# Patient Record
Sex: FEMALE | Race: WHITE | NOT HISPANIC OR LATINO | Employment: FULL TIME | URBAN - METROPOLITAN AREA
[De-identification: names, ages, dates, MRNs, and addresses within clinical notes are randomized per-mention and may not be internally consistent; named-entity substitution may affect disease eponyms.]

---

## 2017-02-02 DIAGNOSIS — F32.0 MILD MAJOR DEPRESSION (H): Primary | ICD-10-CM

## 2017-02-03 RX ORDER — SERTRALINE HYDROCHLORIDE 100 MG/1
TABLET, FILM COATED ORAL
Qty: 90 TABLET | Refills: 0 | Status: SHIPPED | OUTPATIENT
Start: 2017-02-03 | End: 2017-05-02

## 2017-02-09 ENCOUNTER — OFFICE VISIT (OUTPATIENT)
Dept: FAMILY MEDICINE | Facility: OTHER | Age: 27
End: 2017-02-09
Attending: NURSE PRACTITIONER
Payer: COMMERCIAL

## 2017-02-09 VITALS
TEMPERATURE: 98.6 F | WEIGHT: 278.4 LBS | HEIGHT: 67 IN | SYSTOLIC BLOOD PRESSURE: 128 MMHG | RESPIRATION RATE: 14 BRPM | BODY MASS INDEX: 43.7 KG/M2 | HEART RATE: 84 BPM | DIASTOLIC BLOOD PRESSURE: 80 MMHG

## 2017-02-09 DIAGNOSIS — J45.20 INTERMITTENT ASTHMA, UNCOMPLICATED: ICD-10-CM

## 2017-02-09 DIAGNOSIS — H65.192 ACUTE EFFUSION OF LEFT EAR: Primary | ICD-10-CM

## 2017-02-09 PROCEDURE — 99213 OFFICE O/P EST LOW 20 MIN: CPT | Performed by: NURSE PRACTITIONER

## 2017-02-09 RX ORDER — NEOMYCIN SULFATE, POLYMYXIN B SULFATE AND HYDROCORTISONE 10; 3.5; 1 MG/ML; MG/ML; [USP'U]/ML
3 SUSPENSION/ DROPS AURICULAR (OTIC) 4 TIMES DAILY
Qty: 5 ML | Refills: 0 | Status: SHIPPED | OUTPATIENT
Start: 2017-02-09 | End: 2017-02-16

## 2017-02-09 ASSESSMENT — ANXIETY QUESTIONNAIRES
1. FEELING NERVOUS, ANXIOUS, OR ON EDGE: SEVERAL DAYS
2. NOT BEING ABLE TO STOP OR CONTROL WORRYING: NOT AT ALL
GAD7 TOTAL SCORE: 3
3. WORRYING TOO MUCH ABOUT DIFFERENT THINGS: NOT AT ALL
5. BEING SO RESTLESS THAT IT IS HARD TO SIT STILL: NOT AT ALL
6. BECOMING EASILY ANNOYED OR IRRITABLE: SEVERAL DAYS
IF YOU CHECKED OFF ANY PROBLEMS ON THIS QUESTIONNAIRE, HOW DIFFICULT HAVE THESE PROBLEMS MADE IT FOR YOU TO DO YOUR WORK, TAKE CARE OF THINGS AT HOME, OR GET ALONG WITH OTHER PEOPLE: NOT DIFFICULT AT ALL
7. FEELING AFRAID AS IF SOMETHING AWFUL MIGHT HAPPEN: NOT AT ALL

## 2017-02-09 ASSESSMENT — PAIN SCALES - GENERAL: PAINLEVEL: MILD PAIN (3)

## 2017-02-09 ASSESSMENT — PATIENT HEALTH QUESTIONNAIRE - PHQ9: 5. POOR APPETITE OR OVEREATING: SEVERAL DAYS

## 2017-02-09 NOTE — PROGRESS NOTES
SUBJECTIVE:  Shilpi Moore, 26 year old, female presents with the following Chief Complaint(s) with HPI to follow:  Chief Complaint   Patient presents with     Ear Problem     feels plugged and pain since last wednesday     *_* Health Care Directive *_*     declined           HPI:  Shilpi presents today with the above concern. Her symptoms started last week with a sore throat and sinus discomfort. She tried a netti pot for relief of sinus congestion and discomfort. The next day, last Wednesday, she woke up with pressure and discomfort in her ears. She rates the discomfort at 3-4/10. She has tried tylenol for relief but the pressure does not change. On Tuesday she did an online doctor who told her to do Flonase for ear effusion. She has not noticed an improvement since starting the Flonase.  Her sinus symptoms have since resolved.    Asthma is well controlled on current plan.  Has not had to go to the ED or UC for exacerbation.     Patient Active Problem List   Diagnosis     Intermittent asthma     Mild major depression (H)     Vitamin D deficiency     ACP (advance care planning)       Past Medical History   Diagnosis Date     Allergic rhinitis 6/15/2011     Major depressive disorder, recurrent episode, unspecified 6/15/2011     Unspecified vitamin D deficiency 4/11/2014       Past Surgical History   Procedure Laterality Date     Other surgical history       I&D rt abscess axilla     Ent surgery       tonsillectomy     Soft tissue surgery       IND cyst right axilla       Family History   Problem Relation Age of Onset     Asthma Mother      Hypertension Father      Asthma Brother      Asthma Sister      CANCER Other 28     Cousin; Melanoma, cause of death     CANCER Maternal Grandfather 62     Mesothelioma, cause of death     Thyroid Disease No family hx of        Social History   Substance Use Topics     Smoking status: Never Smoker      Smokeless tobacco: Never Used      Comment: Passive exposure     Alcohol Use:  Yes      Comment: Beer; Occasionally       Current Outpatient Prescriptions   Medication Sig Dispense Refill     Topiramate (TOPAMAX PO) Take 25 mg by mouth 2 times daily       neomycin-polymyxin-hydrocortisone (CORTISPORIN) 3.5-95852-0 otic suspension Place 3 drops Into the left ear 4 times daily for 7 days 5 mL 0     sertraline (ZOLOFT) 100 MG tablet TAKE 1 TABLET DAILY 90 tablet 0     hydrochlorothiazide (HYDRODIURIL) 25 MG tablet TAKE 1 TABLET DAILY 90 tablet 1     cetirizine (ZYRTEC) 10 MG tablet Take 10 mg by mouth daily       fluticasone (FLONASE) 50 MCG/ACT nasal spray Spray 1-2 sprays into both nostrils daily (Patient taking differently: Spray 1-2 sprays into both nostrils daily as needed ) 16 g 0     VENTOLIN  (90 BASE) MCG/ACT inhaler USE 2 INHALATIONS INTO THE LUNGS EVERY 6 HOURS. (Patient taking differently: USE 2 INHALATIONS INTO THE LUNGS EVERY 6 HOURS.prn) 2 each 2       Allergies   Allergen Reactions     Nsaids Swelling         REVIEW OF SYSTEMS  Skin: negative for, rash  Eyes: negative for, pain, pressure, drainage  Ears/Nose/Throat: positive for earache bilaterally--see above, negative for, nasal congestion, purulent rhinorrhea, sneezing, postnasal drainage, sinus trouble, sore throat  Respiratory: No shortness of breath, dyspnea on exertion, cough, or hemoptysis  Cardiovascular: negative for, chest pain and exertional chest pain or pressure  Gastrointestinal: negative for, nausea, vomiting, abdominal pain and diarrhea  Musculoskeletal: negative for body aches  Hematologic/Lymphatic/Immunologic: negative for, chills, fever and swollen nodes      OBJECTIVE:    B/P: 128/80, T: 98.6, P: 84, R: 14, W: 278 lbs 6.4 oz, BMI: Body mass index is 43.59 kg/(m^2).  Constitutional: healthy, alert and no distress  Head: Normocephalic. No masses, lesions, tenderness or abnormalities  ENT: Left external auditory canal erythematous. Bilateral TM normal without fluid, translucent TM with positive light  reflex, neck without nodes, throat normal without erythema or exudate and sinuses nontender  Neck: neck supple, no lymphadenopathy, trachea midline, thyroid symmetrical with out nodules noted.  Carotid arteries without bruit  Cardiovascular: RRR. No murmurs, clicks gallops or rub  Respiratory:  Respirations easy, even, unlabored.. Lungs clear  Musculoskeletal: extremities normal- no gross deformities noted, gait normal and normal muscle tone  Skin: no suspicious lesions or rashes  Psychiatric: mentation appears normal and affect normal  Hematologic/Lymphatic/Immunologic: normal ant/post cervical and supraclavicular nodes      ASSESSMENT / PLAN:  1. Acute effusion of left ear  Symptomatic  - neomycin-polymyxin-hydrocortisone (CORTISPORIN) 3.5-65661-9 otic suspension; Place 3 drops Into the left ear 4 times daily for 7 days  Dispense: 5 mL; Refill: 0  -If worsening call the clinic. If symptoms do not improve in 6-8 weeks consider referral to ENT.    2. Intermittent asthma, uncomplicated  Chronic  -Continue with zyrtec for prevention of asthma flares.        Follow-up as needed for acute concerns    MALIKA Mayo-Student    Patient is seen in conjunction with NP student.  History is reviewed with patient and pertinent portions of the exam are repeated.  Assessment and plan is reviewed with the patient.    Thalia Landaverde,   Certified Adult Nurse Practitioner  [phone]

## 2017-02-09 NOTE — PATIENT INSTRUCTIONS
ASSESSMENT/PLAN:  1. Acute effusion of left ear  Symptomatic  - continue zyrtec and flonase  - neomycin-polymyxin-hydrocortisone (CORTISPORIN) 3.5-37091-9 otic suspension; Place 3 drops Into the left ear 4 times daily for 7 days  Dispense: 5 mL; Refill: 0    2. Intermittent asthma, uncomplicated  Chronic  Continue current plan  Asthma action plan and control test are updated today      Increase fluids and rest.   Follow up if symptoms do not improve or worsen    Thalia Landaverde,   Certified Adult Nurse Practitioner  [phone]    My Asthma Action Plan  Name: Shilpi Moore   YOB: 1990  Date: 2/9/2017   My doctor: Tiana Tucker   My clinic: [facility]      My Control Medicine: zyrtec          Dose: 10 mg  My Rescue Medicine: Albuterol (Proair/Ventolin/Proventil) HFA        Dose: prn  My Asthma Severity: intermittent  Avoid your asthma triggers: smoke, upper respiratory infections, dust mites, pollens, animal dander, aspirin, strong odors and fumes, exercise or sports and cold air        GREEN ZONE   Good Control    I feel good    No cough or wheeze    Can work, sleep and play without asthma symptoms       Take your asthma control medicine every day.     1. If exercise triggers your asthma, take your rescue medication    15 minutes before exercise or sports, and    During exercise if you have asthma symptoms  2. Spacer to use with inhaler: If you have a spacer, make sure to use it with your inhaler             YELLOW ZONE Getting Worse  I have ANY of these:    I do not feel good    Cough or wheeze    Chest feels tight    Wake up at night   1. Keep taking your Green Zone medications  2. Start taking your rescue medicine:    every 20 minutes for up to 1 hour. Then every 4 hours for 24-48 hours.  3. If you stay in the Yellow Zone for more than 12-24 hours, contact your doctor.  4. If you do not return to the Green Zone in 12-24 hours or you get worse, start taking your oral steroid  medicine if prescribed by your provider.           RED ZONE Medical Alert - Get Help  I have ANY of these:    I feel awful    Medicine is not helping    Breathing getting harder    Trouble walking or talking    Nose opens wide to breathe       1. Take your rescue medicine NOW  2. If your provider has prescribed an oral steroid medicine, start taking it NOW  3. Call your doctor NOW  4. If you are still in the Red Zone after 20 minutes and you have not reached your doctor:    Take your rescue medicine again and    Call 911 or go to the emergency room right away    See your regular doctor within 2 weeks of an Emergency Room or Urgent Care visit for follow-up treatment.        The above medication may be given at school or day care?: Yes and N/A (Adult Patient)  Child can carry and use inhaler(s) at school with approval of school nurse?: Yes and N/A (Adult Patient)    Electronically signed by: TRANG LARSON, February 9, 2017    Annual Reminders:  Meet with Asthma Educator,  Flu Shot in the Fall, consider Pneumonia Vaccination for patients with asthma (aged 19 and older).    Pharmacy:    [facility] [id number] [place] [address]  [facility] [address]  [facility] [address] [facility] STORE [address] [place], [place] [id number] AT [facility] [address]                    Asthma Triggers  How To Control Things That Make Your Asthma Worse    Triggers are things that make your asthma worse.  Look at the list below to help you find your triggers and what you can do about them.  You can help prevent asthma flare-ups by staying away from your triggers.      Trigger                                                          What you can do   Cigarette Smoke  Tobacco smoke can make asthma worse. Do not allow smoking in your home, car or around you.  Be sure no one smokes at a child s day care or school.  If you  smoke, ask your health care provider for ways to help you quit.  Ask family members to quit too.  Ask your health care provider for a referral to Quit Plan to help you quit smoking, or call 2-265-995-PLAN.     Colds, Flu, Bronchitis  These are common triggers of asthma. Wash your hands often.  Don t touch your eyes, nose or mouth.  Get a flu shot every year.     Dust Mites  These are tiny bugs that live in cloth or carpet. They are too small to see. Wash sheets and blankets in hot water every week.   Encase pillows and mattress in dust mite proof covers.  Avoid having carpet if you can. If you have carpet, vacuum weekly.   Use a dust mask and HEPA vacuum.   Pollen and Outdoor Mold  Some people are allergic to trees, grass, or weed pollen, or molds. Try to keep your windows closed.  Limit time out doors when pollen count is high.   Ask you health care provider about taking medicine during allergy season.     Animal Dander  Some people are allergic to skin flakes, urine or saliva from pets with fur or feathers. Keep pets with fur or feathers out of your home.    If you can t keep the pet outdoors, then keep the pet out of your bedroom.  Keep the bedroom door closed.  Keep pets off cloth furniture and away from stuffed toys.     Mice, Rats, and Cockroaches  Some people are allergic to the waste from these pests.   Cover food and garbage.  Clean up spills and food crumbs.  Store grease in the refrigerator.   Keep food out of the bedroom.   Indoor Mold  This can be a trigger if your home has high moisture. Fix leaking faucets, pipes, or other sources of water.   Clean moldy surfaces.  Dehumidify basement if it is damp and smelly.   Smoke, Strong Odors, and Sprays  These can reduce air quality. Stay away from strong odors and sprays, such as perfume, powder, hair spray, paints, smoke incense, paint, cleaning products, candles and new carpet.   Exercise or Sports  Some people with asthma have this trigger. Be active!  Ask  your doctor about taking medicine before sports or exercise to prevent symptoms.    Warm up for 5-10 minutes before and after sports or exercise.     Other Triggers of Asthma  Cold air:  Cover your nose and mouth with a scarf.  Sometimes laughing or crying can be a trigger.  Some medicines and food can trigger asthma.

## 2017-02-09 NOTE — PROGRESS NOTES
CHIEF COMPLAINT:  Chief Complaint   Patient presents with     Ear Problem     feels plugged and pain since last wednesday     *_* Health Care Directive *_*     declined        SUBJECTIVE:   Shilpi Moore  is here today because of:{FVC CCI:516579}  The patient has had symptoms of {FVC URI SYMPTOMS:128793}.   Onset of symptoms was {NUMBERS 1-16:10} {TIME UNITS:11} ago. Course of illness is {STATUS:270946}.  Patient {has/denies:5300} exposure to illness at home or work/school.   Patient denies {URI Sx:762842}  Treatment measures tried include {URI TREATMENTS TRIED:5012}.  Patient {IS/ARE:5283} a smoker      {additional problems for provider to add:934608}      Past Medical History   Diagnosis Date     Allergic rhinitis 6/15/2011     Major depressive disorder, recurrent episode, unspecified 6/15/2011     Unspecified vitamin D deficiency 4/11/2014     Past Surgical History   Procedure Laterality Date     Other surgical history       I&D rt abscess axilla     Ent surgery       tonsillectomy     Soft tissue surgery       IND cyst right axilla     Current Outpatient Prescriptions   Medication Sig Dispense Refill     Topiramate (TOPAMAX PO) Take 25 mg by mouth 2 times daily       neomycin-polymyxin-hydrocortisone (CORTISPORIN) 3.5-52475-6 otic suspension Place 3 drops Into the left ear 4 times daily for 7 days 5 mL 0     sertraline (ZOLOFT) 100 MG tablet TAKE 1 TABLET DAILY 90 tablet 0     hydrochlorothiazide (HYDRODIURIL) 25 MG tablet TAKE 1 TABLET DAILY 90 tablet 1     cetirizine (ZYRTEC) 10 MG tablet Take 10 mg by mouth daily       fluticasone (FLONASE) 50 MCG/ACT nasal spray Spray 1-2 sprays into both nostrils daily (Patient taking differently: Spray 1-2 sprays into both nostrils daily as needed ) 16 g 0     VENTOLIN  (90 BASE) MCG/ACT inhaler USE 2 INHALATIONS INTO THE LUNGS EVERY 6 HOURS. (Patient taking differently: USE 2 INHALATIONS INTO THE LUNGS EVERY 6 HOURS.prn) 2 each 2      Allergies   Allergen  "Reactions     Nsaids Swelling       Family and Social History are reviewed.    REVIEW OF SYSTEMS  Skin: {Skin:100::\"negative\"}  Eyes: {Eyes:200::\"negative\"}  Ears/Nose/Throat: {ENT:300::\"negative\"}  Respiratory: {Resp:400::\"No shortness of breath, dyspnea on exertion, cough, or hemoptysis\"}  Cardiovascular: {CV:500::\"negative\"}  Gastrointestinal: {GI:600::\"negative\"}  Genitourinary: {:700::\"negative\"}  Musculoskeletal: {Musc:800::\"negative\"}  Neurologic: {Neur:900::\"negative\"}  Psychiatric: {Psych:1000::\"negative\"}  Hematologic/Lymphatic/Immunologic: {Hem:1100::\"negative\"}  Endocrine: {Endo:1200::\"negative\"}    OBJECTIVE:   Vital signs:/80 mmHg  Pulse 84  Temp(Src) 98.6  F (37  C) (Tympanic)  Resp 14  Ht 5' 7\" (1.702 m)  Wt 278 lb 6.4 oz (126.281 kg)  BMI 43.59 kg/m2   General: {GENERAL APPEARANCE:50::\"healthy\",\"alert\",\"no distress\"}  Skin is unremarkable.  HEENT: {ENT EXAM:5032::\"ENT exam normal, no neck nodes or sinus tenderness\"}.  Lungs {CHEST EXAM:5033::\"chest clear to IPPA\",\"no tachypnea, retractions or cyanosis\",\"S1, S2 normal, no murmur, no gallop, rate regular\"}  Abdomen: ***  Rapid Strep Test {IS/ARE:5283} performed    LABS AND IMAGING  Results for orders placed or performed in visit on 03/07/16   Basic metabolic panel   Result Value Ref Range    Sodium 140 133 - 144 mmol/L    Potassium 3.4 3.4 - 5.3 mmol/L    Chloride 103 94 - 109 mmol/L    Carbon Dioxide 28 20 - 32 mmol/L    Anion Gap 9 3 - 14 mmol/L    Glucose 93 70 - 99 mg/dL    Urea Nitrogen 9 7 - 30 mg/dL    Creatinine 0.76 0.52 - 1.04 mg/dL    GFR Estimate >90  Non  GFR Calc   >60 mL/min/1.7m2    GFR Estimate If Black >90   GFR Calc   >60 mL/min/1.7m2    Calcium 8.6 8.5 - 10.1 mg/dL   TSH   Result Value Ref Range    TSH 2.13 0.40 - 4.00 mU/L       ASSESSMENT/PLAN:  1. Acute effusion of left ear  Symptomatic  - continue zyrtec and flonase  - neomycin-polymyxin-hydrocortisone (CORTISPORIN) 3.5-57812-8 otic " suspension; Place 3 drops Into the left ear 4 times daily for 7 days  Dispense: 5 mL; Refill: 0    2. Intermittent asthma, uncomplicated  Chronic  Continue current plan  Asthma action plan and control test are updated today      Increase fluids and rest.   Follow up if symptoms do not improve or worsen    Thalia Landaverde,   Certified Adult Nurse Practitioner  [phone]

## 2017-02-09 NOTE — NURSING NOTE
"Chief Complaint   Patient presents with     Ear Problem     feels plugged and pain since last wednesday     *_* Health Care Directive *_*     declined        Initial /80 mmHg  Pulse 84  Temp(Src) 98.6  F (37  C) (Tympanic)  Resp 14  Ht 5' 7\" (1.702 m)  Wt 278 lb 6.4 oz (126.281 kg)  BMI 43.59 kg/m2 Estimated body mass index is 43.59 kg/(m^2) as calculated from the following:    Height as of this encounter: 5' 7\" (1.702 m).    Weight as of this encounter: 278 lb 6.4 oz (126.281 kg).  Medication Reconciliation: andra LARSON      "

## 2017-02-09 NOTE — MR AVS SNAPSHOT
After Visit Summary   2/9/2017    Shilpi Moore    MRN: [id number]           Patient Information     Date Of Birth          1990        Visit Information        Provider Department      2/9/2017 4:15 PM Thalia Landaverde NP [facility]        Today's Diagnoses     Acute effusion of left ear    -  1     Intermittent asthma, uncomplicated           Care Instructions      ASSESSMENT/PLAN:  1. Acute effusion of left ear  Symptomatic  - continue zyrtec and flonase  - neomycin-polymyxin-hydrocortisone (CORTISPORIN) 3.5-54063-0 otic suspension; Place 3 drops Into the left ear 4 times daily for 7 days  Dispense: 5 mL; Refill: 0    2. Intermittent asthma, uncomplicated  Chronic  Continue current plan  Asthma action plan and control test are updated today      Increase fluids and rest.   Follow up if symptoms do not improve or worsen    Thalia Landaverde,   Certified Adult Nurse Practitioner  [phone]    My Asthma Action Plan  Name: Shilpi Moore   YOB: 1990  Date: 2/9/2017   My doctor: Tiana Tucker   My clinic: [facility]      My Control Medicine: zyrtec          Dose: 10 mg  My Rescue Medicine: Albuterol (Proair/Ventolin/Proventil) HFA        Dose: prn  My Asthma Severity: intermittent  Avoid your asthma triggers: smoke, upper respiratory infections, dust mites, pollens, animal dander, aspirin, strong odors and fumes, exercise or sports and cold air        GREEN ZONE   Good Control    I feel good    No cough or wheeze    Can work, sleep and play without asthma symptoms       Take your asthma control medicine every day.     1. If exercise triggers your asthma, take your rescue medication    15 minutes before exercise or sports, and    During exercise if you have asthma symptoms  2. Spacer to use with inhaler: If you have a spacer, make sure to use it with your inhaler             YELLOW ZONE Getting Worse  I have ANY of these:    I do not feel  good    Cough or wheeze    Chest feels tight    Wake up at night   1. Keep taking your Green Zone medications  2. Start taking your rescue medicine:    every 20 minutes for up to 1 hour. Then every 4 hours for 24-48 hours.  3. If you stay in the Yellow Zone for more than 12-24 hours, contact your doctor.  4. If you do not return to the Green Zone in 12-24 hours or you get worse, start taking your oral steroid medicine if prescribed by your provider.           RED ZONE Medical Alert - Get Help  I have ANY of these:    I feel awful    Medicine is not helping    Breathing getting harder    Trouble walking or talking    Nose opens wide to breathe       1. Take your rescue medicine NOW  2. If your provider has prescribed an oral steroid medicine, start taking it NOW  3. Call your doctor NOW  4. If you are still in the Red Zone after 20 minutes and you have not reached your doctor:    Take your rescue medicine again and    Call 911 or go to the emergency room right away    See your regular doctor within 2 weeks of an Emergency Room or Urgent Care visit for follow-up treatment.        The above medication may be given at school or day care?: Yes and N/A (Adult Patient)  Child can carry and use inhaler(s) at school with approval of school nurse?: Yes and N/A (Adult Patient)    Electronically signed by: TRANG LARSON, February 9, 2017    Annual Reminders:  Meet with Asthma Educator,  Flu Shot in the Fall, consider Pneumonia Vaccination for patients with asthma (aged 19 and older).    Pharmacy:    [facility] [address] [place] [facility] [address]  [facility] - [address]  [facility] - [address] [facility] STORE [address] - [facility] [address] AT [facility] [address]                    Asthma Triggers  How To Control Things That Make Your Asthma Worse    Triggers are things that make your asthma worse.  Look at  the list below to help you find your triggers and what you can do about them.  You can help prevent asthma flare-ups by staying away from your triggers.      Trigger                                                          What you can do   Cigarette Smoke  Tobacco smoke can make asthma worse. Do not allow smoking in your home, car or around you.  Be sure no one smokes at a child s day care or school.  If you smoke, ask your health care provider for ways to help you quit.  Ask family members to quit too.  Ask your health care provider for a referral to Quit Plan to help you quit smoking, or call 2-433-265-PLAN.     Colds, Flu, Bronchitis  These are common triggers of asthma. Wash your hands often.  Don t touch your eyes, nose or mouth.  Get a flu shot every year.     Dust Mites  These are tiny bugs that live in cloth or carpet. They are too small to see. Wash sheets and blankets in hot water every week.   Encase pillows and mattress in dust mite proof covers.  Avoid having carpet if you can. If you have carpet, vacuum weekly.   Use a dust mask and HEPA vacuum.   Pollen and Outdoor Mold  Some people are allergic to trees, grass, or weed pollen, or molds. Try to keep your windows closed.  Limit time out doors when pollen count is high.   Ask you health care provider about taking medicine during allergy season.     Animal Dander  Some people are allergic to skin flakes, urine or saliva from pets with fur or feathers. Keep pets with fur or feathers out of your home.    If you can t keep the pet outdoors, then keep the pet out of your bedroom.  Keep the bedroom door closed.  Keep pets off cloth furniture and away from stuffed toys.     Mice, Rats, and Cockroaches  Some people are allergic to the waste from these pests.   Cover food and garbage.  Clean up spills and food crumbs.  Store grease in the refrigerator.   Keep food out of the bedroom.   Indoor Mold  This can be a trigger if your home has high moisture. Fix  leaking faucets, pipes, or other sources of water.   Clean moldy surfaces.  Dehumidify basement if it is damp and smelly.   Smoke, Strong Odors, and Sprays  These can reduce air quality. Stay away from strong odors and sprays, such as perfume, powder, hair spray, paints, smoke incense, paint, cleaning products, candles and new carpet.   Exercise or Sports  Some people with asthma have this trigger. Be active!  Ask your doctor about taking medicine before sports or exercise to prevent symptoms.    Warm up for 5-10 minutes before and after sports or exercise.     Other Triggers of Asthma  Cold air:  Cover your nose and mouth with a scarf.  Sometimes laughing or crying can be a trigger.  Some medicines and food can trigger asthma.           Follow-ups after your visit        Who to contact     If you have questions or need follow up information about today's clinic visit or your schedule please contact [facility] directly at [phone].  Normal or non-critical lab and imaging results will be communicated to you by Trovhart, letter or phone within 4 business days after the clinic has received the results. If you do not hear from us within 7 days, please contact the clinic through [employer] or phone. If you have a critical or abnormal lab result, we will notify you by phone as soon as possible.  Submit refill requests through [employer] or call your pharmacy and they will forward the refill request to us. Please allow 3 business days for your refill to be completed.          Additional Information About Your Visit        [employer] Information     [employer] gives you secure access to your electronic health record. If you see a primary care provider, you can also send messages to your care team and make appointments. If you have questions, please call your primary care clinic.  If you do not have a primary care provider, please call [phone] and they will assist you.        Care EveryWhere ID     This is your  "Care EveryWhere ID. This could be used by other organizations to access your [facility] medical records  [id number]        Your Vitals Were     Pulse Temperature Respirations Height BMI (Body Mass Index)       84 98.6  F (37  C) (Tympanic) 14 5' 7\" (1.702 m) 43.59 kg/m2        Blood Pressure from Last 3 Encounters:   02/09/17 128/80   10/03/16 124/76   06/06/16 138/74    Weight from Last 3 Encounters:   02/09/17 278 lb 6.4 oz (126.281 kg)   10/03/16 292 lb 9.6 oz (132.722 kg)   06/06/16 300 lb (136.079 kg)              Today, you had the following     No orders found for display         Today's Medication Changes          These changes are accurate as of: 2/9/17  4:37 PM.  If you have any questions, ask your nurse or doctor.               Start taking these medicines.        Dose/Directions    neomycin-polymyxin-hydrocortisone 3.5-41335-7 otic suspension   Commonly known as:  CORTISPORIN   Used for:  Acute effusion of left ear   Started by:  Thalia Landaverde, NP        Dose:  3 drop   Place 3 drops Into the left ear 4 times daily for 7 days   Quantity:  5 mL   Refills:  0         These medicines have changed or have updated prescriptions.        Dose/Directions    fluticasone 50 MCG/ACT spray   Commonly known as:  FLONASE   This may have changed:    - when to take this  - reasons to take this   Used for:  Acute maxillary sinusitis, recurrence not specified        Dose:  1-2 spray   Spray 1-2 sprays into both nostrils daily   Quantity:  16 g   Refills:  0       VENTOLIN  (90 BASE) MCG/ACT Inhaler   This may have changed:  See the new instructions.   Used for:  Mild intermittent asthma without complication   Generic drug:  albuterol        USE 2 INHALATIONS INTO THE LUNGS EVERY 6 HOURS.   Quantity:  2 each   Refills:  2         Stop taking these medicines if you haven't already. Please contact your care team if you have questions.     cyclobenzaprine 10 MG tablet   Commonly known as:  FLEXERIL   Stopped " by:  Thalia Landaverde NP           traMADol 50 MG tablet   Commonly known as:  ULTRAM   Stopped by:  Thalia Landaverde NP                Where to get your medicines      These medications were sent to [place] [id number] [place] [facility] [address] [place] [address]  [facility] [address]     Phone:  [phone]    - neomycin-polymyxin-hydrocortisone 3.5-39693-9 otic suspension             Primary Care Provider Office Phone # Fax #    Tiana GamboaCHRIS underwood [phone]       [address] [address]        Thank you!     Thank you for choosing [facility]  for your care. Our goal is always to provide you with excellent care. Hearing back from our patients is one way we can continue to improve our services. Please take a few minutes to complete the written survey that you may receive in the mail after your visit with us. Thank you!             Your Updated Medication List - Protect others around you: Learn how to safely use, store and throw away your medicines at www.disposemymeds.org.          This list is accurate as of: 2/9/17  4:37 PM.  Always use your most recent med list.                   Brand Name Dispense Instructions for use    cetirizine 10 MG tablet    zyrTEC     Take 10 mg by mouth daily       fluticasone 50 MCG/ACT spray    FLONASE    16 g    Spray 1-2 sprays into both nostrils daily       hydrochlorothiazide 25 MG tablet    HYDRODIURIL    90 tablet    TAKE 1 TABLET DAILY       neomycin-polymyxin-hydrocortisone 3.5-88719-0 otic suspension    CORTISPORIN    5 mL    Place 3 drops Into the left ear 4 times daily for 7 days       sertraline 100 MG tablet    ZOLOFT    90 tablet    TAKE 1 TABLET DAILY       TOPAMAX PO      Take 25 mg by mouth 2 times daily       VENTOLIN  (90 BASE) MCG/ACT Inhaler   Generic drug:  albuterol     2 each    USE 2 INHALATIONS INTO THE LUNGS EVERY 6 HOURS.

## 2017-02-10 ASSESSMENT — ASTHMA QUESTIONNAIRES: ACT_TOTALSCORE: 22

## 2017-02-10 ASSESSMENT — ANXIETY QUESTIONNAIRES: GAD7 TOTAL SCORE: 3

## 2017-02-10 ASSESSMENT — PATIENT HEALTH QUESTIONNAIRE - PHQ9: SUM OF ALL RESPONSES TO PHQ QUESTIONS 1-9: 3

## 2017-04-04 ENCOUNTER — OFFICE VISIT (OUTPATIENT)
Dept: FAMILY MEDICINE | Facility: OTHER | Age: 27
End: 2017-04-04
Attending: NURSE PRACTITIONER
Payer: COMMERCIAL

## 2017-04-04 VITALS
TEMPERATURE: 97 F | HEART RATE: 68 BPM | WEIGHT: 270 LBS | SYSTOLIC BLOOD PRESSURE: 110 MMHG | RESPIRATION RATE: 14 BRPM | DIASTOLIC BLOOD PRESSURE: 80 MMHG | BODY MASS INDEX: 42.38 KG/M2 | HEIGHT: 67 IN

## 2017-04-04 DIAGNOSIS — E56.9 VITAMIN DEFICIENCY: ICD-10-CM

## 2017-04-04 DIAGNOSIS — R00.0 TACHYCARDIA, UNSPECIFIED: Primary | ICD-10-CM

## 2017-04-04 DIAGNOSIS — I10 BENIGN ESSENTIAL HYPERTENSION: ICD-10-CM

## 2017-04-04 LAB
ANION GAP SERPL CALCULATED.3IONS-SCNC: 12 MMOL/L (ref 3–14)
BASOPHILS # BLD AUTO: 0.1 10E9/L (ref 0–0.2)
BASOPHILS NFR BLD AUTO: 0.8 %
BUN SERPL-MCNC: 12 MG/DL (ref 7–30)
CALCIUM SERPL-MCNC: 9.2 MG/DL (ref 8.5–10.1)
CHLORIDE SERPL-SCNC: 107 MMOL/L (ref 94–109)
CHOLEST SERPL-MCNC: 187 MG/DL
CO2 SERPL-SCNC: 21 MMOL/L (ref 20–32)
CREAT SERPL-MCNC: 0.82 MG/DL (ref 0.52–1.04)
DIFFERENTIAL METHOD BLD: NORMAL
EOSINOPHIL # BLD AUTO: 0.4 10E9/L (ref 0–0.7)
EOSINOPHIL NFR BLD AUTO: 5.8 %
ERYTHROCYTE [DISTWIDTH] IN BLOOD BY AUTOMATED COUNT: 14.9 % (ref 10–15)
GFR SERPL CREATININE-BSD FRML MDRD: 84 ML/MIN/1.7M2
GLUCOSE SERPL-MCNC: 103 MG/DL (ref 70–99)
HCT VFR BLD AUTO: 42.7 % (ref 35–47)
HDLC SERPL-MCNC: 59 MG/DL
HGB BLD-MCNC: 14 G/DL (ref 11.7–15.7)
LDLC SERPL CALC-MCNC: 101 MG/DL
LYMPHOCYTES # BLD AUTO: 2.2 10E9/L (ref 0.8–5.3)
LYMPHOCYTES NFR BLD AUTO: 29.9 %
MCH RBC QN AUTO: 26.9 PG (ref 26.5–33)
MCHC RBC AUTO-ENTMCNC: 32.8 G/DL (ref 31.5–36.5)
MCV RBC AUTO: 82 FL (ref 78–100)
MONOCYTES # BLD AUTO: 0.7 10E9/L (ref 0–1.3)
MONOCYTES NFR BLD AUTO: 9 %
NEUTROPHILS # BLD AUTO: 4 10E9/L (ref 1.6–8.3)
NEUTROPHILS NFR BLD AUTO: 54.5 %
NONHDLC SERPL-MCNC: 128 MG/DL
PLATELET # BLD AUTO: 349 10E9/L (ref 150–450)
POTASSIUM SERPL-SCNC: 3.8 MMOL/L (ref 3.4–5.3)
RBC # BLD AUTO: 5.2 10E12/L (ref 3.8–5.2)
SODIUM SERPL-SCNC: 140 MMOL/L (ref 133–144)
TRIGL SERPL-MCNC: 134 MG/DL
TSH SERPL DL<=0.005 MIU/L-ACNC: 2.19 MU/L (ref 0.4–4)
WBC # BLD AUTO: 7.3 10E9/L (ref 4–11)

## 2017-04-04 PROCEDURE — 93000 ELECTROCARDIOGRAM COMPLETE: CPT | Performed by: INTERNAL MEDICINE

## 2017-04-04 PROCEDURE — 99000 SPECIMEN HANDLING OFFICE-LAB: CPT | Performed by: NURSE PRACTITIONER

## 2017-04-04 PROCEDURE — 36415 COLL VENOUS BLD VENIPUNCTURE: CPT | Performed by: NURSE PRACTITIONER

## 2017-04-04 PROCEDURE — 80048 BASIC METABOLIC PNL TOTAL CA: CPT | Performed by: NURSE PRACTITIONER

## 2017-04-04 PROCEDURE — 82306 VITAMIN D 25 HYDROXY: CPT | Mod: 90 | Performed by: NURSE PRACTITIONER

## 2017-04-04 PROCEDURE — 99214 OFFICE O/P EST MOD 30 MIN: CPT | Mod: 25 | Performed by: NURSE PRACTITIONER

## 2017-04-04 PROCEDURE — 80061 LIPID PANEL: CPT | Performed by: NURSE PRACTITIONER

## 2017-04-04 PROCEDURE — 84443 ASSAY THYROID STIM HORMONE: CPT | Performed by: NURSE PRACTITIONER

## 2017-04-04 PROCEDURE — 82607 VITAMIN B-12: CPT | Mod: 90 | Performed by: NURSE PRACTITIONER

## 2017-04-04 PROCEDURE — 85025 COMPLETE CBC W/AUTO DIFF WBC: CPT | Performed by: NURSE PRACTITIONER

## 2017-04-04 NOTE — PATIENT INSTRUCTIONS
1. Tachycardia, unspecified  - TSH with free T4 reflex  - CBC with platelets differential  - Vitamin B12  - Vitamin D Deficiency  - EKG 12-lead complete w/read - (Clinic Performed)  - Zio Patch Holter; Future  - Zio Patch Holter    2. Vitamin deficiency  - Vitamin D level    3. Benign essential hypertension  - Meds as directed  - Lipid Profile  - Basic metabolic panel    Pap in 3 weeks      [facility]  [phone]

## 2017-04-04 NOTE — MR AVS SNAPSHOT
After Visit Summary   4/4/2017    Shilpi Moore    MRN: [id number]           Patient Information     Date Of Birth          1990        Visit Information        Provider Department      4/4/2017 8:15 AM Tiana Tucker NP [facility]        Today's Diagnoses     Tachycardia, unspecified    -  1    Vitamin deficiency        Benign essential hypertension          Care Instructions          1. Tachycardia, unspecified  - TSH with free T4 reflex  - CBC with platelets differential  - Vitamin B12  - Vitamin D Deficiency  - EKG 12-lead complete w/read - (Clinic Performed)  - Zio Patch Holter; Future  - Zio Patch Holter    2. Vitamin deficiency  - Vitamin D level    3. Benign essential hypertension  - Meds as directed  - Lipid Profile  - Basic metabolic panel    Pap in 3 weeks      Tiana LOGAN-FN  [phone]              Follow-ups after your visit        Who to contact     If you have questions or need follow up information about today's clinic visit or your schedule please contact [facility] directly at [phone].  Normal or non-critical lab and imaging results will be communicated to you by [employer], letter or phone within 4 business days after the clinic has received the results. If you do not hear from us within 7 days, please contact the clinic through [email or web address] or phone. If you have a critical or abnormal lab result, we will notify you by phone as soon as possible.  Submit refill requests through [email or web address] or call your pharmacy and they will forward the refill request to us. Please allow 3 business days for your refill to be completed.          Additional Information About Your Visit        [employer] Information     [email or web address] gives you secure access to your electronic health record. If you see a primary care provider, you can also send messages to your care team and make appointments. If you have questions, please call your primary care clinic.  If you do not have a primary  "care provider, please call [phone] and they will assist you.        Care EveryWhere ID     This is your Care EveryWhere ID. This could be used by other organizations to access your [facility] medical records  [id number]        Your Vitals Were     Pulse Temperature Respirations Height BMI (Body Mass Index)       68 97  F (36.1  C) 14 5' 7\" (1.702 m) 42.29 kg/m2        Blood Pressure from Last 3 Encounters:   04/04/17 110/80   02/09/17 128/80   10/03/16 124/76    Weight from Last 3 Encounters:   04/04/17 270 lb (122.5 kg)   02/09/17 278 lb 6.4 oz (126.3 kg)   10/03/16 292 lb 9.6 oz (132.7 kg)              We Performed the Following     Basic metabolic panel     CBC with platelets differential     EKG 12-lead complete w/read - (Clinic Performed)     Lipid Profile     TSH with free T4 reflex     Vitamin B12     Vitamin D Deficiency     Zio Patch Holter          Today's Medication Changes          These changes are accurate as of: 4/4/17  9:16 AM.  If you have any questions, ask your nurse or doctor.               These medicines have changed or have updated prescriptions.        Dose/Directions    fluticasone 50 MCG/ACT spray   Commonly known as:  FLONASE   This may have changed:    - when to take this  - reasons to take this   Used for:  Acute maxillary sinusitis, recurrence not specified        Dose:  1-2 spray   Spray 1-2 sprays into both nostrils daily   Quantity:  16 g   Refills:  0       hydrochlorothiazide 25 MG tablet   Commonly known as:  HYDRODIURIL   This may have changed:  See the new instructions.   Used for:  HTN (hypertension)        TAKE 1 TABLET DAILY   Quantity:  90 tablet   Refills:  1       VENTOLIN  (90 BASE) MCG/ACT Inhaler   This may have changed:  See the new instructions.   Used for:  Mild intermittent asthma without complication   Generic drug:  albuterol        USE 2 INHALATIONS INTO THE LUNGS EVERY 6 HOURS.   Quantity:  2 each   Refills:  2                Primary Care Provider " Office Phone # Fax Ike Tucker [phone]       [address] [address]        Thank you!     Thank you for choosing [facility]  for your care. Our goal is always to provide you with excellent care. Hearing back from our patients is one way we can continue to improve our services. Please take a few minutes to complete the written survey that you may receive in the mail after your visit with us. Thank you!             Your Updated Medication List - Protect others around you: Learn how to safely use, store and throw away your medicines at www.disposemymeds.org.          This list is accurate as of: 4/4/17  9:16 AM.  Always use your most recent med list.                   Brand Name Dispense Instructions for use    cetirizine 10 MG tablet    zyrTEC     Take 10 mg by mouth daily       fluticasone 50 MCG/ACT spray    FLONASE    16 g    Spray 1-2 sprays into both nostrils daily       hydrochlorothiazide 25 MG tablet    HYDRODIURIL    90 tablet    TAKE 1 TABLET DAILY       sertraline 100 MG tablet    ZOLOFT    90 tablet    TAKE 1 TABLET DAILY       TOPAMAX PO      Take 25 mg by mouth 2 times daily       VENTOLIN  (90 BASE) MCG/ACT Inhaler   Generic drug:  albuterol     2 each    USE 2 INHALATIONS INTO THE LUNGS EVERY 6 HOURS.

## 2017-04-04 NOTE — PROGRESS NOTES
"SUBJECTIVE:  Shilpi Moore is a 26 year old female   Chief Complaint   Patient presents with     Heart Problem     heart beat rapid, , different feeling. for few months, when went on low carb diet, cut HCTZ in half daily       Active diagnoses this visit:  Rapid heart beat at times, Feels\"different but cant explain..  She is on a low carb diet, sees Dr. Bruno, on Topamax.  Has lost 60#.  She has reduced her dose of HCTZ, as BP was normal after weight loss.    Onset- 3-4 months  Timing - intermittent - but daily  Location  - CV  Severity  - moderate  Duration - as above  Quality -  No pain, no SOB, just rapid HR  Settings  - any  Aggravating Factors -  no  Relieving factors -  no  Treatment to Date - no      Due for pap, will schedule  Other medical conditions are stable    Past Medical History:   Diagnosis Date     Allergic rhinitis 6/15/2011     Major depressive disorder, recurrent episode, unspecified 6/15/2011     Unspecified vitamin D deficiency 4/11/2014       Past Surgical History:   Procedure Laterality Date     ENT SURGERY      tonsillectomy     OTHER SURGICAL HISTORY      I&D rt abscess axilla     SOFT TISSUE SURGERY      IND cyst right axilla       Family History   Problem Relation Age of Onset     Asthma Mother      Hypertension Father      Asthma Brother      Asthma Sister      CANCER Maternal Grandfather 62     Mesothelioma, cause of death     CANCER Other 28     Cousin; Melanoma, cause of death     Thyroid Disease No family hx of        Social History   Substance Use Topics     Smoking status: Never Smoker     Smokeless tobacco: Never Used      Comment: Passive exposure     Alcohol use Yes      Comment: Beer; Occasionally       Current Outpatient Prescriptions   Medication     Topiramate (TOPAMAX PO)     sertraline (ZOLOFT) 100 MG tablet     hydrochlorothiazide (HYDRODIURIL) 25 MG tablet     cetirizine (ZYRTEC) 10 MG tablet     fluticasone (FLONASE) 50 MCG/ACT nasal spray     VENTOLIN  (90 BASE) " "MCG/ACT inhaler     No current facility-administered medications for this visit.           Allergies   Allergen Reactions     Nsaids Swelling       REVIEW OF SYSTEMS  Skin: negative  Eyes: negative  Ears/Nose/Throat: negative  Respiratory: No shortness of breath, dyspnea on exertion, cough, or hemoptysis  Cardiovascular: as above  Gastrointestinal: negative  Genitourinary: negative  Musculoskeletal: negative  Neurologic: negative  Psychiatric: negative  Hematologic/Lymphatic/Immunologic: negative      OBJECTIVE:  /80 (BP Location: Right arm, Patient Position: Chair, Cuff Size: Adult Large)  Pulse 68  Temp 97  F (36.1  C)  Resp 14  Ht 5' 7\" (1.702 m)  Wt 270 lb (122.5 kg)  BMI 42.29 kg/m2  Constitutional: healthy, alert, no distress and cooperative  Head: Normocephalic. No masses, lesions, or tenderness  Neck: Neck supple. No adenopathy. Thyroid symmetric.  ENT: ENT exam unremarkable  Cardiovascular: PMI normal. No murmurs, clicks gallops or rub  Respiratory: negative, Percussion normal. Good diaphragmatic excursion. Lungs clear  Gastrointestinal: Abdomen soft, non-tender. BS normal. No masses, organomegaly  Musculoskeletal: extremities normal- no gross deformities noted  Skin: no suspicious lesions or rashes  Neurologic: Gait normal. Sensation grossly WNL.  Psychiatric: mentation appears normal and affect normal/bright  Hematologic/Lymphatic/Immunologic: No adenopathy noted      Visit time:   20 Minutes  Time spent with patient, including examination, face to face patient education - 10mn  Visit Content: During our face to face time, patient education was provided regarding diagnosis, and treatment pan. Patient counseled regarding disease process  All diagnosis and treatment plan are reviewed with the patient, 50 % of face to face time is directed at patient education  Record review completed        1. Tachycardia, unspecified  - TSH with free T4 reflex  - CBC with platelets differential  - Vitamin " B12  - Vitamin D Deficiency  - EKG 12-lead complete w/read - (Clinic Performed)  - Zio Patch Holter; Future  - Zio Patch Holter    2. Vitamin deficiency  - Vitamin D level    3. Benign essential hypertension  - Meds as directed  - Lipid Profile  - Basic metabolic panel    Pap in 3 weeks      Tiana KEMP  [phone]

## 2017-04-04 NOTE — NURSING NOTE
"Chief Complaint   Patient presents with     Heart Problem     heart beat rapid, , different feeling. for few months, when went on low carb diet, cut HCTZ in half daily       Initial /80 (BP Location: Right arm, Patient Position: Chair, Cuff Size: Adult Large)  Pulse 68  Temp 97  F (36.1  C)  Resp 14  Ht 5' 7\" (1.702 m)  Wt 270 lb (122.5 kg)  BMI 42.29 kg/m2 Estimated body mass index is 42.29 kg/(m^2) as calculated from the following:    Height as of this encounter: 5' 7\" (1.702 m).    Weight as of this encounter: 270 lb (122.5 kg).  Medication Reconciliation: complete     Danisha Mijares      "

## 2017-04-05 ENCOUNTER — HOSPITAL ENCOUNTER (OUTPATIENT)
Dept: NUCLEAR MEDICINE | Facility: HOSPITAL | Age: 27
Discharge: HOME | End: 2017-04-05
Attending: NURSE PRACTITIONER | Admitting: NURSE PRACTITIONER
Payer: COMMERCIAL

## 2017-04-05 LAB
DEPRECATED CALCIDIOL+CALCIFEROL SERPL-MC: 23 UG/L (ref 20–75)
VIT B12 SERPL-MCNC: 800 PG/ML (ref 193–986)

## 2017-04-05 PROCEDURE — 0296T ZZHC  EXT ECG > 48HR TO 21 DAY RCRD W/CONECT INTL RCRD: CPT | Mod: TC

## 2017-04-05 PROCEDURE — 0298T ZZC EXT ECG > 48HR TO 21 DAY REVIEW AND INTERPRETATN: CPT | Performed by: INTERNAL MEDICINE

## 2017-04-25 NOTE — PROGRESS NOTES
I looked at her last visit note - she was in with tachycardia.  EKG normal, all labs normal.  Zio patch resulted essentially normal, I will review this with Dr Kendrick for his opinion.  No obvious concerns noted on report  If she is still feeling poorly, have her FU, or I can refer her to IM.    Pls print Zio report    Thank you    Tiana LOGANGracie Square Hospital  [phone]

## 2017-04-26 ENCOUNTER — TELEPHONE (OUTPATIENT)
Dept: FAMILY MEDICINE | Facility: OTHER | Age: 27
End: 2017-04-26

## 2017-04-26 DIAGNOSIS — R00.2 PALPITATIONS: Primary | ICD-10-CM

## 2017-04-27 PROBLEM — J30.1 SEASONAL ALLERGIC RHINITIS DUE TO POLLEN: Status: ACTIVE | Noted: 2017-04-27

## 2017-04-27 PROBLEM — E66.9 OBESITY: Status: ACTIVE | Noted: 2017-04-27

## 2017-04-27 PROBLEM — I10 BENIGN ESSENTIAL HYPERTENSION: Status: ACTIVE | Noted: 2017-04-27

## 2017-05-02 DIAGNOSIS — F32.0 MILD MAJOR DEPRESSION (H): ICD-10-CM

## 2017-05-03 ENCOUNTER — OFFICE VISIT (OUTPATIENT)
Dept: INTERNAL MEDICINE | Facility: OTHER | Age: 27
End: 2017-05-03
Attending: INTERNAL MEDICINE
Payer: COMMERCIAL

## 2017-05-03 VITALS
BODY MASS INDEX: 42.76 KG/M2 | SYSTOLIC BLOOD PRESSURE: 120 MMHG | WEIGHT: 273 LBS | RESPIRATION RATE: 20 BRPM | TEMPERATURE: 96.4 F | DIASTOLIC BLOOD PRESSURE: 80 MMHG | HEART RATE: 76 BPM | OXYGEN SATURATION: 98 %

## 2017-05-03 DIAGNOSIS — I49.3 PVC'S (PREMATURE VENTRICULAR CONTRACTIONS): Primary | ICD-10-CM

## 2017-05-03 DIAGNOSIS — R00.2 PALPITATIONS: Primary | ICD-10-CM

## 2017-05-03 PROCEDURE — 99243 OFF/OP CNSLTJ NEW/EST LOW 30: CPT | Performed by: INTERNAL MEDICINE

## 2017-05-03 ASSESSMENT — PAIN SCALES - GENERAL: PAINLEVEL: NO PAIN (0)

## 2017-05-03 NOTE — MR AVS SNAPSHOT
After Visit Summary   5/3/2017    Shilpi Moore    MRN: [id number]           Patient Information     Date Of Birth          1990        Visit Information        Provider Department      5/3/2017 11:30 AM Gabe Kendrick, DO [facility]        Today's Diagnoses     Palpitations    -  1       Follow-ups after your visit        Who to contact     If you have questions or need follow up information about today's clinic visit or your schedule please contact [facility] directly at [phone].  Normal or non-critical lab and imaging results will be communicated to you by MyChart, letter or phone within 4 business days after the clinic has received the results. If you do not hear from us within 7 days, please contact the clinic through [facility] or phone. If you have a critical or abnormal lab result, we will notify you by phone as soon as possible.  Submit refill requests through [employer] or call your pharmacy and they will forward the refill request to us. Please allow 3 business days for your refill to be completed.          Additional Information About Your Visit        MyChart Information     [employer] gives you secure access to your electronic health record. If you see a primary care provider, you can also send messages to your care team and make appointments. If you have questions, please call your primary care clinic.  If you do not have a primary care provider, please call [phone] and they will assist you.        Care EveryWhere ID     This is your Care EveryWhere ID. This could be used by other organizations to access your [place] medical records  [id number]        Your Vitals Were     Pulse Temperature Respirations Pulse Oximetry BMI (Body Mass Index)       76 96.4  F (35.8  C) (Tympanic) 20 98% 42.76 kg/m2        Blood Pressure from Last 3 Encounters:   05/03/17 120/80   04/04/17 110/80   02/09/17 128/80    Weight from Last 3 Encounters:   05/03/17 273 lb  (123.8 kg)   04/04/17 270 lb (122.5 kg)   02/09/17 278 lb 6.4 oz (126.3 kg)              We Performed the Following     Echocardiogram          Today's Medication Changes          These changes are accurate as of: 5/3/17 11:45 AM.  If you have any questions, ask your nurse or doctor.               These medicines have changed or have updated prescriptions.        Dose/Directions    fluticasone 50 MCG/ACT spray   Commonly known as:  FLONASE   This may have changed:    - when to take this  - reasons to take this   Used for:  Acute maxillary sinusitis, recurrence not specified        Dose:  1-2 spray   Spray 1-2 sprays into both nostrils daily   Quantity:  16 g   Refills:  0       hydrochlorothiazide 25 MG tablet   Commonly known as:  HYDRODIURIL   This may have changed:  See the new instructions.   Used for:  HTN (hypertension)        TAKE 1 TABLET DAILY   Quantity:  90 tablet   Refills:  1       VENTOLIN  (90 BASE) MCG/ACT Inhaler   This may have changed:  See the new instructions.   Used for:  Mild intermittent asthma without complication   Generic drug:  albuterol        USE 2 INHALATIONS INTO THE LUNGS EVERY 6 HOURS.   Quantity:  2 each   Refills:  2                Primary Care Provider Office Phone # Fax #    Tiana Tucker [phone]       [place] [address]        Thank you!     Thank you for choosing [facility] (formerly [facility])  for your care. Our goal is always to provide you with excellent care. Hearing back from our patients is one way we can continue to improve our services. Please take a few minutes to complete the written survey that you may receive in the mail after your visit with us. Thank you!             Your Updated Medication List - Protect others around you: Learn how to safely use, store and throw away your medicines at www.disposemymeds.org.          This list is accurate as of: 5/3/17 11:45 AM.  Always use your most recent med list.                    Brand Name Dispense Instructions for use    cetirizine 10 MG tablet    zyrTEC     Take 10 mg by mouth daily       fluticasone 50 MCG/ACT spray    FLONASE    16 g    Spray 1-2 sprays into both nostrils daily       hydrochlorothiazide 25 MG tablet    HYDRODIURIL    90 tablet    TAKE 1 TABLET DAILY       sertraline 100 MG tablet    ZOLOFT    90 tablet    TAKE 1 TABLET DAILY       TOPAMAX PO      Take 25 mg by mouth 2 times daily       VENTOLIN  (90 BASE) MCG/ACT Inhaler   Generic drug:  albuterol     2 each    USE 2 INHALATIONS INTO THE LUNGS EVERY 6 HOURS.

## 2017-05-03 NOTE — NURSING NOTE
"Chief Complaint   Patient presents with     Results     zio patch results       Initial /80 (BP Location: Left arm, Patient Position: Chair, Cuff Size: Adult Large)  Pulse 76  Temp 96.4  F (35.8  C) (Tympanic)  Resp 20  Wt 273 lb (123.8 kg)  SpO2 98%  BMI 42.76 kg/m2 Estimated body mass index is 42.76 kg/(m^2) as calculated from the following:    Height as of 4/4/17: 5' 7\" (1.702 m).    Weight as of this encounter: 273 lb (123.8 kg).  Medication Reconciliation: complete   Helena Tirado LPN      "

## 2017-05-03 NOTE — PROGRESS NOTES
Internal Medicine:    Chief Complaint   Patient presents with     Results     zio patch results     Shilpi presents today in consultation from Tiana Tucker regarding palpitations and recent Zio monitor.  Zio revealed some PVCs and bigemeny-essentially sinus rhythm throughout.  She denies any chest pain or pre syncope or syncope related to her palpitations.  Zio reviewed.  No family hx of Hypertrophic cardiomyopathy.          Patient Active Problem List   Diagnosis     Intermittent asthma     Mild major depression (H)     Vitamin D deficiency     ACP (advance care planning)     Seasonal allergic rhinitis due to pollen     Benign essential hypertension     Obesity            Past Medical History:   Diagnosis Date     Allergic rhinitis 6/15/2011     Benign essential hypertension 4/27/2017     Major depressive disorder, recurrent episode, unspecified 6/15/2011     Obesity 4/27/2017     Seasonal allergic rhinitis due to pollen 4/27/2017     Unspecified vitamin D deficiency 4/11/2014            Past Surgical History:   Procedure Laterality Date     ENT SURGERY      tonsillectomy     OTHER SURGICAL HISTORY      I&D rt abscess axilla     SOFT TISSUE SURGERY      IND cyst right axilla            Social History   Substance Use Topics     Smoking status: Never Smoker     Smokeless tobacco: Never Used      Comment: Passive exposure     Alcohol use Yes      Comment: Beer; Occasionally            Family History   Problem Relation Age of Onset     Asthma Mother      Hypertension Father      Asthma Brother      Asthma Sister      CANCER Maternal Grandfather 62     Mesothelioma, cause of death     CANCER Other 28     Cousin; Melanoma, cause of death     Thyroid Disease No family hx of                Allergies   Allergen Reactions     Nsaids Swelling            Current Outpatient Prescriptions   Medication Sig Dispense Refill     Topiramate (TOPAMAX PO) Take 25 mg by mouth 2 times daily       sertraline (ZOLOFT) 100 MG tablet TAKE 1  TABLET DAILY 90 tablet 0     hydrochlorothiazide (HYDRODIURIL) 25 MG tablet TAKE 1 TABLET DAILY (Patient taking differently: takes 1/2 tab daily) 90 tablet 1     cetirizine (ZYRTEC) 10 MG tablet Take 10 mg by mouth daily       fluticasone (FLONASE) 50 MCG/ACT nasal spray Spray 1-2 sprays into both nostrils daily (Patient taking differently: Spray 1-2 sprays into both nostrils daily as needed ) 16 g 0     VENTOLIN  (90 BASE) MCG/ACT inhaler USE 2 INHALATIONS INTO THE LUNGS EVERY 6 HOURS. (Patient taking differently: USE 2 INHALATIONS INTO THE LUNGS EVERY 6 HOURS.prn) 2 each 2       Review Of Systems:    Respiratory: No shortness of breath, dyspnea on exertion, cough, or hemoptysis  Cardiovascular: as above and palpitations  Gastrointestinal: negative  Genitourinary: negative  Musculoskeletal: negative  Neurologic: negative  Psychiatric: negative    Objective:   /80 (BP Location: Left arm, Patient Position: Chair, Cuff Size: Adult Large)  Pulse 76  Temp 96.4  F (35.8  C) (Tympanic)  Resp 20  Wt 273 lb (123.8 kg)  SpO2 98%  BMI 42.76 kg/m2  EXAM:  Constitutional: healthy, alert and no distress   Cardiovascular: RRR. No murmurs, clicks gallops or rub  Respiratory:  Lungs clear  Psychiatric: mentation appears normal and affect normal/bright  Abdomen: Abdomen soft, non-tender. BS normal. No masses, organomegaly       Orders placed or performed in visit on 02/09/17     Topiramate (TOPAMAX PO)     neomycin-polymyxin-hydrocortisone (CORTISPORIN) 3.5-02412-6 otic suspension       Assessment and Plan:    (R00.2) Palpitations  (primary encounter diagnosis)  Comment: Kandy reviewed with relatively benign findings.  Ischemic heart disease extremely unlikely at her age.  Potassium values were normal.  Mg-unknown.  Structural heart disease is still of question but extremely unlikely.  If Echo were to look ok no additional work up necessary.    Plan: Echocardiogram-[facility].  Patient also instructed as to taking  Magnesium may be of some help.            Gabe Kendrick,        CC:Tiana Tucker

## 2017-05-05 RX ORDER — SERTRALINE HYDROCHLORIDE 100 MG/1
TABLET, FILM COATED ORAL
Qty: 90 TABLET | Refills: 0 | Status: SHIPPED | OUTPATIENT
Start: 2017-05-05 | End: 2017-08-03

## 2017-05-10 DIAGNOSIS — I10 HTN (HYPERTENSION): ICD-10-CM

## 2017-05-11 RX ORDER — HYDROCHLOROTHIAZIDE 25 MG/1
TABLET ORAL
Qty: 90 TABLET | Refills: 2 | Status: SHIPPED | OUTPATIENT
Start: 2017-05-11 | End: 2018-02-05

## 2017-05-11 NOTE — TELEPHONE ENCOUNTER
Hydrochlorothiazide       Last Written Prescription Date: 2/12/2017  Last Fill Quantity: 90, # refills: 0  Last Office Visit with G, P or [facility] prescribing provider: 5/3/2017       Potassium   Date Value Ref Range Status   04/04/2017 3.8 3.4 - 5.3 mmol/L Final     Creatinine   Date Value Ref Range Status   04/04/2017 0.82 0.52 - 1.04 mg/dL Final     BP Readings from Last 3 Encounters:   05/03/17 120/80   04/04/17 110/80   02/09/17 128/80

## 2017-08-03 DIAGNOSIS — F32.0 MILD MAJOR DEPRESSION (H): ICD-10-CM

## 2017-08-03 NOTE — TELEPHONE ENCOUNTER
Zoloft     Last Written Prescription Date: 5/5/17  Last Fill Quantity: 90, # refills: 0  Last Office Visit with [facility] primary care provider:  5/3/17        Last PHQ-9 score on record=   PHQ-9 SCORE 2/9/2017   Total Score -   Total Score 3

## 2017-08-07 RX ORDER — SERTRALINE HYDROCHLORIDE 100 MG/1
TABLET, FILM COATED ORAL
Qty: 90 TABLET | Refills: 0 | Status: SHIPPED | OUTPATIENT
Start: 2017-08-07 | End: 2017-11-05

## 2017-11-05 DIAGNOSIS — F32.0 MILD MAJOR DEPRESSION (H): ICD-10-CM

## 2017-11-06 NOTE — TELEPHONE ENCOUNTER
Zoloft       Last Written Prescription Date: 8/07/2017  Last Fill Quantity: 90,  # refills: 0   Last Office Visit with [facility], [facility] or [facility] prescribing provider: 4/04/2017

## 2017-11-08 RX ORDER — SERTRALINE HYDROCHLORIDE 100 MG/1
TABLET, FILM COATED ORAL
Qty: 90 TABLET | Refills: 0 | Status: SHIPPED | OUTPATIENT
Start: 2017-11-08 | End: 2018-02-07

## 2017-11-26 ENCOUNTER — HEALTH MAINTENANCE LETTER (OUTPATIENT)
Age: 27
End: 2017-11-26

## 2018-02-05 DIAGNOSIS — I10 HTN (HYPERTENSION): ICD-10-CM

## 2018-02-05 RX ORDER — HYDROCHLOROTHIAZIDE 25 MG/1
TABLET ORAL
Qty: 90 TABLET | Refills: 0 | Status: SHIPPED | OUTPATIENT
Start: 2018-02-05 | End: 2018-02-19

## 2018-02-12 PROBLEM — J98.9 REACTIVE AIRWAY DISEASE THAT IS NOT ASTHMA: Status: ACTIVE | Noted: 2018-02-12

## 2018-02-19 ENCOUNTER — OFFICE VISIT (OUTPATIENT)
Dept: FAMILY MEDICINE | Facility: OTHER | Age: 28
End: 2018-02-19
Attending: NURSE PRACTITIONER
Payer: COMMERCIAL

## 2018-02-19 VITALS
RESPIRATION RATE: 16 BRPM | BODY MASS INDEX: 41.91 KG/M2 | TEMPERATURE: 97.8 F | HEIGHT: 67 IN | WEIGHT: 267 LBS | SYSTOLIC BLOOD PRESSURE: 122 MMHG | HEART RATE: 77 BPM | OXYGEN SATURATION: 98 % | DIASTOLIC BLOOD PRESSURE: 76 MMHG

## 2018-02-19 DIAGNOSIS — F32.0 MILD MAJOR DEPRESSION (H): Primary | ICD-10-CM

## 2018-02-19 DIAGNOSIS — Z30.9 ENCOUNTER FOR CONTRACEPTIVE MANAGEMENT, UNSPECIFIED TYPE: ICD-10-CM

## 2018-02-19 PROBLEM — E66.01 MORBID OBESITY (H): Status: ACTIVE | Noted: 2017-04-27

## 2018-02-19 PROBLEM — I10 BENIGN ESSENTIAL HYPERTENSION: Status: RESOLVED | Noted: 2017-04-27 | Resolved: 2018-02-19

## 2018-02-19 PROCEDURE — 99214 OFFICE O/P EST MOD 30 MIN: CPT | Performed by: NURSE PRACTITIONER

## 2018-02-19 RX ORDER — SERTRALINE HYDROCHLORIDE 100 MG/1
TABLET, FILM COATED ORAL
Qty: 135 TABLET | Refills: 1 | Status: SHIPPED | OUTPATIENT
Start: 2018-02-19 | End: 2018-12-07

## 2018-02-19 ASSESSMENT — ANXIETY QUESTIONNAIRES
6. BECOMING EASILY ANNOYED OR IRRITABLE: NEARLY EVERY DAY
IF YOU CHECKED OFF ANY PROBLEMS ON THIS QUESTIONNAIRE, HOW DIFFICULT HAVE THESE PROBLEMS MADE IT FOR YOU TO DO YOUR WORK, TAKE CARE OF THINGS AT HOME, OR GET ALONG WITH OTHER PEOPLE: VERY DIFFICULT
5. BEING SO RESTLESS THAT IT IS HARD TO SIT STILL: SEVERAL DAYS
GAD7 TOTAL SCORE: 18
3. WORRYING TOO MUCH ABOUT DIFFERENT THINGS: NEARLY EVERY DAY
1. FEELING NERVOUS, ANXIOUS, OR ON EDGE: NEARLY EVERY DAY
7. FEELING AFRAID AS IF SOMETHING AWFUL MIGHT HAPPEN: MORE THAN HALF THE DAYS
2. NOT BEING ABLE TO STOP OR CONTROL WORRYING: NEARLY EVERY DAY

## 2018-02-19 ASSESSMENT — PATIENT HEALTH QUESTIONNAIRE - PHQ9: 5. POOR APPETITE OR OVEREATING: NEARLY EVERY DAY

## 2018-02-19 ASSESSMENT — PAIN SCALES - GENERAL: PAINLEVEL: NO PAIN (0)

## 2018-02-19 NOTE — LETTER
My Depression Action Plan  Name: Shilpi Moore   Date of Birth 1990  Date: 2/19/2018    My doctor: Tiana Tucker   My clinic: [facility]  [address] [place] [address] [address]  [place] [address]  [phone]          GREEN    ZONE   Good Control    What it looks like:     Things are going generally well. You have normal up s and down s. You may even feel depressed from time to time, but bad moods usually last less than a day.   What you need to do:  1. Continue to care for yourself (see self care plan)  2. Check your depression survival kit and update it as needed  3. Follow your physician s recommendations including any medication.  4. Do not stop taking medication unless you consult with your physician first.           YELLOW         ZONE Getting Worse    What it looks like:     Depression is starting to interfere with your life.     It may be hard to get out of bed; you may be starting to isolate yourself from others.    Symptoms of depression are starting to last most all day and this has happened for several days.     You may have suicidal thoughts but they are not constant.   What you need to do:     1. Call your care team, your response to treatment will improve if you keep your care team informed of your progress. Yellow periods are signs an adjustment may need to be made.     2. Continue your self-care, even if you have to fake it!    3. Talk to someone in your support network    4. Open up your depression survival kit           RED    ZONE Medical Alert - Get Help    What it looks like:     Depression is seriously interfering with your life.     You may experience these or other symptoms: You can t get out of bed most days, can t work or engage in other necessary activities, you have trouble taking care of basic hygiene, or basic responsibilities, thoughts of suicide or death that will not go away, self-injurious behavior.     What you need to do:  1. Call your care team and request  a same-day appointment. If they are not available (weekends or after hours) call your local crisis line, emergency room or 911.      Electronically signed by: Tiana Tucker, February 19, 2018    Depression Self Care Plan / Survival Kit    Self-Care for Depression  Here s the deal. Your body and mind are really not as separate as most people think.  What you do and think affects how you feel and how you feel influences what you do and think. This means if you do things that people who feel good do, it will help you feel better.  Sometimes this is all it takes.  There is also a place for medication and therapy depending on how severe your depression is, so be sure to consult with your medical provider and/ or Behavioral Health Consultant if your symptoms are worsening or not improving.     In order to better manage my stress, I will:    Exercise  Get some form of exercise, every day. This will help reduce pain and release endorphins, the  feel good  chemicals in your brain. This is almost as good as taking antidepressants!  This is not the same as joining a gym and then never going! (they count on that by the way ) It can be as simple as just going for a walk or doing some gardening, anything that will get you moving.      Hygiene   Maintain good hygiene (Get out of bed in the morning, Make your bed, Brush your teeth, Take a shower, and Get dressed like you were going to work, even if you are unemployed).  If your clothes don't fit try to get ones that do.    Diet  I will strive to eat foods that are good for me, drink plenty of water, and avoid excessive sugar, caffeine, alcohol, and other mood-altering substances.  Some foods that are helpful in depression are: complex carbohydrates, B vitamins, flaxseed, fish or fish oil, fresh fruits and vegetables.    Psychotherapy  I agree to participate in Individual Therapy (if recommended).    Medication  If prescribed medications, I agree to take them.  Missing doses can result  in serious side effects.  I understand that drinking alcohol, or other illicit drug use, may cause potential side effects.  I will not stop my medication abruptly without first discussing it with my provider.    Staying Connected With Others  I will stay in touch with my friends, family members, and my primary care provider/team.    Use your imagination  Be creative.  We all have a creative side; it doesn t matter if it s oil painting, sand castles, or mud pies! This will also kick up the endorphins.    Witness Beauty  (AKA stop and smell the roses) Take a look outside, even in mid-winter. Notice colors, textures. Watch the squirrels and birds.     Service to others  Be of service to others.  There is always someone else in need.  By helping others we can  get out of ourselves  and remember the really important things.  This also provides opportunities for practicing all the other parts of the program.    Humor  Laugh and be silly!  Adjust your TV habits for less news and crime-drama and more comedy.    Control your stress  Try breathing deep, massage therapy, biofeedback, and meditation. Find time to relax each day.     My support system    Clinic Contact:  Phone number:    Contact 1:  Phone number:    Contact 2:  Phone number:    Sikhism/:  Phone number:    Therapist:  Phone number:    Local crisis center:    Phone number:    Other community support:  Phone number:

## 2018-02-19 NOTE — PATIENT INSTRUCTIONS
1. Mild major depression (H) - Anxiety  - sertraline (ZOLOFT) 100 MG tablet; 1.5 tabs po per day for 150 mg  Dispense: 135 tablet; Refill: 1  - DEPRESSION EDUCATION    2. Encounter for contraceptive management, unspecified type  - OB/GYN REFERRAL      Vitamin D3 5000 U daily  Multiple vitamin daily      My chart update in 1 month анна Tucker NP  [facility]

## 2018-02-19 NOTE — PROGRESS NOTES
SUBJECTIVE:   Shilpi Moore is a 27 year old female who presents to clinic today for the following health issues:      Depression and Anxiety Follow-Up    Status since last visit: Worsened with both anxiety and depression    Other associated symptoms:None    Complicating factors:     Significant life event: No     Current substance abuse: None    PHQ-9 3/7/2016 2/9/2017   Total Score 5 3   Q9: Suicide Ideation Not at all Not at all     KING-7 SCORE 2/9/2017   Total Score 3         PHQ-9  English  PHQ-9   Any Language  KING-7  Suicide Assessment Five-step Evaluation and Treatment (SAFE-T)      Pap due        Amount of exercise or physical activity: active    Problems taking medications regularly: No    Medication side effects: none    Diet: regular (no restrictions)      Problem list and histories reviewed & adjusted, as indicated.  Additional history: as documented    Patient Active Problem List   Diagnosis     Mild major depression (H)     Vitamin D deficiency     ACP (advance care planning)     Seasonal allergic rhinitis due to pollen     Morbid obesity (H)     Reactive airway disease that is not asthma     Contraception management     Family history of melanoma     Past Surgical History:   Procedure Laterality Date     ENT SURGERY      tonsillectomy     OTHER SURGICAL HISTORY      I&D rt abscess axilla     SOFT TISSUE SURGERY      IND cyst right axilla       Social History   Substance Use Topics     Smoking status: Never Smoker     Smokeless tobacco: Never Used      Comment: Passive exposure     Alcohol use Yes      Comment: Beer; Occasionally     Family History   Problem Relation Age of Onset     Asthma Mother      Hypertension Father      Asthma Brother      Asthma Sister      CANCER Maternal Grandfather 62     Mesothelioma, cause of death     CANCER Other 28     Cousin; Melanoma, cause of death     Thyroid Disease No family hx of          Current Outpatient Prescriptions   Medication Sig Dispense Refill      "sertraline (ZOLOFT) 100 MG tablet TAKE 1 TABLET DAILY (DUE FOR OFFICE VISIT) 90 tablet 0     cetirizine (ZYRTEC) 10 MG tablet Take 10 mg by mouth daily       VENTOLIN  (90 BASE) MCG/ACT inhaler USE 2 INHALATIONS INTO THE LUNGS EVERY 6 HOURS. (Patient taking differently: USE 2 INHALATIONS INTO THE LUNGS EVERY 6 HOURS.prn) 2 each 2     Allergies   Allergen Reactions     Nsaids Swelling     Recent Labs   Lab Test  04/04/17   0919  03/07/16   0902  08/29/14   0916   LDL  101*   --   139*   HDL  59   --   49*   TRIG  134   --   134   CR  0.82  0.76  0.83   GFRESTIMATED  84  >90  Non  GFR Calc    84   GFRESTBLACK  >90   GFR Calc    >90   GFR Calc    >90   GFR Calc     POTASSIUM  3.8  3.4  3.8   TSH  2.19  2.13   --       BP Readings from Last 3 Encounters:   02/19/18 122/76   05/03/17 120/80   04/04/17 110/80    Wt Readings from Last 3 Encounters:   02/19/18 267 lb (121.1 kg)   05/03/17 273 lb (123.8 kg)   04/04/17 270 lb (122.5 kg)                  Labs reviewed in EPIC    Reviewed and updated as needed this visit by clinical staff       Reviewed and updated as needed this visit by Provider         ROS:  Constitutional, HEENT, cardiovascular, pulmonary, gi and gu systems are negative, except as otherwise noted.    OBJECTIVE:     /76  Pulse 77  Temp 97.8  F (36.6  C) (Tympanic)  Resp 16  Ht 5' 7\" (1.702 m)  Wt 267 lb (121.1 kg)  SpO2 98%  BMI 41.82 kg/m2  Body mass index is 41.82 kg/(m^2).       GENERAL: healthy, alert and no distress  EYES: Eyes grossly normal to inspection, PERRL and conjunctivae and sclerae normal  HENT: ear canals and TM's normal, nose and mouth without ulcers or lesions  NECK: no adenopathy, no asymmetry, masses, or scars and thyroid normal to palpation  RESP: lungs clear to auscultation - no rales, rhonchi or wheezes  CV: regular rate and rhythm, normal S1 S2, no S3 or S4, no murmur, click or rub, no peripheral edema " and peripheral pulses strong  MS: no gross musculoskeletal defects noted, no edema  SKIN: no suspicious lesions or rashes  PSYCH: a bit more anxious than usual      Visit time:   Over 25  minutes are spent with the patient, over 50% of which was in education and counseling regarding current conditions and treatment/therapy.  Time spent with patient, including examination, face to face patient education, review of disease process, and plan of care  15  mn  Visit Content: During our face to face time, patient education was provided regarding diagnosis, and treatment pan.  Relevant laboratory and diagnostic testing is reviewed prior to visit, and updated at this appointment as indicated  Health Maintenance - updated as appropriate.  Record review completed        ASSESSMENT/PLAN:     Depression; recurrent episode-- Moderate   Associated with the following complications:    None   Plan:  No changes in the patient's current treatment plan          1. Mild major depression (H) - Anxiety  - sertraline (ZOLOFT) 100 MG tablet; 1.5 tabs po per day for 150 mg  Dispense: 135 tablet; Refill: 1  - DEPRESSION EDUCATION    2. Encounter for contraceptive management, unspecified type  - OB/GYN REFERRAL      Vitamin D3 5000 U daily  Multiple vitamin daily      My chart update in 1 month анна Tucker NP  [facility]

## 2018-02-19 NOTE — MR AVS SNAPSHOT
After Visit Summary   2/19/2018    Shilpi Moore    MRN: [id number]           Patient Information     Date Of Birth          1990        Visit Information        Provider Department      2/19/2018 9:45 AM Tiana Tucker NP [facility]        Today's Diagnoses     Mild major depression (H)    -  1    Encounter for contraceptive management, unspecified type          Care Instructions        1. Mild major depression (H) - Anxiety  - sertraline (ZOLOFT) 100 MG tablet; 1.5 tabs po per day for 150 mg  Dispense: 135 tablet; Refill: 1  - DEPRESSION EDUCATION    2. Encounter for contraceptive management, unspecified type  - OB/GYN REFERRAL      Vitamin D3 5000 U daily  Multiple vitamin daily      My chart update in 1 month pls    Tiana Tucker NP  [facility]            Follow-ups after your visit        Additional Services     OB/GYN REFERRAL       Your provider has referred you to:  Kanu Bueno - [facility] pap and birth control    Please be aware that coverage of these services is subject to the terms and limitations of your health insurance plan.  Call member services at your health plan with any benefit or coverage questions.      Please bring the following with you to your appointment:    (1) Any X-Rays, CTs or MRIs which have been performed.  Contact the facility where they were done to arrange for  prior to your scheduled appointment.   (2) List of current medications   (3) This referral request   (4) Any documents/labs given to you for this referral                  Who to contact     If you have questions or need follow up information about today's clinic visit or your schedule please contact [facility] directly at [phone].  Normal or non-critical lab and imaging results will be communicated to you by MyChart, letter or phone within 4 business days after the clinic has received the results. If you do not hear from us within 7 days, please contact the  "clinic through [employer] or phone. If you have a critical or abnormal lab result, we will notify you by phone as soon as possible.  Submit refill requests through [employer]Archon or call your pharmacy and they will forward the refill request to us. Please allow 3 business days for your refill to be completed.          Additional Information About Your Visit        [employer] Portal Information     [employer]Archon gives you secure access to your electronic health record. If you see a primary care provider, you can also send messages to your care team and make appointments. If you have questions, please call your primary care clinic.  If you do not have a primary care provider, please call [phone] and they will assist you.        Care EveryWhere ID     This is your Care EveryWhere ID. This could be used by other organizations to access your [place] medical records  [id number]        Your Vitals Were     Pulse Temperature Respirations Height Pulse Oximetry BMI (Body Mass Index)    77 97.8  F (36.6  C) (Tympanic) 16 5' 7\" (1.702 m) 98% 41.82 kg/m2       Blood Pressure from Last 3 Encounters:   02/19/18 122/76   05/03/17 120/80   04/04/17 110/80    Weight from Last 3 Encounters:   02/19/18 267 lb (121.1 kg)   05/03/17 273 lb (123.8 kg)   04/04/17 270 lb (122.5 kg)              We Performed the Following     DEPRESSION EDUCATION     OB/GYN REFERRAL          Today's Medication Changes          These changes are accurate as of 2/19/18 11:19 AM.  If you have any questions, ask your nurse or doctor.               These medicines have changed or have updated prescriptions.        Dose/Directions    sertraline 100 MG tablet   Commonly known as:  ZOLOFT   This may have changed:  See the new instructions.   Used for:  Mild major depression (H)   Changed by:  Tiana Tucker NP        1.5 tabs po per day for 150 mg   Quantity:  135 tablet   Refills:  1       VENTOLIN  (90 BASE) MCG/ACT Inhaler   This may have changed:  See the new instructions. "   Used for:  Mild intermittent asthma without complication   Generic drug:  albuterol        USE 2 INHALATIONS INTO THE LUNGS EVERY 6 HOURS.   Quantity:  2 each   Refills:  2         Stop taking these medicines if you haven't already. Please contact your care team if you have questions.     fluticasone 50 MCG/ACT spray   Commonly known as:  FLONASE   Stopped by:  Tiana Tucker NP           hydrochlorothiazide 25 MG tablet   Commonly known as:  HYDRODIURIL   Stopped by:  Tiana Tucker NP           TOPAMAX PO   Stopped by:  Tiana Tucker NP                Where to get your medicines      These medications were sent to [place] [address] [place] [address] [address]     Phone:  [phone]     sertraline 100 MG tablet                Primary Care Provider Office Phone # Fax #    Tiana Tucker [phone] [address]  [facility] [address]        Equal Access to Services     [facility] : Hadii aad ku hadasho Soomaali, waaxda luqadaha, qaybta kaalmada adeegyada, waxay idiin hayaan leo roe . So [facility] [phone].    ATENCIÓN: Si habla español, tiene a hines disposición servicios gratuitos de asistencia lingüística. Llame al [phone].    We comply with applicable federal civil rights laws and [place] laws. We do not discriminate on the basis of race, color, national origin, age, disability, sex, sexual orientation, or gender identity.            Thank you!     Thank you for choosing [facility]  for your care. Our goal is always to provide you with excellent care. Hearing back from our patients is one way we can continue to improve our services. Please take a few minutes to complete the written survey that you may receive in the mail after your visit with us. Thank you!             Your Updated Medication List - Protect others around you: Learn how to safely use, store and throw away your medicines at  www.disposemymeds.org.          This list is accurate as of 2/19/18 11:19 AM.  Always use your most recent med list.                   Brand Name Dispense Instructions for use Diagnosis    cetirizine 10 MG tablet    zyrTEC     Take 10 mg by mouth daily        sertraline 100 MG tablet    ZOLOFT    135 tablet    1.5 tabs po per day for 150 mg    Mild major depression (H)       VENTOLIN  (90 BASE) MCG/ACT Inhaler   Generic drug:  albuterol     2 each    USE 2 INHALATIONS INTO THE LUNGS EVERY 6 HOURS.    Mild intermittent asthma without complication

## 2018-02-19 NOTE — NURSING NOTE
"Chief Complaint   Patient presents with     Depression       Initial /76  Pulse 77  Temp 97.8  F (36.6  C) (Tympanic)  Resp 16  Ht 5' 7\" (1.702 m)  Wt 267 lb (121.1 kg)  SpO2 98%  BMI 41.82 kg/m2 Estimated body mass index is 41.82 kg/(m^2) as calculated from the following:    Height as of this encounter: 5' 7\" (1.702 m).    Weight as of this encounter: 267 lb (121.1 kg).  Medication Reconciliation: complete   Rajani Perkins      "

## 2018-02-20 ENCOUNTER — OFFICE VISIT (OUTPATIENT)
Dept: OBGYN | Facility: OTHER | Age: 28
End: 2018-02-20
Attending: NURSE PRACTITIONER
Payer: COMMERCIAL

## 2018-02-20 VITALS
OXYGEN SATURATION: 99 % | HEART RATE: 93 BPM | DIASTOLIC BLOOD PRESSURE: 80 MMHG | HEIGHT: 67 IN | WEIGHT: 267 LBS | BODY MASS INDEX: 41.91 KG/M2 | SYSTOLIC BLOOD PRESSURE: 124 MMHG

## 2018-02-20 DIAGNOSIS — Z12.4 SCREENING FOR CERVICAL CANCER: ICD-10-CM

## 2018-02-20 DIAGNOSIS — Z30.019 ENCOUNTER FOR INITIAL PRESCRIPTION OF CONTRACEPTIVES, UNSPECIFIED CONTRACEPTIVE: Primary | ICD-10-CM

## 2018-02-20 LAB — HCG UR QL: NEGATIVE

## 2018-02-20 PROCEDURE — 81025 URINE PREGNANCY TEST: CPT | Performed by: ADVANCED PRACTICE MIDWIFE

## 2018-02-20 PROCEDURE — 88142 CYTOPATH C/V THIN LAYER: CPT | Performed by: ADVANCED PRACTICE MIDWIFE

## 2018-02-20 PROCEDURE — 99202 OFFICE O/P NEW SF 15 MIN: CPT | Performed by: ADVANCED PRACTICE MIDWIFE

## 2018-02-20 RX ORDER — NORETHINDRONE ACETATE AND ETHINYL ESTRADIOL 1MG-20(21)
KIT ORAL
Qty: 84 TABLET | Refills: 4 | Status: SHIPPED | OUTPATIENT
Start: 2018-02-20 | End: 2018-03-14

## 2018-02-20 ASSESSMENT — PATIENT HEALTH QUESTIONNAIRE - PHQ9: SUM OF ALL RESPONSES TO PHQ QUESTIONS 1-9: 14

## 2018-02-20 ASSESSMENT — ANXIETY QUESTIONNAIRES
4. TROUBLE RELAXING: MORE THAN HALF THE DAYS
1. FEELING NERVOUS, ANXIOUS, OR ON EDGE: NEARLY EVERY DAY
2. NOT BEING ABLE TO STOP OR CONTROL WORRYING: NEARLY EVERY DAY
GAD7 TOTAL SCORE: 18
5. BEING SO RESTLESS THAT IT IS HARD TO SIT STILL: SEVERAL DAYS
GAD7 TOTAL SCORE: 16
3. WORRYING TOO MUCH ABOUT DIFFERENT THINGS: NEARLY EVERY DAY
7. FEELING AFRAID AS IF SOMETHING AWFUL MIGHT HAPPEN: MORE THAN HALF THE DAYS
6. BECOMING EASILY ANNOYED OR IRRITABLE: MORE THAN HALF THE DAYS
IF YOU CHECKED OFF ANY PROBLEMS ON THIS QUESTIONNAIRE, HOW DIFFICULT HAVE THESE PROBLEMS MADE IT FOR YOU TO DO YOUR WORK, TAKE CARE OF THINGS AT HOME, OR GET ALONG WITH OTHER PEOPLE: NOT DIFFICULT AT ALL

## 2018-02-20 ASSESSMENT — PAIN SCALES - GENERAL: PAINLEVEL: NO PAIN (0)

## 2018-02-20 NOTE — MR AVS SNAPSHOT
After Visit Summary   2/20/2018    Shilpi Moore    MRN: [id number]           Patient Information     Date Of Birth          1990        Visit Information        Provider Department      2/20/2018 3:30 PM Kanu Bueno APRN CNM [facility]        Today's Diagnoses     Encounter for initial prescription of contraceptives, unspecified contraceptive    -  1    Screening for cervical cancer          Care Instructions    Return to office as needed.    Thank you for allowing Kanu APODACA CNM and our OB team to participate in your care.  If you have a scheduling or an appointment question please contact [facility] Coordinator at their direct line [phone].   ALL nursing questions or concerns can be directed to your OB nurse at: [phone] - leah              Follow-ups after your visit        Who to contact     If you have questions or need follow up information about today's clinic visit or your schedule please contact [facility] directly at [phone].  Normal or non-critical lab and imaging results will be communicated to you by [facility] [employer]art, letter or phone within 4 business days after the clinic has received the results. If you do not hear from us within 7 days, please contact the clinic through Smart Eyet or phone. If you have a critical or abnormal lab result, we will notify you by phone as soon as possible.  Submit refill requests through Alo7 or call your pharmacy and they will forward the refill request to us. Please allow 3 business days for your refill to be completed.          Additional Information About Your Visit        MyChart Information     Alo[employer] gives you secure access to your electronic health record. If you see a primary care provider, you can also send messages to your care team and make appointments. If you have questions, please call your primary care clinic.  If you do not have a primary care provider, please call [phone]  "and they will assist you.        Care EveryWhere ID     This is your Care EveryWhere ID. This could be used by other organizations to access your [place] medical records  [id number]        Your Vitals Were     Pulse Height Pulse Oximetry BMI (Body Mass Index)          93 5' 7\" (1.702 m) 99% 41.82 kg/m2         Blood Pressure from Last 3 Encounters:   02/20/18 124/80   02/19/18 122/76   05/03/17 120/80    Weight from Last 3 Encounters:   02/20/18 267 lb (121.1 kg)   02/19/18 267 lb (121.1 kg)   05/03/17 273 lb (123.8 kg)              We Performed the Following     A pap thin layer screen reflex to HPV if ASCUS - recommend age 25 - 29     HCG qualitative urine          Today's Medication Changes          These changes are accurate as of 2/20/18  5:40 PM.  If you have any questions, ask your nurse or doctor.               Start taking these medicines.        Dose/Directions    norethindrone-ethinyl estradiol 1-20 MG-MCG per tablet   Commonly known as:  MICROGESTIN FE 1/20   Used for:  Encounter for initial prescription of contraceptives, unspecified contraceptive   Started by:  Kanu Bueno APRN CNM        Take active pill daily for amenorrhea.  If period desired take a 4 day break only and restart.   Quantity:  84 tablet   Refills:  4         These medicines have changed or have updated prescriptions.        Dose/Directions    VENTOLIN  (90 BASE) MCG/ACT Inhaler   This may have changed:  See the new instructions.   Used for:  Mild intermittent asthma without complication   Generic drug:  albuterol        USE 2 INHALATIONS INTO THE LUNGS EVERY 6 HOURS.   Quantity:  2 each   Refills:  2            Where to get your medicines      These medications were sent to [facility] [address] [place] [address] [address]     Phone:  [phone]     norethindrone-ethinyl estradiol 1-20 MG-MCG per tablet                Primary Care Provider Office Phone # Fax #    Tiana Tucker, " [phone]       [address]  [place] [address]        Equal Access to Services     LENNY PINEDA : Hadii aad ku hadmendel Gibson, watierrada luqadaha, qaybta kaalmada shabana, azul florin hayaan shaunivania oswald laLeahmaylin kumar. So [facility] [phone].    ATENCIÓN: Si habla español, tiene a hines disposición servicios gratuitos de asistencia lingüística. Llame al [phone].    We comply with applicable federal civil rights laws and [place] laws. We do not discriminate on the basis of race, color, national origin, age, disability, sex, sexual orientation, or gender identity.            Thank you!     Thank you for choosing [facility]  for your care. Our goal is always to provide you with excellent care. Hearing back from our patients is one way we can continue to improve our services. Please take a few minutes to complete the written survey that you may receive in the mail after your visit with us. Thank you!             Your Updated Medication List - Protect others around you: Learn how to safely use, store and throw away your medicines at www.disposemymeds.org.          This list is accurate as of 2/20/18  5:40 PM.  Always use your most recent med list.                   Brand Name Dispense Instructions for use Diagnosis    cetirizine 10 MG tablet    zyrTEC     Take 10 mg by mouth daily        norethindrone-ethinyl estradiol 1-20 MG-MCG per tablet    MICROGESTIN FE 1/20    84 tablet    Take active pill daily for amenorrhea.  If period desired take a 4 day break only and restart.    Encounter for initial prescription of contraceptives, unspecified contraceptive       sertraline 100 MG tablet    ZOLOFT    135 tablet    1.5 tabs po per day for 150 mg    Mild major depression (H)       VENTOLIN  (90 BASE) MCG/ACT Inhaler   Generic drug:  albuterol     2 each    USE 2 INHALATIONS INTO THE LUNGS EVERY 6 HOURS.    Mild intermittent asthma without complication

## 2018-02-20 NOTE — NURSING NOTE
"Chief Complaint   Patient presents with     Gyn Exam     Contraception       Initial /80 (BP Location: Left arm, Patient Position: Chair, Cuff Size: Adult Large)  Pulse 93  Ht 5' 7\" (1.702 m)  Wt 267 lb (121.1 kg)  SpO2 99%  BMI 41.82 kg/m2 Estimated body mass index is 41.82 kg/(m^2) as calculated from the following:    Height as of this encounter: 5' 7\" (1.702 m).    Weight as of this encounter: 267 lb (121.1 kg).  Medication Reconciliation: andra Reyes      "

## 2018-02-20 NOTE — PROGRESS NOTES
"Shilpi Moore is a 27 year old female  Here today for contraception counseling and cervical cancer screening.    Cycle:  28 days  Bleed:  5-6 days with 2-3 heavy Pain:  Cramping 6/10  Started Menses:  13 yo.  Contraception:  Natural family planning  Planning pregnancy:  no    Denies migraines, clotting disorders, heart disease, or hyperlipidemia.    Discussed Contraception options:  Effectiveness, risks, benefits, side effects.    O:   /80 (BP Location: Left arm, Patient Position: Chair, Cuff Size: Adult Large)  Pulse 93  Ht 5' 7\" (1.702 m)  Wt 267 lb (121.1 kg)  SpO2 99%  BMI 41.82 kg/m2   Pleasant without acute distress  Pelvic:  Vagina and vulva are normal;  no discharge is noted.    Cervix: normal without lesions.    Uterus:  Freely mobile,  normal in size and shape without tenderness.  Adnexa: without masses or tenderness.      A:    Need of cervical cancer screening  Negative hCG  Family planning    P:    Pap  Contraception counseling  Urine hCG qualitative  Microgestin PO daily      Greater than 20 minutes were spent face to face counseling this patient contraception options.    PAULIE Carson, CNM    "

## 2018-02-20 NOTE — PATIENT INSTRUCTIONS
Return to office as needed.    Thank you for allowing Kanu APODACA CNM and our OB team to participate in your care.  If you have a scheduling or an appointment question please contact [facility] Coordinator at their direct line [phone].   ALL nursing questions or concerns can be directed to your OB nurse at: [phone] - Radha

## 2018-02-21 ASSESSMENT — PATIENT HEALTH QUESTIONNAIRE - PHQ9: SUM OF ALL RESPONSES TO PHQ QUESTIONS 1-9: 9

## 2018-02-21 ASSESSMENT — ANXIETY QUESTIONNAIRES: GAD7 TOTAL SCORE: 16

## 2018-02-28 LAB
COPATH REPORT: NORMAL
PAP: NORMAL

## 2018-03-14 DIAGNOSIS — Z30.019 ENCOUNTER FOR INITIAL PRESCRIPTION OF CONTRACEPTIVES, UNSPECIFIED CONTRACEPTIVE: ICD-10-CM

## 2018-03-14 NOTE — TELEPHONE ENCOUNTER
norethindrone-ethinyl estradiol (MICROGESTIN FE 1/20) 1-20 MG-MCG per tablet   Last Written Prescription Date:  2/20/18  Last Fill Quantity: 84,   # refills: 4  Last Office Visit: 2/19/18  Future Office visit:

## 2018-03-16 RX ORDER — NORETHINDRONE ACETATE AND ETHINYL ESTRADIOL 1MG-20(21)
KIT ORAL
Qty: 84 TABLET | Refills: 0 | Status: SHIPPED | OUTPATIENT
Start: 2018-03-16 | End: 2018-05-24

## 2018-05-06 DIAGNOSIS — I10 HYPERTENSION, UNSPECIFIED TYPE: Primary | ICD-10-CM

## 2018-05-07 DIAGNOSIS — F32.0 MILD MAJOR DEPRESSION (H): ICD-10-CM

## 2018-05-07 NOTE — TELEPHONE ENCOUNTER
HCTZ      Last Written Prescription Date:  Not on medication list  Last Fill Quantity: ,   # refills:   Last Office Visit: 2/19/18  Future Office visit: none

## 2018-05-08 RX ORDER — HYDROCHLOROTHIAZIDE 25 MG/1
TABLET ORAL
Qty: 90 TABLET | Refills: 0 | Status: SHIPPED | OUTPATIENT
Start: 2018-05-08 | End: 2018-08-03

## 2018-05-08 NOTE — TELEPHONE ENCOUNTER
HCZ not on current medication list. Pended at this time.Thank you    Vital Signs 2/9/2017 4/4/2017 5/3/2017 5/3/2017 2/19/2018 2/19/2018   Systolic 128 110  120  122   Diastolic 80 80  80  76   Pulse 84 68  76  77     Vital Signs 2/20/2018 2/20/2018   Systolic  124   Diastolic  80   Pulse  93

## 2018-05-10 RX ORDER — SERTRALINE HYDROCHLORIDE 100 MG/1
100 TABLET, FILM COATED ORAL DAILY
Qty: 135 TABLET | Refills: 0 | Status: SHIPPED | OUTPATIENT
Start: 2018-05-10 | End: 2018-08-16

## 2018-05-10 NOTE — TELEPHONE ENCOUNTER
Patient of CHRIS Torres.Epic order for Zoloft is for 1.5mg tabs for 150mg dose.Pharmacy request is for 1 tablet.Pended for 1.5mg.Please review and approve if appropriate.Thank you.

## 2018-05-10 NOTE — TELEPHONE ENCOUNTER
Zoloft       Last Written Prescription Date:  2/19/2018  Last Fill Quantity: 135,   # refills: 1  Last Office Visit: 2/19/2018  Future Office visit:

## 2018-07-24 ENCOUNTER — TRANSFERRED RECORDS (OUTPATIENT)
Dept: HEALTH INFORMATION MANAGEMENT | Facility: CLINIC | Age: 28
End: 2018-07-24

## 2018-08-16 DIAGNOSIS — F32.0 MILD MAJOR DEPRESSION (H): ICD-10-CM

## 2018-08-16 RX ORDER — SERTRALINE HYDROCHLORIDE 100 MG/1
TABLET, FILM COATED ORAL
Qty: 135 TABLET | Refills: 0 | Status: SHIPPED | OUTPATIENT
Start: 2018-08-16 | End: 2018-09-21

## 2018-08-23 DIAGNOSIS — J98.9 REACTIVE AIRWAY DISEASE THAT IS NOT ASTHMA: Primary | ICD-10-CM

## 2018-08-23 RX ORDER — ALBUTEROL SULFATE 90 UG/1
2 AEROSOL, METERED RESPIRATORY (INHALATION) EVERY 6 HOURS PRN
Qty: 1 INHALER | Refills: 1 | COMMUNITY
Start: 2018-08-23 | End: 2018-11-28

## 2018-09-20 NOTE — PROGRESS NOTES
SUBJECTIVE:   Shilpi Moore is a 28 year old female who presents to clinic today for the following health issues:        Depression Followup    Status since last visit: Improved     See PHQ-9 for current symptoms.  Other associated symptoms: None    Complicating factors:   Significant life event:  No   Current substance abuse:  None  Anxiety or Panic symptoms:  No      PHQ-9 SCORE 2/9/2017 2/19/2018 2/20/2018   Total Score - - -   Total Score 3 14 9       KING-7 SCORE 2/19/2018 2/20/2018 9/21/2018   Total Score 18 16 6         PHQ-9  English  PHQ-9   Any Language  Suicide Assessment Five-step Evaluation and Treatment (SAFE-T)          Amount of exercise or physical activity: walks at work    Problems taking medications regularly: No    Medication side effects: none    Diet: regular (no restrictions)      GERD - trying OTC low dose prilosec, not effective      Hypertension Follow-up    Outpatient blood pressures are not being checked.    Low Salt Diet: not monitoring salt    Was taking hydrochlorothiazide in the past, quit 6 months ago  Today's 140 /100             Problem list and histories reviewed & adjusted, as indicated.  Additional history: as documented    Patient Active Problem List   Diagnosis     Mild major depression (H)     Vitamin D deficiency     ACP (advance care planning)     Seasonal allergic rhinitis due to pollen     Benign essential hypertension     Morbid obesity (H)     Reactive airway disease that is not asthma     Family history of melanoma     Gastroesophageal reflux disease without esophagitis     Past Surgical History:   Procedure Laterality Date     ENT SURGERY      tonsillectomy     OTHER SURGICAL HISTORY      I&D rt abscess axilla     SOFT TISSUE SURGERY      IND cyst right axilla       Social History   Substance Use Topics     Smoking status: Never Smoker     Smokeless tobacco: Never Used      Comment: Passive exposure     Alcohol use Yes      Comment: Beer; Occasionally     Family  History   Problem Relation Age of Onset     Asthma Mother      Hypertension Father      Asthma Brother      Asthma Sister      Cancer Maternal Grandfather 62     Mesothelioma, cause of death     Cancer Other 28     Cousin; Melanoma, cause of death     Thyroid Disease No family hx of          Current Outpatient Prescriptions   Medication Sig Dispense Refill     albuterol (PROAIR HFA/PROVENTIL HFA/VENTOLIN HFA) 108 (90 Base) MCG/ACT inhaler Inhale 2 puffs into the lungs every 6 hours as needed for shortness of breath / dyspnea or wheezing 1 Inhaler 1     BLISOVI FE 1/20 1-20 MG-MCG per tablet TAKE 1 ACTIVE TABLET DAILY FOR AMENORRHEA. IF PERIOD DESIRED TAKE A 4 DAY BREAK ONLY AND RESTART 84 tablet 2     cetirizine (ZYRTEC) 10 MG tablet Take 10 mg by mouth daily       hydrochlorothiazide (HYDRODIURIL) 25 MG tablet Take 1 tablet (25 mg) by mouth daily 90 tablet 1     omeprazole (PRILOSEC) 40 MG capsule Take 1 capsule (40 mg) by mouth daily Take 30-60 minutes before a meal. 90 capsule 3     sertraline (ZOLOFT) 100 MG tablet 1.5 tabs po per day for 150 mg 135 tablet 1     [DISCONTINUED] hydrochlorothiazide (HYDRODIURIL) 25 MG tablet TAKE 1 TABLET DAILY 90 tablet 0     [DISCONTINUED] sertraline (ZOLOFT) 100 MG tablet TAKE ONE AND ONE-HALF TABLETS (150 MG TOTAL) DAILY 135 tablet 0     Allergies   Allergen Reactions     Nsaids Swelling     Recent Labs   Lab Test  04/04/17   0919  03/07/16   0902  08/29/14   0916   LDL  101*   --   139*   HDL  59   --   49*   TRIG  134   --   134   CR  0.82  0.76  0.83   GFRESTIMATED  84  >90  Non  GFR Calc    84   GFRESTBLACK  >90   GFR Calc    >90   GFR Calc    >90   GFR Calc     POTASSIUM  3.8  3.4  3.8   TSH  2.19  2.13   --       BP Readings from Last 3 Encounters:   09/21/18 136/86   02/20/18 124/80   02/19/18 122/76    Wt Readings from Last 3 Encounters:   09/21/18 304 lb (137.9 kg)   02/20/18 267 lb (121.1 kg)   02/19/18  267 lb (121.1 kg)                  Labs reviewed in EPIC    Reviewed and updated as needed this visit by clinical staff  Tobacco  Allergies  Meds  Med Hx  Surg Hx  Fam Hx  Soc Hx      Reviewed and updated as needed this visit by Provider         ROS:  Constitutional, HEENT, cardiovascular, pulmonary, GI, , musculoskeletal, neuro, skin, endocrine and psych systems are negative, except as otherwise noted.    OBJECTIVE:     /86 (BP Location: Left arm, Patient Position: Sitting, Cuff Size: Adult Large)  Pulse 76  Resp 14  Wt 304 lb (137.9 kg)  BMI 47.61 kg/m2  Body mass index is 47.61 kg/(m^2).       GENERAL: healthy, alert and no distress  EYES: Eyes grossly normal to inspection, PERRL and conjunctivae and sclerae normal  NECK: no adenopathy, no asymmetry, masses, or scars and thyroid normal to palpation  RESP: lungs clear to auscultation - no rales, rhonchi or wheezes  CV: regular rate and rhythm, normal S1 S2, no S3 or S4, no murmur, click or rub, no peripheral edema and peripheral pulses strong  ABDOMEN: soft, nontender, no hepatosplenomegaly, no masses and bowel sounds normal  MS: no gross musculoskeletal defects noted, no edema  SKIN: no suspicious lesions or rashes  PSYCH: mentation appears normal, affect normal/bright      ASSESSMENT/PLAN:     Depression; recurrent episode-- Mild   Associated with the following complications:    None   Plan:  No changes in the patient's current treatment plan      1. Mild major depression (H)  - Zoloft as directed  - DEPRESSION EDUCATION    2. Hypertension, unspecified type - Goal is 120'/70  - hydrochlorothiazide (HYDRODIURIL) 25 MG tablet; Take 1 tablet (25 mg) by mouth daily  Dispense: 90 tablet; Refill: 1  - Baby aspirin one daily  - TSH with free T4 reflex  - Comprehensive metabolic panel (BMP + Alb, Alk Phos, ALT, AST, Total. Bili, TP)  - BP check 2  weeks - nurse only     3. Gastroesophageal reflux disease without esophagitis  - omeprazole (PRILOSEC) 40  MG capsule; Take 1 capsule (40 mg) by mouth daily Take 30-60 minutes before a meal.  Dispense: 90 capsule; Refill: 3    Tiana Tucker NP  [facility]

## 2018-09-21 ENCOUNTER — TELEPHONE (OUTPATIENT)
Dept: FAMILY MEDICINE | Facility: OTHER | Age: 28
End: 2018-09-21

## 2018-09-21 ENCOUNTER — OFFICE VISIT (OUTPATIENT)
Dept: FAMILY MEDICINE | Facility: OTHER | Age: 28
End: 2018-09-21
Attending: NURSE PRACTITIONER
Payer: COMMERCIAL

## 2018-09-21 VITALS
DIASTOLIC BLOOD PRESSURE: 86 MMHG | HEART RATE: 76 BPM | BODY MASS INDEX: 47.61 KG/M2 | WEIGHT: 293 LBS | RESPIRATION RATE: 14 BRPM | SYSTOLIC BLOOD PRESSURE: 136 MMHG

## 2018-09-21 DIAGNOSIS — I10 HYPERTENSION, UNSPECIFIED TYPE: ICD-10-CM

## 2018-09-21 DIAGNOSIS — F32.0 MILD MAJOR DEPRESSION (H): Primary | ICD-10-CM

## 2018-09-21 DIAGNOSIS — K21.9 GASTROESOPHAGEAL REFLUX DISEASE WITHOUT ESOPHAGITIS: ICD-10-CM

## 2018-09-21 LAB
ALBUMIN SERPL-MCNC: 3.4 G/DL (ref 3.4–5)
ALP SERPL-CCNC: 66 U/L (ref 40–150)
ALT SERPL W P-5'-P-CCNC: 33 U/L (ref 0–50)
ANION GAP SERPL CALCULATED.3IONS-SCNC: 8 MMOL/L (ref 3–14)
AST SERPL W P-5'-P-CCNC: 33 U/L (ref 0–45)
BILIRUB SERPL-MCNC: 0.1 MG/DL (ref 0.2–1.3)
BUN SERPL-MCNC: 10 MG/DL (ref 7–30)
CALCIUM SERPL-MCNC: 8.7 MG/DL (ref 8.5–10.1)
CHLORIDE SERPL-SCNC: 107 MMOL/L (ref 94–109)
CO2 SERPL-SCNC: 25 MMOL/L (ref 20–32)
CREAT SERPL-MCNC: 0.78 MG/DL (ref 0.52–1.04)
GFR SERPL CREATININE-BSD FRML MDRD: 88 ML/MIN/1.7M2
GLUCOSE SERPL-MCNC: 86 MG/DL (ref 70–99)
POTASSIUM SERPL-SCNC: 4 MMOL/L (ref 3.4–5.3)
PROT SERPL-MCNC: 7.2 G/DL (ref 6.8–8.8)
SODIUM SERPL-SCNC: 140 MMOL/L (ref 133–144)
TSH SERPL DL<=0.005 MIU/L-ACNC: 2.4 MU/L (ref 0.4–4)

## 2018-09-21 PROCEDURE — 84443 ASSAY THYROID STIM HORMONE: CPT | Performed by: NURSE PRACTITIONER

## 2018-09-21 PROCEDURE — 80053 COMPREHEN METABOLIC PANEL: CPT | Performed by: NURSE PRACTITIONER

## 2018-09-21 PROCEDURE — 36415 COLL VENOUS BLD VENIPUNCTURE: CPT | Performed by: NURSE PRACTITIONER

## 2018-09-21 PROCEDURE — 99214 OFFICE O/P EST MOD 30 MIN: CPT | Performed by: NURSE PRACTITIONER

## 2018-09-21 RX ORDER — OMEPRAZOLE 40 MG/1
40 CAPSULE, DELAYED RELEASE ORAL DAILY
Qty: 90 CAPSULE | Refills: 3 | Status: SHIPPED | OUTPATIENT
Start: 2018-09-21 | End: 2018-10-18

## 2018-09-21 RX ORDER — HYDROCHLOROTHIAZIDE 25 MG/1
25 TABLET ORAL DAILY
Qty: 90 TABLET | Refills: 1 | COMMUNITY
Start: 2018-09-21 | End: 2018-11-05

## 2018-09-21 ASSESSMENT — ANXIETY QUESTIONNAIRES
IF YOU CHECKED OFF ANY PROBLEMS ON THIS QUESTIONNAIRE, HOW DIFFICULT HAVE THESE PROBLEMS MADE IT FOR YOU TO DO YOUR WORK, TAKE CARE OF THINGS AT HOME, OR GET ALONG WITH OTHER PEOPLE: SOMEWHAT DIFFICULT
6. BECOMING EASILY ANNOYED OR IRRITABLE: SEVERAL DAYS
5. BEING SO RESTLESS THAT IT IS HARD TO SIT STILL: NOT AT ALL
1. FEELING NERVOUS, ANXIOUS, OR ON EDGE: SEVERAL DAYS
GAD7 TOTAL SCORE: 6
3. WORRYING TOO MUCH ABOUT DIFFERENT THINGS: SEVERAL DAYS
2. NOT BEING ABLE TO STOP OR CONTROL WORRYING: SEVERAL DAYS
7. FEELING AFRAID AS IF SOMETHING AWFUL MIGHT HAPPEN: SEVERAL DAYS
4. TROUBLE RELAXING: SEVERAL DAYS

## 2018-09-21 NOTE — MR AVS SNAPSHOT
After Visit Summary   9/21/2018    Shilpi Moore    MRN: [id number]           Patient Information     Date Of Birth          1990        Visit Information        Provider Department      9/21/2018 1:15 PM Tiana Tucker NP [facility]        Today's Diagnoses     Mild major depression (H)    -  1    Hypertension, unspecified type        Gastroesophageal reflux disease without esophagitis          Care Instructions        1. Mild major depression (H)  - Zoloft as directed  - DEPRESSION EDUCATION    2. Hypertension, unspecified type - Goal is 120'/70  - hydrochlorothiazide (HYDRODIURIL) 25 MG tablet; Take 1 tablet (25 mg) by mouth daily  Dispense: 90 tablet; Refill: 1  - Baby aspirin one daily  - TSH with free T4 reflex  - Comprehensive metabolic panel (BMP + Alb, Alk Phos, ALT, AST, Total. Bili, TP)  - BP check 2  weeks - nurse only     3. Gastroesophageal reflux disease without esophagitis  - omeprazole (PRILOSEC) 40 MG capsule; Take 1 capsule (40 mg) by mouth daily Take 30-60 minutes before a meal.  Dispense: 90 capsule; Refill: 3    Tiana Tucker NP  [facility]            Follow-ups after your visit        Who to contact     If you have questions or need follow up information about today's clinic visit or your schedule please contact [facility] directly at [phone].  Normal or non-critical lab and imaging results will be communicated to you by MyChart, letter or phone within 4 business days after the clinic has received the results. If you do not hear from us within 7 days, please contact the clinic through MyChart or phone. If you have a critical or abnormal lab result, we will notify you by phone as soon as possible.  Submit refill requests through [employer] or call your pharmacy and they will forward the refill request to us. Please allow 3 business days for your refill to be completed.          Additional Information About  Your Visit        [employer] Information     [employer] gives you secure access to your electronic health record. If you see a primary care provider, you can also send messages to your care team and make appointments. If you have questions, please call your primary care clinic.  If you do not have a primary care provider, please call [phone] and they will assist you.        Care EveryWhere ID     This is your Care EveryWhere ID. This could be used by other organizations to access your [place] medical records  [id number]        Your Vitals Were     Pulse Respirations BMI (Body Mass Index)             76 14 47.61 kg/m2          Blood Pressure from Last 3 Encounters:   09/21/18 136/86   02/20/18 124/80   02/19/18 122/76    Weight from Last 3 Encounters:   09/21/18 304 lb (137.9 kg)   02/20/18 267 lb (121.1 kg)   02/19/18 267 lb (121.1 kg)              We Performed the Following     Comprehensive metabolic panel (BMP + Alb, Alk Phos, ALT, AST, Total. Bili, TP)     DEPRESSION EDUCATION     TSH with free T4 reflex          Today's Medication Changes          These changes are accurate as of 9/21/18  1:55 PM.  If you have any questions, ask your nurse or doctor.               Start taking these medicines.        Dose/Directions    aspirin 81 MG EC tablet   Used for:  Hypertension, unspecified type   Started by:  Tiana Tucker NP        Dose:  81 mg   Take 1 tablet (81 mg) by mouth daily   Quantity:  90 tablet   Refills:  3       omeprazole 40 MG capsule   Commonly known as:  priLOSEC   Used for:  Gastroesophageal reflux disease without esophagitis   Started by:  Tiana Tucker NP        Dose:  40 mg   Take 1 capsule (40 mg) by mouth daily Take 30-60 minutes before a meal.   Quantity:  90 capsule   Refills:  3         These medicines have changed or have updated prescriptions.        Dose/Directions    hydrochlorothiazide 25 MG tablet   Commonly known as:  HYDRODIURIL   This may have changed:  See the new instructions.    Used for:  Hypertension, unspecified type   Changed by:  Tiana Tucker NP        Dose:  25 mg   Take 1 tablet (25 mg) by mouth daily   Quantity:  90 tablet   Refills:  1       sertraline 100 MG tablet   Commonly known as:  ZOLOFT   This may have changed:  Another medication with the same name was removed. Continue taking this medication, and follow the directions you see here.   Used for:  Mild major depression (H)   Changed by:  Tiana Tucker NP        1.5 tabs po per day for 150 mg   Quantity:  135 tablet   Refills:  1            Where to get your medicines      These medications were sent to [facility] [address] [address]  AT [facility] [address] [facility] [address]     Phone:  [phone]     omeprazole 40 MG capsule         Some of these will need a paper prescription and others can be bought over the counter.  Ask your nurse if you have questions.     You don't need a prescription for these medications     aspirin 81 MG EC tablet                Primary Care Provider Office Phone # Fax #    Tiana Tucker [phone] [address] [place]  [facility] [address]        Equal Access to Services     BROOKS PINEDA AH: Hadii aad ku hadasho Soomaali, waaxda luqadaha, qaybta kaalmada adeegyada, waxay florin hayzenaidan leo kumar. So [facility] [phone].    ATENCIÓN: Si habla español, tiene a hines disposición servicios gratuitos de asistencia lingüística. Severino al [phone].    We comply with applicable federal civil rights laws and [place] laws. We do not discriminate on the basis of race, color, national origin, age, disability, sex, sexual orientation, or gender identity.            Thank you!     Thank you for choosing [facility]  for your care. Our goal is always to provide you with excellent care. Hearing back from our patients is one way we can continue to improve our services. Please take a few  minutes to complete the written survey that you may receive in the mail after your visit with us. Thank you!             Your Updated Medication List - Protect others around you: Learn how to safely use, store and throw away your medicines at www.disposemymeds.org.          This list is accurate as of 9/21/18  1:55 PM.  Always use your most recent med list.                   Brand Name Dispense Instructions for use Diagnosis    albuterol 108 (90 Base) MCG/ACT inhaler    PROAIR HFA/PROVENTIL HFA/VENTOLIN HFA    1 Inhaler    Inhale 2 puffs into the lungs every 6 hours as needed for shortness of breath / dyspnea or wheezing    Reactive airway disease that is not asthma       aspirin 81 MG EC tablet     90 tablet    Take 1 tablet (81 mg) by mouth daily    Hypertension, unspecified type       BLISOVI FE 1/20 1-20 MG-MCG per tablet   Generic drug:  norethindrone-ethinyl estradiol     84 tablet    TAKE 1 ACTIVE TABLET DAILY FOR AMENORRHEA. IF PERIOD DESIRED TAKE A 4 DAY BREAK ONLY AND RESTART    Encounter for initial prescription of contraceptives, unspecified contraceptive       cetirizine 10 MG tablet    zyrTEC     Take 10 mg by mouth daily        hydrochlorothiazide 25 MG tablet    HYDRODIURIL    90 tablet    Take 1 tablet (25 mg) by mouth daily    Hypertension, unspecified type       omeprazole 40 MG capsule    priLOSEC    90 capsule    Take 1 capsule (40 mg) by mouth daily Take 30-60 minutes before a meal.    Gastroesophageal reflux disease without esophagitis       sertraline 100 MG tablet    ZOLOFT    135 tablet    1.5 tabs po per day for 150 mg    Mild major depression (H)

## 2018-09-21 NOTE — PATIENT INSTRUCTIONS
1. Mild major depression (H)  - Zoloft as directed  - DEPRESSION EDUCATION    2. Hypertension, unspecified type - Goal is 120'/70  - hydrochlorothiazide (HYDRODIURIL) 25 MG tablet; Take 1 tablet (25 mg) by mouth daily  Dispense: 90 tablet; Refill: 1  - Baby aspirin one daily  - TSH with free T4 reflex  - Comprehensive metabolic panel (BMP + Alb, Alk Phos, ALT, AST, Total. Bili, TP)  - BP check 2  weeks - nurse only     3. Gastroesophageal reflux disease without esophagitis  - omeprazole (PRILOSEC) 40 MG capsule; Take 1 capsule (40 mg) by mouth daily Take 30-60 minutes before a meal.  Dispense: 90 capsule; Refill: 3    Tiana Tucker NP  [facility]

## 2018-09-22 ASSESSMENT — ANXIETY QUESTIONNAIRES: GAD7 TOTAL SCORE: 6

## 2018-10-18 DIAGNOSIS — K21.9 GASTROESOPHAGEAL REFLUX DISEASE WITHOUT ESOPHAGITIS: ICD-10-CM

## 2018-10-18 RX ORDER — OMEPRAZOLE 40 MG/1
40 CAPSULE, DELAYED RELEASE ORAL DAILY
Qty: 90 CAPSULE | Refills: 2 | Status: SHIPPED | OUTPATIENT
Start: 2018-10-18 | End: 2019-07-10

## 2018-10-30 ENCOUNTER — TRANSFERRED RECORDS (OUTPATIENT)
Dept: HEALTH INFORMATION MANAGEMENT | Facility: CLINIC | Age: 28
End: 2018-10-30

## 2018-11-04 DIAGNOSIS — I10 HYPERTENSION, UNSPECIFIED TYPE: ICD-10-CM

## 2018-11-05 RX ORDER — HYDROCHLOROTHIAZIDE 25 MG/1
TABLET ORAL
Qty: 90 TABLET | Refills: 0 | OUTPATIENT
Start: 2018-11-05

## 2018-11-05 RX ORDER — HYDROCHLOROTHIAZIDE 25 MG/1
25 TABLET ORAL DAILY
Qty: 90 TABLET | Refills: 1 | Status: SHIPPED | OUTPATIENT
Start: 2018-11-05 | End: 2019-05-03

## 2018-11-05 NOTE — TELEPHONE ENCOUNTER
hydrochlorothiazide      Last Written Prescription Date:  9/21/18  Last Fill Quantity: 90,   # refills: 1  Last Office Visit: 9/21/18  Future Office visit:

## 2018-11-05 NOTE — NURSING NOTE
"Chief Complaint   Patient presents with     Depression       Initial BP (!) 140/100 (BP Location: Left arm, Patient Position: Sitting, Cuff Size: Adult Large)  Pulse 76  Resp 14  Wt 304 lb (137.9 kg)  BMI 47.61 kg/m2 Estimated body mass index is 47.61 kg/(m^2) as calculated from the following:    Height as of 2/20/18: 5' 7\" (1.702 m).    Weight as of this encounter: 304 lb (137.9 kg).  Medication Reconciliation: complete    Danisha Mijares LPN    "
no

## 2018-11-13 DIAGNOSIS — F32.0 MILD MAJOR DEPRESSION (H): ICD-10-CM

## 2018-11-14 RX ORDER — SERTRALINE HYDROCHLORIDE 100 MG/1
TABLET, FILM COATED ORAL
Qty: 135 TABLET | Refills: 0 | Status: SHIPPED | OUTPATIENT
Start: 2018-11-14 | End: 2018-12-07

## 2018-11-28 ENCOUNTER — MYC MEDICAL ADVICE (OUTPATIENT)
Dept: FAMILY MEDICINE | Facility: OTHER | Age: 28
End: 2018-11-28

## 2018-11-28 DIAGNOSIS — J98.9 REACTIVE AIRWAY DISEASE THAT IS NOT ASTHMA: ICD-10-CM

## 2018-11-28 RX ORDER — ALBUTEROL SULFATE 90 UG/1
2 AEROSOL, METERED RESPIRATORY (INHALATION) EVERY 6 HOURS PRN
Qty: 1 INHALER | Refills: 3 | Status: SHIPPED | OUTPATIENT
Start: 2018-11-28 | End: 2019-09-17

## 2018-12-06 NOTE — PROGRESS NOTES
SUBJECTIVE:   Shilpi Moore is a 28 year old female who presents to clinic today for the following health issues:      Patient would like to discuss family planning.       Shilpi is anticipating trying to conceive.  She would like to discuss her meds, and what is riley vs not, while she trys to conceive.       Has been on Zoloft for depression, which she feels is well controlled, off med    RAD - uses inhaler    HTN - stable on hydrochlorothiazide - which she takes intermittently    GERD - stable on Prilosec      Chronic disease management visit UTD  Labs UTD      Pap 2/20/18 - Kanu Bueno - Normal        PHQ-9 SCORE 2/19/2018 2/20/2018 12/7/2018   PHQ-9 Total Score - - -   PHQ-9 Total Score 14 9 4       KING-7 SCORE 2/19/2018 2/20/2018 9/21/2018   Total Score 18 16 6           Problem list and histories reviewed & adjusted, as indicated.  Additional history: as documented    Patient Active Problem List   Diagnosis     Mild major depression (H)     Vitamin D deficiency     ACP (advance care planning)     Seasonal allergic rhinitis due to pollen     Benign essential hypertension     Morbid obesity (H)     Reactive airway disease that is not asthma     Family history of melanoma     Gastroesophageal reflux disease without esophagitis     Past Surgical History:   Procedure Laterality Date     ENT SURGERY      tonsillectomy     OTHER SURGICAL HISTORY      I&D rt abscess axilla     SOFT TISSUE SURGERY      IND cyst right axilla       Social History   Substance Use Topics     Smoking status: Never Smoker     Smokeless tobacco: Never Used      Comment: Passive exposure     Alcohol use Yes      Comment: Beer; Occasionally     Family History   Problem Relation Age of Onset     Asthma Mother      Hypertension Father      Asthma Brother      Asthma Sister      Cancer Maternal Grandfather 62     Mesothelioma, cause of death     Cancer Other 28     Cousin; Melanoma, cause of death     Thyroid Disease No family hx of           Current Outpatient Prescriptions   Medication Sig Dispense Refill     albuterol (PROAIR HFA/PROVENTIL HFA/VENTOLIN HFA) 108 (90 Base) MCG/ACT inhaler Inhale 2 puffs into the lungs every 6 hours as needed for shortness of breath / dyspnea or wheezing 1 Inhaler 3     aspirin 81 MG EC tablet Take 1 tablet (81 mg) by mouth daily 90 tablet 3     cetirizine (ZYRTEC) 10 MG tablet Take 10 mg by mouth daily       hydrochlorothiazide (HYDRODIURIL) 25 MG tablet Take 1 tablet (25 mg) by mouth daily 90 tablet 1     omeprazole (PRILOSEC) 40 MG capsule Take 1 capsule (40 mg) by mouth daily Take 30-60 minutes before a meal. 90 capsule 2     Allergies   Allergen Reactions     Nsaids Swelling     Recent Labs   Lab Test  09/21/18   1357  04/04/17   0919   08/29/14   0916   LDL   --   101*   --   139*   HDL   --   59   --   49*   TRIG   --   134   --   134   ALT  33   --    --    --    CR  0.78  0.82   < >  0.83   GFRESTIMATED  88  84   < >  84   GFRESTBLACK  >90  >90  African American GFR Calc     < >  >90   GFR Calc     POTASSIUM  4.0  3.8   < >  3.8   TSH  2.40  2.19   < >   --     < > = values in this interval not displayed.      BP Readings from Last 3 Encounters:   12/07/18 126/76   09/21/18 136/86   02/20/18 124/80    Wt Readings from Last 3 Encounters:   12/07/18 317 lb (143.8 kg)   09/21/18 304 lb (137.9 kg)   02/20/18 267 lb (121.1 kg)                  Labs reviewed in EPIC    Reviewed and updated as needed this visit by clinical staff       Reviewed and updated as needed this visit by Provider         ROS:  Constitutional, HEENT, cardiovascular, pulmonary, gi and gu systems are negative, except as otherwise noted.    OBJECTIVE:     /76 (BP Location: Left arm, Patient Position: Sitting, Cuff Size: Adult Regular)  Pulse 88  Temp 98  F (36.7  C) (Tympanic)  Resp 14  Wt 317 lb (143.8 kg)  SpO2 98%  BMI 49.65 kg/m2  Body mass index is 49.65 kg/(m^2).     GENERAL: healthy, alert and no  distress  EYES: Eyes grossly normal to inspection, PERRL and conjunctivae and sclerae normal  NECK: no adenopathy, no asymmetry, masses, or scars and thyroid normal to palpation  RESP: lungs clear to auscultation - no rales, rhonchi or wheezes  CV: regular rate and rhythm, normal S1 S2, no S3 or S4, no murmur, click or rub, no peripheral edema and peripheral pulses strong  SKIN: no suspicious lesions or rashes  PSYCH: mentation appears normal, affect normal/bright        ASSESSMENT/PLAN:     1. Family planning  - OB/GYN REFERRAL  - Prenate OTC as you have been taking        Tiana Tucker NP  [facility]

## 2018-12-07 ENCOUNTER — OFFICE VISIT (OUTPATIENT)
Dept: FAMILY MEDICINE | Facility: OTHER | Age: 28
End: 2018-12-07
Attending: NURSE PRACTITIONER
Payer: COMMERCIAL

## 2018-12-07 VITALS
DIASTOLIC BLOOD PRESSURE: 76 MMHG | HEART RATE: 88 BPM | BODY MASS INDEX: 49.65 KG/M2 | WEIGHT: 293 LBS | TEMPERATURE: 98 F | OXYGEN SATURATION: 98 % | SYSTOLIC BLOOD PRESSURE: 126 MMHG | RESPIRATION RATE: 14 BRPM

## 2018-12-07 DIAGNOSIS — Z30.09 FAMILY PLANNING: Primary | ICD-10-CM

## 2018-12-07 PROCEDURE — 99213 OFFICE O/P EST LOW 20 MIN: CPT | Performed by: NURSE PRACTITIONER

## 2018-12-07 ASSESSMENT — ANXIETY QUESTIONNAIRES
1. FEELING NERVOUS, ANXIOUS, OR ON EDGE: MORE THAN HALF THE DAYS
IF YOU CHECKED OFF ANY PROBLEMS ON THIS QUESTIONNAIRE, HOW DIFFICULT HAVE THESE PROBLEMS MADE IT FOR YOU TO DO YOUR WORK, TAKE CARE OF THINGS AT HOME, OR GET ALONG WITH OTHER PEOPLE: SOMEWHAT DIFFICULT
5. BEING SO RESTLESS THAT IT IS HARD TO SIT STILL: MORE THAN HALF THE DAYS
7. FEELING AFRAID AS IF SOMETHING AWFUL MIGHT HAPPEN: NOT AT ALL
3. WORRYING TOO MUCH ABOUT DIFFERENT THINGS: MORE THAN HALF THE DAYS
6. BECOMING EASILY ANNOYED OR IRRITABLE: NEARLY EVERY DAY

## 2018-12-07 ASSESSMENT — PATIENT HEALTH QUESTIONNAIRE - PHQ9
SUM OF ALL RESPONSES TO PHQ QUESTIONS 1-9: 4
5. POOR APPETITE OR OVEREATING: MORE THAN HALF THE DAYS

## 2018-12-07 ASSESSMENT — PAIN SCALES - GENERAL: PAINLEVEL: NO PAIN (0)

## 2018-12-07 NOTE — NURSING NOTE
"No chief complaint on file.      Initial /76 (BP Location: Left arm, Patient Position: Sitting, Cuff Size: Adult Regular)  Pulse 88  Temp 98  F (36.7  C) (Tympanic)  Resp 14  Wt 317 lb (143.8 kg)  SpO2 98%  BMI 49.65 kg/m2 Estimated body mass index is 49.65 kg/(m^2) as calculated from the following:    Height as of 2/20/18: 5' 7\" (1.702 m).    Weight as of this encounter: 317 lb (143.8 kg).  Medication Reconciliation: complete    Charisma Celeste LPN      "

## 2018-12-07 NOTE — PATIENT INSTRUCTIONS
ASSESSMENT/PLAN:     1. Family planning  - OB/GYN REFERRAL  - Prenate OTC as you have been taking        Tiana Tucker NP  [facility]

## 2018-12-07 NOTE — MR AVS SNAPSHOT
After Visit Summary   12/7/2018    Shilpi Moore    MRN: [id number]           Patient Information     Date Of Birth          1990        Visit Information        Provider Department      12/7/2018 3:00 PM Tiana Tucker NP [facility]        Today's Diagnoses     Family planning    -  1      Care Instructions        ASSESSMENT/PLAN:     1. Family planning  - OB/GYN REFERRAL  - Prenate OTC as you have been taking        Tiana Tucker NP  [facility]              Follow-ups after your visit        Additional Services     OB/GYN REFERRAL       Your provider has referred you to:  Kanu Bueno - Family planning visit - has seen previously for pap    Please be aware that coverage of these services is subject to the terms and limitations of your health insurance plan.  Call member services at your health plan with any benefit or coverage questions.      Please bring the following with you to your appointment:    (1) Any X-Rays, CTs or MRIs which have been performed.  Contact the facility where they were done to arrange for  prior to your scheduled appointment.   (2) List of current medications   (3) This referral request   (4) Any documents/labs given to you for this referral                  Your next 10 appointments already scheduled     Dec 11, 2018  3:30 PM CST   (Arrive by 3:15 PM)   SHORT with PAULIE Cuadra CNM   [facility] ([facility] )    [address] [place] [id number] [phone]              Who to contact     If you have questions or need follow up information about today's clinic visit or your schedule please contact [facility] directly at [phone].  Normal or non-critical lab and imaging results will be communicated to you by MyChart, letter or phone within 4 business days after the clinic has received the results. If you do not hear from  us within 7 days, please contact the clinic through [employer] or phone. If you have a critical or abnormal lab result, we will notify you by phone as soon as possible.  Submit refill requests through [employer] or call your pharmacy and they will forward the refill request to us. Please allow 3 business days for your refill to be completed.          Additional Information About Your Visit        [employer] Information     [employer] gives you secure access to your electronic health record. If you see a primary care provider, you can also send messages to your care team and make appointments. If you have questions, please call your primary care clinic.  If you do not have a primary care provider, please call [phone] and they will assist you.        Care EveryWhere ID     This is your Care EveryWhere ID. This could be used by other organizations to access your [place] medical records  [id number]        Your Vitals Were     Pulse Temperature Respirations Pulse Oximetry BMI (Body Mass Index)       88 98  F (36.7  C) (Tympanic) 14 98% 49.65 kg/m2        Blood Pressure from Last 3 Encounters:   12/07/18 126/76   09/21/18 136/86   02/20/18 124/80    Weight from Last 3 Encounters:   12/07/18 317 lb (143.8 kg)   09/21/18 304 lb (137.9 kg)   02/20/18 267 lb (121.1 kg)              We Performed the Following     OB/GYN REFERRAL          Today's Medication Changes          These changes are accurate as of 12/7/18  3:51 PM.  If you have any questions, ask your nurse or doctor.               Stop taking these medicines if you haven't already. Please contact your care team if you have questions.     sertraline 100 MG tablet   Commonly known as:  ZOLOFT   Stopped by:  Tiana Tucker NP                    Primary Care Provider Office Phone # Fax #    Tiana Tucker [phone] [address]  [facility] [address]        Equal Access to Services     LENNY PINEDA AH: betty Tay  gila yarielmateuskwasi tobyazul soto ah. So [facility] [phone].    ATENCIÓN: Si bushra tiwari, tiene a hines disposición servicios gratuitos de asistencia lingüística. Severino al [phone].    We comply with applicable federal civil rights laws and [place] laws. We do not discriminate on the basis of race, color, national origin, age, disability, sex, sexual orientation, or gender identity.            Thank you!     Thank you for choosing [facility]  for your care. Our goal is always to provide you with excellent care. Hearing back from our patients is one way we can continue to improve our services. Please take a few minutes to complete the written survey that you may receive in the mail after your visit with us. Thank you!             Your Updated Medication List - Protect others around you: Learn how to safely use, store and throw away your medicines at www.disposemymeds.org.          This list is accurate as of 12/7/18  3:51 PM.  Always use your most recent med list.                   Brand Name Dispense Instructions for use Diagnosis    albuterol 108 (90 Base) MCG/ACT inhaler    PROAIR HFA/PROVENTIL HFA/VENTOLIN HFA    1 Inhaler    Inhale 2 puffs into the lungs every 6 hours as needed for shortness of breath / dyspnea or wheezing    Reactive airway disease that is not asthma       aspirin 81 MG EC tablet    ASA    90 tablet    Take 1 tablet (81 mg) by mouth daily    Hypertension, unspecified type       cetirizine 10 MG tablet    zyrTEC     Take 10 mg by mouth daily        hydrochlorothiazide 25 MG tablet    HYDRODIURIL    90 tablet    Take 1 tablet (25 mg) by mouth daily    Hypertension, unspecified type       omeprazole 40 MG DR capsule    priLOSEC    90 capsule    Take 1 capsule (40 mg) by mouth daily Take 30-60 minutes before a meal.    Gastroesophageal reflux disease without esophagitis

## 2018-12-11 ENCOUNTER — OFFICE VISIT (OUTPATIENT)
Dept: OBGYN | Facility: OTHER | Age: 28
End: 2018-12-11
Attending: ADVANCED PRACTICE MIDWIFE
Payer: COMMERCIAL

## 2018-12-11 VITALS
WEIGHT: 293 LBS | SYSTOLIC BLOOD PRESSURE: 124 MMHG | DIASTOLIC BLOOD PRESSURE: 80 MMHG | HEIGHT: 67 IN | BODY MASS INDEX: 45.99 KG/M2

## 2018-12-11 DIAGNOSIS — F41.9 ANXIETY: ICD-10-CM

## 2018-12-11 DIAGNOSIS — Z31.69 ENCOUNTER FOR PRECONCEPTION CONSULTATION: Primary | ICD-10-CM

## 2018-12-11 DIAGNOSIS — Z30.09 FAMILY PLANNING COUNSELING: ICD-10-CM

## 2018-12-11 PROCEDURE — 99214 OFFICE O/P EST MOD 30 MIN: CPT | Performed by: ADVANCED PRACTICE MIDWIFE

## 2018-12-11 PROCEDURE — 83036 HEMOGLOBIN GLYCOSYLATED A1C: CPT | Performed by: ADVANCED PRACTICE MIDWIFE

## 2018-12-11 PROCEDURE — 36415 COLL VENOUS BLD VENIPUNCTURE: CPT | Performed by: ADVANCED PRACTICE MIDWIFE

## 2018-12-11 ASSESSMENT — MIFFLIN-ST. JEOR: SCORE: 2200.53

## 2018-12-11 ASSESSMENT — PAIN SCALES - GENERAL: PAINLEVEL: NO PAIN (0)

## 2018-12-11 NOTE — PROGRESS NOTES
"Shilpi Moore is a 28 year old female  Here for family planning counseling.   0.  Pt discontinued a combined oral contraception (PASQUALE) 3 months ago.  Reports 2 menstrual cycles 24 days apart.  Bleedin days with 3 heavy.  Pain:  Mild cramps.    Negative Pap on 18      O:   /80 (BP Location: Left arm, Patient Position: Chair, Cuff Size: Adult Large)   Ht 1.702 m (5' 7\")   Wt 143.8 kg (317 lb)   LMP 2018 (Exact Date)   BMI 49.65 kg/m     Pleasant without acute distress.    A:    Family planning counseling  Morbidly obese  Anxiety    P:    Hgb A1C  Weight loss/Consider diabetic diet/weight management counseling  Preconception counseling  Offered MH counseling  Follow up for fertility consult in 8 months if needed    Total visit greater than 25 minutes with 100% of time spent face to face counseling this patient on fertility, ovulation calendar, need for weight loss, anxiety, healthy diet, mental health counseling, and preconception counseling including avoiding alcohol, undercooked meat, unpasteurized meat and cheeses, do not change cat litter, inform provider prior to taking prescribed or OTC medications.  Notify provider of positive pregnancy test.    PAULIE Carson, CNM    "

## 2018-12-11 NOTE — NURSING NOTE
"Chief Complaint   Patient presents with     Family Planning       Initial /80 (BP Location: Left arm, Patient Position: Chair, Cuff Size: Adult Large)   Ht 1.702 m (5' 7\")   Wt 143.8 kg (317 lb)   LMP 11/20/2018 (Exact Date)   BMI 49.65 kg/m   Estimated body mass index is 49.65 kg/m  as calculated from the following:    Height as of this encounter: 1.702 m (5' 7\").    Weight as of this encounter: 143.8 kg (317 lb).  Medication Reconciliation: complete    Radha Reyes LPN    "

## 2018-12-12 LAB
EST. AVERAGE GLUCOSE BLD GHB EST-MCNC: 103 MG/DL
HBA1C MFR BLD: 5.2 % (ref 0–5.6)

## 2018-12-12 NOTE — PATIENT INSTRUCTIONS
Return to office as needed.    Thank you for allowing Kanu APODACA CNM and our OB team to participate in your care.  If you have a scheduling or an appointment question please contact [facility] Coordinator at their direct line [phone].   ALL nursing questions or concerns can be directed to your OB nurse at: [phone] Mu Garcia/Janell

## 2018-12-27 ENCOUNTER — MYC MEDICAL ADVICE (OUTPATIENT)
Dept: OBGYN | Facility: OTHER | Age: 28
End: 2018-12-27

## 2018-12-27 DIAGNOSIS — Z32.01 PREGNANCY TEST POSITIVE: Primary | ICD-10-CM

## 2018-12-27 NOTE — TELEPHONE ENCOUNTER
Kanu Bueno Patient:     Positive pregnancy test : Yes, last Saturday   Last Annual/ Pap: 2018      P:0  LMP : 2018 GA: 4w2d  Prenatal vitamins?: Yes   Bleeding?: Yes, spotting for one day 2 days ago   Cramping?: Yes, light cramping off and on  1-sided pelvic pain?: No   Advised patient to be seen ASAP if any of the above symptoms.    Order pended for dating US.     Tramaine appt scheduled with Kanu on- 2019 @ 2

## 2019-01-02 ENCOUNTER — MYC MEDICAL ADVICE (OUTPATIENT)
Dept: OBGYN | Facility: OTHER | Age: 29
End: 2019-01-02

## 2019-01-07 ENCOUNTER — MYC MEDICAL ADVICE (OUTPATIENT)
Dept: OBGYN | Facility: OTHER | Age: 29
End: 2019-01-07

## 2019-01-11 ENCOUNTER — TRANSFERRED RECORDS (OUTPATIENT)
Dept: HEALTH INFORMATION MANAGEMENT | Facility: CLINIC | Age: 29
End: 2019-01-11

## 2019-01-14 ENCOUNTER — MYC MEDICAL ADVICE (OUTPATIENT)
Dept: OBGYN | Facility: OTHER | Age: 29
End: 2019-01-14

## 2019-01-18 ENCOUNTER — HOSPITAL ENCOUNTER (OUTPATIENT)
Dept: ULTRASOUND IMAGING | Facility: HOSPITAL | Age: 29
Discharge: HOME | End: 2019-01-18
Attending: ADVANCED PRACTICE MIDWIFE | Admitting: ADVANCED PRACTICE MIDWIFE
Payer: COMMERCIAL

## 2019-01-18 DIAGNOSIS — Z32.01 PREGNANCY TEST POSITIVE: ICD-10-CM

## 2019-01-18 PROCEDURE — 76801 OB US < 14 WKS SINGLE FETUS: CPT | Mod: TC

## 2019-01-23 ENCOUNTER — OFFICE VISIT (OUTPATIENT)
Dept: FAMILY MEDICINE | Facility: OTHER | Age: 29
End: 2019-01-23
Attending: NURSE PRACTITIONER
Payer: COMMERCIAL

## 2019-01-23 VITALS
WEIGHT: 293 LBS | BODY MASS INDEX: 45.99 KG/M2 | OXYGEN SATURATION: 97 % | SYSTOLIC BLOOD PRESSURE: 122 MMHG | DIASTOLIC BLOOD PRESSURE: 70 MMHG | TEMPERATURE: 97.4 F | HEIGHT: 67 IN | HEART RATE: 102 BPM | RESPIRATION RATE: 16 BRPM

## 2019-01-23 DIAGNOSIS — J01.00 SUBACUTE MAXILLARY SINUSITIS: Primary | ICD-10-CM

## 2019-01-23 PROCEDURE — 99213 OFFICE O/P EST LOW 20 MIN: CPT | Performed by: NURSE PRACTITIONER

## 2019-01-23 RX ORDER — AMOXICILLIN 500 MG/1
500 CAPSULE ORAL 3 TIMES DAILY
Qty: 30 CAPSULE | Refills: 0 | Status: SHIPPED | OUTPATIENT
Start: 2019-01-23 | End: 2019-02-12

## 2019-01-23 ASSESSMENT — MIFFLIN-ST. JEOR: SCORE: 2200.53

## 2019-01-23 ASSESSMENT — PAIN SCALES - GENERAL: PAINLEVEL: NO PAIN (0)

## 2019-01-23 NOTE — PROGRESS NOTES
SUBJECTIVE:   Shilpi Moore is a 28 year old female who presents to clinic today for the following health issues:      Acute Illness   Acute illness concerns: URI  Onset: Middle of last week    Fever: YES- low grade (under 100)    Chills/Sweats: YES- chills    Headache (location?): YES    Sinus Pressure:no    Conjunctivitis:  no    Ear Pain: no    Rhinorrhea: YES    Congestion: YES- chest    Sore Throat: YES- from coughing     Cough: YES    Wheeze: YES    Decreased Appetite: YES    Nausea: YES- due to pregnancy    Vomiting: no     Diarrhea:  no     Dysuria/Freq.: no     Fatigue/Achiness: YES    Sick/Strep Exposure: YES-  was sick but now over it     Therapies Tried and outcome: cough drops and tylenol      She is pregnant, 8 weeks, with an upcoming first OB appointment with Kanu Bueno in a week.        Problem list and histories reviewed & adjusted, as indicated.  Additional history: as documented    Patient Active Problem List   Diagnosis     Mild major depression (H)     Vitamin D deficiency     ACP (advance care planning)     Seasonal allergic rhinitis due to pollen     Benign essential hypertension     Morbid obesity (H)     Reactive airway disease that is not asthma     Family history of melanoma     Gastroesophageal reflux disease without esophagitis     Family planning counseling     Past Surgical History:   Procedure Laterality Date     ENT SURGERY      tonsillectomy     OTHER SURGICAL HISTORY      I&D rt abscess axilla     SOFT TISSUE SURGERY      IND cyst right axilla       Social History     Tobacco Use     Smoking status: Never Smoker     Smokeless tobacco: Never Used     Tobacco comment: Passive exposure   Substance Use Topics     Alcohol use: Yes     Comment: Beer; Occasionally     Family History   Problem Relation Age of Onset     Asthma Mother      Hypertension Father      Asthma Brother      Asthma Sister      Cancer Maternal Grandfather 62        Mesothelioma, cause of death     Cancer  Other 28        Cousin; Melanoma, cause of death     Thyroid Disease No family hx of          Current Outpatient Medications   Medication Sig Dispense Refill     albuterol (PROAIR HFA/PROVENTIL HFA/VENTOLIN HFA) 108 (90 Base) MCG/ACT inhaler Inhale 2 puffs into the lungs every 6 hours as needed for shortness of breath / dyspnea or wheezing 1 Inhaler 3     cetirizine (ZYRTEC) 10 MG tablet Take 10 mg by mouth daily       hydrochlorothiazide (HYDRODIURIL) 25 MG tablet Take 1 tablet (25 mg) by mouth daily 90 tablet 1     omeprazole (PRILOSEC) 40 MG capsule Take 1 capsule (40 mg) by mouth daily Take 30-60 minutes before a meal. 90 capsule 2     Prenatal Vit-Fe Fumarate-FA (PRENATAL PO)        Allergies   Allergen Reactions     Nsaids Swelling     Recent Labs   Lab Test 12/11/18  1700 09/21/18  1357 04/04/17  0919  08/29/14  0916   A1C 5.2  --   --   --   --    LDL  --   --  101*  --  139*   HDL  --   --  59  --  49*   TRIG  --   --  134  --  134   ALT  --  33  --   --   --    CR  --  0.78 0.82   < > 0.83   GFRESTIMATED  --  88 84   < > 84   GFRESTBLACK  --  >90 >90  African American GFR Calc     < > >90   GFR Calc     POTASSIUM  --  4.0 3.8   < > 3.8   TSH  --  2.40 2.19   < >  --     < > = values in this interval not displayed.      BP Readings from Last 3 Encounters:   01/23/19 122/70   12/11/18 124/80   12/07/18 126/76    Wt Readings from Last 3 Encounters:   01/23/19 143.8 kg (317 lb)   12/11/18 143.8 kg (317 lb)   12/07/18 143.8 kg (317 lb)                  Labs reviewed in EPIC    Reviewed and updated as needed this visit by clinical staff  Tobacco  Allergies  Meds       Reviewed and updated as needed this visit by Provider         ROS:  Constitutional, HEENT, cardiovascular, pulmonary, gi and gu systems are negative, except as otherwise noted.    OBJECTIVE:     /70 (BP Location: Left arm, Patient Position: Sitting, Cuff Size: Adult Large)   Pulse 102   Temp 97.4  F (36.3  C) (Tympanic)   " Resp 16   Ht 1.702 m (5' 7\")   Wt 143.8 kg (317 lb)   SpO2 97%   BMI 49.65 kg/m    Body mass index is 49.65 kg/m .     GENERAL: healthy, alert and no distress  EYES: Eyes grossly normal to inspection, PERRL and conjunctivae and sclerae normal  HENT: rhinorrhea yellow and sinuses: maxillary, frontal tenderness on both sides, yellow PND  NECK: no adenopathy, no asymmetry, masses, or scars and thyroid normal to palpation  RESP: lungs clear to auscultation - no rales, rhonchi or wheezes. Loose cough  CV: regular rate and rhythm, normal S1 S2, no S3 or S4, no murmur, click or rub, no peripheral edema and peripheral pulses strong  SKIN: no suspicious lesions or rashes        ASSESSMENT/PLAN:     1. Subacute maxillary sinusitis  - amoxicillin (AMOXIL) 500 MG capsule; Take 1 capsule (500 mg) by mouth 3 times daily for 10 days  Dispense: 30 capsule; Refill: 0      Fluids  Rest  Temp control at home  Humidity at home - add bacteriostatic solution to humidifier  To ER or UC if symptoms increase  Return if you do not improve          Tiana Tucker NP  [facility]    "

## 2019-01-23 NOTE — NURSING NOTE
"Chief Complaint   Patient presents with     URI     Patient reports being 8 weeks pregnant       Initial /70 (BP Location: Left arm, Patient Position: Sitting, Cuff Size: Adult Large)   Pulse 102   Temp 97.4  F (36.3  C) (Tympanic)   Resp 16   Ht 1.702 m (5' 7\")   Wt 143.8 kg (317 lb)   SpO2 97%   BMI 49.65 kg/m   Estimated body mass index is 49.65 kg/m  as calculated from the following:    Height as of this encounter: 1.702 m (5' 7\").    Weight as of this encounter: 143.8 kg (317 lb).  Medication Reconciliation: complete    Charisma Celeste LPN    "

## 2019-01-23 NOTE — PATIENT INSTRUCTIONS
ASSESSMENT/PLAN:     1. Subacute maxillary sinusitis  - amoxicillin (AMOXIL) 500 MG capsule; Take 1 capsule (500 mg) by mouth 3 times daily for 10 days  Dispense: 30 capsule; Refill: 0      Fluids  Rest  Temp control at home  Humidity at home - add bacteriostatic solution to humidifier  To ER or UC if symptoms increase  Return if you do not improve          Tiana Tucker NP  [facility]

## 2019-01-25 ENCOUNTER — TELEPHONE (OUTPATIENT)
Dept: FAMILY MEDICINE | Facility: OTHER | Age: 29
End: 2019-01-25

## 2019-01-25 NOTE — TELEPHONE ENCOUNTER
It has only been 2 days.  Continue plan of care, to UC on weekend if worse.    Tiana Tucker [facility]  [phone]

## 2019-02-11 DIAGNOSIS — F32.0 MILD MAJOR DEPRESSION (H): ICD-10-CM

## 2019-02-12 ENCOUNTER — PRENATAL OFFICE VISIT (OUTPATIENT)
Dept: OBGYN | Facility: OTHER | Age: 29
End: 2019-02-12
Attending: ADVANCED PRACTICE MIDWIFE
Payer: COMMERCIAL

## 2019-02-12 VITALS
WEIGHT: 293 LBS | DIASTOLIC BLOOD PRESSURE: 72 MMHG | BODY MASS INDEX: 45.99 KG/M2 | HEIGHT: 67 IN | SYSTOLIC BLOOD PRESSURE: 116 MMHG

## 2019-02-12 DIAGNOSIS — Z11.3 SCREEN FOR STD (SEXUALLY TRANSMITTED DISEASE): ICD-10-CM

## 2019-02-12 DIAGNOSIS — O09.91 ENCOUNTER FOR SUPERVISION OF HIGH RISK PREGNANCY IN FIRST TRIMESTER, ANTEPARTUM: ICD-10-CM

## 2019-02-12 DIAGNOSIS — O09.90 SUPERVISION OF HIGH RISK PREGNANCY, ANTEPARTUM: Primary | ICD-10-CM

## 2019-02-12 LAB
ALBUMIN UR-MCNC: NEGATIVE MG/DL
APPEARANCE UR: CLEAR
BILIRUB UR QL STRIP: NEGATIVE
COLOR UR AUTO: YELLOW
ERYTHROCYTE [DISTWIDTH] IN BLOOD BY AUTOMATED COUNT: 13.4 % (ref 10–15)
GLUCOSE UR STRIP-MCNC: NEGATIVE MG/DL
HCT VFR BLD AUTO: 41.5 % (ref 35–47)
HGB BLD-MCNC: 13.8 G/DL (ref 11.7–15.7)
HGB UR QL STRIP: ABNORMAL
KETONES UR STRIP-MCNC: NEGATIVE MG/DL
LEUKOCYTE ESTERASE UR QL STRIP: NEGATIVE
MCH RBC QN AUTO: 27.8 PG (ref 26.5–33)
MCHC RBC AUTO-ENTMCNC: 33.3 G/DL (ref 31.5–36.5)
MCV RBC AUTO: 84 FL (ref 78–100)
NITRATE UR QL: NEGATIVE
PH UR STRIP: 6 PH (ref 5–7)
PLATELET # BLD AUTO: 325 10E9/L (ref 150–450)
RBC # BLD AUTO: 4.96 10E12/L (ref 3.8–5.2)
RBC #/AREA URNS AUTO: NORMAL /HPF
SOURCE: ABNORMAL
SP GR UR STRIP: >1.03 (ref 1–1.03)
SPECIMEN SOURCE: NORMAL
UROBILINOGEN UR STRIP-ACNC: 0.2 EU/DL (ref 0.2–1)
WBC # BLD AUTO: 12.7 10E9/L (ref 4–11)
WBC #/AREA URNS AUTO: NORMAL /HPF
WET PREP SPEC: NORMAL

## 2019-02-12 PROCEDURE — 80307 DRUG TEST PRSMV CHEM ANLYZR: CPT | Performed by: ADVANCED PRACTICE MIDWIFE

## 2019-02-12 PROCEDURE — 99207 ZZC FIRST OB VISIT: CPT | Mod: 25 | Performed by: ADVANCED PRACTICE MIDWIFE

## 2019-02-12 PROCEDURE — 87389 HIV-1 AG W/HIV-1&-2 AB AG IA: CPT | Mod: 90 | Performed by: ADVANCED PRACTICE MIDWIFE

## 2019-02-12 PROCEDURE — 81001 URINALYSIS AUTO W/SCOPE: CPT | Mod: 59 | Performed by: ADVANCED PRACTICE MIDWIFE

## 2019-02-12 PROCEDURE — 86901 BLOOD TYPING SEROLOGIC RH(D): CPT | Performed by: ADVANCED PRACTICE MIDWIFE

## 2019-02-12 PROCEDURE — 86850 RBC ANTIBODY SCREEN: CPT | Performed by: ADVANCED PRACTICE MIDWIFE

## 2019-02-12 PROCEDURE — 0064U ANTB TP TOTAL&RPR IA QUAL: CPT | Mod: 90 | Performed by: ADVANCED PRACTICE MIDWIFE

## 2019-02-12 PROCEDURE — 76815 OB US LIMITED FETUS(S): CPT | Performed by: ADVANCED PRACTICE MIDWIFE

## 2019-02-12 PROCEDURE — 86900 BLOOD TYPING SEROLOGIC ABO: CPT | Performed by: ADVANCED PRACTICE MIDWIFE

## 2019-02-12 PROCEDURE — 99000 SPECIMEN HANDLING OFFICE-LAB: CPT | Performed by: ADVANCED PRACTICE MIDWIFE

## 2019-02-12 PROCEDURE — 85027 COMPLETE CBC AUTOMATED: CPT | Performed by: ADVANCED PRACTICE MIDWIFE

## 2019-02-12 PROCEDURE — 87491 CHLMYD TRACH DNA AMP PROBE: CPT | Performed by: ADVANCED PRACTICE MIDWIFE

## 2019-02-12 PROCEDURE — 36415 COLL VENOUS BLD VENIPUNCTURE: CPT | Performed by: ADVANCED PRACTICE MIDWIFE

## 2019-02-12 PROCEDURE — 87340 HEPATITIS B SURFACE AG IA: CPT | Mod: 90 | Performed by: ADVANCED PRACTICE MIDWIFE

## 2019-02-12 PROCEDURE — 86762 RUBELLA ANTIBODY: CPT | Mod: 90 | Performed by: ADVANCED PRACTICE MIDWIFE

## 2019-02-12 PROCEDURE — 87591 N.GONORRHOEAE DNA AMP PROB: CPT | Performed by: ADVANCED PRACTICE MIDWIFE

## 2019-02-12 PROCEDURE — 87210 SMEAR WET MOUNT SALINE/INK: CPT | Performed by: ADVANCED PRACTICE MIDWIFE

## 2019-02-12 RX ORDER — SERTRALINE HYDROCHLORIDE 100 MG/1
TABLET, FILM COATED ORAL
Qty: 135 TABLET | Refills: 0 | Status: SHIPPED | OUTPATIENT
Start: 2019-02-12 | End: 2019-02-12

## 2019-02-12 ASSESSMENT — PATIENT HEALTH QUESTIONNAIRE - PHQ9: SUM OF ALL RESPONSES TO PHQ QUESTIONS 1-9: 3

## 2019-02-12 ASSESSMENT — PAIN SCALES - GENERAL: PAINLEVEL: NO PAIN (0)

## 2019-02-12 ASSESSMENT — ANXIETY QUESTIONNAIRES
GAD7 TOTAL SCORE: 8
3. WORRYING TOO MUCH ABOUT DIFFERENT THINGS: SEVERAL DAYS
5. BEING SO RESTLESS THAT IT IS HARD TO SIT STILL: NOT AT ALL
6. BECOMING EASILY ANNOYED OR IRRITABLE: MORE THAN HALF THE DAYS
7. FEELING AFRAID AS IF SOMETHING AWFUL MIGHT HAPPEN: MORE THAN HALF THE DAYS
4. TROUBLE RELAXING: NOT AT ALL
2. NOT BEING ABLE TO STOP OR CONTROL WORRYING: SEVERAL DAYS
1. FEELING NERVOUS, ANXIOUS, OR ON EDGE: MORE THAN HALF THE DAYS
IF YOU CHECKED OFF ANY PROBLEMS ON THIS QUESTIONNAIRE, HOW DIFFICULT HAVE THESE PROBLEMS MADE IT FOR YOU TO DO YOUR WORK, TAKE CARE OF THINGS AT HOME, OR GET ALONG WITH OTHER PEOPLE: SOMEWHAT DIFFICULT

## 2019-02-12 ASSESSMENT — MIFFLIN-ST. JEOR: SCORE: 2191.46

## 2019-02-12 NOTE — TELEPHONE ENCOUNTER
sertraline (ZOLOFT) 100 MG tablet  Last Written Prescription Date:  Not on med list   Last Fill Quantity: 0,   # refills: 0  Last Office Visit: 1/23/18  Future Office visit:    Next 5 appointments (look out 90 days)    Feb 12, 2019  3:00 PM CST  (Arrive by 2:45 PM)  New Prenatal with PAULIE Cuadra CNM  [facility] ([facility] ) [address] [place] [id number] [phone]           Routing refill request to provider for review/approval because:  Drug not active on patient's medication list

## 2019-02-12 NOTE — PROGRESS NOTES
NEW OB VISIT  Shilpi Moore is a 28 year old  at 10w6d presenting for a new ob visit.      Currently taking Prenatal Vitamins? y  Folate y    ZIKA n    MEDICAL HISTORY:  Diabetes: No  Hypertension: Yes, benign essential hypertension  Heart Disease: No  Autoimmune disorder: No  Kidney Disease/UTI: No  Neurologic Disease/Epilepsy:No  Psychiatric Disease: No  Depression/Postpartum Depression:Yes  Varicositites/Phlebitis: No  Hepatitis/Liver Disease: No  Thyroid Dysfunction: No  Trauma/Violence: No  History of Blood Transfusion: No  Tobacco Use: No  Alcohol Use: No  Illicit/Recreational Drugs: No  D (Rh Sensitized): unsure  Pulmonary Disease (TB/Asthma): Yes, mild asthma  Drug/Latex Allergies/Reactions: Yes, NSAIDs  Breast: No  GYN Surgery:No  Operations/Hospitalizations:Yes, tonsillectomy, cysts removal from right axillary  Anesthetic Complications: No  History of Abnormal Pap:No  Uterine Anomalies/LAWRENCE: No  Infertility: No  Artifical Reproductive Technologies Treatment: No  Relevant Family History:No  Other/Comments: No    INFECTION HISTORY:  Are you exposed to TB anywhere you work or live?: n  Do you or your Partner have Genital Herpes: n  Rash or viral illness or fever since LMP: n  Hepatitis B or C: n  History of STI (Gonorrhea, Chlamydia, HPV, HIV, Syphilis): n  Other: n  Cats y, do NOT change cat litter    BABY DOC A. Fltiffany             Breast feeding: y  Card given y      IMMUNIZATION HISTORY:  Chicken Pox: y  Flu Vaccine:  y  Pneumococcal if smoker or Reactive Airway Disease:  n  Tdap: 28 w  HPV vaccinations (Gardasil): n  Other/comments: n    FAMILY HISTORY  Diabetes: MGM, pgm  Hypertension: self, father  CVA/Stroke: n  Lupus: n  Cancers: Breast  n ovarian n,colon n,uterine: n           Genetics Screening/Teratology Counseling:  Includes Patient, Baby's Father, or anyone in either family with:  Patient's age 35 years or older as of estimated date of delivery:  n  Thalassemia: MCV less than 80: n  Neural  "Tube defects: n  Congenital Heart Defects: n  Down syndrome: n  Les-Sachs: n  Canavan Disease: n  Familial Dysautonomia: n  Sickle Cell Disease or Trait: n  Hemophilia or other blood disorders: n  Muscular Dystrophy: n  Cystic Fibrosis: n  Guthrie's Chorea: n  Intellectual development disorder or Autism: n  Other genetic or chromosomal disorders: n  Maternal Metabolic Disorder (Type 1 DM, PKU): n  Patient or baby's father with birth defects not listed above: n  Recurrent pregnancy loss or stillbirth: n  Medications (Supplements, drugs)/ Illicit/ Recreational drugs/ Alcohol since LMP: n  Other/Comments: n    Review Of Systems:   CONSTITUTIONAL:     NEGATIVE for fever, chills, change in weight  INTEGUMENTARY/SKIN:       NEGATIVE for worrisome rashes, moles or lesions  EYES:     NEGATIVE for vision changes or irritation  ENT/MOUTH: NEGATIVE for ear, mouth and throat problems  RESP:     NEGATIVE for significant cough or SOB  CV:   NEGATIVE for chest pain, palpitations or peripheral edema  GI:     NEGATIVE for unusual nausea, abdominal pain, heartburn, or change in bowel   :   NEGATIVE for frequency, dysuria, hematuria, vaginal discharge or bleeding.  Vaginal discharge  MUSCULOSKELETAL:     NEGATIVE for significant arthralgias or myalgia.  Scholiosis  NEURO:      NEGATIVE for weakness, dizziness or paresthesias  ENDOCRINE:      NEGATIVE for temperature intolerance, skin/hair changes  PSYCHIATRIC:      NEGATIVE for changes in mood or affect.     PHYSICAL EXAM:   /72 (BP Location: Right arm, Patient Position: Chair, Cuff Size: Adult Large)   Ht 1.702 m (5' 7\")   Wt 142.9 kg (315 lb)   LMP 11/28/2018   BMI 49.34 kg/m     BMI: Body mass index is 49.34 kg/m .  Constitutional: healthy, alert and no distress  Head: Normocephalic. No masses, lesions, tenderness or abnormalities  Neck: Neck supple. Trachea midline. No adenopathy. Thyroid symmetric, normal size.   Cardiovascular: RRR.   Respiratory: lungs clear "   Breast: Breasts reveal mild symmetric fibrocystic densities, but there are no dominant, discrete, fixed or suspicious masses found.  Gastrointestinal: Abdomen soft, non-tender, non-distended. No masses, organomegaly.  Pelvic:  Vulva:  No external lesions, normal female hair distribution, no inguinal adenopathy.    Urethra:  Midline, non-tender, well supported, no discharge  Vagina:  Moist, pink, no abnormal discharge, no lesions  Uterus:    , non-tender  Ovaries:  No masses appreciated  Rectal Exam: deferred    Musculoskeletal: extremities normal  Skin: no suspicious lesions or rashes  Psychiatric: Affect appropriate, cooperative,mentation appears normal.     Risk assessment done. Level is   high    ASSESSMENT:   G1 @ 10 w 6d  Benign essential hypertension  Mild asthma  Morbidly obese  Scoliosis    Vaginal discharge  Unable to hear FHT with Doppler  Fetal cardiac activity and movement Noted with US    PLAN:  Prenatal labs   11-13 weeks 1st trimester Nuchal Translucency/Bloodwork  15-16 wk MSAFP   Level II Ultrasound at 20 weeks   Tdap at 27 weeks  Estimated Fetal Weight at 38 weeks prn    Early 1 hour Blood Sugar @ next visit  Referral to Diabetic resource center for diet changes  Consult with OB  Discussed need to deliver in [place] if over 300 lbs r/t safety for surgery  US for FHT  Return to Office:  In 4 weeks for prenatal care and as needed    Greater than 45 were spent in face to face counseling and interview by me for this initial new ob visit.  Kanu APODACA, PUSHPA

## 2019-02-12 NOTE — NURSING NOTE
"Chief Complaint   Patient presents with     Prenatal Care     10w6d       Initial /72 (BP Location: Right arm, Patient Position: Chair, Cuff Size: Adult Large)   Ht 1.702 m (5' 7\")   Wt 142.9 kg (315 lb)   LMP 11/28/2018   BMI 49.34 kg/m   Estimated body mass index is 49.34 kg/m  as calculated from the following:    Height as of this encounter: 1.702 m (5' 7\").    Weight as of this encounter: 142.9 kg (315 lb).  Medication Reconciliation: complete    Radha Reyes LPN    "

## 2019-02-12 NOTE — PATIENT INSTRUCTIONS
Return to office in 4 weeks for prenatal care and as needed.    Thank you for allowing Kanu APODACA CNM and our OB team to participate in your care.  If you have a scheduling or an appointment question please contact [facility] Unit Coordinator at their direct line [phone].   ALL nursing questions or concerns can be directed to your OB nurse at: [phone] Mu Garcia/Janell

## 2019-02-13 ENCOUNTER — MYC MEDICAL ADVICE (OUTPATIENT)
Dept: OBGYN | Facility: OTHER | Age: 29
End: 2019-02-13

## 2019-02-13 LAB
ABO + RH BLD: NORMAL
ABO + RH BLD: NORMAL
AMPHETAMINES UR QL SCN: NEGATIVE
BARBITURATES UR QL: NEGATIVE
BENZODIAZ UR QL: NEGATIVE
BLD GP AB SCN SERPL QL: NORMAL
BLOOD BANK CMNT PATIENT-IMP: NORMAL
BLOOD BANK CMNT PATIENT-IMP: NORMAL
C TRACH DNA SPEC QL PROBE+SIG AMP: NOT DETECTED
CANNABINOIDS UR QL SCN: NEGATIVE
COCAINE UR QL: NEGATIVE
METHADONE UR QL SCN: NEGATIVE
N GONORRHOEA DNA SPEC QL PROBE+SIG AMP: NOT DETECTED
OPIATES UR QL SCN: NEGATIVE
PCP UR QL SCN: NEGATIVE
SPECIMEN EXP DATE BLD: NORMAL
SPECIMEN SOURCE: NORMAL

## 2019-02-13 ASSESSMENT — ANXIETY QUESTIONNAIRES: GAD7 TOTAL SCORE: 8

## 2019-02-14 LAB
HBV SURFACE AG SERPL QL IA: NONREACTIVE
HIV 1+2 AB+HIV1 P24 AG SERPL QL IA: NONREACTIVE
RUBV IGG SERPL IA-ACNC: 20 IU/ML
T PALLIDUM AB SER QL: NONREACTIVE

## 2019-02-18 ENCOUNTER — HOSPITAL ENCOUNTER (OUTPATIENT)
Dept: ULTRASOUND IMAGING | Facility: HOSPITAL | Age: 29
Discharge: HOME | End: 2019-02-18
Attending: ADVANCED PRACTICE MIDWIFE | Admitting: ADVANCED PRACTICE MIDWIFE
Payer: COMMERCIAL

## 2019-02-18 ENCOUNTER — MYC MEDICAL ADVICE (OUTPATIENT)
Dept: OBGYN | Facility: OTHER | Age: 29
End: 2019-02-18

## 2019-02-18 DIAGNOSIS — O09.91 ENCOUNTER FOR SUPERVISION OF HIGH RISK PREGNANCY IN FIRST TRIMESTER, ANTEPARTUM: ICD-10-CM

## 2019-02-18 DIAGNOSIS — O09.90 SUPERVISION OF HIGH RISK PREGNANCY, ANTEPARTUM: ICD-10-CM

## 2019-02-18 DIAGNOSIS — E66.01 MORBID OBESITY (H): Primary | ICD-10-CM

## 2019-02-18 PROCEDURE — 76801 OB US < 14 WKS SINGLE FETUS: CPT | Mod: TC

## 2019-02-20 DIAGNOSIS — O09.90 SUPERVISION OF HIGH RISK PREGNANCY, ANTEPARTUM: Primary | ICD-10-CM

## 2019-02-20 DIAGNOSIS — O09.91 ENCOUNTER FOR SUPERVISION OF HIGH RISK PREGNANCY IN FIRST TRIMESTER, ANTEPARTUM: ICD-10-CM

## 2019-02-20 LAB — GLUCOSE 1H P 50 G GLC PO SERPL-MCNC: 109 MG/DL (ref 60–129)

## 2019-02-20 PROCEDURE — 82950 GLUCOSE TEST: CPT | Performed by: ADVANCED PRACTICE MIDWIFE

## 2019-02-20 PROCEDURE — 36415 COLL VENOUS BLD VENIPUNCTURE: CPT | Performed by: ADVANCED PRACTICE MIDWIFE

## 2019-02-22 ENCOUNTER — HOSPITAL ENCOUNTER (OUTPATIENT)
Dept: EDUCATION SERVICES | Facility: HOSPITAL | Age: 29
Discharge: HOME | End: 2019-02-22
Attending: ADVANCED PRACTICE MIDWIFE | Admitting: ADVANCED PRACTICE MIDWIFE
Payer: COMMERCIAL

## 2019-02-22 VITALS
HEART RATE: 94 BPM | HEIGHT: 66 IN | OXYGEN SATURATION: 99 % | SYSTOLIC BLOOD PRESSURE: 118 MMHG | DIASTOLIC BLOOD PRESSURE: 76 MMHG | BODY MASS INDEX: 47.09 KG/M2 | WEIGHT: 293 LBS | RESPIRATION RATE: 20 BRPM

## 2019-02-22 DIAGNOSIS — O99.211 OBESITY DURING PREGNANCY IN FIRST TRIMESTER: ICD-10-CM

## 2019-02-22 PROCEDURE — 97802 MEDICAL NUTRITION INDIV IN: CPT | Performed by: DIETITIAN, REGISTERED

## 2019-02-22 ASSESSMENT — PAIN SCALES - GENERAL: PAINLEVEL: NO PAIN (0)

## 2019-02-22 ASSESSMENT — MIFFLIN-ST. JEOR: SCORE: 2165.26

## 2019-02-22 NOTE — PROGRESS NOTES
"Pt is here today MNT r/t dx of obesity during pregnancy.  She is 12 weeks gestation with first pregnancy.  Pt is concerned about risk of GDM.  1 hour glucose screen was 109.      /76   Pulse 94   Resp 20   Ht 1.67 m (5' 5.75\")   Wt 142.2 kg (313 lb 9.6 oz)   LMP 11/28/2018   SpO2 99%   BMI 51.00 kg/m     Pt reports prepregnancy weight of 318#.  She states she has been trying to eat healthy which she suspects has led to some weight loss.    In the past pt has followed a Keto diet and lost 50# but gained it all back as she was unable to stay on diet long term.      Verbal diet recall obtained:  Breakfast-protein shake  Lunch-egg salad on 1 slice ww toast  Supper-lately has been take out as she has been very tired in the evenings.  Pt states she knows she needs to change that.    Snacks-fruit, vegis, crackers, chips    Pt has been walking for 15 minutes 5x/week on her breaks at work.  She will try to work up to 30 minutes daily.      Reviewed healthy diet for pregnancy.  Discussed portion guidelines for protein, fat, carbohydrates.  Pt is very familiar with label reading from doing the Keto diet.  Encouraged her to try to limit carbohydrates to 45 grams/meal, 15-30 grams/snack in effort to limit weight gain and keep glucose levels controlled.  Pt listened attentively and asked appropriate questions.  She seems motivated to have a healthy pregnancy.  Written materials provided.     PLAN:    Follow meal planning guidelines discussed.      FOLLOW UP:  As needed.     Total time spent with pt was 30 minutes.    "

## 2019-02-22 NOTE — PATIENT INSTRUCTIONS
-Follow carbohydrate counting meal plan - 45 grams/meal, 15-30 grams/snack.   -Be as active as you are able/allowed.   -Call with any concerns - GARTH Baron, [phone].

## 2019-03-12 ENCOUNTER — PRENATAL OFFICE VISIT (OUTPATIENT)
Dept: OBGYN | Facility: OTHER | Age: 29
End: 2019-03-12
Attending: ADVANCED PRACTICE MIDWIFE
Payer: COMMERCIAL

## 2019-03-12 VITALS
BODY MASS INDEX: 47.09 KG/M2 | HEIGHT: 66 IN | SYSTOLIC BLOOD PRESSURE: 114 MMHG | WEIGHT: 293 LBS | DIASTOLIC BLOOD PRESSURE: 72 MMHG

## 2019-03-12 DIAGNOSIS — O09.90 SUPERVISION OF HIGH RISK PREGNANCY, ANTEPARTUM: Primary | ICD-10-CM

## 2019-03-12 PROCEDURE — 99207 ZZC PRENATAL VISIT: CPT | Performed by: ADVANCED PRACTICE MIDWIFE

## 2019-03-12 ASSESSMENT — MIFFLIN-ST. JEOR: SCORE: 2167.07

## 2019-03-12 ASSESSMENT — PAIN SCALES - GENERAL: PAINLEVEL: NO PAIN (0)

## 2019-03-12 NOTE — PROGRESS NOTES
Doing well.   Denies contractions, bleeding, or leakage of fluid.     Discussed:  Weight loss plan, fetal movement    Plan:  Walking and continue with dietary program    Return to office in 2 weeks for prenatal care and as needed.    PAULIE Carson, CNM

## 2019-03-12 NOTE — NURSING NOTE
"Chief Complaint   Patient presents with     Prenatal Care     14w6d       Initial /72 (BP Location: Right arm, Patient Position: Chair, Cuff Size: Adult Large)   Ht 1.67 m (5' 5.75\")   Wt 142.4 kg (314 lb)   LMP 11/28/2018   BMI 51.07 kg/m   Estimated body mass index is 51.07 kg/m  as calculated from the following:    Height as of this encounter: 1.67 m (5' 5.75\").    Weight as of this encounter: 142.4 kg (314 lb).  Medication Reconciliation: complete    Radha Reyes LPN    "

## 2019-03-12 NOTE — PATIENT INSTRUCTIONS
Return to office in 2 week(s) for prenatal care and as needed.    Thank you for allowing Kanu APODACA CNM and our OB team to participate in your care.  If you have a scheduling or an appointment question please contact [facility] Unit Coordinator at their direct line [phone].   ALL nursing questions or concerns can be directed to your OB nurse at: [phone] Mu Garcia/Janell

## 2019-03-14 DIAGNOSIS — O09.90 SUPERVISION OF HIGH RISK PREGNANCY, ANTEPARTUM: ICD-10-CM

## 2019-03-14 PROCEDURE — 81511 FTL CGEN ABNOR FOUR ANAL: CPT | Mod: 90 | Performed by: ADVANCED PRACTICE MIDWIFE

## 2019-03-14 PROCEDURE — 99000 SPECIMEN HANDLING OFFICE-LAB: CPT | Performed by: ADVANCED PRACTICE MIDWIFE

## 2019-03-14 PROCEDURE — 36415 COLL VENOUS BLD VENIPUNCTURE: CPT | Performed by: ADVANCED PRACTICE MIDWIFE

## 2019-03-18 LAB
# FETUSES US: NORMAL
# FETUSES: NORMAL
AFP ADJ MOM AMN: 0.68
AFP SERPL-MCNC: 12 NG/ML
AGE - REPORTED: 29.3 YR
CURRENT SMOKER: NO
CURRENT SMOKER: NO
DIABETES STATUS PATIENT: NO
FAMILY MEMBER DISEASES HX: NO
FAMILY MEMBER DISEASES HX: NO
GA METHOD: NORMAL
GA METHOD: NORMAL
GA: NORMAL WK
HCG MOM SERPL: 0.99
HCG SERPL-ACNC: NORMAL IU/L
HX OF HEREDITARY DISORDERS: NO
IDDM PATIENT QL: NO
INHIBIN A MOM SERPL: 1.02
INHIBIN A SERPL-MCNC: 113 PG/ML
INTEGRATED SCN PATIENT-IMP: NORMAL
IVF PREGNANCY: NO
LMP START DATE: NORMAL
MONOCHORIONIC TWINS: NO
PATHOLOGY STUDY: NORMAL
PREV FETUS DEFECT: NO
SERVICE CMNT-IMP: NO
SPECIMEN DRAWN SERPL: NORMAL
U ESTRIOL MOM SERPL: 1.2
U ESTRIOL SERPL-MCNC: 0.61 NG/ML
VALPROIC/CARBAMAZEPINE STATUS: NO
WEIGHT UNITS: NORMAL

## 2019-03-28 ENCOUNTER — MYC MEDICAL ADVICE (OUTPATIENT)
Dept: OBGYN | Facility: OTHER | Age: 29
End: 2019-03-28

## 2019-05-03 DIAGNOSIS — I10 HYPERTENSION, UNSPECIFIED TYPE: ICD-10-CM

## 2019-05-06 RX ORDER — HYDROCHLOROTHIAZIDE 25 MG/1
TABLET ORAL
Qty: 90 TABLET | Refills: 1 | Status: SHIPPED | OUTPATIENT
Start: 2019-05-06 | End: 2019-09-17

## 2019-05-06 NOTE — TELEPHONE ENCOUNTER
Patient is expecting.    hydrochlorothiazide (HYDRODIURIL) 25 MG tablet      Last Written Prescription Date:  11/5/18  Last Fill Quantity: 90,   # refills: 1  Last Office Visit: 1/23/19  Future Office visit:       Routing refill request to provider for review/approval because:  Drug not on the [facility], P or [facility] refill protocol or controlled substance

## 2019-07-10 DIAGNOSIS — K21.9 GASTROESOPHAGEAL REFLUX DISEASE WITHOUT ESOPHAGITIS: ICD-10-CM

## 2019-07-11 RX ORDER — OMEPRAZOLE 40 MG/1
CAPSULE, DELAYED RELEASE ORAL
Qty: 90 CAPSULE | Refills: 2 | Status: SHIPPED | OUTPATIENT
Start: 2019-07-11 | End: 2019-12-04

## 2019-07-11 NOTE — TELEPHONE ENCOUNTER
Omeprazole      Last Written Prescription Date:  10/18/2018  Last Fill Quantity: 90,   # refills: 2  Last Office Visit: 1/23/2019  Future Office visit:       Routing refill request to provider for review/approval because:  Does not pass protocol based on pregnancy. Please advise. Thank you.

## 2019-08-29 ENCOUNTER — TRANSFERRED RECORDS (OUTPATIENT)
Dept: HEALTH INFORMATION MANAGEMENT | Facility: CLINIC | Age: 29
End: 2019-08-29

## 2019-09-16 NOTE — PROGRESS NOTES
Shilpi Moore is a 29 year old female who presents to clinic today for the following health issues:      Depression and Anxiety Follow-Up    How are you doing with your depression since your last visit? Improved     How are you doing with your anxiety since your last visit?  Worsened     Are you having other symptoms that might be associated with depression or anxiety? No    Have you had a significant life event? OTHER: had a baby      Do you have any concerns with your use of alcohol or other drugs? No         Social History     Tobacco Use     Smoking status: Never Smoker     Smokeless tobacco: Never Used     Tobacco comment: Passive exposure   Substance Use Topics     Alcohol use: Yes     Comment: Beer; Occasionally     Drug use: No       PHQ 12/7/2018 2/12/2019 9/17/2019   PHQ-9 Total Score 4 3 5   Q9: Thoughts of better off dead/self-harm past 2 weeks Not at all Not at all Not at all     KING-7 SCORE 9/21/2018 2/12/2019 9/17/2019   Total Score 6 8 16     No flowsheet data found.  No flowsheet data found.       Suicide Assessment Five-step Evaluation and Treatment (SAFE-T)        How many servings of fruits and vegetables do you eat daily?  2-3    On average, how many sweetened beverages do you drink each day (soda, juice, sweet tea, etc)?   0    How many days per week do you miss taking your medication? 0        RAD FU  Was ACT completed today?    Yes    ACT Total Scores 9/17/2019   ACT TOTAL SCORE (Goal Greater than or Equal to 20) 25   In the past 12 months, how many times did you visit the emergency room for your asthma without being admitted to the hospital? 0   In the past 12 months, how many times were you hospitalized overnight because of your asthma? 0           How many days per week do you miss taking your asthma controller medication?  I do not have an asthma controller medication    Please describe any recent triggers for your asthma: upper respiratory infections    Have you had any Emergency Room  Visits, Urgent Care Visits, or Hospital Admissions since your last office visit?  No        Patient Active Problem List   Diagnosis     Mild major depression (H)     Vitamin D deficiency     ACP (advance care planning)     Seasonal allergic rhinitis due to pollen     Benign essential hypertension     Morbid obesity (H)     Reactive airway disease that is not asthma     Family history of melanoma     Gastroesophageal reflux disease without esophagitis     Anxiety     Past Surgical History:   Procedure Laterality Date     ENT SURGERY      tonsillectomy     OTHER SURGICAL HISTORY      I&D rt abscess axilla     SOFT TISSUE SURGERY      IND cyst right axilla       Social History     Tobacco Use     Smoking status: Never Smoker     Smokeless tobacco: Never Used     Tobacco comment: Passive exposure   Substance Use Topics     Alcohol use: Yes     Comment: Beer; Occasionally     Family History   Problem Relation Age of Onset     Asthma Mother      Hypertension Father      Asthma Brother      Asthma Sister      Cancer Maternal Grandfather 62        Mesothelioma, cause of death     Cancer Other 28        Cousin; Melanoma, cause of death     Thyroid Disease No family hx of              Current Outpatient Medications   Medication Sig Dispense Refill     cetirizine (ZYRTEC) 10 MG tablet Take 10 mg by mouth daily       hydrochlorothiazide (HYDRODIURIL) 25 MG tablet Take 1 tablet (25 mg) by mouth daily 90 tablet 1     labetalol (NORMODYNE) 100 MG tablet Take 50 mg by mouth 2 times daily       omeprazole (PRILOSEC) 40 MG DR capsule TAKE 1 CAPSULE DAILY 30 TO 60 MINUTES BEFORE A MEAL 90 capsule 2     Prenatal Vit-Fe Fumarate-FA (PRENATAL PO)        sertraline (ZOLOFT) 50 MG tablet Take 1 tablet (50 mg) by mouth daily 90 tablet 1       Allergies   Allergen Reactions     Nsaids Swelling         Recent Labs   Lab Test 12/11/18  1700 09/21/18  1357 04/04/17  0919  08/29/14  0916   A1C 5.2  --   --   --   --    LDL  --   --  101*  --   139*   HDL  --   --  59  --  49*   TRIG  --   --  134  --  134   ALT  --  33  --   --   --    CR  --  0.78 0.82   < > 0.83   GFRESTIMATED  --  88 84   < > 84   GFRESTBLACK  --  >90 >90  African American GFR Calc     < > >90   GFR Calc     POTASSIUM  --  4.0 3.8   < > 3.8   TSH  --  2.40 2.19   < >  --     < > = values in this interval not displayed.      BP Readings from Last 3 Encounters:   09/17/19 118/78   03/12/19 114/72   02/22/19 118/76    Wt Readings from Last 3 Encounters:   09/17/19 133.4 kg (294 lb 3.2 oz)   03/12/19 142.4 kg (314 lb)   02/22/19 142.2 kg (313 lb 9.6 oz)                Reviewed and updated as needed this visit by Provider  Tobacco         Review of Systems   ROS COMP: Constitutional, HEENT, cardiovascular, pulmonary, GI, , musculoskeletal, neuro, skin, endocrine and psych systems are negative, except as otherwise noted.        Objective    /78 (BP Location: Right arm, Patient Position: Sitting, Cuff Size: Adult Large)   Pulse 83   Temp 98.2  F (36.8  C) (Tympanic)   Wt 133.4 kg (294 lb 3.2 oz)   LMP 11/28/2018   SpO2 97%   BMI 47.85 kg/m     Body mass index is 47.85 kg/m .       Physical Exam   GENERAL: healthy, alert and no distress  EYES: Eyes grossly normal to inspection, PERRL and conjunctivae and sclerae normal  HENT: ear canals and TM's normal, nose and mouth without ulcers or lesions  NECK: no adenopathy, no asymmetry, masses, or scars and thyroid normal to palpation  RESP: lungs clear to auscultation - no rales, rhonchi or wheezes  CV: regular rate and rhythm, normal S1 S2, no S3 or S4, no murmur, click or rub, no peripheral edema and peripheral pulses strong  SKIN: no suspicious lesions or rashes  PSYCH: anxious              Assessment & Plan     1. Benign essential hypertension  - Discontinue Normodyne  - hydrochlorothiazide (HYDRODIURIL) 25 MG tablet; Take 1 tablet (25 mg) by mouth daily  Dispense: 90 tablet; Refill: 1  - Comprehensive metabolic  panel (BMP + Alb, Alk Phos, ALT, AST, Total. Bili, TP)  - TSH with free T4 reflex  - Call me if home BP's are high    2. Anxiety  - TSH with free T4 reflex  - sertraline (ZOLOFT) 50 MG tablet; Take 1 tablet (50 mg) by mouth daily  Dispense: 90 tablet; Refill: 1    3. Reactive airway disease that is not asthma  - Continue plan of care         Return in about 6 months (around 3/17/2020).    Tiana Tucker, LISETTE  [facility] [place] [facility]

## 2019-09-17 ENCOUNTER — OFFICE VISIT (OUTPATIENT)
Dept: FAMILY MEDICINE | Facility: OTHER | Age: 29
End: 2019-09-17
Attending: NURSE PRACTITIONER
Payer: COMMERCIAL

## 2019-09-17 VITALS
HEART RATE: 83 BPM | DIASTOLIC BLOOD PRESSURE: 78 MMHG | SYSTOLIC BLOOD PRESSURE: 118 MMHG | BODY MASS INDEX: 47.85 KG/M2 | WEIGHT: 293 LBS | TEMPERATURE: 98.2 F | OXYGEN SATURATION: 97 %

## 2019-09-17 DIAGNOSIS — F41.9 ANXIETY: ICD-10-CM

## 2019-09-17 DIAGNOSIS — I10 BENIGN ESSENTIAL HYPERTENSION: Primary | ICD-10-CM

## 2019-09-17 DIAGNOSIS — J98.9 REACTIVE AIRWAY DISEASE THAT IS NOT ASTHMA: ICD-10-CM

## 2019-09-17 PROBLEM — Z30.09 FAMILY PLANNING COUNSELING: Status: RESOLVED | Noted: 2018-12-11 | Resolved: 2019-09-17

## 2019-09-17 PROBLEM — O99.211 OBESITY DURING PREGNANCY IN FIRST TRIMESTER: Status: RESOLVED | Noted: 2019-02-22 | Resolved: 2019-09-17

## 2019-09-17 PROBLEM — O09.90 SUPERVISION OF HIGH RISK PREGNANCY, ANTEPARTUM: Status: RESOLVED | Noted: 2019-02-12 | Resolved: 2019-09-17

## 2019-09-17 LAB
ALBUMIN SERPL-MCNC: 3.4 G/DL (ref 3.4–5)
ALP SERPL-CCNC: 75 U/L (ref 40–150)
ALT SERPL W P-5'-P-CCNC: 31 U/L (ref 0–50)
ANION GAP SERPL CALCULATED.3IONS-SCNC: 8 MMOL/L (ref 3–14)
AST SERPL W P-5'-P-CCNC: 9 U/L (ref 0–45)
BILIRUB SERPL-MCNC: 0.3 MG/DL (ref 0.2–1.3)
BUN SERPL-MCNC: 10 MG/DL (ref 7–30)
CALCIUM SERPL-MCNC: 9 MG/DL (ref 8.5–10.1)
CHLORIDE SERPL-SCNC: 111 MMOL/L (ref 94–109)
CO2 SERPL-SCNC: 23 MMOL/L (ref 20–32)
CREAT SERPL-MCNC: 0.79 MG/DL (ref 0.52–1.04)
GFR SERPL CREATININE-BSD FRML MDRD: >90 ML/MIN/{1.73_M2}
GLUCOSE SERPL-MCNC: 92 MG/DL (ref 70–99)
POTASSIUM SERPL-SCNC: 3.7 MMOL/L (ref 3.4–5.3)
PROT SERPL-MCNC: 7 G/DL (ref 6.8–8.8)
SODIUM SERPL-SCNC: 142 MMOL/L (ref 133–144)
TSH SERPL DL<=0.005 MIU/L-ACNC: 0.7 MU/L (ref 0.4–4)

## 2019-09-17 PROCEDURE — 80053 COMPREHEN METABOLIC PANEL: CPT | Performed by: NURSE PRACTITIONER

## 2019-09-17 PROCEDURE — 99214 OFFICE O/P EST MOD 30 MIN: CPT | Performed by: NURSE PRACTITIONER

## 2019-09-17 PROCEDURE — 84443 ASSAY THYROID STIM HORMONE: CPT | Performed by: NURSE PRACTITIONER

## 2019-09-17 PROCEDURE — 36415 COLL VENOUS BLD VENIPUNCTURE: CPT | Performed by: NURSE PRACTITIONER

## 2019-09-17 RX ORDER — HYDROCHLOROTHIAZIDE 25 MG/1
25 TABLET ORAL DAILY
Qty: 90 TABLET | Refills: 1 | Status: SHIPPED | OUTPATIENT
Start: 2019-09-17 | End: 2019-10-22

## 2019-09-17 RX ORDER — LABETALOL 100 MG/1
50 TABLET, FILM COATED ORAL 2 TIMES DAILY
COMMUNITY
Start: 2019-09-03 | End: 2019-09-30

## 2019-09-17 ASSESSMENT — ANXIETY QUESTIONNAIRES
GAD7 TOTAL SCORE: 16
2. NOT BEING ABLE TO STOP OR CONTROL WORRYING: NEARLY EVERY DAY
IF YOU CHECKED OFF ANY PROBLEMS ON THIS QUESTIONNAIRE, HOW DIFFICULT HAVE THESE PROBLEMS MADE IT FOR YOU TO DO YOUR WORK, TAKE CARE OF THINGS AT HOME, OR GET ALONG WITH OTHER PEOPLE: VERY DIFFICULT
7. FEELING AFRAID AS IF SOMETHING AWFUL MIGHT HAPPEN: NEARLY EVERY DAY
3. WORRYING TOO MUCH ABOUT DIFFERENT THINGS: NEARLY EVERY DAY
5. BEING SO RESTLESS THAT IT IS HARD TO SIT STILL: NOT AT ALL
1. FEELING NERVOUS, ANXIOUS, OR ON EDGE: NEARLY EVERY DAY
6. BECOMING EASILY ANNOYED OR IRRITABLE: SEVERAL DAYS

## 2019-09-17 ASSESSMENT — PAIN SCALES - GENERAL: PAINLEVEL: NO PAIN (0)

## 2019-09-17 ASSESSMENT — PATIENT HEALTH QUESTIONNAIRE - PHQ9
SUM OF ALL RESPONSES TO PHQ QUESTIONS 1-9: 5
5. POOR APPETITE OR OVEREATING: NEARLY EVERY DAY

## 2019-09-17 NOTE — LETTER
My Asthma Action Plan    Name: Shilpi Moore   YOB: 1990  Date: 9/17/2019   My doctor: Tiana Tucker CNP   My clinic: [facility]        My Rescue Medicine:   Albuterol inhaler (Proair/Ventolin/Proventil HFA)  2-4 puffs EVERY 4 HOURS as needed. Use a spacer if recommended by your provider.   My Asthma Severity:   Intermittent / Exercise Induced  Know your asthma triggers: upper respiratory infections             GREEN ZONE   Good Control    I feel good    No cough or wheeze    Can work, sleep and play without asthma symptoms       Take your asthma control medicine every day.     1. If exercise triggers your asthma, take your rescue medication    15 minutes before exercise or sports, and    During exercise if you have asthma symptoms  2. Spacer to use with inhaler: If you have a spacer, make sure to use it with your inhaler             YELLOW ZONE Getting Worse  I have ANY of these:    I do not feel good    Cough or wheeze    Chest feels tight    Wake up at night   1. Keep taking your Green Zone medications  2. Start taking your rescue medicine:    every 20 minutes for up to 1 hour. Then every 4 hours for 24-48 hours.  3. If you stay in the Yellow Zone for more than 12-24 hours, contact your doctor.  4. If you do not return to the Green Zone in 12-24 hours or you get worse, start taking your oral steroid medicine if prescribed by your provider.           RED ZONE Medical Alert - Get Help  I have ANY of these:    I feel awful    Medicine is not helping    Breathing getting harder    Trouble walking or talking    Nose opens wide to breathe       1. Take your rescue medicine NOW  2. If your provider has prescribed an oral steroid medicine, start taking it NOW  3. Call your doctor NOW  4. If you are still in the Red Zone after 20 minutes and you have not reached your doctor:    Take your rescue medicine again and    Call 911 or go to the emergency room right away    See your regular  doctor within 2 weeks of an Emergency Room or Urgent Care visit for follow-up treatment.          Annual Reminders:  Meet with Asthma Educator,  Flu Shot in the Fall, consider Pneumonia Vaccination for patients with asthma (aged 19 and older).    Pharmacy:    [facility] [address] [facility] - [address]  [facility] - [place], [place] - [address]                        Asthma Triggers  How To Control Things That Make Your Asthma Worse    Triggers are things that make your asthma worse.  Look at the list below to help you find your triggers and   what you can do about them. You can help prevent asthma flare-ups by staying away from your triggers.      Trigger                                                          What you can do   Cigarette Smoke  Tobacco smoke can make asthma worse. Do not allow smoking in your home, car or around you.  Be sure no one smokes at a child s day care or school.  If you smoke, ask your health care provider for ways to help you quit.  Ask family members to quit too.  Ask your health care provider for a referral to Quit Plan to help you quit smoking, or call 1-320-275-PLAN.     Colds, Flu, Bronchitis  These are common triggers of asthma. Wash your hands often.  Don t touch your eyes, nose or mouth.  Get a flu shot every year.     Dust Mites  These are tiny bugs that live in cloth or carpet. They are too small to see. Wash sheets and blankets in hot water every week.   Encase pillows and mattress in dust mite proof covers.  Avoid having carpet if you can. If you have carpet, vacuum weekly.   Use a dust mask and HEPA vacuum.   Pollen and Outdoor Mold  Some people are allergic to trees, grass, or weed pollen, or molds. Try to keep your windows closed.  Limit time out doors when pollen count is high.   Ask you health care provider about taking medicine during allergy season.     Animal Dander  Some people are allergic to skin flakes, urine or saliva  from pets with fur or feathers. Keep pets with fur or feathers out of your home.    If you can t keep the pet outdoors, then keep the pet out of your bedroom.  Keep the bedroom door closed.  Keep pets off cloth furniture and away from stuffed toys.     Mice, Rats, and Cockroaches  Some people are allergic to the waste from these pests.   Cover food and garbage.  Clean up spills and food crumbs.  Store grease in the refrigerator.   Keep food out of the bedroom.   Indoor Mold  This can be a trigger if your home has high moisture. Fix leaking faucets, pipes, or other sources of water.   Clean moldy surfaces.  Dehumidify basement if it is damp and smelly.   Smoke, Strong Odors, and Sprays  These can reduce air quality. Stay away from strong odors and sprays, such as perfume, powder, hair spray, paints, smoke incense, paint, cleaning products, candles and new carpet.   Exercise or Sports  Some people with asthma have this trigger. Be active!  Ask your doctor about taking medicine before sports or exercise to prevent symptoms.    Warm up for 5-10 minutes before and after sports or exercise.     Other Triggers of Asthma  Cold air:  Cover your nose and mouth with a scarf.  Sometimes laughing or crying can be a trigger.  Some medicines and food can trigger asthma.

## 2019-09-17 NOTE — NURSING NOTE
"Chief Complaint   Patient presents with     Depression       Initial /88 (BP Location: Right arm, Patient Position: Sitting, Cuff Size: Adult Large)   Pulse 83   Temp 98.2  F (36.8  C) (Tympanic)   Wt 133.4 kg (294 lb 3.2 oz)   LMP 11/28/2018   SpO2 97%   BMI 47.85 kg/m   Estimated body mass index is 47.85 kg/m  as calculated from the following:    Height as of 3/12/19: 1.67 m (5' 5.75\").    Weight as of this encounter: 133.4 kg (294 lb 3.2 oz).  Medication Reconciliation: complete  Pao Miramontes  "

## 2019-09-17 NOTE — PATIENT INSTRUCTIONS
Assessment & Plan     1. Benign essential hypertension  - Discontinue Normodyne  - hydrochlorothiazide (HYDRODIURIL) 25 MG tablet; Take 1 tablet (25 mg) by mouth daily  Dispense: 90 tablet; Refill: 1  - Comprehensive metabolic panel (BMP + Alb, Alk Phos, ALT, AST, Total. Bili, TP)  - TSH with free T4 reflex  - Call me if home BP's are high    2. Anxiety  - TSH with free T4 reflex  - sertraline (ZOLOFT) 50 MG tablet; Take 1 tablet (50 mg) by mouth daily  Dispense: 90 tablet; Refill: 1    3. Reactive airway disease that is not asthma  - Continue plan of care         Return in about 6 months (around 3/17/2020).    Tiana Tucker, LISETTE  [employer]

## 2019-09-17 NOTE — LETTER
My Depression Action Plan  Name: Shilpi Moore   Date of Birth 1990  Date: 9/17/2019    My doctor: Tiana Tucker   My clinic: [facility]  [address]  [place] [address]  [phone]          GREEN    ZONE   Good Control    What it looks like:     Things are going generally well. You have normal up s and down s. You may even feel depressed from time to time, but bad moods usually last less than a day.   What you need to do:  1. Continue to care for yourself (see self care plan)  2. Check your depression survival kit and update it as needed  3. Follow your physician s recommendations including any medication.  4. Do not stop taking medication unless you consult with your physician first.           YELLOW         ZONE Getting Worse    What it looks like:     Depression is starting to interfere with your life.     It may be hard to get out of bed; you may be starting to isolate yourself from others.    Symptoms of depression are starting to last most all day and this has happened for several days.     You may have suicidal thoughts but they are not constant.   What you need to do:     1. Call your care team, your response to treatment will improve if you keep your care team informed of your progress. Yellow periods are signs an adjustment may need to be made.     2. Continue your self-care, even if you have to fake it!    3. Talk to someone in your support network    4. Open up your depression survival kit           RED    ZONE Medical Alert - Get Help    What it looks like:     Depression is seriously interfering with your life.     You may experience these or other symptoms: You can t get out of bed most days, can t work or engage in other necessary activities, you have trouble taking care of basic hygiene, or basic responsibilities, thoughts of suicide or death that will not go away, self-injurious behavior.     What you need to do:  1. Call your care team and  request a same-day appointment. If they are not available (weekends or after hours) call your local crisis line, emergency room or 911.            Depression Self Care Plan / Survival Kit    Self-Care for Depression  Here s the deal. Your body and mind are really not as separate as most people think.  What you do and think affects how you feel and how you feel influences what you do and think. This means if you do things that people who feel good do, it will help you feel better.  Sometimes this is all it takes.  There is also a place for medication and therapy depending on how severe your depression is, so be sure to consult with your medical provider and/ or Behavioral Health Consultant if your symptoms are worsening or not improving.     In order to better manage my stress, I will:    Exercise  Get some form of exercise, every day. This will help reduce pain and release endorphins, the  feel good  chemicals in your brain. This is almost as good as taking antidepressants!  This is not the same as joining a gym and then never going! (they count on that by the way ) It can be as simple as just going for a walk or doing some gardening, anything that will get you moving.      Hygiene   Maintain good hygiene (Get out of bed in the morning, Make your bed, Brush your teeth, Take a shower, and Get dressed like you were going to work, even if you are unemployed).  If your clothes don't fit try to get ones that do.    Diet  I will strive to eat foods that are good for me, drink plenty of water, and avoid excessive sugar, caffeine, alcohol, and other mood-altering substances.  Some foods that are helpful in depression are: complex carbohydrates, B vitamins, flaxseed, fish or fish oil, fresh fruits and vegetables.    Psychotherapy  I agree to participate in Individual Therapy (if recommended).    Medication  If prescribed medications, I agree to take them.  Missing doses can result in serious side effects.  I understand that  drinking alcohol, or other illicit drug use, may cause potential side effects.  I will not stop my medication abruptly without first discussing it with my provider.    Staying Connected With Others  I will stay in touch with my friends, family members, and my primary care provider/team.    Use your imagination  Be creative.  We all have a creative side; it doesn t matter if it s oil painting, sand castles, or mud pies! This will also kick up the endorphins.    Witness Beauty  (AKA stop and smell the roses) Take a look outside, even in mid-winter. Notice colors, textures. Watch the squirrels and birds.     Service to others  Be of service to others.  There is always someone else in need.  By helping others we can  get out of ourselves  and remember the really important things.  This also provides opportunities for practicing all the other parts of the program.    Humor  Laugh and be silly!  Adjust your TV habits for less news and crime-drama and more comedy.    Control your stress  Try breathing deep, massage therapy, biofeedback, and meditation. Find time to relax each day.     My support system    Clinic Contact:  Phone number:    Contact 1:  Phone number:    Contact 2:  Phone number:    Jewish/:  Phone number:    Therapist:  Phone number:    Local crisis center:    Phone number:    Other community support:  Phone number:

## 2019-09-18 ASSESSMENT — ASTHMA QUESTIONNAIRES: ACT_TOTALSCORE: 25

## 2019-09-18 ASSESSMENT — ANXIETY QUESTIONNAIRES: GAD7 TOTAL SCORE: 16

## 2019-09-27 ENCOUNTER — MYC MEDICAL ADVICE (OUTPATIENT)
Dept: FAMILY MEDICINE | Facility: OTHER | Age: 29
End: 2019-09-27

## 2019-09-27 DIAGNOSIS — O92.70 LACTATION PROBLEM: Primary | ICD-10-CM

## 2019-09-30 ENCOUNTER — OFFICE VISIT (OUTPATIENT)
Dept: OBGYN | Facility: OTHER | Age: 29
End: 2019-09-30
Attending: ADVANCED PRACTICE MIDWIFE
Payer: COMMERCIAL

## 2019-09-30 VITALS — HEIGHT: 66 IN | SYSTOLIC BLOOD PRESSURE: 130 MMHG | DIASTOLIC BLOOD PRESSURE: 82 MMHG | BODY MASS INDEX: 47.85 KG/M2

## 2019-09-30 DIAGNOSIS — O92.70 LACTATION PROBLEM: Primary | ICD-10-CM

## 2019-09-30 PROCEDURE — 99214 OFFICE O/P EST MOD 30 MIN: CPT | Performed by: ADVANCED PRACTICE MIDWIFE

## 2019-09-30 ASSESSMENT — PAIN SCALES - GENERAL: PAINLEVEL: NO PAIN (0)

## 2019-09-30 NOTE — PATIENT INSTRUCTIONS
Return to office as needed.    Thank you for allowing Kanu APODACA CNM and our OB team to participate in your care.  If you have a scheduling or an appointment question please contact [facility] Unit Coordinator at their direct line [phone].   ALL nursing questions or concerns can be directed to your OB nurse at: [phone] Mu Garcia/Janell

## 2019-09-30 NOTE — PROGRESS NOTES
"Shilpi Moore is a 29 year old female here for lactation problem.  Pt has 4 week old .  Pt reports pumping and feeding baby breast milk (and formula as needed) from the bottle with a nipple. Reports baby has latched on a few times since leaving the hospital.    Discussed:  Feeding baby, supplementing as needed, pumping, positioning, latch, frequency, night feedings, voids, stools.      O:   /82 (BP Location: Left arm, Patient Position: Chair, Cuff Size: Adult Large)   Ht 1.67 m (5' 5.75\")   LMP 2018   Breastfeeding? Unknown   BMI 47.85 kg/m     Pleasant without acute distress.  Nipples well formed without redness or lesions.  ' mouth well formed without restrictions.    Observed breast feeding session:  With assistance good latch on right breast then the left.  Mother able to latch baby to right without assistance x one.  Strong suck.  Audible swallowing.  Fed for 30 minutes total.    A:    Lactation problem:  Unable to latch  NB latched on with assistance then without  Well formed breast and NB mouth    P:    Continue BF  Supplement as needed    Total visit greater than 30 minutes with 25 minutes spent face to face counseling this patient on Feeding baby, supplementing as needed, pumping, positioning, latch, frequency, night feedings, voids, stools.  Assisted with latch.  Also observed breastfeeding.        PAULIE Carson, CNKASSIDY    "

## 2019-09-30 NOTE — NURSING NOTE
"Chief Complaint   Patient presents with     Lactation Consult       Initial /82 (BP Location: Left arm, Patient Position: Chair, Cuff Size: Adult Large)   Ht 1.67 m (5' 5.75\")   LMP 11/28/2018   Breastfeeding? Unknown   BMI 47.85 kg/m   Estimated body mass index is 47.85 kg/m  as calculated from the following:    Height as of this encounter: 1.67 m (5' 5.75\").    Weight as of 9/17/19: 133.4 kg (294 lb 3.2 oz).  Medication Reconciliation: complete  Radha Reyes LPN    "

## 2019-10-22 ENCOUNTER — OFFICE VISIT (OUTPATIENT)
Dept: OBGYN | Facility: OTHER | Age: 29
End: 2019-10-22
Attending: NURSE PRACTITIONER
Payer: COMMERCIAL

## 2019-10-22 VITALS
HEART RATE: 60 BPM | DIASTOLIC BLOOD PRESSURE: 70 MMHG | BODY MASS INDEX: 46.45 KG/M2 | WEIGHT: 289 LBS | HEIGHT: 66 IN | SYSTOLIC BLOOD PRESSURE: 110 MMHG

## 2019-10-22 PROCEDURE — 99212 OFFICE O/P EST SF 10 MIN: CPT | Performed by: NURSE PRACTITIONER

## 2019-10-22 RX ORDER — ACETAMINOPHEN AND CODEINE PHOSPHATE 120; 12 MG/5ML; MG/5ML
0.35 SOLUTION ORAL
COMMUNITY
Start: 2019-10-11 | End: 2020-12-22

## 2019-10-22 RX ORDER — NYSTATIN 100000 U/G
OINTMENT TOPICAL
Refills: 2 | COMMUNITY
Start: 2019-10-11 | End: 2020-10-09

## 2019-10-22 ASSESSMENT — MIFFLIN-ST. JEOR: SCORE: 2052.65

## 2019-10-22 ASSESSMENT — PAIN SCALES - GENERAL: PAINLEVEL: NO PAIN (0)

## 2019-10-22 NOTE — NURSING NOTE
"Chief Complaint   Patient presents with     Lactation Consult       Initial /70   Pulse 60   Ht 1.676 m (5' 6\")   Wt 131.1 kg (289 lb)   BMI 46.65 kg/m   Estimated body mass index is 46.65 kg/m  as calculated from the following:    Height as of this encounter: 1.676 m (5' 6\").    Weight as of this encounter: 131.1 kg (289 lb).  Medication Reconciliation: complete  Janell Chopra LPN    "

## 2019-10-22 NOTE — Clinical Note
Will continue working with this mom to increase supply.  May consider possible reflux in baby?Thanks!

## 2019-10-24 ENCOUNTER — MYC MEDICAL ADVICE (OUTPATIENT)
Dept: FAMILY MEDICINE | Facility: OTHER | Age: 29
End: 2019-10-24

## 2019-10-24 NOTE — PATIENT INSTRUCTIONS
"BREASTFEEDING TIPS  1. Breastfeed every 2-4 hours. If your baby is sleepy - use breast compression, push on chin to \"start up\" baby, switch breasts, undress to diaper and wake before relatching.   Some babies \"cluster\" feed every 1 hour for a while- this is normal. Feed your baby whenever he/she is awake-  even if every hour for a while. This frequent feeding will help you make more milk and encourage your baby to sleep for longer stretches later in the evening or night.    - Position your baby close to you with pillows so he/she is facing you -belly to belly laying horizontally across your lap at the level of your breast and looking a bit \"upwards\" to your breast   -One hand holds the baby's neck behind the ears and the other hand holds your breast  -Baby's nose should start out pointing to your nipple before latching  - Hold your breast in a \"sandwich\" position by gently squeezing your breast in an oval shape and make sure your hands are not covering the areola  This \"nipple sandwich\" will make it easier for your breast to fit inside the baby's mouth-making latching more comfortable for you and baby and preventing sore nipples. Your baby should take a \"mouthful\" of breast!  - You may want to use hand expression to \"prime the pump\" and get a drip of milk out on your nipple to wake baby   (see website: [email or web address].[email or web address]/Breastfeeding/HandExpression.html)  - Swipe your nipple on baby's upper lip and wait for a BIG open mouth  - YOU bring baby to the breast (hold baby's neck with your fingers just below the ears) and bring baby's head to the breast--leading with the chin.  Try to avoid pushing your breast into baby's mouth- bring baby to you instead!  - Aim to get your baby's bottom lip LOW DOWN ON AREOLA (baby's upper lip just needs to \"clear\" the nipple) .   Your baby should latch onto the areola and NOT just the nipple. That way your baby gets more milk and you don't get sore nipples!      Useful web " sites:  Www.infantrisk.com  Www.aap.org  Www.ibreastfeeding.com  Www.health.[email or web address].mn.us

## 2019-10-24 NOTE — PROGRESS NOTES
Consultation Date: 10/24/2019    Reason for Lactation Referral:difficult latch.    Shilpi presents today with Cindy to discuss decreased milk supply and difficult latch.  She is currently pumping 8 times daily and feeding at the breast as well.  She is pumping 20-25 oz per day. She is also giving 3-4 oz of formula several times throughout the day. Cindy is fussy and pulling off the breast during feedings and fussy after feeding with lots of rooting.  She is not spitting up routinely. Shilpi has tried several OTC lactation products including tea and cookies. Vaginal delivery 8/31/19.    MATERNAL HISTORY   Maternal History: uncomplicated pregnancy. Postpartum preeclampsia  History of Breast Surgery: No  Breast Changes During Pregnancy: Yes  Breast Feeding History: No  Maternal Meds: see list      MATERNAL ASSESSMENT    Breast Size: extra large and soft  Nipple Appearance - Left: intact  Nipple Appearance - Right: intact  Nipple Erectility - Left: erect with stimulation but fairly flat  Nipple Erectility - Right: erect with stimulation but fairly flat  Areolas Compressibility: soft and pliable  Nipple Size: average  Milk Supply: mature    INFANT HISTORY & ASSESSMENT    Weight  Birth weight: 6-10  Discharge weight: Weight: 6-4  Current weight: 9-8    Oral Anatomy  Mouth: normal  Palate: normal  Jaw: normal  Tongue: normal  Frenulum: normal  Digital Suck Exam: root    FEEDING   Feeding Time:20  Position: right breast, modified cradle  Effort to Latch: awake and alert, latched easily  Duration of Breast Feeding: Right Breast: 20; Left Breast: 0  Results: good breast feed  Volume of Intake:    Pre-Weight: 9-8    Post-Weight: 9-9.5    Total Intake: 1.5    FEEDING PLAN    Home Feeding Plan: Increase feeding or pumping to every 2 hours during the day and 1-2 times during the night for 1 week.  Begin Fenugreek 3 capsules three times daily.   Skin to skin when possible. Follow up in one week.     LACTATION RECOMMENDATIONS AND  COMMENTS     Consider possible reflux as baby is very content after feeding when sitting upright but is very fussy and pulling away crying when lying down.  Encouraged to keep her upright whenever possible for 30 minutes after feeding.  Also discouraged use of additional feedings when she is alert and content in an upright position after feeding.     40 minutes were spent in care and counseling with this couplet.

## 2019-10-25 NOTE — TELEPHONE ENCOUNTER
Mother has read message on [email or web address]. Ashley A. Lechevalier, LPN on 10/25/2019 at 4:47 PM

## 2019-10-31 ENCOUNTER — OFFICE VISIT (OUTPATIENT)
Dept: OBGYN | Facility: OTHER | Age: 29
End: 2019-10-31
Attending: NURSE PRACTITIONER
Payer: COMMERCIAL

## 2019-10-31 VITALS
OXYGEN SATURATION: 97 % | BODY MASS INDEX: 46.65 KG/M2 | DIASTOLIC BLOOD PRESSURE: 70 MMHG | SYSTOLIC BLOOD PRESSURE: 116 MMHG | HEART RATE: 77 BPM | HEIGHT: 66 IN

## 2019-10-31 PROCEDURE — 99212 OFFICE O/P EST SF 10 MIN: CPT | Performed by: NURSE PRACTITIONER

## 2019-10-31 ASSESSMENT — PAIN SCALES - GENERAL: PAINLEVEL: NO PAIN (0)

## 2019-10-31 NOTE — NURSING NOTE
"Chief Complaint   Patient presents with     Lactation Consult       Initial /70 (BP Location: Left arm, Cuff Size: Adult Large)   Pulse 77   Ht 1.676 m (5' 6\")   SpO2 97%   BMI 46.65 kg/m   Estimated body mass index is 46.65 kg/m  as calculated from the following:    Height as of this encounter: 1.676 m (5' 6\").    Weight as of 10/22/19: 131.1 kg (289 lb).  Medication Reconciliation: complete  Batsheva Hutchinson LPN  "

## 2019-11-05 ENCOUNTER — MEDICAL CORRESPONDENCE (OUTPATIENT)
Dept: HEALTH INFORMATION MANAGEMENT | Facility: CLINIC | Age: 29
End: 2019-11-05

## 2019-11-05 NOTE — PATIENT INSTRUCTIONS
"BREASTFEEDING TIPS  1. Breastfeed every 2-4 hours. If your baby is sleepy - use breast compression, push on chin to \"start up\" baby, switch breasts, undress to diaper and wake before relatching.   Some babies \"cluster\" feed every 1 hour for a while- this is normal. Feed your baby whenever he/she is awake-  even if every hour for a while. This frequent feeding will help you make more milk and encourage your baby to sleep for longer stretches later in the evening or night.    - Position your baby close to you with pillows so he/she is facing you -belly to belly laying horizontally across your lap at the level of your breast and looking a bit \"upwards\" to your breast   -One hand holds the baby's neck behind the ears and the other hand holds your breast  -Baby's nose should start out pointing to your nipple before latching  - Hold your breast in a \"sandwich\" position by gently squeezing your breast in an oval shape and make sure your hands are not covering the areola  This \"nipple sandwich\" will make it easier for your breast to fit inside the baby's mouth-making latching more comfortable for you and baby and preventing sore nipples. Your baby should take a \"mouthful\" of breast!  - You may want to use hand expression to \"prime the pump\" and get a drip of milk out on your nipple to wake baby   (see website: [email or web address] [place].[email or web address]/Breastfeeding/HandExpression.html)  - Swipe your nipple on baby's upper lip and wait for a BIG open mouth  - YOU bring baby to the breast (hold baby's neck with your fingers just below the ears) and bring baby's head to the breast--leading with the chin.  Try to avoid pushing your breast into baby's mouth- bring baby to you instead!  - Aim to get your baby's bottom lip LOW DOWN ON AREOLA (baby's upper lip just needs to \"clear\" the nipple) .   Your baby should latch onto the areola and NOT just the nipple. That way your baby gets more milk and you don't get sore nipples!      Useful web " sites:  Www.infantrisk.com  Www.aap.org  Www.ibreastfeeding.com  Www.health.[email or web address].mn.us

## 2019-11-05 NOTE — PROGRESS NOTES
"[facility]                HPI   Shilpi presents for follow up on breastfeeding issues today. She states she is not feeling there has been any improvement since our last visit.  She did see DINH Schwartz NP as few days ago and she and baby were treated for thrush.  She continues to try to nurse using the nipple shield and has not been able to latch without it.  She feels that Cindy nurses better when there are other people present but she feels unable to get her to nurse when she is alone.  She is slightly tearful and very anxious today.  She also reports that she has been giving an occasional bottle of formula and that the baby seems content afterward.              Medications:     Current Outpatient Medications Ordered in Epic   Medication     nystatin (MYCOSTATIN) 655666 UNIT/GM external ointment     omeprazole (PRILOSEC) 40 MG DR capsule     Prenatal Vit-Fe Fumarate-FA (PRENATAL PO)     sertraline (ZOLOFT) 50 MG tablet     norethindrone (MICRONOR) 0.35 MG tablet     No current Epic-ordered facility-administered medications on file.                 Allergies:   Nsaids         Review of Systems:   CONSTITUTIONAL: NEGATIVE for fever, chills, change in weight  BREAST: NEGATIVE for NEGATIVE, engorgement and warmth                     Physical Exam:   Blood pressure 116/70, pulse 77, height 1.676 m (5' 6\"), SpO2 97 %, unknown if currently breastfeeding.  Constitutional:   awake, alert, cooperative, no apparent distress, and appears stated age   Feeding is observed today with Cindy latching on with use of the nipple shield.  Audible swallowing heard and 0.5 oz transfer was weighed out. Cindy quickly became fussy and did not want to relatch.  She was content to be sitting up and did burp several times.  Shilpi seemed to get very frustrated with the fussiness and made several attempts to put baby aback to breast without success.  Teaching on feeding cues and understanding the difference between " feeding cues and other factors such as gas.  Shilpi remained very frustrated and tearful throughout our visit.  I did discuss this case with Dr. Baxter today and she has agreed to see Cindy and Shilpi next week for further evaluation.           Assessment and Plan:   Lactation follow up - continue feeding every 2-3 hours.  Positioning and latch reviewed.  Infant cues reviewed.      Arlet Bennett NP, NP  11/5/2019  7:58 AM    40 minutes were sent with this diad on education and counseling.

## 2019-11-12 ENCOUNTER — MYC MEDICAL ADVICE (OUTPATIENT)
Dept: OBGYN | Facility: OTHER | Age: 29
End: 2019-11-12

## 2019-11-26 ENCOUNTER — MYC MEDICAL ADVICE (OUTPATIENT)
Dept: OBGYN | Facility: OTHER | Age: 29
End: 2019-11-26

## 2020-01-06 ENCOUNTER — MEDICAL CORRESPONDENCE (OUTPATIENT)
Dept: HEALTH INFORMATION MANAGEMENT | Facility: CLINIC | Age: 30
End: 2020-01-06

## 2020-01-07 DIAGNOSIS — K21.9 GASTROESOPHAGEAL REFLUX DISEASE WITHOUT ESOPHAGITIS: ICD-10-CM

## 2020-01-07 RX ORDER — OMEPRAZOLE 40 MG/1
CAPSULE, DELAYED RELEASE ORAL
Qty: 90 CAPSULE | Refills: 3 | Status: SHIPPED | OUTPATIENT
Start: 2020-01-07 | End: 2020-12-14

## 2020-03-02 ENCOUNTER — HEALTH MAINTENANCE LETTER (OUTPATIENT)
Age: 30
End: 2020-03-02

## 2020-03-02 ENCOUNTER — MEDICAL CORRESPONDENCE (OUTPATIENT)
Dept: HEALTH INFORMATION MANAGEMENT | Facility: CLINIC | Age: 30
End: 2020-03-02

## 2020-04-22 ENCOUNTER — MYC MEDICAL ADVICE (OUTPATIENT)
Dept: OBGYN | Facility: OTHER | Age: 30
End: 2020-04-22

## 2020-06-15 ENCOUNTER — MYC MEDICAL ADVICE (OUTPATIENT)
Dept: OBGYN | Facility: OTHER | Age: 30
End: 2020-06-15

## 2020-06-16 NOTE — TELEPHONE ENCOUNTER
Total months needed can be as long as she plans to breastfeed. I'm not sure what sort of chart notes they would be looking for?

## 2020-07-14 ENCOUNTER — MYC MEDICAL ADVICE (OUTPATIENT)
Dept: FAMILY MEDICINE | Facility: OTHER | Age: 30
End: 2020-07-14

## 2020-07-14 DIAGNOSIS — T30.0 BURN: Primary | ICD-10-CM

## 2020-07-14 RX ORDER — SILVER SULFADIAZINE 10 MG/G
CREAM TOPICAL DAILY
Qty: 50 G | Refills: 0 | Status: SHIPPED | OUTPATIENT
Start: 2020-07-14 | End: 2020-10-09

## 2020-08-27 ENCOUNTER — TRANSFERRED RECORDS (OUTPATIENT)
Dept: HEALTH INFORMATION MANAGEMENT | Facility: CLINIC | Age: 30
End: 2020-08-27

## 2020-08-27 LAB
ALT SERPL-CCNC: 16 IU/L (ref 6–31)
AST SERPL-CCNC: 18 IU/L (ref 10–40)
CREAT SERPL-MCNC: 0.75 MG/DL (ref 0.4–1)
GFR SERPL CREATININE-BSD FRML MDRD: >60 ML/MIN/1.73M2
GLUCOSE SERPL-MCNC: 123 MG/DL (ref 70–99)
POTASSIUM SERPL-SCNC: 3.8 MEQ/L (ref 3.4–5.1)

## 2020-09-25 ENCOUNTER — TRANSFERRED RECORDS (OUTPATIENT)
Dept: HEALTH INFORMATION MANAGEMENT | Facility: CLINIC | Age: 30
End: 2020-09-25

## 2020-09-26 ENCOUNTER — TRANSFERRED RECORDS (OUTPATIENT)
Dept: HEALTH INFORMATION MANAGEMENT | Facility: CLINIC | Age: 30
End: 2020-09-26

## 2020-09-27 ENCOUNTER — TRANSFERRED RECORDS (OUTPATIENT)
Dept: HEALTH INFORMATION MANAGEMENT | Facility: CLINIC | Age: 30
End: 2020-09-27

## 2020-10-07 ENCOUNTER — TRANSFERRED RECORDS (OUTPATIENT)
Dept: HEALTH INFORMATION MANAGEMENT | Facility: HOSPITAL | Age: 30
End: 2020-10-07

## 2020-10-07 LAB — HEP C HIM: NORMAL

## 2020-10-09 ENCOUNTER — OFFICE VISIT (OUTPATIENT)
Dept: FAMILY MEDICINE | Facility: OTHER | Age: 30
End: 2020-10-09
Attending: NURSE PRACTITIONER
Payer: COMMERCIAL

## 2020-10-09 ENCOUNTER — ANCILLARY PROCEDURE (OUTPATIENT)
Dept: GENERAL RADIOLOGY | Facility: OTHER | Age: 30
End: 2020-10-09
Attending: NURSE PRACTITIONER
Payer: COMMERCIAL

## 2020-10-09 VITALS
SYSTOLIC BLOOD PRESSURE: 122 MMHG | HEART RATE: 77 BPM | DIASTOLIC BLOOD PRESSURE: 76 MMHG | TEMPERATURE: 98.5 F | OXYGEN SATURATION: 98 % | HEIGHT: 67 IN | BODY MASS INDEX: 44.89 KG/M2 | WEIGHT: 286 LBS

## 2020-10-09 DIAGNOSIS — J40 BRONCHITIS: ICD-10-CM

## 2020-10-09 DIAGNOSIS — F41.9 ANXIETY: ICD-10-CM

## 2020-10-09 DIAGNOSIS — R05.9 COUGH: ICD-10-CM

## 2020-10-09 DIAGNOSIS — J01.00 ACUTE NON-RECURRENT MAXILLARY SINUSITIS: ICD-10-CM

## 2020-10-09 DIAGNOSIS — F32.0 MILD MAJOR DEPRESSION (H): Primary | ICD-10-CM

## 2020-10-09 LAB
BASOPHILS # BLD AUTO: 0.1 10E9/L (ref 0–0.2)
BASOPHILS NFR BLD AUTO: 1 %
DIFFERENTIAL METHOD BLD: NORMAL
EOSINOPHIL # BLD AUTO: 0.6 10E9/L (ref 0–0.7)
EOSINOPHIL NFR BLD AUTO: 6.8 %
ERYTHROCYTE [DISTWIDTH] IN BLOOD BY AUTOMATED COUNT: 13.4 % (ref 10–15)
HCT VFR BLD AUTO: 39.9 % (ref 35–47)
HGB BLD-MCNC: 12.8 G/DL (ref 11.7–15.7)
LYMPHOCYTES # BLD AUTO: 3 10E9/L (ref 0.8–5.3)
LYMPHOCYTES NFR BLD AUTO: 36.4 %
MCH RBC QN AUTO: 27.5 PG (ref 26.5–33)
MCHC RBC AUTO-ENTMCNC: 32.1 G/DL (ref 31.5–36.5)
MCV RBC AUTO: 86 FL (ref 78–100)
MONOCYTES # BLD AUTO: 0.6 10E9/L (ref 0–1.3)
MONOCYTES NFR BLD AUTO: 7.2 %
NEUTROPHILS # BLD AUTO: 4 10E9/L (ref 1.6–8.3)
NEUTROPHILS NFR BLD AUTO: 48.6 %
PLATELET # BLD AUTO: 439 10E9/L (ref 150–450)
RBC # BLD AUTO: 4.65 10E12/L (ref 3.8–5.2)
WBC # BLD AUTO: 8.1 10E9/L (ref 4–11)

## 2020-10-09 PROCEDURE — 85025 COMPLETE CBC W/AUTO DIFF WBC: CPT | Performed by: NURSE PRACTITIONER

## 2020-10-09 PROCEDURE — 71046 X-RAY EXAM CHEST 2 VIEWS: CPT | Mod: TC | Performed by: RADIOLOGY

## 2020-10-09 PROCEDURE — 99214 OFFICE O/P EST MOD 30 MIN: CPT | Performed by: NURSE PRACTITIONER

## 2020-10-09 RX ORDER — AZITHROMYCIN 250 MG/1
TABLET, FILM COATED ORAL
Qty: 11 TABLET | Refills: 0 | Status: SHIPPED | OUTPATIENT
Start: 2020-10-09 | End: 2020-10-19

## 2020-10-09 ASSESSMENT — ANXIETY QUESTIONNAIRES
3. WORRYING TOO MUCH ABOUT DIFFERENT THINGS: SEVERAL DAYS
6. BECOMING EASILY ANNOYED OR IRRITABLE: NOT AT ALL
7. FEELING AFRAID AS IF SOMETHING AWFUL MIGHT HAPPEN: SEVERAL DAYS
2. NOT BEING ABLE TO STOP OR CONTROL WORRYING: SEVERAL DAYS
5. BEING SO RESTLESS THAT IT IS HARD TO SIT STILL: NOT AT ALL
IF YOU CHECKED OFF ANY PROBLEMS ON THIS QUESTIONNAIRE, HOW DIFFICULT HAVE THESE PROBLEMS MADE IT FOR YOU TO DO YOUR WORK, TAKE CARE OF THINGS AT HOME, OR GET ALONG WITH OTHER PEOPLE: NOT DIFFICULT AT ALL
GAD7 TOTAL SCORE: 5
1. FEELING NERVOUS, ANXIOUS, OR ON EDGE: SEVERAL DAYS

## 2020-10-09 ASSESSMENT — PATIENT HEALTH QUESTIONNAIRE - PHQ9
SUM OF ALL RESPONSES TO PHQ QUESTIONS 1-9: 2
5. POOR APPETITE OR OVEREATING: SEVERAL DAYS

## 2020-10-09 ASSESSMENT — PAIN SCALES - GENERAL: PAINLEVEL: NO PAIN (0)

## 2020-10-09 ASSESSMENT — MIFFLIN-ST. JEOR: SCORE: 2049.92

## 2020-10-09 NOTE — PROGRESS NOTES
Shilpi Moore is a 30 year old female who presents to clinic today for the following health issues:          Depression and Anxiety Follow-Up    How are you doing with your depression since your last visit? No change    How are you doing with your anxiety since your last visit?  No change    Are you having other symptoms that might be associated with depression or anxiety? No    Have you had a significant life event? No     Do you have any concerns with your use of alcohol or other drugs? No       Social History     Tobacco Use     Smoking status: Never Smoker     Smokeless tobacco: Never Used     Tobacco comment: Passive exposure   Substance Use Topics     Alcohol use: Yes     Comment: Beer; Occasionally     Drug use: No       PHQ 2/12/2019 9/17/2019 10/9/2020   PHQ-9 Total Score 3 5 2   Q9: Thoughts of better off dead/self-harm past 2 weeks Not at all Not at all Not at all       KING-7 SCORE 2/12/2019 9/17/2019 10/9/2020   Total Score 8 16 5         Suicide Assessment Five-step Evaluation and Treatment (SAFE-T)        How many servings of fruits and vegetables do you eat daily?  2-3    On average, how many sweetened beverages do you drink each day (Examples: soda, juice, sweet tea, etc.  Do NOT count diet or artificially sweetened beverages)?   0    How many days per week do you exercise enough to make your heart beat faster? 3 or less    How many minutes a day do you exercise enough to make your heart beat faster? 20 - 29    How many days per week do you miss taking your medication? 0      Acute Illness  Acute illness concerns: cough  Onset/Duration: 2 weeks  Symptoms:  Fever: no  Chills/Sweats: YES- in the beginning of illness  Headache (location?): YES  Sinus Pressure: YES  Conjunctivitis:  no  Ear Pain: no  Rhinorrhea: YES  Congestion: YES  Sore Throat: no  Cough: YES-productive of yellow sputum  Wheeze: YES  Decreased Appetite: no  Nausea: no  Vomiting: no  Diarrhea: no  Dysuria/Freq.: no  Dysuria or  Hematuria: no  Fatigue/Achiness: no  Sick/Strep Exposure: no  Therapies tried and outcome: Dayquil, sudafed, tylenol cold and flu        Patient Active Problem List   Diagnosis     Mild major depression (H)     Vitamin D deficiency     ACP (advance care planning)     Seasonal allergic rhinitis due to pollen     Benign essential hypertension     Morbid obesity (H)     Reactive airway disease that is not asthma     Family history of melanoma     Gastroesophageal reflux disease without esophagitis     Anxiety     Lactation problem     Past Surgical History:   Procedure Laterality Date     ENT SURGERY      tonsillectomy     OTHER SURGICAL HISTORY      I&D rt abscess axilla     SOFT TISSUE SURGERY      IND cyst right axilla       Social History     Tobacco Use     Smoking status: Never Smoker     Smokeless tobacco: Never Used     Tobacco comment: Passive exposure   Substance Use Topics     Alcohol use: Yes     Comment: Beer; Occasionally     Family History   Problem Relation Age of Onset     Asthma Mother      Hypertension Father      Asthma Brother      Asthma Sister      Cancer Maternal Grandfather 62        Mesothelioma, cause of death     Cancer Other 28        Cousin; Melanoma, cause of death     Thyroid Disease No family hx of            Current Outpatient Medications   Medication Sig Dispense Refill     norethindrone (MICRONOR) 0.35 MG tablet Take 0.35 mg by mouth       omeprazole (PRILOSEC) 40 MG DR capsule Take one capsule daily 30-60 minutes before a meal. 90 capsule 3     sertraline (ZOLOFT) 50 MG tablet Take 1 tablet (50 mg) by mouth daily 90 tablet 1         Allergies   Allergen Reactions     Nsaids Swelling         Recent Labs   Lab Test 08/27/20 09/17/19  1429 12/11/18  1700 09/21/18  1357 04/04/17  0919 08/29/14  0916 08/29/14  0916   A1C  --   --  5.2  --   --   --   --    LDL  --   --   --   --  101*  --  139*   HDL  --   --   --   --  59  --  49*   TRIG  --   --   --   --  134  --  134   ALT 16 31  --   "33  --   --   --    CR 0.75 0.79  --  0.78 0.82   < > 0.83   GFRESTIMATED >60 >90  --  88 84   < > 84   GFRESTBLACK  --  >90  --  >90 >90  African American GFR Calc     < > >90   GFR Calc     POTASSIUM 3.8 3.7  --  4.0 3.8   < > 3.8   TSH  --  0.70  --  2.40 2.19   < >  --     < > = values in this interval not displayed.        BP Readings from Last 3 Encounters:   10/09/20 122/76   10/31/19 116/70   10/22/19 110/70    Wt Readings from Last 3 Encounters:   10/09/20 129.7 kg (286 lb)   10/22/19 131.1 kg (289 lb)   09/17/19 133.4 kg (294 lb 3.2 oz)                Review of Systems   Constitutional, HEENT, cardiovascular, pulmonary, GI, , musculoskeletal, neuro, skin, endocrine and psych systems are negative, except as otherwise noted.      Objective    /76   Pulse 77   Temp 98.5  F (36.9  C) (Tympanic)   Ht 1.702 m (5' 7\")   Wt 129.7 kg (286 lb)   SpO2 98%   BMI 44.79 kg/m    Body mass index is 44.79 kg/m .       Physical Exam   GENERAL: healthy, alert and no distress  EYES: Eyes grossly normal to inspection, PERRL and conjunctivae and sclerae normal  HENT: sinuses: maxillary, frontal tenderness on both sides, yellow PND  NECK: no adenopathy, no asymmetry, masses, or scars and thyroid normal to palpation  RESP: Deep loose cough  CV: regular rate and rhythm, normal S1 S2, no S3 or S4, no murmur, click or rub, no peripheral edema and peripheral pulses strong  SKIN: no suspicious lesions or rashes  PSYCH: mentation appears normal, affect normal/bright    Results for orders placed or performed in visit on 10/09/20   CBC with platelets and differential     Status: None   Result Value Ref Range    WBC 8.1 4.0 - 11.0 10e9/L    RBC Count 4.65 3.8 - 5.2 10e12/L    Hemoglobin 12.8 11.7 - 15.7 g/dL    Hematocrit 39.9 35.0 - 47.0 %    MCV 86 78 - 100 fl    MCH 27.5 26.5 - 33.0 pg    MCHC 32.1 31.5 - 36.5 g/dL    RDW 13.4 10.0 - 15.0 %    Platelet Count 439 150 - 450 10e9/L    % Neutrophils 48.6 %    " % Lymphocytes 36.4 %    % Monocytes 7.2 %    % Eosinophils 6.8 %    % Basophils 1.0 %    Absolute Neutrophil 4.0 1.6 - 8.3 10e9/L    Absolute Lymphocytes 3.0 0.8 - 5.3 10e9/L    Absolute Monocytes 0.6 0.0 - 1.3 10e9/L    Absolute Eosinophils 0.6 0.0 - 0.7 10e9/L    Absolute Basophils 0.1 0.0 - 0.2 10e9/L    Diff Method Automated Method            Assessment & Plan     Mild major depression (H)  - Continue plan of care    Anxiety  - Continue plan of care    Cough  - XR CHEST 2 VW (Clinic Performed);   - CBC with platelets and differential    Acute non-recurrent maxillary sinusitis  - azithromycin (ZITHROMAX) 250 MG tablet; Take 2 tablets (500 mg) by mouth daily for 1 day, THEN 1 tablet (250 mg) daily for 9 days.    Bronchitis  - azithromycin (ZITHROMAX) 250 MG tablet; Take 2 tablets (500 mg) by mouth daily for 1 day, THEN 1 tablet (250 mg) daily for 9 days.       Insure adequate fluid intake  Get plenty of rest  Monitor for temp at home, treat with OTC Tylenol or Ibuprofen per package instruction.  Humidity at home (add bacteriostatic solution to humidifier)  Please return in you do not improve  To UC or ER with persistent, worsening, or concerning symptoms         Tiana Tucker CNP  [facility]

## 2020-10-09 NOTE — PATIENT INSTRUCTIONS
Assessment & Plan     Mild major depression (H)  - Continue plan of care    Anxiety  - Continue plan of care    Cough  - XR CHEST 2 VW (Clinic Performed);   - CBC with platelets and differential    Acute non-recurrent maxillary sinusitis  - azithromycin (ZITHROMAX) 250 MG tablet; Take 2 tablets (500 mg) by mouth daily for 1 day, THEN 1 tablet (250 mg) daily for 9 days.    Bronchitis  - azithromycin (ZITHROMAX) 250 MG tablet; Take 2 tablets (500 mg) by mouth daily for 1 day, THEN 1 tablet (250 mg) daily for 9 days.       Insure adequate fluid intake  Get plenty of rest  Monitor for temp at home, treat with OTC Tylenol or Ibuprofen per package instruction.  Humidity at home (add bacteriostatic solution to humidifier)  Please return in you do not improve  To UC or ER with persistent, worsening, or concerning symptoms         Tiana Tucker CNP  [facility]

## 2020-10-10 ASSESSMENT — ANXIETY QUESTIONNAIRES: GAD7 TOTAL SCORE: 5

## 2020-12-12 DIAGNOSIS — K21.9 GASTROESOPHAGEAL REFLUX DISEASE WITHOUT ESOPHAGITIS: ICD-10-CM

## 2020-12-14 RX ORDER — OMEPRAZOLE 40 MG/1
CAPSULE, DELAYED RELEASE ORAL
Qty: 90 CAPSULE | Refills: 3 | Status: SHIPPED | OUTPATIENT
Start: 2020-12-14 | End: 2021-10-22

## 2020-12-20 ENCOUNTER — HEALTH MAINTENANCE LETTER (OUTPATIENT)
Age: 30
End: 2020-12-20

## 2020-12-22 ENCOUNTER — OFFICE VISIT (OUTPATIENT)
Dept: FAMILY MEDICINE | Facility: OTHER | Age: 30
End: 2020-12-22
Attending: NURSE PRACTITIONER
Payer: COMMERCIAL

## 2020-12-22 VITALS
HEIGHT: 66 IN | OXYGEN SATURATION: 97 % | BODY MASS INDEX: 47.09 KG/M2 | TEMPERATURE: 98.2 F | DIASTOLIC BLOOD PRESSURE: 78 MMHG | HEART RATE: 77 BPM | WEIGHT: 293 LBS | SYSTOLIC BLOOD PRESSURE: 128 MMHG

## 2020-12-22 DIAGNOSIS — F32.0 MILD MAJOR DEPRESSION (H): ICD-10-CM

## 2020-12-22 DIAGNOSIS — Z30.41 ENCOUNTER FOR SURVEILLANCE OF CONTRACEPTIVE PILLS: ICD-10-CM

## 2020-12-22 DIAGNOSIS — I10 BENIGN ESSENTIAL HYPERTENSION: ICD-10-CM

## 2020-12-22 DIAGNOSIS — F41.9 ANXIETY: ICD-10-CM

## 2020-12-22 DIAGNOSIS — K21.9 GASTROESOPHAGEAL REFLUX DISEASE WITHOUT ESOPHAGITIS: Primary | ICD-10-CM

## 2020-12-22 PROCEDURE — 99214 OFFICE O/P EST MOD 30 MIN: CPT | Performed by: NURSE PRACTITIONER

## 2020-12-22 RX ORDER — ACETAMINOPHEN AND CODEINE PHOSPHATE 120; 12 MG/5ML; MG/5ML
0.35 SOLUTION ORAL DAILY
Qty: 84 TABLET | Refills: 1 | Status: SHIPPED | OUTPATIENT
Start: 2020-12-22 | End: 2021-06-09

## 2020-12-22 ASSESSMENT — ANXIETY QUESTIONNAIRES
2. NOT BEING ABLE TO STOP OR CONTROL WORRYING: NOT AT ALL
4. TROUBLE RELAXING: NOT AT ALL
IF YOU CHECKED OFF ANY PROBLEMS ON THIS QUESTIONNAIRE, HOW DIFFICULT HAVE THESE PROBLEMS MADE IT FOR YOU TO DO YOUR WORK, TAKE CARE OF THINGS AT HOME, OR GET ALONG WITH OTHER PEOPLE: NOT DIFFICULT AT ALL
5. BEING SO RESTLESS THAT IT IS HARD TO SIT STILL: NOT AT ALL
GAD7 TOTAL SCORE: 3
3. WORRYING TOO MUCH ABOUT DIFFERENT THINGS: SEVERAL DAYS
1. FEELING NERVOUS, ANXIOUS, OR ON EDGE: SEVERAL DAYS
6. BECOMING EASILY ANNOYED OR IRRITABLE: SEVERAL DAYS
7. FEELING AFRAID AS IF SOMETHING AWFUL MIGHT HAPPEN: NOT AT ALL

## 2020-12-22 ASSESSMENT — MIFFLIN-ST. JEOR: SCORE: 2072.27

## 2020-12-22 ASSESSMENT — PAIN SCALES - GENERAL: PAINLEVEL: NO PAIN (0)

## 2020-12-22 ASSESSMENT — PATIENT HEALTH QUESTIONNAIRE - PHQ9: SUM OF ALL RESPONSES TO PHQ QUESTIONS 1-9: 2

## 2020-12-22 NOTE — PATIENT INSTRUCTIONS
Assessment & Plan     Gastroesophageal reflux disease without esophagitis  - Continue plan of care    Anxiety  - Continue plan of care    Mild major depression (H)  - Continue plan of care    Benign essential hypertension  - Continue plan of care    Encounter for surveillance of contraceptive pills  - norethindrone (MICRONOR) 0.35 MG tablet; Take 1 tablet (0.35 mg) by mouth daily       Pap in February with Kanu Bueno    Return in about 6 months (around 6/22/2021) for Chronic disease management, am fasting.    Tiana Tucker CNP  [facility] [place] [facility]

## 2020-12-22 NOTE — NURSING NOTE
"Chief Complaint   Patient presents with     Depression     Anxiety       Initial /80 (BP Location: Right arm, Patient Position: Chair, Cuff Size: Adult Large)   Pulse 77   Temp 98.2  F (36.8  C) (Tympanic)   Ht 1.665 m (5' 5.55\")   Wt 134.3 kg (296 lb)   SpO2 97%   BMI 48.43 kg/m   Estimated body mass index is 48.43 kg/m  as calculated from the following:    Height as of this encounter: 1.665 m (5' 5.55\").    Weight as of this encounter: 134.3 kg (296 lb).  Medication Reconciliation: complete  Sumi Reyna LPN    "

## 2020-12-22 NOTE — PROGRESS NOTES
Shilpi Moore is a 30 year old female who presents to clinic today for the following health issues:          Depression and Anxiety Follow-Up    How are you doing with your depression since your last visit? No change    How are you doing with your anxiety since your last visit?  No change    Are you having other symptoms that might be associated with depression or anxiety? No    Have you had a significant life event? No     Do you have any concerns with your use of alcohol or other drugs? No         Social History     Tobacco Use     Smoking status: Never Smoker     Smokeless tobacco: Never Used     Tobacco comment: Passive exposure   Substance Use Topics     Alcohol use: Yes     Comment: Beer; Occasionally     Drug use: No       PHQ 9/17/2019 10/9/2020 12/22/2020   PHQ-9 Total Score 5 2 2   Q9: Thoughts of better off dead/self-harm past 2 weeks Not at all Not at all Not at all         KING-7 SCORE 9/17/2019 10/9/2020 12/22/2020   Total Score 16 5 3         Last PHQ-9 12/22/2020   1.  Little interest or pleasure in doing things 0   2.  Feeling down, depressed, or hopeless 1   3.  Trouble falling or staying asleep, or sleeping too much 0   4.  Feeling tired or having little energy 1   5.  Poor appetite or overeating 0   6.  Feeling bad about yourself 0   7.  Trouble concentrating 0   8.  Moving slowly or restless 0   Q9: Thoughts of better off dead/self-harm past 2 weeks 0   PHQ-9 Total Score 2   Difficulty at work, home, or with people Not difficult at all     KING-7  12/22/2020   1. Feeling nervous, anxious, or on edge 1   2. Not being able to stop or control worrying 0   3. Worrying too much about different things 1   4. Trouble relaxing 0   5. Being so restless that it is hard to sit still 0   6. Becoming easily annoyed or irritable 1   7. Feeling afraid, as if something awful might happen 0   KING-7 Total Score 3   If you checked any problems, how difficult have they made it for you to do your work, take care  of things at home, or get along with other people? Not difficult at all       Suicide Assessment Five-step Evaluation and Treatment (SAFE-T)        How many servings of fruits and vegetables do you eat daily?  2-3    On average, how many sweetened beverages do you drink each day (Examples: soda, juice, sweet tea, etc.  Do NOT count diet or artificially sweetened beverages)?   0    How many days per week do you exercise enough to make your heart beat faster? 3 or less    How many minutes a day do you exercise enough to make your heart beat faster? 20 - 29    How many days per week do you miss taking your medication? 0      GERD/Heartburn  Onset/Duration:years  Description: resolved with medication  Intensity: mild  Progression of Symptoms: same  Accompanying Signs & Symptoms:  Does it feel like food gets stuck or trouble swallowing: no  Nausea: no  Vomiting (bloody?): no  Abdominal Pain: no  Black-Tarry stools: no  Bloody stools: no  History:  Previous similar episodes: YES  Previous ulcers: no  Precipitating factors:   Caffeine use: YES  Alcohol use: no  NSAID/Aspirin use: no  Tobacco use: no  Worse with no particular food or drink.  Alleviating factors: medications  Therapies tried and outcome:             Lifestyle changes: None            Medications: Omeprazole (Prilosec)        Hypertension Follow-up    Do you check your blood pressure regularly outside of the clinic? No     Are you following a low salt diet? No    Are your blood pressures ever more than 140 on the top number (systolic) OR more   than 90 on the bottom number (diastolic), for example 140/90? No        Patient Active Problem List   Diagnosis     Mild major depression (H)     Vitamin D deficiency     ACP (advance care planning)     Seasonal allergic rhinitis due to pollen     Benign essential hypertension     Morbid obesity (H)     Reactive airway disease that is not asthma     Family history of melanoma     Gastroesophageal reflux disease without  esophagitis     Anxiety     Lactation problem     Past Surgical History:   Procedure Laterality Date     ENT SURGERY      tonsillectomy     OTHER SURGICAL HISTORY      I&D rt abscess axilla     SOFT TISSUE SURGERY      IND cyst right axilla       Social History     Tobacco Use     Smoking status: Never Smoker     Smokeless tobacco: Never Used     Tobacco comment: Passive exposure   Substance Use Topics     Alcohol use: Yes     Comment: Beer; Occasionally     Family History   Problem Relation Age of Onset     Asthma Mother      Hypertension Father      Asthma Brother      Asthma Sister      Cancer Maternal Grandfather 62        Mesothelioma, cause of death     Cancer Other 28        Cousin; Melanoma, cause of death     Thyroid Disease No family hx of            Current Outpatient Medications   Medication Sig Dispense Refill     ALBUTEROL IN        HYDROCHLOROTHIAZIDE PO Take 25 mg by mouth       Multiple Vitamins-Minerals (MULTIVITAMIN ADULT PO)        norethindrone (MICRONOR) 0.35 MG tablet Take 0.35 mg by mouth       omeprazole (PRILOSEC) 40 MG DR capsule TAKE 1 CAPSULE BY MOUTH DAILY 30 TO 60 MINUTES BEFORE A MEAL 90 capsule 3     sertraline (ZOLOFT) 50 MG tablet Take 1 tablet (50 mg) by mouth daily 90 tablet 1     VITAMIN D PO            Allergies   Allergen Reactions     Nsaids Swelling         Recent Labs   Lab Test 08/27/20 09/17/19  1429 12/11/18  1700 09/21/18  1357 04/04/17  0919 08/29/14  0916 08/29/14  0916   A1C  --   --  5.2  --   --   --   --    LDL  --   --   --   --  101*  --  139*   HDL  --   --   --   --  59  --  49*   TRIG  --   --   --   --  134  --  134   ALT 16 31  --  33  --   --   --    CR 0.75 0.79  --  0.78 0.82   < > 0.83   GFRESTIMATED >60 >90  --  88 84   < > 84   GFRESTBLACK  --  >90  --  >90 >90  African American GFR Calc     < > >90   GFR Calc     POTASSIUM 3.8 3.7  --  4.0 3.8   < > 3.8   TSH  --  0.70  --  2.40 2.19   < >  --     < > = values in this interval not  "displayed.        BP Readings from Last 3 Encounters:   12/22/20 128/78   10/09/20 122/76   10/31/19 116/70    Wt Readings from Last 3 Encounters:   12/22/20 134.3 kg (296 lb)   10/09/20 129.7 kg (286 lb)   10/22/19 131.1 kg (289 lb)             Review of Systems   Constitutional, HEENT, cardiovascular, pulmonary, gi and gu systems are negative, except as otherwise noted.        Objective    /78 (BP Location: Right arm, Patient Position: Chair, Cuff Size: Adult Large)   Pulse 77   Temp 98.2  F (36.8  C) (Tympanic)   Ht 1.665 m (5' 5.55\")   Wt 134.3 kg (296 lb)   SpO2 97%   BMI 48.43 kg/m    Body mass index is 48.43 kg/m .       Physical Exam   GENERAL: healthy, alert and no distress  EYES: Eyes grossly normal to inspection, PERRL and conjunctivae and sclerae normal  HENT: ear canals and TM's normal, nose and mouth without ulcers or lesions  NECK: no adenopathy, no asymmetry, masses, or scars and thyroid normal to palpation  RESP: lungs clear to auscultation - no rales, rhonchi or wheezes  CV: regular rate and rhythm, normal S1 S2, no S3 or S4, no murmur, click or rub, no peripheral edema and peripheral pulses strong  SKIN: no suspicious lesions or rashes  PSYCH: mentation appears normal, affect normal/bright          Assessment & Plan     Gastroesophageal reflux disease without esophagitis  - Continue plan of care    Anxiety  - Continue plan of care    Mild major depression (H)  - Continue plan of care    Benign essential hypertension  - Continue plan of care    Encounter for surveillance of contraceptive pills  - norethindrone (MICRONOR) 0.35 MG tablet; Take 1 tablet (0.35 mg) by mouth daily       Pap in February with Kanu Bueno    Return in about 6 months (around 6/22/2021) for Chronic disease management, am fasting.    Tiana Tucker, LISETTE  [facility] [place] [facility]      "

## 2020-12-23 ASSESSMENT — ANXIETY QUESTIONNAIRES: GAD7 TOTAL SCORE: 3

## 2021-01-20 ENCOUNTER — OFFICE VISIT (OUTPATIENT)
Dept: FAMILY MEDICINE | Facility: OTHER | Age: 31
End: 2021-01-20
Attending: NURSE PRACTITIONER
Payer: COMMERCIAL

## 2021-01-20 VITALS — BODY MASS INDEX: 47.94 KG/M2 | WEIGHT: 293 LBS

## 2021-01-20 DIAGNOSIS — R63.4 WEIGHT LOSS: Primary | ICD-10-CM

## 2021-04-18 ENCOUNTER — HEALTH MAINTENANCE LETTER (OUTPATIENT)
Age: 31
End: 2021-04-18

## 2021-05-19 ENCOUNTER — OFFICE VISIT (OUTPATIENT)
Dept: FAMILY MEDICINE | Facility: OTHER | Age: 31
End: 2021-05-19
Attending: NURSE PRACTITIONER
Payer: COMMERCIAL

## 2021-05-19 VITALS
SYSTOLIC BLOOD PRESSURE: 126 MMHG | HEART RATE: 81 BPM | TEMPERATURE: 98.6 F | OXYGEN SATURATION: 98 % | RESPIRATION RATE: 18 BRPM | WEIGHT: 293 LBS | HEIGHT: 66 IN | BODY MASS INDEX: 47.09 KG/M2 | DIASTOLIC BLOOD PRESSURE: 72 MMHG

## 2021-05-19 DIAGNOSIS — J01.10 ACUTE NON-RECURRENT FRONTAL SINUSITIS: Primary | ICD-10-CM

## 2021-05-19 PROCEDURE — 99213 OFFICE O/P EST LOW 20 MIN: CPT | Performed by: NURSE PRACTITIONER

## 2021-05-19 RX ORDER — AMOXICILLIN 875 MG
875 TABLET ORAL 2 TIMES DAILY
Qty: 20 TABLET | Refills: 0 | Status: SHIPPED | OUTPATIENT
Start: 2021-05-19 | End: 2021-05-29

## 2021-05-19 ASSESSMENT — MIFFLIN-ST. JEOR: SCORE: 2066.46

## 2021-05-19 ASSESSMENT — PATIENT HEALTH QUESTIONNAIRE - PHQ9
SUM OF ALL RESPONSES TO PHQ QUESTIONS 1-9: 0
5. POOR APPETITE OR OVEREATING: NOT AT ALL

## 2021-05-19 ASSESSMENT — ANXIETY QUESTIONNAIRES
2. NOT BEING ABLE TO STOP OR CONTROL WORRYING: NOT AT ALL
7. FEELING AFRAID AS IF SOMETHING AWFUL MIGHT HAPPEN: NOT AT ALL
6. BECOMING EASILY ANNOYED OR IRRITABLE: NOT AT ALL
5. BEING SO RESTLESS THAT IT IS HARD TO SIT STILL: NOT AT ALL
GAD7 TOTAL SCORE: 0
3. WORRYING TOO MUCH ABOUT DIFFERENT THINGS: NOT AT ALL
IF YOU CHECKED OFF ANY PROBLEMS ON THIS QUESTIONNAIRE, HOW DIFFICULT HAVE THESE PROBLEMS MADE IT FOR YOU TO DO YOUR WORK, TAKE CARE OF THINGS AT HOME, OR GET ALONG WITH OTHER PEOPLE: NOT DIFFICULT AT ALL
1. FEELING NERVOUS, ANXIOUS, OR ON EDGE: NOT AT ALL

## 2021-05-19 ASSESSMENT — PAIN SCALES - GENERAL: PAINLEVEL: MILD PAIN (3)

## 2021-05-19 NOTE — NURSING NOTE
"Chief Complaint   Patient presents with     Sinus Problem       Initial /72   Pulse 81   Temp 98.6  F (37  C) (Tympanic)   Resp 18   Ht 1.664 m (5' 5.5\")   Wt 134.3 kg (296 lb)   SpO2 98%   BMI 48.51 kg/m   Estimated body mass index is 48.51 kg/m  as calculated from the following:    Height as of this encounter: 1.664 m (5' 5.5\").    Weight as of this encounter: 134.3 kg (296 lb).  Medication Reconciliation: complete  Maggie Scott LPN  "

## 2021-05-19 NOTE — PROGRESS NOTES
"    Assessment & Plan       Acute non-recurrent frontal sinusitis  - amoxicillin (AMOXIL) 875 MG tablet; Take 1 tablet (875 mg) by mouth 2 times daily for 10 days    Insure adequate fluid intake  Get plenty of rest  Monitor for temp at home, treat with OTC Tylenol or Ibuprofen per package instruction.  Humidity at home (add bacteriostatic solution to humidifier)  Please return in you do not improve  To UC or ER with persistent, worsening, or concerning symptoms      Tiana Tucker, LISETTE  [facility] - CARTER Dobbins is a 31 year old who presents for the following health issues       Acute Illness  Acute illness concerns: sinus congestion  Onset/Duration: 2.5 weeks  Symptoms:  Fever: no  Chills/Sweats: no  Headache (location?): YES  Sinus Pressure: YES  Conjunctivitis:  no  Ear Pain: YES: bilateral  Rhinorrhea: YES  Congestion: YES  Sore Throat: YES  Cough: YES - at night from drainage  Wheeze: no  Decreased Appetite: no  Nausea: no  Vomiting: no  Diarrhea: no  Dysuria/Freq.: no  Dysuria or Hematuria: no  Fatigue/Achiness: no  Sick/Strep Exposure: no  Therapies tried and outcome: zyrtec, allegra, flonase        Review of Systems   Constitutional, HEENT, cardiovascular, pulmonary, gi and gu systems are negative, except as otherwise noted.        Objective    /72   Pulse 81   Temp 98.6  F (37  C) (Tympanic)   Resp 18   Ht 1.664 m (5' 5.5\")   Wt 134.3 kg (296 lb)   SpO2 98%   BMI 48.51 kg/m    Body mass index is 48.51 kg/m .     Physical Exam   GENERAL: healthy, alert and no distress  ENT - Facial tenderness, PND, erythema of posterior oropharynx - yellow drainage  NECK: no adenopathy, no asymmetry, masses, or scars and thyroid normal to palpation  RESP: lungs clear to auscultation - no rales, rhonchi or wheezes  CV: regular rate and rhythm, normal S1 S2, no S3 or S4, no murmur, click or rub, no peripheral edema and peripheral pulses strong  ABDOMEN: soft, nontender, no hepatosplenomegaly, no " masses and bowel sounds normal  MS: no gross musculoskeletal defects noted, no edema

## 2021-05-19 NOTE — PATIENT INSTRUCTIONS
Assessment & Plan       Acute non-recurrent frontal sinusitis  - amoxicillin (AMOXIL) 875 MG tablet; Take 1 tablet (875 mg) by mouth 2 times daily for 10 days    Insure adequate fluid intake  Get plenty of rest  Monitor for temp at home, treat with OTC Tylenol or Ibuprofen per package instruction.  Humidity at home (add bacteriostatic solution to humidifier)  Please return in you do not improve  To UC or ER with persistent, worsening, or concerning symptoms      Tiana Tucker, LISETTE  [facility] [place] [facility]

## 2021-05-20 ASSESSMENT — ANXIETY QUESTIONNAIRES: GAD7 TOTAL SCORE: 0

## 2021-06-09 DIAGNOSIS — Z30.41 ENCOUNTER FOR SURVEILLANCE OF CONTRACEPTIVE PILLS: ICD-10-CM

## 2021-06-09 RX ORDER — NORETHINDRONE
KIT
Qty: 84 TABLET | Refills: 1 | Status: SHIPPED | OUTPATIENT
Start: 2021-06-09 | End: 2021-10-11

## 2021-06-09 NOTE — TELEPHONE ENCOUNTER
Norlyda 0.35 mg tablet  Last Written Prescription Date:  12/22/2020  Last Fill Quantity: 84,   # refills: 1  Last Office Visit: 5/19/2021  Future Office visit:    Next 5 appointments (look out 90 days)    Jun 22, 2021  3:45 PM  (Arrive by 3:30 PM)  SHORT with Tiana Tucker CNP  [facility] ([facility] ) [address]  [facility] [address]  [phone]

## 2021-06-28 ENCOUNTER — E-VISIT (OUTPATIENT)
Dept: URGENT CARE | Facility: CLINIC | Age: 31
End: 2021-06-28
Payer: COMMERCIAL

## 2021-06-28 DIAGNOSIS — J01.90 ACUTE SINUSITIS, RECURRENCE NOT SPECIFIED, UNSPECIFIED LOCATION: Primary | ICD-10-CM

## 2021-06-28 PROCEDURE — 99421 OL DIG E/M SVC 5-10 MIN: CPT | Performed by: NURSE PRACTITIONER

## 2021-06-28 RX ORDER — IPRATROPIUM BROMIDE 42 UG/1
2 SPRAY, METERED NASAL 4 TIMES DAILY
Qty: 15 ML | Refills: 1 | Status: SHIPPED | OUTPATIENT
Start: 2021-06-28 | End: 2021-10-28

## 2021-06-28 RX ORDER — BENZONATATE 100 MG/1
100 CAPSULE ORAL 3 TIMES DAILY PRN
Qty: 21 CAPSULE | Refills: 0 | Status: SHIPPED | OUTPATIENT
Start: 2021-06-28 | End: 2021-07-05

## 2021-06-28 RX ORDER — GUAIFENESIN 1200 MG/1
1200 TABLET, EXTENDED RELEASE ORAL 2 TIMES DAILY
Qty: 60 TABLET | Refills: 0 | Status: SHIPPED | OUTPATIENT
Start: 2021-06-28 | End: 2021-10-11

## 2021-06-28 NOTE — PATIENT INSTRUCTIONS
Dear Shilpi Moore    After reviewing your responses, I've been able to diagnose you with?a sinus infection caused by a virus.Your sinus infection is not considered a bacterial infection unless symptoms have not improved after 10 days, at which time an antibiotic may be needed for treatment. ?     Based on your responses and diagnosis, I have prescribed decongestants to treat your symptoms. I have sent this to your pharmacy.?     It is also important to stay well hydrated, get lots of rest and take over-the-counter decongestants,?tylenol?or ibuprofen if you?are able to?take those medications per your primary care provider to help relieve discomfort.?     It is important that you take?all of?your prescribed medication even if your symptoms are improving after a few doses.? Taking?all of?your medicine helps prevent the symptoms from returning.?     If your symptoms worsen, you develop severe headache, vomiting, high fever (>102), or are not improving in 7 days, please contact your primary care provider for an appointment or visit any of our convenient Walk-in Care or Urgent Care Centers to be seen which can be found on our website?here.?     Thanks again for choosing?us?as your health care partner,?   ?  Mariana Medel, PAULIE CNP?    The symptoms you describe suggest a viral cause, which is much more common than a bacterial cause. Antibiotics will treat bacterial infections, but have no effect on viral infections. If possible, especially if improving, start with symptom care for the first 7-10 days, then consider seeking further treatment or taking an antibiotic. Bacterial infections generally are more severe, including symptoms such as pus, fever over 101degrees F, or rapidly worsening.  You may want to try a nasal lavage (also known as nasal irrigation). You can find over-the-counter products, such as Neti-Pot, at retail locations or make your own at home. Instructions for homemade nasal lavage and more  information on the process are available online at http://www.aafp.org/afp/2009/1115/p1121.html.      When to Use Antibiotics    Antibiotics are medicines used to treat infections caused by bacteria. They don t work for an illness caused by a virus. And they don't work for an allergic reaction. In fact, taking antibiotics for reasons other than an infection by bacteria can cause problems. You may have side effects from the medicine. And if you need an antibiotic in the future, it may not work well. This is because the bacteria can become immune to the medicine. You can also get a type of diarrhea that's hard to treat. This diarrhea is called C. diff.   When antibiotics likely won t help  Your healthcare provider won t usually give you antibiotics for the conditions listed below. You can help by not asking for them if you have:     A cold. This type of illness is caused by a virus. It can cause a runny nose, stuffed-up nose, sneezing, coughing, and headache. You may also have mild body aches and low fever. A cold gets better on its own in a few days to a week.    The flu (influenza). This is a respiratory illness caused by a virus. The flu usually goes away on its own in a week or so. It can cause fever, body aches, sore throat, and tiredness.    Bronchitis. This is an infection in the lungs. It is most often caused by a virus. You may have coughing, phlegm, body aches, and a low fever. A common type of bronchitis is known as a chest cold. This is called acute bronchitis. This often happens after you have a respiratory infection like a cold. Bronchitis can take weeks to go away. Antibiotics often don t help.    Most sore throats. Sore throats are most often caused by viruses. Your throat may feel scratchy or achy. It may hurt to swallow. You may also have a low fever and body aches. A sore throat usually gets better in a few days.    Most outer ear infections. An ear infection may be caused by a virus or bacteria. It  causes pain in the ear. Antibiotics by mouth usually don t help. Low-dose antibiotic ear drops work much better.    Some inner ear infections. An inner ear infection (otitis media) can be caused by a virus in the ear. It can also cause pain and a high fever. Most older children with low-grade fever don't need to be treated with antibiotics.    Most sinus infections. This is also known as sinusitis. This kind of infection causes sinus pain and swelling, and a runny nose. In most cases, it goes away on its own. Antibiotics don t make recovery quicker.    Allergic rhinitis. This is a set of symptoms caused by an allergic reaction. You may have sneezing, a runny nose, itchy or watery eyes, or a sore throat. Allergies are not treated with antibiotics.    Low fever. A mild fever that s less than 100.4 F (38 C) most likely doesn t need to be treated with antibiotics.   When antibiotics can help  Antibiotics can be used to treat:                                                       Strep throat. This is a throat infection caused by a certain type of bacteria. Symptoms of strep throat include a sore throat, white patches on the tonsils, red spots on the roof of the mouth, fever, body aches, and nausea and vomiting. Strep throat almost never causes a cough.    Urinary tract infection (UTI). This is an infection of the bladder and the tube that takes urine out of the body. It is caused by bacteria. It can cause burning pain and urine that s cloudy or tinted with blood. UTIs are very common. Antibiotics usually help treat them.    Some outer ear infections. In some cases, a healthcare provider may prescribe antibiotics by mouth for an ear infection. You may need a test to show the cause of the ear infection.    Some sinus infections. In some cases, your healthcare provider may give you antibiotics. He or she may first need to make sure your symptoms aren t caused by something else. This may be a virus, fungus, allergies, or  air pollutants such as smoke.   Your healthcare provider may give you antibiotics if you have a condition that can affect your immune system. This includes diabetes or cancer.  Self-care at home  If your infection can t be treated with antibiotics, you can take other steps to feel better. Try the remedies below. In general:     Rest and sleep as much as needed.    Drink water and other clear fluids.    Don t smoke. Stay away from smoke from other people.    Use over-the-counter medicine such as acetaminophen or ibuprofen to ease pain or fever, as directed by your healthcare provider.  To treat sinus pain or nasal stuffiness:    Put a warm, moist cloth on your face where you feel sinus pain or pressure.    Try a nasal spray with medicine or saline. Use as directed by your healthcare provider.    Breathe in steam from a hot shower.    Use a humidifier or cool mist vaporizer.   To quiet a cough:     Use a humidifier or cool mist vaporizer.    Breathe in steam from a hot shower.    Suck on cough lozenges.   To sooth a sore throat:     Suck on ice chips, frozen ice pops, or lozenges.    Use a sore throat spray.    Use a humidifier or cool mist vaporizer.    Gargle with saltwater.    Drink warm liquids.    Take ibuprofen to reduce swelling and pain.  To ease ear pain:     Hold a warm, moist washcloth on the ear for 10 minutes at a time.  [employer] last reviewed this educational content on 12/1/2019 2000-2021 [employer]. All rights reserved. This information is not intended as a substitute for professional medical care. Always follow your healthcare professional's instructions.

## 2021-06-29 ENCOUNTER — OFFICE VISIT (OUTPATIENT)
Dept: FAMILY MEDICINE | Facility: OTHER | Age: 31
End: 2021-06-29
Attending: NURSE PRACTITIONER
Payer: COMMERCIAL

## 2021-06-29 ENCOUNTER — MYC MEDICAL ADVICE (OUTPATIENT)
Dept: FAMILY MEDICINE | Facility: OTHER | Age: 31
End: 2021-06-29

## 2021-06-29 VITALS
DIASTOLIC BLOOD PRESSURE: 74 MMHG | HEIGHT: 66 IN | HEART RATE: 75 BPM | TEMPERATURE: 98.2 F | BODY MASS INDEX: 48.51 KG/M2 | OXYGEN SATURATION: 98 % | SYSTOLIC BLOOD PRESSURE: 110 MMHG

## 2021-06-29 DIAGNOSIS — R05.9 COUGH: ICD-10-CM

## 2021-06-29 DIAGNOSIS — J20.9 ACUTE BRONCHITIS, UNSPECIFIED ORGANISM: ICD-10-CM

## 2021-06-29 DIAGNOSIS — J01.11 ACUTE RECURRENT FRONTAL SINUSITIS: Primary | ICD-10-CM

## 2021-06-29 PROCEDURE — 99214 OFFICE O/P EST MOD 30 MIN: CPT | Performed by: NURSE PRACTITIONER

## 2021-06-29 RX ORDER — CODEINE PHOSPHATE AND GUAIFENESIN 10; 100 MG/5ML; MG/5ML
1-2 SOLUTION ORAL EVERY 6 HOURS PRN
Qty: 118 ML | Refills: 0 | Status: SHIPPED | OUTPATIENT
Start: 2021-06-29 | End: 2021-07-06

## 2021-06-29 RX ORDER — FEXOFENADINE HCL 180 MG/1
TABLET ORAL
COMMUNITY
Start: 2021-06-01 | End: 2021-10-28

## 2021-06-29 RX ORDER — AZITHROMYCIN 250 MG/1
TABLET, FILM COATED ORAL
Qty: 11 TABLET | Refills: 0 | Status: SHIPPED | OUTPATIENT
Start: 2021-06-29 | End: 2021-07-09

## 2021-06-29 ASSESSMENT — PAIN SCALES - GENERAL: PAINLEVEL: MILD PAIN (2)

## 2021-06-29 NOTE — NURSING NOTE
"Chief Complaint   Patient presents with     Sinus Problem       Initial /74 (BP Location: Right arm, Patient Position: Chair, Cuff Size: Adult Large)   Pulse 75   Temp 98.2  F (36.8  C) (Tympanic)   Ht 1.664 m (5' 5.5\")   SpO2 98%   BMI 48.51 kg/m   Estimated body mass index is 48.51 kg/m  as calculated from the following:    Height as of this encounter: 1.664 m (5' 5.5\").    Weight as of 5/19/21: 134.3 kg (296 lb).  Medication Reconciliation: complete  Sumi Reyna LPN    "

## 2021-06-29 NOTE — PROGRESS NOTES
Assessment & Plan     Acute recurrent frontal sinusitis  - azithromycin (ZITHROMAX) 250 MG tablet; Take 2 tablets (500 mg) by mouth daily for 1 day, THEN 1 tablet (250 mg) daily for 9 days.    Acute bronchitis, unspecified organism  - guaiFENesin-codeine (ROBITUSSIN AC) 100-10 MG/5ML solution; Take 5-10 mLs by mouth every 6 hours as needed for cough    Cough  - guaiFENesin-codeine (ROBITUSSIN AC) 100-10 MG/5ML solution; Take 5-10 mLs by mouth every 6 hours as needed for cough      Use your inhaler regularly    Insure adequate fluid intake  Get plenty of rest  Monitor for temp at home, treat with OTC Tylenol or Ibuprofen per package instruction.  Humidity at home (add bacteriostatic solution to humidifier)  Please return in you do not improve  To UC or ER with persistent, worsening, or concerning symptoms      Tiana Tucker, LISETTE  [facility] - [place] BALTAZAR Dobbins is a 31 year old who presents for the following health issues       Acute Illness  Acute illness concerns: URI  Onset/Duration: 6/24/21  Symptoms:  Fever: no  Chills/Sweats: no  Headache (location?): YES- frontal sinus   Sinus Pressure: YES  Conjunctivitis:  no  Ear Pain: YES: right  Rhinorrhea: YES  Congestion: YES  Sore Throat: YES  Cough: YES-non-productive, barking  Wheeze: YES  Decreased Appetite: YES  Nausea: no  Vomiting: no  Diarrhea: no  Dysuria/Freq.: no  Dysuria or Hematuria: no  Fatigue/Achiness: YES  Sick/Strep Exposure: YES- child intermittently sick   Therapies tried and outcome: tessalon, mucinex, sudafed, fexofenadine, Atrovent , no relief         Patient Active Problem List   Diagnosis     Mild major depression (H)     Vitamin D deficiency     ACP (advance care planning)     Seasonal allergic rhinitis due to pollen     Benign essential hypertension     Morbid obesity (H)     Reactive airway disease that is not asthma     Family history of melanoma     Gastroesophageal reflux disease without esophagitis     Anxiety      Lactation problem     Past Surgical History:   Procedure Laterality Date     CHOLECYSTECTOMY, LAPOROSCOPIC N/A 09/27/2020    [facility].     ENT SURGERY      tonsillectomy     OTHER SURGICAL HISTORY      I&D rt abscess axilla     SOFT TISSUE SURGERY      IND cyst right axilla       Social History     Tobacco Use     Smoking status: Never Smoker     Smokeless tobacco: Never Used     Tobacco comment: Passive exposure   Substance Use Topics     Alcohol use: Yes     Comment: Beer; Occasionally     Family History   Problem Relation Age of Onset     Asthma Mother      Hypertension Father      Asthma Brother      Asthma Sister      Cancer Maternal Grandfather 62        Mesothelioma, cause of death     Cancer Other 28        Cousin; Melanoma, cause of death     Thyroid Disease No family hx of            Current Outpatient Medications   Medication Sig Dispense Refill     ALBUTEROL IN        benzonatate (TESSALON) 100 MG capsule Take 1 capsule (100 mg) by mouth 3 times daily as needed for cough 21 capsule 0     fexofenadine (ALLEGRA) 180 MG tablet        guaiFENesin 1200 MG TB12 Take 1 tablet (1,200 mg) by mouth 2 times daily 60 tablet 0     HYDROCHLOROTHIAZIDE PO Take 25 mg by mouth       ipratropium (ATROVENT) 0.06 % nasal spray Spray 2 sprays in nostril 4 times daily 15 mL 1     Multiple Vitamins-Minerals (MULTIVITAMIN ADULT PO)        NORLYDA 0.35 MG tablet TAKE 1 TABLET(0.35 MG) BY MOUTH DAILY 84 tablet 1     omeprazole (PRILOSEC) 40 MG DR capsule TAKE 1 CAPSULE BY MOUTH DAILY 30 TO 60 MINUTES BEFORE A MEAL 90 capsule 3     sertraline (ZOLOFT) 50 MG tablet Take 1 tablet (50 mg) by mouth daily 90 tablet 1     VITAMIN D PO          Allergies   Allergen Reactions     Nsaids Swelling       Recent Labs   Lab Test 08/27/20 09/17/19  1429 12/11/18  1700 09/21/18  1357 04/04/17  0919 08/29/14  0916 08/29/14  0916   A1C  --   --  5.2  --   --   --   --    LDL  --   --   --   --  101*  --  139*   HDL  --   --   --   --  59   "--  49*   TRIG  --   --   --   --  134  --  134   ALT 16 31  --  33  --   --   --    CR 0.75 0.79  --  0.78 0.82   < > 0.83   GFRESTIMATED >60 >90  --  88 84   < > 84   GFRESTBLACK  --  >90  --  >90 >90  African American GFR Calc     < > >90   GFR Calc     POTASSIUM 3.8 3.7  --  4.0 3.8   < > 3.8   TSH  --  0.70  --  2.40 2.19   < >  --     < > = values in this interval not displayed.        BP Readings from Last 3 Encounters:   06/29/21 110/74   05/19/21 126/72   12/22/20 128/78    Wt Readings from Last 3 Encounters:   05/19/21 134.3 kg (296 lb)   01/20/21 132.9 kg (293 lb)   12/22/20 134.3 kg (296 lb)                   Review of Systems   Constitutional, HEENT, cardiovascular, pulmonary, gi and gu systems are negative, except as otherwise noted.        Objective    /74 (BP Location: Right arm, Patient Position: Chair, Cuff Size: Adult Large)   Pulse 75   Temp 98.2  F (36.8  C) (Tympanic)   Ht 1.664 m (5' 5.5\")   SpO2 98%   BMI 48.51 kg/m    Body mass index is 48.51 kg/m .       Physical Exam   GENERAL: healthy, alert and no distress  EYES: Eyes grossly normal to inspection, PERRL and conjunctivae and sclerae normal  HENT: nasal mucosa edematous , rhinorrhea yellow and sinuses: maxillary, frontal tenderness on both sides, yellow PND  NECK: no adenopathy, no asymmetry, masses, or scars and thyroid normal to palpation  RESP: lungs clear to auscultation - no rales, rhonchi or wheezes frequent cough  CV: regular rate and rhythm, normal S1 S2, no S3 or S4, no murmur, click or rub, no peripheral edema and peripheral pulses strong  MS: no gross musculoskeletal defects noted, no edema  SKIN: no suspicious lesions or rashes  PSYCH: mentation appears normal, affect normal/bright            "

## 2021-06-29 NOTE — PATIENT INSTRUCTIONS
Assessment & Plan       Acute recurrent frontal sinusitis  - azithromycin (ZITHROMAX) 250 MG tablet; Take 2 tablets (500 mg) by mouth daily for 1 day, THEN 1 tablet (250 mg) daily for 9 days.    Acute bronchitis, unspecified organism  - guaiFENesin-codeine (ROBITUSSIN AC) 100-10 MG/5ML solution; Take 5-10 mLs by mouth every 6 hours as needed for cough    Cough  - guaiFENesin-codeine (ROBITUSSIN AC) 100-10 MG/5ML solution; Take 5-10 mLs by mouth every 6 hours as needed for cough      Use your inhaler regularly    Insure adequate fluid intake  Get plenty of rest  Monitor for temp at home, treat with OTC Tylenol or Ibuprofen per package instruction.  Humidity at home (add bacteriostatic solution to humidifier)  Please return in you do not improve  To UC or ER with persistent, worsening, or concerning symptoms      Tiana Tucker, LISETTE  [facility] - [place] [facility]

## 2021-08-03 ENCOUNTER — TRANSFERRED RECORDS (OUTPATIENT)
Dept: HEALTH INFORMATION MANAGEMENT | Facility: HOSPITAL | Age: 31
End: 2021-08-03

## 2021-08-03 LAB
HPV ABSTRACT: NORMAL
PAP-ABSTRACT: NORMAL

## 2021-10-03 ENCOUNTER — HEALTH MAINTENANCE LETTER (OUTPATIENT)
Age: 31
End: 2021-10-03

## 2021-10-10 ENCOUNTER — MYC MEDICAL ADVICE (OUTPATIENT)
Dept: FAMILY MEDICINE | Facility: OTHER | Age: 31
End: 2021-10-10

## 2021-10-11 ENCOUNTER — OFFICE VISIT (OUTPATIENT)
Dept: FAMILY MEDICINE | Facility: OTHER | Age: 31
End: 2021-10-11
Attending: NURSE PRACTITIONER
Payer: COMMERCIAL

## 2021-10-11 VITALS
TEMPERATURE: 99.5 F | HEIGHT: 66 IN | DIASTOLIC BLOOD PRESSURE: 70 MMHG | HEART RATE: 92 BPM | WEIGHT: 293 LBS | OXYGEN SATURATION: 98 % | BODY MASS INDEX: 47.09 KG/M2 | SYSTOLIC BLOOD PRESSURE: 124 MMHG | RESPIRATION RATE: 16 BRPM

## 2021-10-11 DIAGNOSIS — H65.93 BILATERAL NON-SUPPURATIVE OTITIS MEDIA: Primary | ICD-10-CM

## 2021-10-11 PROCEDURE — 99213 OFFICE O/P EST LOW 20 MIN: CPT | Performed by: NURSE PRACTITIONER

## 2021-10-11 RX ORDER — NEOMYCIN SULFATE, POLYMYXIN B SULFATE, HYDROCORTISONE 3.5; 10000; 1 MG/ML; [USP'U]/ML; MG/ML
3 SOLUTION/ DROPS AURICULAR (OTIC) 4 TIMES DAILY
Qty: 5 ML | Refills: 0 | Status: SHIPPED | OUTPATIENT
Start: 2021-10-11 | End: 2021-10-18

## 2021-10-11 ASSESSMENT — MIFFLIN-ST. JEOR: SCORE: 2116.35

## 2021-10-11 ASSESSMENT — PAIN SCALES - GENERAL: PAINLEVEL: MODERATE PAIN (4)

## 2021-10-11 NOTE — NURSING NOTE
"Chief Complaint   Patient presents with     Ear Problem       Initial /70 (BP Location: Right arm, Patient Position: Sitting, Cuff Size: Adult Regular)   Pulse 92   Temp 99.5  F (37.5  C) (Tympanic)   Resp 16   Ht 1.664 m (5' 5.5\")   Wt 139.3 kg (307 lb)   SpO2 98%   BMI 50.31 kg/m   Estimated body mass index is 50.31 kg/m  as calculated from the following:    Height as of this encounter: 1.664 m (5' 5.5\").    Weight as of this encounter: 139.3 kg (307 lb).  Medication Reconciliation: complete  Charisma Celeste LPN    "

## 2021-10-11 NOTE — PROGRESS NOTES
"    Assessment & Plan     1. Bilateral non-suppurative otitis media  Tylenol as needed, do not use NSAIDS due to allergy.    - neomycin-polymyxin-hydrocortisone (CORTISPORIN) 3.5-13187-1 otic solution; Place 3 drops in ear(s) 4 times daily for 7 days  Dispense: 5 mL; Refill: 0           BMI:   Estimated body mass index is 50.31 kg/m  as calculated from the following:    Height as of this encounter: 1.664 m (5' 5.5\").    Weight as of this encounter: 139.3 kg (307 lb).     Follow-up if no improvement or any worsening.     Thalia Landaverde NP  [facility]   Shilpi is a 31 year old who presents for the following health issues     HPI     Concern - Ear pain  Onset: Friday  Description: ear pain, right worse than left with headache   Intensity: moderate  Progression of Symptoms:  worsening  Accompanying Signs & Symptoms: none  Previous history of similar problem: yes  Precipitating factors:        Worsened by: none  Alleviating factors:        Improved by: none  Therapies tried and outcome: tylenol, no improvement.      RSV diagnosed in daughter, Shilpi developed cold symptoms Wednesday, ear pain Friday.  Denies cough, fever, sinus pressure, wheezing, post nasal drainage.          Patient Active Problem List   Diagnosis     Mild major depression (H)     Vitamin D deficiency     ACP (advance care planning)     Seasonal allergic rhinitis due to pollen     Benign essential hypertension     Morbid obesity (H)     Reactive airway disease that is not asthma     Family history of melanoma     Gastroesophageal reflux disease without esophagitis     Anxiety     Lactation problem     Past Surgical History:   Procedure Laterality Date     CHOLECYSTECTOMY, LAPOROSCOPIC N/A 09/27/2020    [facility].     ENT SURGERY      tonsillectomy     OTHER SURGICAL HISTORY      I&D rt abscess axilla     SOFT TISSUE SURGERY      IND cyst right axilla       Social History     Tobacco Use     Smoking " status: Never Smoker     Smokeless tobacco: Never Used     Tobacco comment: Passive exposure   Substance Use Topics     Alcohol use: Yes     Comment: Beer; Occasionally     Family History   Problem Relation Age of Onset     Asthma Mother      Hypertension Father      Asthma Brother      Asthma Sister      Cancer Maternal Grandfather 62        Mesothelioma, cause of death     Cancer Other 28        Cousin; Melanoma, cause of death     Thyroid Disease No family hx of          Current Outpatient Medications   Medication Sig Dispense Refill     ALBUTEROL IN        fexofenadine (ALLEGRA) 180 MG tablet        HYDROCHLOROTHIAZIDE PO Take 25 mg by mouth       omeprazole (PRILOSEC) 40 MG DR capsule TAKE 1 CAPSULE BY MOUTH DAILY 30 TO 60 MINUTES BEFORE A MEAL 90 capsule 3     Prenatal MV-Min-Fe Fum-FA-DHA (PRENATAL 1 PO)        sertraline (ZOLOFT) 50 MG tablet Take 1 tablet (50 mg) by mouth daily 90 tablet 1     VITAMIN D PO        ipratropium (ATROVENT) 0.06 % nasal spray Spray 2 sprays in nostril 4 times daily (Patient not taking: Reported on 10/11/2021) 15 mL 1     Multiple Vitamins-Minerals (MULTIVITAMIN ADULT PO)  (Patient not taking: Reported on 10/11/2021)       Allergies   Allergen Reactions     Nsaids Swelling     Recent Labs   Lab Test 08/27/20  0000 09/17/19  1429 12/11/18  1700 09/21/18  1357 09/21/18  1357 04/04/17  0919 04/04/17  0919 03/07/16  0902 08/29/14  0916   A1C  --   --  5.2  --   --   --   --   --   --    LDL  --   --   --   --   --   --  101*  --  139*   HDL  --   --   --   --   --   --  59  --  49*   TRIG  --   --   --   --   --   --  134  --  134   ALT 16 31  --   --  33  --   --   --   --    CR 0.75 0.79  --    < > 0.78   < > 0.82   < > 0.83   GFRESTIMATED >60 >90  --    < > 88   < > 84   < > 84   GFRESTBLACK  --  >90  --   --  >90   < > >90  African American GFR Calc     < > >90   GFR Calc     POTASSIUM 3.8 3.7  --    < > 4.0   < > 3.8   < > 3.8   TSH  --  0.70  --   --  2.40   < >  "2.19   < >  --     < > = values in this interval not displayed.      BP Readings from Last 3 Encounters:   10/11/21 124/70   06/29/21 110/74   05/19/21 126/72    Wt Readings from Last 3 Encounters:   10/11/21 139.3 kg (307 lb)   05/19/21 134.3 kg (296 lb)   01/20/21 132.9 kg (293 lb)                      Review of Systems   Constitutional, HEENT, cardiovascular, pulmonary, gi and gu systems are negative, except as otherwise noted.      Objective    /70 (BP Location: Right arm, Patient Position: Sitting, Cuff Size: Adult Regular)   Pulse 92   Temp 99.5  F (37.5  C) (Tympanic)   Resp 16   Ht 1.664 m (5' 5.5\")   Wt 139.3 kg (307 lb)   SpO2 98%   BMI 50.31 kg/m    Body mass index is 50.31 kg/m .  Physical Exam   GENERAL: alert, no distress and obese  HENT: normal cephalic/atraumatic, both ears: erythematous, nose and mouth without ulcers or lesions, oropharynx clear and oral mucous membranes moist  NECK: no adenopathy, no asymmetry, masses, or scars and thyroid normal to palpation  RESP: lungs clear to auscultation - no rales, rhonchi or wheezes  CV: regular rate and rhythm, normal S1 S2, no S3 or S4, no murmur, click or rub, no peripheral edema and peripheral pulses strong  MS: no gross musculoskeletal defects noted, no edema  PSYCH: mentation appears normal, affect normal/bright                "

## 2021-10-14 ENCOUNTER — OFFICE VISIT (OUTPATIENT)
Dept: FAMILY MEDICINE | Facility: OTHER | Age: 31
End: 2021-10-14
Attending: NURSE PRACTITIONER
Payer: COMMERCIAL

## 2021-10-14 VITALS
TEMPERATURE: 100.8 F | DIASTOLIC BLOOD PRESSURE: 87 MMHG | HEART RATE: 102 BPM | BODY MASS INDEX: 50.31 KG/M2 | WEIGHT: 293 LBS | SYSTOLIC BLOOD PRESSURE: 124 MMHG | RESPIRATION RATE: 20 BRPM | OXYGEN SATURATION: 98 %

## 2021-10-14 DIAGNOSIS — J06.9 VIRAL URI WITH COUGH: Primary | ICD-10-CM

## 2021-10-14 DIAGNOSIS — R50.9 FEVER, UNSPECIFIED FEVER CAUSE: ICD-10-CM

## 2021-10-14 DIAGNOSIS — R05.9 COUGH: ICD-10-CM

## 2021-10-14 PROCEDURE — U0005 INFEC AGEN DETEC AMPLI PROBE: HCPCS | Performed by: NURSE PRACTITIONER

## 2021-10-14 PROCEDURE — 99213 OFFICE O/P EST LOW 20 MIN: CPT | Performed by: NURSE PRACTITIONER

## 2021-10-14 PROCEDURE — 87631 RESP VIRUS 3-5 TARGETS: CPT | Performed by: NURSE PRACTITIONER

## 2021-10-14 PROCEDURE — U0003 INFECTIOUS AGENT DETECTION BY NUCLEIC ACID (DNA OR RNA); SEVERE ACUTE RESPIRATORY SYNDROME CORONAVIRUS 2 (SARS-COV-2) (CORONAVIRUS DISEASE [COVID-19]), AMPLIFIED PROBE TECHNIQUE, MAKING USE OF HIGH THROUGHPUT TECHNOLOGIES AS DESCRIBED BY CMS-2020-01-R: HCPCS | Performed by: NURSE PRACTITIONER

## 2021-10-14 ASSESSMENT — PAIN SCALES - GENERAL: PAINLEVEL: MODERATE PAIN (4)

## 2021-10-14 NOTE — PROGRESS NOTES
"    Assessment & Plan     1. Viral URI with cough  Symptomatic cares  safe over the counter medication list to use in pregnancy is provided.      2. Cough  - Symptomatic COVID-19 Virus (Coronavirus) by PCR Nose  - Influenza A and B and RSV PCR    3. Fever  - Symptomatic COVID-19 Virus (Coronavirus) by PCR Nose  - Influenza A and B and RSV PCR       BMI:   Estimated body mass index is 50.31 kg/m  as calculated from the following:    Height as of 10/11/21: 1.664 m (5' 5.5\").    Weight as of this encounter: 139.3 kg (307 lb).       Will notify of results as they are returned.  Follow-up as needed.      Thalia Landaverde NP  [facility]   Shilpi is a 31 year old who presents for the following health issues     HPI     Acute Illness  Acute illness concerns: cough and ear pain  Onset/Duration: 2 days  Symptoms:  Fever: YES  Chills/Sweats: YES  Headache (location?): YES  Sinus Pressure: YES  Conjunctivitis:  no  Ear Pain: YES: bilateral  Rhinorrhea: YES  Congestion: YES  Sore Throat: YES  Cough: YES-non-productive  Wheeze: no  Decreased Appetite: YES  Nausea: no  Vomiting: no  Diarrhea: no  Dysuria/Freq.: no  Dysuria or Hematuria: no  Fatigue/Achiness: no  Sick/Strep Exposure: YES - daughter with RSV  Therapies tried and outcome: tylenol    She is 5 weeks gestation; will be following with [facility].     Review of Systems   Constitutional, HEENT, cardiovascular, pulmonary, gi and gu systems are negative, except as otherwise noted.      Objective    /87 (BP Location: Right arm, Patient Position: Sitting, Cuff Size: Adult Large)   Pulse 102   Temp (!) 100.8  F (38.2  C) (Tympanic)   Resp 20   Wt 139.3 kg (307 lb)   SpO2 98%   BMI 50.31 kg/m    Body mass index is 50.31 kg/m .  Physical Exam   GENERAL: healthy, alert and no distress  HENT: normal cephalic/atraumatic, both ears: erythematous, nasal mucosa edematous , rhinorrhea clear and oral mucous membranes " moist  NECK: no adenopathy, no asymmetry, masses, or scars and thyroid normal to palpation  RESP: lungs clear to auscultation - bronchospasm on inspiration.   CV: regular rate and rhythm, normal S1 S2, no S3 or S4, no murmur, click or rub, no peripheral edema and peripheral pulses strong  MS: no gross musculoskeletal defects noted, no edema  PSYCH: mentation appears normal, affect normal/bright

## 2021-10-14 NOTE — NURSING NOTE
"Chief Complaint   Patient presents with     Cough       Initial /87 (BP Location: Right arm, Patient Position: Sitting, Cuff Size: Adult Large)   Pulse 102   Temp (!) 100.8  F (38.2  C) (Tympanic)   Resp 20   Wt 139.3 kg (307 lb)   SpO2 98%   BMI 50.31 kg/m   Estimated body mass index is 50.31 kg/m  as calculated from the following:    Height as of 10/11/21: 1.664 m (5' 5.5\").    Weight as of this encounter: 139.3 kg (307 lb).  Medication Reconciliation: complete  Kelsi Mcintyre LPN  "

## 2021-10-16 LAB
FLUAV RNA SPEC QL NAA+PROBE: NEGATIVE
FLUBV RNA RESP QL NAA+PROBE: NEGATIVE
RSV RNA SPEC NAA+PROBE: POSITIVE
SARS-COV-2 RNA RESP QL NAA+PROBE: NEGATIVE

## 2021-10-22 DIAGNOSIS — K21.9 GASTROESOPHAGEAL REFLUX DISEASE WITHOUT ESOPHAGITIS: ICD-10-CM

## 2021-10-22 RX ORDER — OMEPRAZOLE 40 MG/1
CAPSULE, DELAYED RELEASE ORAL
Qty: 90 CAPSULE | Refills: 3 | Status: SHIPPED | OUTPATIENT
Start: 2021-10-22 | End: 2022-09-07

## 2021-10-27 NOTE — PROGRESS NOTES
Assessment & Plan       Benign essential hypertension  - Continue plan of care    Anxiety  - Continue plan of care    Gastroesophageal reflux disease without esophagitis  - Continue plan of care    Middle ear effusion, bilateral  - Ask OB about Allegra  - Epley exercises  - Consider chiropractic adjustment      Return in about 6 months (around 4/29/2022).      Tiana Tucker CNP  [facility] - CARTER Dobbins is a 31 year old who presents for the following health issues       Acute Illness  Acute illness concerns: ear pain, and headache Onset/Duration:10/8/21,dx OM-10/11/2, dx RSV- 10/11/21  Symptoms:  Fever: no  Chills/Sweats: no  Headache (location?): YES- all over   Sinus Pressure: no  Conjunctivitis:  no  Ear Pain: YES: bilateral  Rhinorrhea: no  Congestion: no  Sore Throat: no  Cough: no  Wheeze: no  Decreased Appetite: no  Nausea: no  Vomiting: no  Diarrhea: no  Dysuria/Freq.: no  Dysuria or Hematuria: no  Fatigue/Achiness: no  Sick/Strep Exposure: no  Therapies tried and outcome:         Depression and Anxiety Follow-Up    How are you doing with your depression since your last visit? Improved     How are you doing with your anxiety since your last visit?  Improved     Are you having other symptoms that might be associated with depression or anxiety? No    Have you had a significant life event? OTHER: newly pregnant     Do you have any concerns with your use of alcohol or other drugs? No         Hypertension Follow-up    Do you check your blood pressure regularly outside of the clinic? No     Are you following a low salt diet? Yes    Are your blood pressures ever more than 140 on the top number (systolic) OR more   than 90 on the bottom number (diastolic), for example 140/90? No       GERD   Stable on Omeprazole        Social History     Tobacco Use     Smoking status: Never Smoker     Smokeless tobacco: Never Used     Tobacco comment: Passive exposure   Substance Use Topics     Alcohol use:  Yes     Comment: Beer; Occasionally     Drug use: No     PHQ 12/22/2020 5/19/2021 10/29/2021   PHQ-9 Total Score 2 0 0   Q9: Thoughts of better off dead/self-harm past 2 weeks Not at all Not at all Not at all     KING-7 SCORE 12/22/2020 5/19/2021 10/29/2021   Total Score 3 0 0                   Patient Active Problem List   Diagnosis     Vitamin D deficiency     ACP (advance care planning)     Seasonal allergic rhinitis due to pollen     Benign essential hypertension     Morbid obesity (H)     Reactive airway disease that is not asthma     Family history of melanoma     Gastroesophageal reflux disease without esophagitis     Anxiety     Lactation problem     Past Surgical History:   Procedure Laterality Date     CHOLECYSTECTOMY, LAPOROSCOPIC N/A 09/27/2020    [facility].     ENT SURGERY      tonsillectomy     OTHER SURGICAL HISTORY      I&D rt abscess axilla     SOFT TISSUE SURGERY      IND cyst right axilla       Social History     Tobacco Use     Smoking status: Never Smoker     Smokeless tobacco: Never Used     Tobacco comment: Passive exposure   Substance Use Topics     Alcohol use: Yes     Comment: Beer; Occasionally     Family History   Problem Relation Age of Onset     Asthma Mother      Hypertension Father      Asthma Brother      Asthma Sister      Cancer Maternal Grandfather 62        Mesothelioma, cause of death     Cancer Other 28        Cousin; Melanoma, cause of death     Thyroid Disease No family hx of            Current Outpatient Medications   Medication Sig Dispense Refill     ALBUTEROL IN        omeprazole (PRILOSEC) 40 MG DR capsule TAKE ONE CAPSULE BY MOUTH EVERY DAY 30 TO 60 MINUTES BEFORE A MEAL 90 capsule 3     Prenatal MV-Min-Fe Fum-FA-DHA (PRENATAL 1 PO)        sertraline (ZOLOFT) 50 MG tablet Take 1 tablet (50 mg) by mouth daily 90 tablet 1     VITAMIN D PO          Allergies   Allergen Reactions     Nsaids Swelling       Recent Labs   Lab Test 08/27/20  0000 09/17/19  7320  "12/11/18  1700 09/21/18  1357 09/21/18  1357 04/04/17  0919 04/04/17  0919 03/07/16  0902 08/29/14  0916   A1C  --   --  5.2  --   --   --   --   --   --    LDL  --   --   --   --   --   --  101*  --  139*   HDL  --   --   --   --   --   --  59  --  49*   TRIG  --   --   --   --   --   --  134  --  134   ALT 16 31  --   --  33  --   --   --   --    CR 0.75 0.79  --    < > 0.78   < > 0.82   < > 0.83   GFRESTIMATED >60 >90  --    < > 88   < > 84   < > 84   GFRESTBLACK  --  >90  --   --  >90   < > >90  African American GFR Calc     < > >90   GFR Calc     POTASSIUM 3.8 3.7  --    < > 4.0   < > 3.8   < > 3.8   TSH  --  0.70  --   --  2.40   < > 2.19   < >  --     < > = values in this interval not displayed.        BP Readings from Last 3 Encounters:   10/29/21 128/78   10/14/21 124/87   10/11/21 124/70    Wt Readings from Last 3 Encounters:   10/29/21 139.3 kg (307 lb)   10/14/21 139.3 kg (307 lb)   10/11/21 139.3 kg (307 lb)           Review of Systems   Constitutional, HEENT, cardiovascular, pulmonary, GI, , musculoskeletal, neuro, skin, endocrine and psych systems are negative, except as otherwise noted.        Objective    /78 (BP Location: Right arm, Patient Position: Chair, Cuff Size: Adult Large)   Pulse 84   Temp 98.2  F (36.8  C) (Tympanic)   Ht 1.664 m (5' 5.5\")   Wt 139.3 kg (307 lb)   SpO2 98%   BMI 50.31 kg/m    Body mass index is 50.31 kg/m .       Physical Exam   GENERAL: healthy, alert and no distress  EYES: Eyes grossly normal to inspection, PERRL and conjunctivae and sclerae normal  HENT: both ears: clear effusion  NECK: no adenopathy, no asymmetry, masses, or scars and thyroid normal to palpation  RESP: lungs clear to auscultation - no rales, rhonchi or wheezes  CV: regular rate and rhythm, normal S1 S2, no S3 or S4, no murmur, click or rub, no peripheral edema and peripheral pulses strong  SKIN: no suspicious lesions or rashes  PSYCH: mentation appears normal, affect " normal/bright

## 2021-10-29 ENCOUNTER — OFFICE VISIT (OUTPATIENT)
Dept: FAMILY MEDICINE | Facility: OTHER | Age: 31
End: 2021-10-29
Attending: NURSE PRACTITIONER
Payer: COMMERCIAL

## 2021-10-29 VITALS
HEART RATE: 84 BPM | DIASTOLIC BLOOD PRESSURE: 78 MMHG | HEIGHT: 66 IN | BODY MASS INDEX: 47.09 KG/M2 | SYSTOLIC BLOOD PRESSURE: 128 MMHG | OXYGEN SATURATION: 98 % | WEIGHT: 293 LBS | TEMPERATURE: 98.2 F

## 2021-10-29 DIAGNOSIS — Z23 NEED FOR PROPHYLACTIC VACCINATION AND INOCULATION AGAINST INFLUENZA: ICD-10-CM

## 2021-10-29 DIAGNOSIS — F41.9 ANXIETY: ICD-10-CM

## 2021-10-29 DIAGNOSIS — H65.93 MIDDLE EAR EFFUSION, BILATERAL: ICD-10-CM

## 2021-10-29 DIAGNOSIS — K21.9 GASTROESOPHAGEAL REFLUX DISEASE WITHOUT ESOPHAGITIS: ICD-10-CM

## 2021-10-29 DIAGNOSIS — I10 BENIGN ESSENTIAL HYPERTENSION: Primary | ICD-10-CM

## 2021-10-29 PROCEDURE — 99214 OFFICE O/P EST MOD 30 MIN: CPT | Mod: 25 | Performed by: NURSE PRACTITIONER

## 2021-10-29 PROCEDURE — 90686 IIV4 VACC NO PRSV 0.5 ML IM: CPT | Performed by: NURSE PRACTITIONER

## 2021-10-29 PROCEDURE — 90471 IMMUNIZATION ADMIN: CPT | Performed by: NURSE PRACTITIONER

## 2021-10-29 ASSESSMENT — ANXIETY QUESTIONNAIRES
7. FEELING AFRAID AS IF SOMETHING AWFUL MIGHT HAPPEN: NOT AT ALL
6. BECOMING EASILY ANNOYED OR IRRITABLE: NOT AT ALL
GAD7 TOTAL SCORE: 0
5. BEING SO RESTLESS THAT IT IS HARD TO SIT STILL: NOT AT ALL
1. FEELING NERVOUS, ANXIOUS, OR ON EDGE: NOT AT ALL
2. NOT BEING ABLE TO STOP OR CONTROL WORRYING: NOT AT ALL
3. WORRYING TOO MUCH ABOUT DIFFERENT THINGS: NOT AT ALL
IF YOU CHECKED OFF ANY PROBLEMS ON THIS QUESTIONNAIRE, HOW DIFFICULT HAVE THESE PROBLEMS MADE IT FOR YOU TO DO YOUR WORK, TAKE CARE OF THINGS AT HOME, OR GET ALONG WITH OTHER PEOPLE: NOT DIFFICULT AT ALL

## 2021-10-29 ASSESSMENT — MIFFLIN-ST. JEOR: SCORE: 2116.35

## 2021-10-29 ASSESSMENT — PATIENT HEALTH QUESTIONNAIRE - PHQ9
5. POOR APPETITE OR OVEREATING: NOT AT ALL
SUM OF ALL RESPONSES TO PHQ QUESTIONS 1-9: 0

## 2021-10-29 ASSESSMENT — PAIN SCALES - GENERAL: PAINLEVEL: MODERATE PAIN (4)

## 2021-10-29 NOTE — NURSING NOTE
"Chief Complaint   Patient presents with     Otalgia       Initial /78 (BP Location: Right arm, Patient Position: Chair, Cuff Size: Adult Large)   Pulse 84   Temp 98.2  F (36.8  C) (Tympanic)   Ht 1.664 m (5' 5.5\")   Wt 139.3 kg (307 lb)   SpO2 98%   BMI 50.31 kg/m   Estimated body mass index is 50.31 kg/m  as calculated from the following:    Height as of this encounter: 1.664 m (5' 5.5\").    Weight as of this encounter: 139.3 kg (307 lb).  Medication Reconciliation: complete  Sumi Reyna LPN    "

## 2021-10-29 NOTE — PATIENT INSTRUCTIONS
Assessment & Plan       Benign essential hypertension  - Continue plan of care    Anxiety  - Continue plan of care    Gastroesophageal reflux disease without esophagitis  - Continue plan of care    Middle ear effusion, bilateral  - Ask OB about Allegra  - Epley exercises  - Consider chiropractic adjustment      Return in about 6 months (around 4/29/2022).      Tiana Tucker, LISETTE  [facility]

## 2021-10-30 ASSESSMENT — ANXIETY QUESTIONNAIRES: GAD7 TOTAL SCORE: 0

## 2021-12-08 ENCOUNTER — E-VISIT (OUTPATIENT)
Dept: FAMILY MEDICINE | Facility: OTHER | Age: 31
End: 2021-12-08
Attending: NURSE PRACTITIONER
Payer: COMMERCIAL

## 2021-12-08 DIAGNOSIS — Z53.9 ERRONEOUS ENCOUNTER--DISREGARD: Primary | ICD-10-CM

## 2021-12-09 ENCOUNTER — TELEPHONE (OUTPATIENT)
Dept: FAMILY MEDICINE | Facility: OTHER | Age: 31
End: 2021-12-09
Payer: COMMERCIAL

## 2021-12-09 NOTE — TELEPHONE ENCOUNTER
Patient wanted an E-visit yesterday, I requested that she be seen, in lieu of her wheezing, pregnancy etc.  Was she able to get in with Sheree Edwards or another provider today?    [facility]  [phone]

## 2022-01-31 ENCOUNTER — MYC MEDICAL ADVICE (OUTPATIENT)
Dept: FAMILY MEDICINE | Facility: OTHER | Age: 32
End: 2022-01-31
Payer: COMMERCIAL

## 2022-01-31 DIAGNOSIS — H65.06 RECURRENT ACUTE SEROUS OTITIS MEDIA OF BOTH EARS: Primary | ICD-10-CM

## 2022-02-01 DIAGNOSIS — H91.93 DECREASED HEARING OF BOTH EARS: Primary | ICD-10-CM

## 2022-05-14 ENCOUNTER — HEALTH MAINTENANCE LETTER (OUTPATIENT)
Age: 32
End: 2022-05-14

## 2022-05-20 ENCOUNTER — TRANSFERRED RECORDS (OUTPATIENT)
Dept: HEALTH INFORMATION MANAGEMENT | Facility: CLINIC | Age: 32
End: 2022-05-20
Payer: COMMERCIAL

## 2022-06-09 ENCOUNTER — MYC MEDICAL ADVICE (OUTPATIENT)
Dept: FAMILY MEDICINE | Facility: OTHER | Age: 32
End: 2022-06-09
Payer: COMMERCIAL

## 2022-09-03 DIAGNOSIS — K21.9 GASTROESOPHAGEAL REFLUX DISEASE WITHOUT ESOPHAGITIS: ICD-10-CM

## 2022-09-04 ENCOUNTER — HEALTH MAINTENANCE LETTER (OUTPATIENT)
Age: 32
End: 2022-09-04

## 2022-09-07 RX ORDER — OMEPRAZOLE 40 MG/1
CAPSULE, DELAYED RELEASE ORAL
Qty: 90 CAPSULE | Refills: 3 | Status: SHIPPED | OUTPATIENT
Start: 2022-09-07 | End: 2023-08-28

## 2022-09-07 NOTE — TELEPHONE ENCOUNTER
prilosec      Last Written Prescription Date:  1022021  Last Fill Quantity: 90,   # refills: 3  Last Office Visit: 10/21/22  Future Office visit:

## 2023-01-15 ENCOUNTER — HEALTH MAINTENANCE LETTER (OUTPATIENT)
Age: 33
End: 2023-01-15

## 2023-01-17 ENCOUNTER — OFFICE VISIT (OUTPATIENT)
Dept: FAMILY MEDICINE | Facility: OTHER | Age: 33
End: 2023-01-17
Attending: NURSE PRACTITIONER
Payer: COMMERCIAL

## 2023-01-17 VITALS
TEMPERATURE: 98.1 F | SYSTOLIC BLOOD PRESSURE: 128 MMHG | WEIGHT: 260.7 LBS | BODY MASS INDEX: 42.72 KG/M2 | RESPIRATION RATE: 18 BRPM | HEART RATE: 58 BPM | OXYGEN SATURATION: 98 % | DIASTOLIC BLOOD PRESSURE: 68 MMHG

## 2023-01-17 DIAGNOSIS — M54.50 ACUTE BILATERAL LOW BACK PAIN WITHOUT SCIATICA: ICD-10-CM

## 2023-01-17 DIAGNOSIS — K21.9 GASTROESOPHAGEAL REFLUX DISEASE WITHOUT ESOPHAGITIS: ICD-10-CM

## 2023-01-17 DIAGNOSIS — F41.9 ANXIETY: ICD-10-CM

## 2023-01-17 DIAGNOSIS — I10 BENIGN ESSENTIAL HYPERTENSION: Primary | ICD-10-CM

## 2023-01-17 LAB — HOLD SPECIMEN: NORMAL

## 2023-01-17 PROCEDURE — 36415 COLL VENOUS BLD VENIPUNCTURE: CPT | Performed by: NURSE PRACTITIONER

## 2023-01-17 PROCEDURE — 80053 COMPREHEN METABOLIC PANEL: CPT | Performed by: NURSE PRACTITIONER

## 2023-01-17 PROCEDURE — 99214 OFFICE O/P EST MOD 30 MIN: CPT | Performed by: NURSE PRACTITIONER

## 2023-01-17 PROCEDURE — 84443 ASSAY THYROID STIM HORMONE: CPT | Performed by: NURSE PRACTITIONER

## 2023-01-17 ASSESSMENT — ANXIETY QUESTIONNAIRES
3. WORRYING TOO MUCH ABOUT DIFFERENT THINGS: MORE THAN HALF THE DAYS
2. NOT BEING ABLE TO STOP OR CONTROL WORRYING: SEVERAL DAYS
IF YOU CHECKED OFF ANY PROBLEMS ON THIS QUESTIONNAIRE, HOW DIFFICULT HAVE THESE PROBLEMS MADE IT FOR YOU TO DO YOUR WORK, TAKE CARE OF THINGS AT HOME, OR GET ALONG WITH OTHER PEOPLE: SOMEWHAT DIFFICULT
1. FEELING NERVOUS, ANXIOUS, OR ON EDGE: SEVERAL DAYS
6. BECOMING EASILY ANNOYED OR IRRITABLE: NOT AT ALL
GAD7 TOTAL SCORE: 8
7. FEELING AFRAID AS IF SOMETHING AWFUL MIGHT HAPPEN: MORE THAN HALF THE DAYS
GAD7 TOTAL SCORE: 8
4. TROUBLE RELAXING: MORE THAN HALF THE DAYS
5. BEING SO RESTLESS THAT IT IS HARD TO SIT STILL: NOT AT ALL

## 2023-01-17 ASSESSMENT — PATIENT HEALTH QUESTIONNAIRE - PHQ9: SUM OF ALL RESPONSES TO PHQ QUESTIONS 1-9: 5

## 2023-01-17 ASSESSMENT — PAIN SCALES - GENERAL: PAINLEVEL: NO PAIN (0)

## 2023-01-17 NOTE — PROGRESS NOTES
Assessment & Plan          Benign essential hypertension  - TSH with free T4 reflex; Future  - Comprehensive metabolic panel; Future    Gastroesophageal reflux disease without esophagitis  - Continue plan of care    Anxiety  - sertraline (ZOLOFT) 50 MG tablet; Take 1 tablet (50 mg) by mouth 2 times daily      Back pain  - PT ordered  - Ice  - OTC topical voltaren Gel          Return in about 6 months (around 7/17/2023).        Tiana Tucker CNP  [facility] - [place] Dobbins is a 32 year old, presenting for the following health issues:  Hypertension and Anxiety        Hypertension Follow-up    Do you check your blood pressure regularly outside of the clinic? No     Are you following a low salt diet? Yes    Are your blood pressures ever more than 140 on the top number (systolic) OR more   than 90 on the bottom number (diastolic), for example 140/90? No        GERD/Heartburn  Onset/Duration: Prior  Accompanying Signs & Symptoms:  Does it feel like food gets stuck or trouble swallowing: No  Nausea: No  Vomiting (bloody?): No  Abdominal Pain: No  Black-Tarry stools: No  Bloody stools: No  History:  Previous ulcers: No  Therapies tried and outcome:            Medications: Omeprazole (Prilosec)        Anxiety Follow-Up    How are you doing with your depression since your last visit? Improved     How are you doing with your anxiety since your last visit?  Worsened     Are you having other symptoms that might be associated with depression or anxiety? No    Have you had a significant life event? No     Do you have any concerns with your use of alcohol or other drugs? No        Low back pain  For weeks  No sciatica  Lifting her children a lot, possible strain  No bowel or bladder change          Social History     Tobacco Use     Smoking status: Never     Smokeless tobacco: Never     Tobacco comments:     Passive exposure   Substance Use Topics     Alcohol use: Yes     Comment: Beer; Occasionally     Drug  use: No         PHQ 5/19/2021 10/29/2021 1/17/2023   PHQ-9 Total Score 0 0 5   Q9: Thoughts of better off dead/self-harm past 2 weeks Not at all Not at all Not at all           KING-7 SCORE 5/19/2021 10/29/2021 1/17/2023   Total Score 0 0 8         Last PHQ-9 1/17/2023   1.  Little interest or pleasure in doing things 0   2.  Feeling down, depressed, or hopeless 0   3.  Trouble falling or staying asleep, or sleeping too much 0   4.  Feeling tired or having little energy 3   5.  Poor appetite or overeating 0   6.  Feeling bad about yourself 0   7.  Trouble concentrating 2   8.  Moving slowly or restless 0   Q9: Thoughts of better off dead/self-harm past 2 weeks 0   PHQ-9 Total Score 5   Difficulty at work, home, or with people Not difficult at all         KING-7  1/17/2023   1. Feeling nervous, anxious, or on edge 1   2. Not being able to stop or control worrying 1   3. Worrying too much about different things 2   4. Trouble relaxing 2   5. Being so restless that it is hard to sit still 0   6. Becoming easily annoyed or irritable 0   7. Feeling afraid, as if something awful might happen 2   KING-7 Total Score 8   If you checked any problems, how difficult have they made it for you to do your work, take care of things at home, or get along with other people? Somewhat difficult           Patient Active Problem List   Diagnosis     Vitamin D deficiency     ACP (advance care planning)     Seasonal allergic rhinitis due to pollen     Benign essential hypertension     Morbid obesity (H)     Reactive airway disease that is not asthma     Family history of melanoma     Gastroesophageal reflux disease without esophagitis     Anxiety     Lactation problem     Past Surgical History:   Procedure Laterality Date     CHOLECYSTECTOMY, LAPOROSCOPIC N/A 09/27/2020    [facility].     ENT SURGERY      tonsillectomy     OTHER SURGICAL HISTORY      I&D rt abscess axilla     SOFT TISSUE SURGERY      IND cyst right axilla       Social  History     Tobacco Use     Smoking status: Never     Smokeless tobacco: Never     Tobacco comments:     Passive exposure   Substance Use Topics     Alcohol use: Yes     Comment: Beer; Occasionally     Family History   Problem Relation Age of Onset     Asthma Mother      Hypertension Father      Asthma Brother      Asthma Sister      Cancer Maternal Grandfather 62        Mesothelioma, cause of death     Cancer Other 28        Cousin; Melanoma, cause of death     Thyroid Disease No family hx of              Current Outpatient Medications   Medication Sig Dispense Refill     sertraline (ZOLOFT) 50 MG tablet Take 1 tablet (50 mg) by mouth 2 times daily 180 tablet 1     ALBUTEROL IN        omeprazole (PRILOSEC) 40 MG DR capsule TAKE ONE CAPSULE BY MOUTH EVERY DAY 30 TO 60 MINUTES BEFORE A MEAL 90 capsule 3     Prenatal MV-Min-Fe Fum-FA-DHA (PRENATAL 1 PO)        VITAMIN D PO            Allergies   Allergen Reactions     Nsaids Swelling         Recent Labs   Lab Test 08/27/20  0000 09/17/19  1429 12/11/18  1700 09/21/18  1357 04/04/17  0919   A1C  --   --  5.2  --   --    LDL  --   --   --   --  101*   HDL  --   --   --   --  59   TRIG  --   --   --   --  134   ALT 16 31  --  33  --    CR 0.75 0.79  --  0.78 0.82   GFRESTIMATED >60 >90  --  88 84   GFRESTBLACK  --  >90  --  >90 >90  African American GFR Calc     POTASSIUM 3.8 3.7  --  4.0 3.8   TSH  --  0.70  --  2.40 2.19            BP Readings from Last 3 Encounters:   01/17/23 128/68   10/29/21 128/78   10/14/21 124/87    Wt Readings from Last 3 Encounters:   01/17/23 118.3 kg (260 lb 11.2 oz)   10/29/21 139.3 kg (307 lb)   10/14/21 139.3 kg (307 lb)                 Review of Systems   Constitutional, HEENT, cardiovascular, pulmonary, gi and gu systems are negative, except as otherwise noted.          Objective    /68 (BP Location: Right arm)   Pulse 58   Temp 98.1  F (36.7  C) (Tympanic)   Resp 18   Wt 118.3 kg (260 lb 11.2 oz)   SpO2 98%   BMI 42.72  kg/m    Body mass index is 42.72 kg/m .           Physical Exam   GENERAL: healthy, alert and no distress  EYES: Eyes grossly normal to inspection, PERRL and conjunctivae and sclerae normal  HENT: ear canals and TM's normal, nose and mouth without ulcers or lesions  NECK: no adenopathy, no asymmetry, masses, or scars and thyroid normal to palpation  RESP: lungs clear to auscultation - no rales, rhonchi or wheezes  CV: regular rate and rhythm, normal S1 S2, no S3 or S4, no murmur, click or rub, no peripheral edema and peripheral pulses strong  ABDOMEN: soft, nontender, no hepatosplenomegaly, no masses and bowel sounds normal  SKIN: no suspicious lesions or rashes  PSYCH: mentation appears normal, affect normal/bright  MS - Lumbar stiffness

## 2023-01-17 NOTE — PATIENT INSTRUCTIONS
Assessment & Plan          Benign essential hypertension  - TSH with free T4 reflex; Future  - Comprehensive metabolic panel; Future    Gastroesophageal reflux disease without esophagitis  - Continue plan of care    Anxiety  - sertraline (ZOLOFT) 50 MG tablet; Take 1 tablet (50 mg) by mouth 2 times daily      Back pain  - PT ordered  - Ice  - OTC topical voltaren Gel          Return in about 6 months (around 7/17/2023).        Tiana Tucker CNP  [facility] - [place] [facility]

## 2023-01-18 LAB
ALBUMIN SERPL BCG-MCNC: 4.4 G/DL (ref 3.5–5.2)
ALP SERPL-CCNC: 79 U/L (ref 35–104)
ALT SERPL W P-5'-P-CCNC: 16 U/L (ref 10–35)
ANION GAP SERPL CALCULATED.3IONS-SCNC: 12 MMOL/L (ref 7–15)
AST SERPL W P-5'-P-CCNC: 15 U/L (ref 10–35)
BILIRUB SERPL-MCNC: <0.2 MG/DL
BUN SERPL-MCNC: 15.5 MG/DL (ref 6–20)
CALCIUM SERPL-MCNC: 9.1 MG/DL (ref 8.6–10)
CHLORIDE SERPL-SCNC: 100 MMOL/L (ref 98–107)
CREAT SERPL-MCNC: 0.86 MG/DL (ref 0.51–0.95)
DEPRECATED HCO3 PLAS-SCNC: 28 MMOL/L (ref 22–29)
GFR SERPL CREATININE-BSD FRML MDRD: >90 ML/MIN/1.73M2
GLUCOSE SERPL-MCNC: 88 MG/DL (ref 70–99)
POTASSIUM SERPL-SCNC: 3.8 MMOL/L (ref 3.4–5.3)
PROT SERPL-MCNC: 7.3 G/DL (ref 6.4–8.3)
SODIUM SERPL-SCNC: 140 MMOL/L (ref 136–145)
TSH SERPL DL<=0.005 MIU/L-ACNC: 2.09 UIU/ML (ref 0.3–4.2)

## 2023-01-30 NOTE — PATIENT INSTRUCTIONS
Thank you for allowing Rajani Riverabiel and our ENT team to participate in your care.  If your medications are too expensive, please give the nurse a call.  We can possibly change this medication.  If you have a scheduling or an appointment question please contact our Health Unit Coordinator at their direct line [phone].   ALL nursing questions or concerns can be directed to your ENT nurse at: [phone] - [place]     Ordered CT of the sinus, someone will call you to schedule.  Nurse will call you with results.     Budesonide instructions  Make the Ken Med Saline Solution using 2 packages of salt and previously boiled or distilled water.  This will make 240 ml of saline solution.  Mix the entire vial of budesonide into the solution.   Irrigate your nose 2 times a day with the warm budesonide solution using 1 bottle between nostrils in the morning, and one bottle at night.     Start Nasal Steroid Spray over the counter.     Follow up after Ct Scan with Dr. Schmitt to consult about adenoids.     Ordered allergy testing, allergy nurse will call you to schedule

## 2023-01-31 ENCOUNTER — OFFICE VISIT (OUTPATIENT)
Dept: OTOLARYNGOLOGY | Facility: OTHER | Age: 33
End: 2023-01-31
Attending: AUDIOLOGIST
Payer: COMMERCIAL

## 2023-01-31 ENCOUNTER — OFFICE VISIT (OUTPATIENT)
Dept: AUDIOLOGY | Facility: OTHER | Age: 33
End: 2023-01-31
Attending: NURSE PRACTITIONER
Payer: COMMERCIAL

## 2023-01-31 VITALS
BODY MASS INDEX: 40.81 KG/M2 | OXYGEN SATURATION: 98 % | WEIGHT: 260 LBS | DIASTOLIC BLOOD PRESSURE: 70 MMHG | TEMPERATURE: 98.4 F | HEART RATE: 63 BPM | SYSTOLIC BLOOD PRESSURE: 136 MMHG | HEIGHT: 67 IN

## 2023-01-31 DIAGNOSIS — J35.2 ADENOID HYPERTROPHY: ICD-10-CM

## 2023-01-31 DIAGNOSIS — Z86.69 HISTORY OF EAR INFECTIONS: ICD-10-CM

## 2023-01-31 DIAGNOSIS — J32.9 CHRONIC SINUSITIS, UNSPECIFIED LOCATION: ICD-10-CM

## 2023-01-31 DIAGNOSIS — Z01.10 EXAMINATION OF EARS AND HEARING: Primary | ICD-10-CM

## 2023-01-31 DIAGNOSIS — J33.9 NASAL POLYP: Primary | ICD-10-CM

## 2023-01-31 DIAGNOSIS — H91.93 DECREASED HEARING OF BOTH EARS: ICD-10-CM

## 2023-01-31 PROCEDURE — 92557 COMPREHENSIVE HEARING TEST: CPT | Performed by: AUDIOLOGIST

## 2023-01-31 PROCEDURE — 31231 NASAL ENDOSCOPY DX: CPT | Performed by: NURSE PRACTITIONER

## 2023-01-31 PROCEDURE — 92550 TYMPANOMETRY & REFLEX THRESH: CPT | Performed by: AUDIOLOGIST

## 2023-01-31 PROCEDURE — 99214 OFFICE O/P EST MOD 30 MIN: CPT | Mod: 25 | Performed by: NURSE PRACTITIONER

## 2023-01-31 ASSESSMENT — PAIN SCALES - GENERAL: PAINLEVEL: NO PAIN (0)

## 2023-01-31 NOTE — PROGRESS NOTES
Audiology Evaluation Completed. Please refer SCANNED AUDIOGRAM and/or TYMPANOGRAM for BACKGROUND, RESULTS, RECOMMENDATIONS.      Gina IVAN, [facility]  Audiologist #[id number]

## 2023-01-31 NOTE — PROGRESS NOTES
Otolaryngology Note         Chief Complaint:     Patient presents with:  Follow Up: HEPAVAN   Otitis Media: Recurrent Acute Otitis Media; bilateral            History of Present Illness:     Shilpi Moore is a 32 year old female seen today for concerns for recurrent ear infections.      She reports her last ear infection was over a year ago.    She continues to feel plugged sensation in both ears.      Symptoms started 2+ years ago.  Symptoms include - ear plugging, nasal congestion, sinus headaches, recurrent sinus infections - usually starts with URI and turns into bronchitis or sinusitis.      Last sinus infection was last winter.    She is not currently using anything in her nose.  She previously used nasal steroid spray, didn't notice much improvement.  She does take Allegra every day.    + seasonal allergies with pollen that is worse in the summer  She had previous skin testing completed   She has a history of NSAID allergy with oral swelling.  No bonita anaphylaxis.  She was observed overnight.    She also has mild reactive airway symptoms.  Uses albuterol inhaler as needed, not very frequently.  Has not used it in the last month.     She has associated disequilibrium with plugged sensation in her ears.    No history of migraines  She does have jaw clenching with bruxism.  She has a mouthguard but does not wear it.      No concerns for decreased hearing.   + history of recurrent OM, no otological surgery.  No previous concerns for perforations.   She is a never smoker   + family hx of sinusitis, mom.  Her daughter had PE tubes.   No history of noise exposure  No rotary vertigo, facial numbness or tingling.  No bothersome tinnitus.       12/1/11 saw Dr Handley for allergy testing: birch, mild to moderate to dogs and cats, other milder reactions to Reji grass and dust mites.    Does not currently carry epi pen.      Audiogram completed 1/31/23:  Tympanograms are Type A for both ears suggesting normal eardrum  mobility.  Acoustic Reflex Thresholds at 1000 Hz are present for both ears.  Thresholds are normal range both ears.  Speech reception thresholds are in good agreement with pure tone average.  Word discrimination scores are excellent at supra-thresholds level.         Medications:     Current Outpatient Rx   Medication Sig Dispense Refill     ALBUTEROL IN        budesonide (PULMICORT) 0.5 MG/2ML neb solution Mix 240 ml Ken Med Sinus rinse, add 0.5 mg budesonide vial, rinse 1/2 bottle in each nostril twice daily 120 mL 4     omeprazole (PRILOSEC) 40 MG DR capsule TAKE ONE CAPSULE BY MOUTH EVERY DAY 30 TO 60 MINUTES BEFORE A MEAL 90 capsule 3     Prenatal MV-Min-Fe Fum-FA-DHA (PRENATAL 1 PO)        sertraline (ZOLOFT) 50 MG tablet Take 1 tablet (50 mg) by mouth 2 times daily 180 tablet 1     VITAMIN D PO               Allergies:     Allergies: Nsaids          Past Medical History:     Past Medical History:   Diagnosis Date     Allergic rhinitis 6/15/2011     Anxiety 9/17/2019     Benign essential hypertension 4/27/2017     Gastroesophageal reflux disease without esophagitis 9/21/2018     Major depressive disorder, recurrent episode, unspecified 6/15/2011     Obesity 4/27/2017     Reactive airway disease that is not asthma 2/12/2018     Seasonal allergic rhinitis due to pollen 4/27/2017     Unspecified vitamin D deficiency 4/11/2014            Past Surgical History:     Past Surgical History:   Procedure Laterality Date     CHOLECYSTECTOMY, LAPOROSCOPIC N/A 09/27/2020    [facility].     ENT SURGERY      tonsillectomy     OTHER SURGICAL HISTORY      I&D rt abscess axilla     SOFT TISSUE SURGERY      IND cyst right axilla       ENT family history reviewed         Social History:     Social History     Tobacco Use     Smoking status: Never     Smokeless tobacco: Never     Tobacco comments:     Passive exposure   Substance Use Topics     Alcohol use: Yes     Comment: Beer; Occasionally     Drug use: No             "Review of Systems:     ROS: See HPI         Physical Exam:     /70 (BP Location: Right arm, Patient Position: Sitting, Cuff Size: Adult Large)   Pulse 63   Temp 98.4  F (36.9  C) (Tympanic)   Ht 1.702 m (5' 7\")   Wt 117.9 kg (260 lb)   SpO2 98%   BMI 40.72 kg/m      General - The patient is well nourished and well developed, and appears to have good nutritional status.  Alert and oriented to person and place, answers questions and cooperates with examination appropriately.   Head and Face - Normocephalic and atraumatic, with no gross asymmetry noted.  The facial nerve is intact, with strong symmetric movements.  Voice and Breathing - The patient was breathing comfortably without the use of accessory muscles. There was no wheezing, stridor. The patients voice was clear and strong, and had appropriate pitch and quality.  Ears - External ear normal. Canals are patent. Right tympanic membrane is intact without effusion, retraction or mass. Left tympanic membrane is intact without effusion, retraction or mass.  Eyes - Extraocular movements intact, sclera were not icteric or injected, conjunctiva were pink and moist.  Mouth - Examination of the oral cavity showed pink, healthy oral mucosa. Dentition in good condition. No lesions or ulcerations noted. The tongue was mobile and midline.   TMJ - bilateral superior ramus tenderness, minimal tenderness to the bilateral TMJ joints.  Good ROM, no trismus.   Throat - The walls of the oropharynx were smooth, pink, moist, symmetric, and had no lesions or ulcerations.  The tonsillar pillars and soft palate were symmetric. The uvula was midline on elevation.    Neck - Normal ROM, no palpable lymphadenopathy.  Palpation of the thyroid was soft and smooth, with no nodules or goiter appreciated.  The trachea was mobile and midline.  Nose - External contour is symmetric, no gross deflection or scars.  Nasal mucosa is pink and moist with no abnormal mucus.  The septum and " turbinates were evaluated, bilateral turbinate hypertrophy, right worse than left.  No polyps, masses, or purulence noted on examination.    To evaluate the nose and sinuses, I performed rigid nasal endoscopy.  I sprayed both nares with 2 sprays lidocaine and neosynephrine.     I began with the RIGHT side using a 0 degree rigid nasal endoscope, and then similarly examined the LEFT side     Findings:  Inferior turbinates:  hypertrophic with polypoid change - allergic mucin throughout.  I used a 8 Turner to suction debrided some clear allergic mucin.   Middle turbinate and middle meatus: bilateral large nasal polyps anterior to the bilateral MT's and MM polypoid change.   The superior meatus is examined and appears with polypoid change  No sphenoethmoidal recess purulence  There is asymmetric irregular adenoid tissue, the right is greater than the left.  Bilateral eustachian tubes do appear patent.   The patient tolerated the procedure well              Assessment and Plan:       ICD-10-CM    1. Nasal polyp  J33.9 CT Sinus w/o Contrast     budesonide (PULMICORT) 0.5 MG/2ML neb solution      2. Chronic sinusitis, unspecified location  J32.9 CT Sinus w/o Contrast      3. History of ear infections  Z86.69       4. Adenoid hypertrophy - with asymmetry, right > left  J35.2         Complete CT sinus  Start budesonide rinses  Start over-the-counter steroid nasal spray, she has this at home  Follow-up with Dr. Schmitt after this CT is completed.  I would like Dr. Schmitt to rescope her nose and evaluate the asymmetric adenoid tissue in the nasopharynx.  Recommend repeat allergy testing  Follow-up sooner if any concerns or worsening of symptoms  Reassured her audiogram and ear exam are both within normal limits.  I do believe that her plugging sensation in her ears is associated to nasal congestion and possible TMJ    Rest the muscles of the jaw by eating soft foods, avoid chewing gum, avoid clenching or tensing the  jaw  Apply moist warm heat to the affected jaw for 30 minutes at least twice daily  Physical therapy can be helpful as well  OTC (such as ibuprofen or aleve) NSAIDs per package directions  Oral splints or mouth guards as sometimes necessary  We discussed physical therapy today, she is not interested in at this time    Rajani TORO  [facility]

## 2023-01-31 NOTE — LETTER
1/31/2023         RE: Shilpi Moore  [address] [facility] [address]        Dear Colleague,    Thank you for referring your patient, Shilpi Moore, to the [facility]. Please see a copy of my visit note below.    Otolaryngology Note         Chief Complaint:     Patient presents with:  Follow Up: HEA   Otitis Media: Recurrent Acute Otitis Media; bilateral            History of Present Illness:     Shilpi Moore is a 32 year old female seen today for concerns for recurrent ear infections.      She reports her last ear infection was over a year ago.    She continues to feel plugged sensation in both ears.      Symptoms started 2+ years ago.  Symptoms include - ear plugging, nasal congestion, sinus headaches, recurrent sinus infections - usually starts with URI and turns into bronchitis or sinusitis.      Last sinus infection was last winter.    She is not currently using anything in her nose.  She previously used nasal steroid spray, didn't notice much improvement.  She does take Allegra every day.    + seasonal allergies with pollen that is worse in the summer  She had previous skin testing completed   She has a history of NSAID allergy with oral swelling.  No bonita anaphylaxis.  She was observed overnight.    She also has mild reactive airway symptoms.  Uses albuterol inhaler as needed, not very frequently.  Has not used it in the last month.     She has associated disequilibrium with plugged sensation in her ears.    No history of migraines  She does have jaw clenching with bruxism.  She has a mouthguard but does not wear it.      No concerns for decreased hearing.   + history of recurrent OM, no otological surgery.  No previous concerns for perforations.   She is a never smoker   + family hx of sinusitis, mom.  Her daughter had PE tubes.   No history of noise exposure  No rotary vertigo, facial numbness or tingling.  No bothersome tinnitus.       12/1/11 saw Dr Handley for  allergy testing: birch, mild to moderate to dogs and cats, other milder reactions to Reji grass and dust mites.    Does not currently carry epi pen.      Audiogram completed 1/31/23:  Tympanograms are Type A for both ears suggesting normal eardrum mobility.  Acoustic Reflex Thresholds at 1000 Hz are present for both ears.  Thresholds are normal range both ears.  Speech reception thresholds are in good agreement with pure tone average.  Word discrimination scores are excellent at supra-thresholds level.         Medications:     Current Outpatient Rx   Medication Sig Dispense Refill     ALBUTEROL IN        budesonide (PULMICORT) 0.5 MG/2ML neb solution Mix 240 ml Ken Med Sinus rinse, add 0.5 mg budesonide vial, rinse 1/2 bottle in each nostril twice daily 120 mL 4     omeprazole (PRILOSEC) 40 MG DR capsule TAKE ONE CAPSULE BY MOUTH EVERY DAY 30 TO 60 MINUTES BEFORE A MEAL 90 capsule 3     Prenatal MV-Min-Fe Fum-FA-DHA (PRENATAL 1 PO)        sertraline (ZOLOFT) 50 MG tablet Take 1 tablet (50 mg) by mouth 2 times daily 180 tablet 1     VITAMIN D PO               Allergies:     Allergies: Nsaids          Past Medical History:     Past Medical History:   Diagnosis Date     Allergic rhinitis 6/15/2011     Anxiety 9/17/2019     Benign essential hypertension 4/27/2017     Gastroesophageal reflux disease without esophagitis 9/21/2018     Major depressive disorder, recurrent episode, unspecified 6/15/2011     Obesity 4/27/2017     Reactive airway disease that is not asthma 2/12/2018     Seasonal allergic rhinitis due to pollen 4/27/2017     Unspecified vitamin D deficiency 4/11/2014            Past Surgical History:     Past Surgical History:   Procedure Laterality Date     CHOLECYSTECTOMY, LAPOROSCOPIC N/A 09/27/2020    .     ENT SURGERY      tonsillectomy     OTHER SURGICAL HISTORY      I&D rt abscess axilla     SOFT TISSUE SURGERY      IND cyst right axilla       ENT family history reviewed         Social  "History:     Social History     Tobacco Use     Smoking status: Never     Smokeless tobacco: Never     Tobacco comments:     Passive exposure   Substance Use Topics     Alcohol use: Yes     Comment: Beer; Occasionally     Drug use: No            Review of Systems:     ROS: See HPI         Physical Exam:     /70 (BP Location: Right arm, Patient Position: Sitting, Cuff Size: Adult Large)   Pulse 63   Temp 98.4  F (36.9  C) (Tympanic)   Ht 1.702 m (5' 7\")   Wt 117.9 kg (260 lb)   SpO2 98%   BMI 40.72 kg/m      General - The patient is well nourished and well developed, and appears to have good nutritional status.  Alert and oriented to person and place, answers questions and cooperates with examination appropriately.   Head and Face - Normocephalic and atraumatic, with no gross asymmetry noted.  The facial nerve is intact, with strong symmetric movements.  Voice and Breathing - The patient was breathing comfortably without the use of accessory muscles. There was no wheezing, stridor. The patients voice was clear and strong, and had appropriate pitch and quality.  Ears - External ear normal. Canals are patent. Right tympanic membrane is intact without effusion, retraction or mass. Left tympanic membrane is intact without effusion, retraction or mass.  Eyes - Extraocular movements intact, sclera were not icteric or injected, conjunctiva were pink and moist.  Mouth - Examination of the oral cavity showed pink, healthy oral mucosa. Dentition in good condition. No lesions or ulcerations noted. The tongue was mobile and midline.   TMJ - bilateral superior ramus tenderness, minimal tenderness to the bilateral TMJ joints.  Good ROM, no trismus.   Throat - The walls of the oropharynx were smooth, pink, moist, symmetric, and had no lesions or ulcerations.  The tonsillar pillars and soft palate were symmetric. The uvula was midline on elevation.    Neck - Normal ROM, no palpable lymphadenopathy.  Palpation of the " thyroid was soft and smooth, with no nodules or goiter appreciated.  The trachea was mobile and midline.  Nose - External contour is symmetric, no gross deflection or scars.  Nasal mucosa is pink and moist with no abnormal mucus.  The septum and turbinates were evaluated, bilateral turbinate hypertrophy, right worse than left.  No polyps, masses, or purulence noted on examination.    To evaluate the nose and sinuses, I performed rigid nasal endoscopy.  I sprayed both nares with 2 sprays lidocaine and neosynephrine.     I began with the RIGHT side using a 0 degree rigid nasal endoscope, and then similarly examined the LEFT side     Findings:  Inferior turbinates:  hypertrophic with polypoid change - allergic mucin throughout.  I used a 8 Turner to suction debrided some clear allergic mucin.   Middle turbinate and middle meatus: bilateral large nasal polyps anterior to the bilateral MT's and MM polypoid change.   The superior meatus is examined and appears with polypoid change  No sphenoethmoidal recess purulence  There is asymmetric irregular adenoid tissue, the right is greater than the left.  Bilateral eustachian tubes do appear patent.   The patient tolerated the procedure well              Assessment and Plan:       ICD-10-CM    1. Nasal polyp  J33.9 CT Sinus w/o Contrast     budesonide (PULMICORT) 0.5 MG/2ML neb solution      2. Chronic sinusitis, unspecified location  J32.9 CT Sinus w/o Contrast      3. History of ear infections  Z86.69       4. Adenoid hypertrophy - with asymmetry, right > left  J35.2         Complete CT sinus  Start budesonide rinses  Start over-the-counter steroid nasal spray, she has this at home  Follow-up with Dr. Schmitt after this CT is completed.  I would like Dr. Schmitt to rescope her nose and evaluate the asymmetric adenoid tissue in the nasopharynx.  Recommend repeat allergy testing  Follow-up sooner if any concerns or worsening of symptoms  Reassured her audiogram and ear  exam are both within normal limits.  I do believe that her plugging sensation in her ears is associated to nasal congestion and possible TMJ    Rest the muscles of the jaw by eating soft foods, avoid chewing gum, avoid clenching or tensing the jaw  Apply moist warm heat to the affected jaw for 30 minutes at least twice daily  Physical therapy can be helpful as well  OTC (such as ibuprofen or aleve) NSAIDs per package directions  Oral splints or mouth guards as sometimes necessary  We discussed physical therapy today, she is not interested in at this time    Rajani TORO  [facility]NT        Again, thank you for allowing me to participate in the care of your patient.        Sincerely,        Rajani Aguiar NP

## 2023-02-02 ENCOUNTER — TELEPHONE (OUTPATIENT)
Dept: ALLERGY | Facility: OTHER | Age: 33
End: 2023-02-02

## 2023-02-02 ENCOUNTER — HOSPITAL ENCOUNTER (OUTPATIENT)
Dept: CT IMAGING | Facility: HOSPITAL | Age: 33
Discharge: HOME | End: 2023-02-02
Attending: NURSE PRACTITIONER | Admitting: NURSE PRACTITIONER
Payer: COMMERCIAL

## 2023-02-02 DIAGNOSIS — J33.9 NASAL POLYP: ICD-10-CM

## 2023-02-02 DIAGNOSIS — J32.9 CHRONIC SINUSITIS, UNSPECIFIED LOCATION: ICD-10-CM

## 2023-02-02 DIAGNOSIS — J35.2 ADENOID HYPERPLASIA: ICD-10-CM

## 2023-02-02 PROCEDURE — 70486 CT MAXILLOFACIAL W/O DYE: CPT

## 2023-02-02 NOTE — TELEPHONE ENCOUNTER
Went over instructions with patient for allergy skin testing.  Reviewed patients current medications and patient will avoid all contraindicated medications prior to MQT testing.  Patient verbalizes understanding.  Copy of allergy testing packet will be mailed to the patient.  Patient is aware that she will be called to schedule her testing.  She is advised to call if she has any questions.    Beth Noriega RN on 2/2/2023 at 9:13 AM

## 2023-02-03 RX ORDER — BUDESONIDE 0.5 MG/2ML
INHALANT ORAL
Qty: 120 ML | Refills: 4 | Status: ON HOLD | OUTPATIENT
Start: 2023-02-03 | End: 2024-01-03

## 2023-04-28 ENCOUNTER — TELEPHONE (OUTPATIENT)
Dept: ALLERGY | Facility: OTHER | Age: 33
End: 2023-04-28

## 2023-04-28 DIAGNOSIS — J30.1 SEASONAL ALLERGIC RHINITIS DUE TO POLLEN: Primary | ICD-10-CM

## 2023-04-28 NOTE — TELEPHONE ENCOUNTER
Order for Loratadine 10mg chewable tab to be administered after allergy testing.  Order pended for review and signature.    Rao Hyde RN on 4/28/2023 at 10:16 AM

## 2023-05-01 ENCOUNTER — OFFICE VISIT (OUTPATIENT)
Dept: ALLERGY | Facility: OTHER | Age: 33
End: 2023-05-01
Attending: NURSE PRACTITIONER
Payer: COMMERCIAL

## 2023-05-01 ENCOUNTER — OFFICE VISIT (OUTPATIENT)
Dept: OTOLARYNGOLOGY | Facility: OTHER | Age: 33
End: 2023-05-01
Attending: NURSE PRACTITIONER
Payer: COMMERCIAL

## 2023-05-01 VITALS
DIASTOLIC BLOOD PRESSURE: 93 MMHG | BODY MASS INDEX: 40.81 KG/M2 | TEMPERATURE: 97.8 F | SYSTOLIC BLOOD PRESSURE: 133 MMHG | HEIGHT: 67 IN | OXYGEN SATURATION: 98 % | HEART RATE: 82 BPM | WEIGHT: 260 LBS

## 2023-05-01 DIAGNOSIS — J33.9 NASAL POLYP: ICD-10-CM

## 2023-05-01 DIAGNOSIS — J30.1 SEASONAL ALLERGIC RHINITIS DUE TO POLLEN: Primary | ICD-10-CM

## 2023-05-01 DIAGNOSIS — J35.2 ADENOID HYPERTROPHY: ICD-10-CM

## 2023-05-01 DIAGNOSIS — J32.9 CHRONIC SINUSITIS, UNSPECIFIED LOCATION: ICD-10-CM

## 2023-05-01 DIAGNOSIS — J30.89 PERENNIAL ALLERGIC RHINITIS: Primary | ICD-10-CM

## 2023-05-01 DIAGNOSIS — R09.81 NASAL CONGESTION: ICD-10-CM

## 2023-05-01 PROCEDURE — 99213 OFFICE O/P EST LOW 20 MIN: CPT | Mod: 25 | Performed by: NURSE PRACTITIONER

## 2023-05-01 PROCEDURE — 95004 PERQ TESTS W/ALRGNC XTRCS: CPT

## 2023-05-01 PROCEDURE — 95024 IQ TESTS W/ALLERGENIC XTRCS: CPT

## 2023-05-01 PROCEDURE — 31231 NASAL ENDOSCOPY DX: CPT | Performed by: NURSE PRACTITIONER

## 2023-05-01 PROCEDURE — 250N000013 HC RX MED GY IP 250 OP 250 PS 637: Performed by: NURSE PRACTITIONER

## 2023-05-01 RX ADMIN — LORATADINE 10 MG: 5 TABLET, CHEWABLE ORAL at 10:08

## 2023-05-01 ASSESSMENT — ASTHMA QUESTIONNAIRES
QUESTION_1 LAST FOUR WEEKS HOW MUCH OF THE TIME DID YOUR ASTHMA KEEP YOU FROM GETTING AS MUCH DONE AT WORK, SCHOOL OR AT HOME: NONE OF THE TIME
ACT_TOTALSCORE: 23
ACT_TOTALSCORE: 23
QUESTION_4 LAST FOUR WEEKS HOW OFTEN HAVE YOU USED YOUR RESCUE INHALER OR NEBULIZER MEDICATION (SUCH AS ALBUTEROL): ONCE A WEEK OR LESS
QUESTION_5 LAST FOUR WEEKS HOW WOULD YOU RATE YOUR ASTHMA CONTROL: WELL CONTROLLED
QUESTION_3 LAST FOUR WEEKS HOW OFTEN DID YOUR ASTHMA SYMPTOMS (WHEEZING, COUGHING, SHORTNESS OF BREATH, CHEST TIGHTNESS OR PAIN) WAKE YOU UP AT NIGHT OR EARLIER THAN USUAL IN THE MORNING: NOT AT ALL
QUESTION_2 LAST FOUR WEEKS HOW OFTEN HAVE YOU HAD SHORTNESS OF BREATH: NOT AT ALL

## 2023-05-01 ASSESSMENT — PAIN SCALES - GENERAL: PAINLEVEL: NO PAIN (0)

## 2023-05-01 NOTE — PATIENT INSTRUCTIONS
Thank you for allowing Rajani Aguiar and our ENT team to participate in your care.  If your medications are too expensive, please give the nurse a call.  We can possibly change this medication.  If you have a scheduling or an appointment question please contact our Health Unit Coordinator at their direct line [phone].       Continue Budesonide irrigations.  Continue allergy medications.  Strongly consider immunotherapy.  Follow up with Dr. Schmitt.

## 2023-05-01 NOTE — NURSING NOTE
This patient presents today for allergy skin testing.      Symptoms have included recurrent ear infections, plugged sensation in both ears, nasal congestion, sinus headaches, recurrent sinus infections that lead to bronchitis, jaw clenching, mild reactive airway symtoms, and are worse in spring/summer season. Takes Allegra daily but hasn't for testing. Also uses budesonide rinses. States that both of these help her symptoms. Hx of NSAID allergy - oral swelling, observed overnight - administered an epi pen at the hospital. No hx of tubes in ears. In 2011 stated that she used to get rashes on her skin. No past sinus surgeries. ACT = 23. Does not have her tonsils anymore but still has adenoids.     This patient lives in a single family home, with a basement.  This patient does not suspect mold, water or moisture issues in the home.  There is carpet in the home, and in bedroom.  Home has gas/radiator heat and a split level air conditioning unit.        This patient has 2 dogs for pets.  They are inside. Do not sleep in bed with her.    This patient has had allergy testing in the past. In 2011 with Dr. Handley at [facility] in [place]. Was positive to birch, cats, dogs, grass, and dust. Did not do any immunotherapy.     This patient's medications have been reviewed prior to testing and all appropriate medications have been stopped.    Consent is signed by patient and signature is verified.     MQT/ID test is performed per protocol.  The patient tolerated testing well. Benadryl spray applied to testing sites. Children's chewable Claritin given for post testing itching per protocol.  All findings are recorded on the paper flow sheet. Results are reviewed with this patient.  They are given written information regarding allergy.       The patient will follow-up with Rajani Aguiar CNP for treatment plan.      Beth Noriega RN on 5/1/2023 at 9:41 AM

## 2023-05-01 NOTE — LETTER
5/1/2023         RE: Shilpi Moore  [address] [facility] [id number]        Dear Colleague,    Thank you for referring your patient, Shilpi Moore, to the [facility]. Please see a copy of my visit note below.    Otolaryngology Note         Chief Complaint:     Patient presents with:  MQT F/U           History of Present Illness:     Shilpi Moore is a 32 year old female seen today for follow-up M QT.    She did well with allergy testing.  No oral swelling, excessive itching, or wheezing.      MQT completed 5/1/2023:  Dilution 6 (high) birch, Oak, cat  Dilution 5 (moderate) ragweed, pigweed, thistle, grass, maple, elm, jarrell, pine, [place], Alternaria, Aspergillus, Hormodendrum, penicillium, Epicoccum, Fusarium, Helminthosporium, dog, dust  Dilution 2 (low) walnut    She reports at this time that her allergy symptoms are well controlled with daily antihistamine.      She was last seen in ENT on 1/31/2023 by myself, for symptoms of ear plugging, nasal congestion, sinus headaches, recurrent sinus infections.    Rigid endoscopic exam completed on 1/31/2023 showed inferior turbinate hypertrophy with polypoid change, allergic mucin throughout.  Bilateral large nasal polyps anterior to the bilateral MTs and middle meatus polypoid change, asymmetric irregular adenoid tissue right greater than left.  She was started on budesonide rinses.    CT sinus completed 2/2/2023  FINDINGS: There is mild mucosal thickening seen in the floor of the  left maxillary sinus. Right maxillary sinus is clear. The ethmoid  sphenoid and frontal sinuses are clear. There is mild deviation of  nasal septum to the right. There is amber bullosa of the middle  turbinate on the left. The retropharyngeal soft tissues are normal.                                                                        IMPRESSION: Mucosal thickening in the left maxillary sinus.    Audiogram completed 1/31/23:  Tympanograms are Type  A for both ears suggesting normal eardrum mobility.  Acoustic Reflex Thresholds at 1000 Hz are present for both ears.  Thresholds are normal range both ears.  Speech reception thresholds are in good agreement with pure tone average.  Word discrimination scores are excellent at supra-thresholds level.    She has been using budesonide rinses 1-2 times per day and tolerating well.  She continues congestion but is able to breath through her nose.  NO recent sinus infection.   She is able to smell and taste.  She feels overall her symptoms are improved and well controlled.    No concerns for loud snoring or sleep disordered breathing.         Medications:     Current Outpatient Rx   Medication Sig Dispense Refill     ALBUTEROL IN        budesonide (PULMICORT) 0.5 MG/2ML neb solution Mix 240 ml Ken Med Sinus rinse, add 0.5 mg budesonide vial, rinse 1/2 bottle in each nostril twice daily 120 mL 4     omeprazole (PRILOSEC) 40 MG DR capsule TAKE ONE CAPSULE BY MOUTH EVERY DAY 30 TO 60 MINUTES BEFORE A MEAL 90 capsule 3     sertraline (ZOLOFT) 50 MG tablet Take 1 tablet (50 mg) by mouth 2 times daily 180 tablet 1     VITAMIN D PO  (Patient not taking: Reported on 5/1/2023)              Allergies:     Allergies: Nsaids          Past Medical History:     Past Medical History:   Diagnosis Date     Allergic rhinitis 6/15/2011     Anxiety 9/17/2019     Benign essential hypertension 4/27/2017     Gastroesophageal reflux disease without esophagitis 9/21/2018     Major depressive disorder, recurrent episode, unspecified 6/15/2011     Obesity 4/27/2017     Reactive airway disease that is not asthma 2/12/2018     Seasonal allergic rhinitis due to pollen 4/27/2017     Unspecified vitamin D deficiency 4/11/2014            Past Surgical History:     Past Surgical History:   Procedure Laterality Date     CHOLECYSTECTOMY, LAPOROSCOPIC N/A 09/27/2020    [facility].     ENT SURGERY      tonsillectomy     OTHER SURGICAL HISTORY      I&D  "rt abscess axilla     SOFT TISSUE SURGERY      IND cyst right axilla       ENT family history reviewed         Social History:     Social History     Tobacco Use     Smoking status: Never     Smokeless tobacco: Never     Tobacco comments:     Passive exposure   Substance Use Topics     Alcohol use: Yes     Comment: Beer; Occasionally     Drug use: No            Review of Systems:     ROS: See HPI         Physical Exam:     BP (!) 133/93 (BP Location: Right arm, Patient Position: Sitting, Cuff Size: Adult Large)   Pulse 82   Temp 97.8  F (36.6  C)   Ht 1.702 m (5' 7\")   Wt 117.9 kg (260 lb)   SpO2 98%   BMI 40.72 kg/m      General - The patient is well nourished and well developed, and appears to have good nutritional status.  Alert and oriented to person and place, answers questions and cooperates with examination appropriately.   Head and Face - Normocephalic and atraumatic, with no gross asymmetry noted.  The facial nerve is intact, with strong symmetric movements.  Voice and Breathing - The patient was breathing comfortably without the use of accessory muscles. There was no wheezing, stridor. The patients voice was clear and strong, and had appropriate pitch and quality.  Ears - External ear normal. Canals are patent. Right tympanic membrane is intact without effusion, retraction or mass. Left tympanic membrane is intact without effusion, retraction or mass.  Eyes - Extraocular movements intact, sclera were not icteric or injected, conjunctiva were pink and moist.  Mouth - Examination of the oral cavity showed pink, healthy oral mucosa. Dentition in good condition. No lesions or ulcerations noted. The tongue was mobile and midline.   Throat - The walls of the oropharynx were smooth, pink, moist, symmetric, and had no lesions or ulcerations.  The tonsillar pillars and soft palate were symmetric. The uvula was midline on elevation.    Neck - Normal range of motion, no palpable lymphadenopathy.  Palpation of " the thyroid was soft and smooth, with no nodules or goiter appreciated.  The trachea was mobile and midline.  Nose - External contour is symmetric, no gross deflection or scars.  Nasal mucosa is pink and moist with no abnormal mucus.  The septum and turbinates were evaluated with nasal speculum, no polyps, masses, or purulence noted on examination.    To evaluate the nose and sinuses, I performed rigid nasal endoscopy.  I sprayed both nares with 2 sprays lidocaine and neosynephrine.     I began with the RIGHT side using a 0 degree rigid nasal endoscope, and then similarly examined the LEFT side     Findings: Septum is intact and grossly midline  Inferior turbinates:  enlarged, clear drainage, polypoid change  Middle turbinate and middle meatus: Right MM with polyp, left MT enlarged with polypoid change  The superior meatus is examined and unremarkable  Mucosa is edematous throughout with clear drainage.    No SER  Nasopharynx with grade 3 symmetric adenoid tissue.  ETs are open  Significant improvement noted from previous examination.  The patient tolerated the procedure well         Assessment and Plan:       ICD-10-CM    1. Perennial allergic rhinitis  J30.89       2. Nasal polyp  J33.9       3. Chronic sinusitis, unspecified location  J32.9       4. Adenoid hypertrophy  J35.2       5. Nasal congestion  R09.81         Continue Budesonide irrigations.  Continue allergy medications.  Strongly consider immunotherapy.  Follow up with Dr. Schmitt for surgical consult, she would like to think about allergy immunotherapy and discuss with Dr. Schmitt at follow-up.    Rajani TORO  [facility]        Again, thank you for allowing me to participate in the care of your patient.        Sincerely,        Rajani Aguiar NP

## 2023-05-01 NOTE — PROGRESS NOTES
Otolaryngology Note         Chief Complaint:     Patient presents with:  MQT F/U           History of Present Illness:     Shilpi Moore is a 32 year old female seen today for follow-up M QT.    She did well with allergy testing.  No oral swelling, excessive itching, or wheezing.      MQT completed 5/1/2023:  Dilution 6 (high) birch, Oak, cat  Dilution 5 (moderate) ragweed, pigweed, thistle, grass, maple, elm, jarrell, pine, [place], Alternaria, Aspergillus, Hormodendrum, penicillium, Epicoccum, Fusarium, Helminthosporium, dog, dust  Dilution 2 (low) walnut    She reports at this time that her allergy symptoms are well controlled with daily antihistamine.      She was last seen in ENT on 1/31/2023 by myself, for symptoms of ear plugging, nasal congestion, sinus headaches, recurrent sinus infections.    Rigid endoscopic exam completed on 1/31/2023 showed inferior turbinate hypertrophy with polypoid change, allergic mucin throughout.  Bilateral large nasal polyps anterior to the bilateral MTs and middle meatus polypoid change, asymmetric irregular adenoid tissue right greater than left.  She was started on budesonide rinses.    CT sinus completed 2/2/2023  FINDINGS: There is mild mucosal thickening seen in the floor of the  left maxillary sinus. Right maxillary sinus is clear. The ethmoid  sphenoid and frontal sinuses are clear. There is mild deviation of  nasal septum to the right. There is amber bullosa of the middle  turbinate on the left. The retropharyngeal soft tissues are normal.                                                                        IMPRESSION: Mucosal thickening in the left maxillary sinus.    Audiogram completed 1/31/23:  Tympanograms are Type A for both ears suggesting normal eardrum mobility.  Acoustic Reflex Thresholds at 1000 Hz are present for both ears.  Thresholds are normal range both ears.  Speech reception thresholds are in good agreement with pure tone average.  Word  discrimination scores are excellent at supra-thresholds level.    She has been using budesonide rinses 1-2 times per day and tolerating well.  She continues congestion but is able to breath through her nose.  NO recent sinus infection.   She is able to smell and taste.  She feels overall her symptoms are improved and well controlled.    No concerns for loud snoring or sleep disordered breathing.         Medications:     Current Outpatient Rx   Medication Sig Dispense Refill     ALBUTEROL IN        budesonide (PULMICORT) 0.5 MG/2ML neb solution Mix 240 ml Ken Med Sinus rinse, add 0.5 mg budesonide vial, rinse 1/2 bottle in each nostril twice daily 120 mL 4     omeprazole (PRILOSEC) 40 MG DR capsule TAKE ONE CAPSULE BY MOUTH EVERY DAY 30 TO 60 MINUTES BEFORE A MEAL 90 capsule 3     sertraline (ZOLOFT) 50 MG tablet Take 1 tablet (50 mg) by mouth 2 times daily 180 tablet 1     VITAMIN D PO  (Patient not taking: Reported on 5/1/2023)              Allergies:     Allergies: Nsaids          Past Medical History:     Past Medical History:   Diagnosis Date     Allergic rhinitis 6/15/2011     Anxiety 9/17/2019     Benign essential hypertension 4/27/2017     Gastroesophageal reflux disease without esophagitis 9/21/2018     Major depressive disorder, recurrent episode, unspecified 6/15/2011     Obesity 4/27/2017     Reactive airway disease that is not asthma 2/12/2018     Seasonal allergic rhinitis due to pollen 4/27/2017     Unspecified vitamin D deficiency 4/11/2014            Past Surgical History:     Past Surgical History:   Procedure Laterality Date     CHOLECYSTECTOMY, LAPOROSCOPIC N/A 09/27/2020    [facility].     ENT SURGERY      tonsillectomy     OTHER SURGICAL HISTORY      I&D rt abscess axilla     SOFT TISSUE SURGERY      IND cyst right axilla       ENT family history reviewed         Social History:     Social History     Tobacco Use     Smoking status: Never     Smokeless tobacco: Never     Tobacco comments:  "    Passive exposure   Substance Use Topics     Alcohol use: Yes     Comment: Beer; Occasionally     Drug use: No            Review of Systems:     ROS: See HPI         Physical Exam:     BP (!) 133/93 (BP Location: Right arm, Patient Position: Sitting, Cuff Size: Adult Large)   Pulse 82   Temp 97.8  F (36.6  C)   Ht 1.702 m (5' 7\")   Wt 117.9 kg (260 lb)   SpO2 98%   BMI 40.72 kg/m      General - The patient is well nourished and well developed, and appears to have good nutritional status.  Alert and oriented to person and place, answers questions and cooperates with examination appropriately.   Head and Face - Normocephalic and atraumatic, with no gross asymmetry noted.  The facial nerve is intact, with strong symmetric movements.  Voice and Breathing - The patient was breathing comfortably without the use of accessory muscles. There was no wheezing, stridor. The patients voice was clear and strong, and had appropriate pitch and quality.  Ears - External ear normal. Canals are patent. Right tympanic membrane is intact without effusion, retraction or mass. Left tympanic membrane is intact without effusion, retraction or mass.  Eyes - Extraocular movements intact, sclera were not icteric or injected, conjunctiva were pink and moist.  Mouth - Examination of the oral cavity showed pink, healthy oral mucosa. Dentition in good condition. No lesions or ulcerations noted. The tongue was mobile and midline.   Throat - The walls of the oropharynx were smooth, pink, moist, symmetric, and had no lesions or ulcerations.  The tonsillar pillars and soft palate were symmetric. The uvula was midline on elevation.    Neck - Normal range of motion, no palpable lymphadenopathy.  Palpation of the thyroid was soft and smooth, with no nodules or goiter appreciated.  The trachea was mobile and midline.  Nose - External contour is symmetric, no gross deflection or scars.  Nasal mucosa is pink and moist with no abnormal mucus.  The " septum and turbinates were evaluated with nasal speculum, no polyps, masses, or purulence noted on examination.    To evaluate the nose and sinuses, I performed rigid nasal endoscopy.  I sprayed both nares with 2 sprays lidocaine and neosynephrine.     I began with the RIGHT side using a 0 degree rigid nasal endoscope, and then similarly examined the LEFT side     Findings: Septum is intact and grossly midline  Inferior turbinates:  enlarged, clear drainage, polypoid change  Middle turbinate and middle meatus: Right MM with polyp, left MT enlarged with polypoid change  The superior meatus is examined and unremarkable  Mucosa is edematous throughout with clear drainage.    No SER  Nasopharynx with grade 3 symmetric adenoid tissue.  ETs are open  Significant improvement noted from previous examination.  The patient tolerated the procedure well         Assessment and Plan:       ICD-10-CM    1. Perennial allergic rhinitis  J30.89       2. Nasal polyp  J33.9       3. Chronic sinusitis, unspecified location  J32.9       4. Adenoid hypertrophy  J35.2       5. Nasal congestion  R09.81         Continue Budesonide irrigations.  Continue allergy medications.  Strongly consider immunotherapy.  Follow up with Dr. Schmitt for surgical consult, she would like to think about allergy immunotherapy and discuss with Dr. Schmitt at follow-up.    Rajani TORO  [facility]

## 2023-05-01 NOTE — PROGRESS NOTES
Shilpi was seen for allergy skin testing. Patient was seen by this nurse in conjunction with ENT provider. All encounter details are documented in ENT Provider's appointment from this same date. Please see referenced encounter for this visits documentation.     Beth Noriega RN on 5/1/2023 at 10:09 AM

## 2023-06-16 ENCOUNTER — OFFICE VISIT (OUTPATIENT)
Dept: OTOLARYNGOLOGY | Facility: OTHER | Age: 33
End: 2023-06-16
Attending: OTOLARYNGOLOGY
Payer: COMMERCIAL

## 2023-06-16 ENCOUNTER — PREP FOR PROCEDURE (OUTPATIENT)
Dept: OTOLARYNGOLOGY | Facility: OTHER | Age: 33
End: 2023-06-16

## 2023-06-16 VITALS
TEMPERATURE: 98.5 F | HEART RATE: 62 BPM | OXYGEN SATURATION: 98 % | SYSTOLIC BLOOD PRESSURE: 142 MMHG | DIASTOLIC BLOOD PRESSURE: 82 MMHG

## 2023-06-16 DIAGNOSIS — J33.9 ASPIRIN-EXACERBATED RESPIRATORY DISEASE (AERD): ICD-10-CM

## 2023-06-16 DIAGNOSIS — J30.89 PERENNIAL ALLERGIC RHINITIS: ICD-10-CM

## 2023-06-16 DIAGNOSIS — J32.0 CHRONIC MAXILLARY SINUSITIS: ICD-10-CM

## 2023-06-16 DIAGNOSIS — J98.9 REACTIVE AIRWAY DISEASE WITHOUT ASTHMA: ICD-10-CM

## 2023-06-16 DIAGNOSIS — J33.9 NASAL POLYP: ICD-10-CM

## 2023-06-16 DIAGNOSIS — J34.2 DNS (DEVIATED NASAL SEPTUM): ICD-10-CM

## 2023-06-16 DIAGNOSIS — J34.2 DEVIATED NASAL SEPTUM: ICD-10-CM

## 2023-06-16 DIAGNOSIS — J32.0 CHRONIC MAXILLARY SINUSITIS: Primary | ICD-10-CM

## 2023-06-16 DIAGNOSIS — Z88.6 ASPIRIN-EXACERBATED RESPIRATORY DISEASE (AERD): ICD-10-CM

## 2023-06-16 DIAGNOSIS — J45.909 ASPIRIN-EXACERBATED RESPIRATORY DISEASE (AERD): ICD-10-CM

## 2023-06-16 DIAGNOSIS — J33.9 NASAL POLYP: Primary | ICD-10-CM

## 2023-06-16 DIAGNOSIS — J34.3 NASAL TURBINATE HYPERTROPHY: ICD-10-CM

## 2023-06-16 PROCEDURE — 31231 NASAL ENDOSCOPY DX: CPT | Performed by: OTOLARYNGOLOGY

## 2023-06-16 PROCEDURE — 99214 OFFICE O/P EST MOD 30 MIN: CPT | Mod: 25 | Performed by: OTOLARYNGOLOGY

## 2023-06-16 RX ORDER — PREDNISONE 10 MG/1
TABLET ORAL
Qty: 6 TABLET | Refills: 1 | Status: ON HOLD | OUTPATIENT
Start: 2023-06-16 | End: 2023-10-25

## 2023-06-16 ASSESSMENT — PAIN SCALES - GENERAL: PAINLEVEL: NO PAIN (0)

## 2023-06-16 NOTE — PROGRESS NOTES
Otolaryngology Progress Note          Shilpi Moore is a 33 year old female    Follow-up of chronic recurrent sinusitis with findings of a nasal polyp.  She saw Rajani on 5/1/2023.    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  No prior nasal or sinus surgery  Shilpi states she has asthma which overall is well controlled  However, Tiana Tucker, CHRIS, primary states she does not have asithma.  Chart dx RAD    She has avoided all NSAIDs    SNOT 31 with a severe problem with nasal blockage and ear fullness    CT sinus dated 2/2/2023 shows mucoperiosteal thickening of the left maxillary sinus with amber bullosa and turbinate hypertrophy      CT sinus dated 2/2/2023 shows bilateral clear frontal and ethmoid sinuses there is a septal deviation to the right at the mid septum with septal contact against an enlarged turbinate probable polyp off of the middle turbinate on the right on the left there is an obstructive amber bullosa with polyps extending off of the turbinate.  The ostiomeatal complexes are narrowed there is polypoid mucoperiosteal thickening of the maxillary sinuses bilaterally the sphenoid sinuses are clear there is an onidi cell,  the optic nerve is not dehiscent since the lamina is intact the skull base is intact keros 3    Significant history of perennial allergic rhinitis.       MQT completed 5/1/2023:  Dilution 6 (high) birch, Oak, cat  Dilution 5 (moderate) ragweed, pigweed, thistle, grass, maple, elm, jarrell, pine, [place], Alternaria, Aspergillus, Hormodendrum, penicillium, Epicoccum, Fusarium, Helminthosporium, dog, dust  Dilution 2 (low) walnut     She reports at this time that her allergy symptoms are overall well well controlled with daily antihistamine.  However over the past 2 weeks her  allergies have flared    12 point review of systems is negative aside from that mentioned in the history of present illness      Physical Exam  BP (!) 142/82 (BP Location: Left arm, Patient Position: Sitting, Cuff Size: Adult Large)   Pulse 62   Temp 98.5  F (36.9  C) (Tympanic)   SpO2 98%   General - The patient is well nourished and well developed, and appears to have good nutritional status.  Alert and oriented to person and place, interactive.  Head and Face - Normocephalic and atraumatic, with no gross asymmetry noted of the contour of the facial features.  The facial nerve is intact, with strong symmetric movements.  Neck-no palpable lymphadenopathy or thyroid mass.  Trachea is midline.  Eyes - Extraocular movements intact.   Ears- External auditory canals are patent, tympanic membranes are intact without effusion or worrisome retractions mild pars tensa retraction right  Nose - Nasal mucosa is pink and moist with abnormal mucus.    Mouth - Examination of the oral cavity shows pink, healthy, moist mucosa.  No lesions or ulceration noted.  The dentition are in good repair.  The tongue is mobile and midline.  Throat - The walls of the oropharynx were smooth, pink, moist, symmetric, and had no lesions or ulcerations.  The tonsillar pillars and soft palate were symmetric.  The uvula was midline on elevation.        To evaluate the nose and sinuses, I performed rigid nasal endoscopy.  I applied topical nasal lidocaine and neosynephrine.    I began with the LEFT side using a 0 degree rigid nasal endoscope, and then similarly examined the RIGHT side    Findings:  Inferior turbinates:  4+  Left amber with a polyp obstructing the inferior meatus and narrowing the middle meatus.  There is a septal deviation to the right with 90% obstruction and septal contact  There is a right polyp occluding the middle meatus  Superior meatus was evaluated  Frontal recess clear left, narrow right  Mucosa is edematous throughout,    polyps noted  Sphenoethmoidal recess without purulence   Nasopharynx severe polypoid mucosal inflammation surrounding the eustachian tubes bilaterally grade 1 polypoid adenoids.  Allergic mucin debrided from the inferior and middle meatus and nasopharynx with a 7 Turner    The patient tolerated the procedure well      Impression/Plan  Shilpi Moore is a 33 year old female    ICD-10-CM    1. Nasal polyp  J33.9 predniSONE (DELTASONE) 10 MG tablet      2. nsaid-exacerbated respiratory disease (AERD)  J45.909     J33.9     Z88.6       3. Perennial allergic rhinitis  J30.89       4. Chronic maxillary sinusitis  J32.0       5. DNS (deviated nasal septum)  J34.2       6. Nasal turbinate hypertrophy  J34.3       7. Reactive airway disease that is not asthma  J98.9               NO ibu or NSAIDS    The risks and complications of bilateral endoscopic sinus surgery, septoplasty, turbinate reduction were openly discussed.  The potential risks include bleeding, general anesthesia, infection, scar formation, need for additional surgery, septal perforation, and rarely injury to the eye with the possibility of blindness,  injury to the brain, meningitis or other intracranial complications.  Nonsurgical options were discussed including prolonged antibioitics and/or oral and topical steroids.   Sinus anatomy and sinus disease were reviewed.  CT sinus was reviewed with the patient.  All questions were answered.     Max, polypectomy, excision amber  No alondra      Continue all current medications recommended by me or my staff.    Continue budesonide irrigations.  Verbal and written education on use provided.    The importance of budesonide irrigations postoperatively was reinforced.    The correct use of nasal irrigations as prescribed will prevent synechiae and allow for proper recovery of the sinus mucosa.       Risks of oral steroid use were discussed and include psychiatric/mood changes, insomnia, stomach ulcers and potential GI  bleeding, blood sugar elevation/worsening diabetes, hip/bone necrosis called avascular necrosis, or bone demineralization.          Daniella Schmitt D.O.  Otolaryngology/Head and Neck Surgery  Allergy

## 2023-06-16 NOTE — PATIENT INSTRUCTIONS
Thank you for allowing Dr. Schmitt and our ENT team to participate in your care.  If your medications are too expensive, please give the nurse a call.  We can possibly change this medication.  If you have a scheduling or an appointment question please contact our Health Unit Coordinator at their direct line [phone].   ALL nursing questions or concerns can be directed to your ENT nurse at: [phone] - Desirae    Start Prednisone 2 days before surgery    Use Budesonide Rinses Twice Daily    Contact us if allergies worsen to set up immunotherapy    Follow up in surgery      Budesonide instructions  Make the Ken Med Saline Solution using 2 packages of salt and previously boiled or distilled water.  This will make 240 ml of saline solution.  Mix the entire vial of budesonide into the solution.   Irrigate your nose 2 times a day with the warm budesonide solution using 1 bottle between nostrils in the morning, and one bottle at night.     Instructions for Sinus Surgery    Recovery - Everyone recovers differently from a general anesthetic.  Symptoms such as fatigue, nausea, light-headedness, and sometimes a low grade fever (up to 100 degrees) are not unusual.  As your body removes the anesthetic drugs from circulation, these symptoms will resolve.  Your nose will be sore after surgery, and you may even have symptoms similar to a sinus infection with headache, congestion, and pressure.  These will resolve with healing.  For several days you may experience bloody drainage from the nose, please use the drip pad as necessary for this.  If there is persistent bleeding, please call the office during business hours or the on call ENT physician after hours.  There are no diet restrictions after sinus surgery, and you can resume your home medications.      Please do not blow your nose until 2 weeks after surgery.       Limit your activity to no strenuous activities until I see you for the first follow-up visit in  approximately 2 weeks.      Medications - You were sent home with narcotic pain medication.  If you can tolerate the discomfort during your recovery by using just plain Tylenol or ibuprofen (advil), please do so.  However, do not hesitate to use the stronger pain medication if needed.  If you were sent home with an antibiotic, it is primarily used to help the healing process.  If it causes loose bowel movements or other signs of intolerance, it is appropriate to discontinue it.  By far the most important measure you can take to speed recovery, and maximize the chances of long term success of sinus surgery is using the sinus rinses at least three time per day for the first month after surgery.       Start budesonide irrigations tomorrow.  Use 2 times daily for one month and then as directed.  Start Maciel Med saline irrigation tonight and use at least 3 times daily.   Continue twice daily budesonide irrigations and 2-3 times daily NeilMed saline irrigations for 1 month and then as directed by Dr. Schmitt    Budesonide instructions  Make the saline solution using 2 packages of salt and previously boiled or distilled water.  This will make 240 ml of saline solution.  Mix the entire vial of budesonide into the solution.   Irrigate your nose 2 times a day with the warm budesonide solution using 1 bottle between nostrils in the morning, and one bottle at night.   Use plain maciel med saline without the steroid 2-3 times during the afternoon    Perform gentle irrigation for the first week.  Starting 1 week after surgery, you can start to irrigate a bit more forcefully.  The more often you irrigate with the maciel med saline, the faster you will heal.      At 3 weeks after surgery you may restart nasal steroids if needed (flonase, nasonex, etc).      Complications - Problems related to sinus surgery almost always are detected during the operation, and special instruction will be given in that situation.  However, unexpected  things can happen, and are all related to the structures around the sinus cavities.  Symptoms that should alert you to a possible problem include: severe eye pain or eye swelling, persistent heavy bleeding from the nose, and high fevers with headache and neck pain.  Any of these symptoms should be called into my office or to the on call ENT if after hours.  The most common non-emergency complication of sinus surgery is the formation of scar tissue which can re-block the sinuses.  This is addressed below.    Follow-up -  As you have noted, there are quite a few follow-up visits after sinus surgery.  This is done to aggressively manage the most common complication of this technique, which is scar tissue blocking the sinuses.  These visits will require the examination of your nose and possibly removal of crusts of dry mucous and blood, with possible removal of early scar tissue.  Please prepare for these visits by using your sinus rinses.    If there are any questions or issues with the above, or if there are other issues that concern you, always feel free to call the clinic and I am happy to speak with you as soon as I can.    Daniella Schmitt D.O.  Otolaryngology/Head and Neck Surgery  Allergy    [phone]   extension [phone]          HOW TO PREPARE-    You need to have a scheduled Pre-Op with your primary care physician within 30 days of your scheduled surgery.      You need a friend or family member available to drive you home AND stay with you for 24 hours after you leave the hospital. You will not be allowed to drive yourself. IF you need to take a taxi or the bus you MUST have a responsible person to ride with you. YOUR PROCEDURE WILL BE CANCELLED IF YOU DO NOT HAVE A RESPONSIBLE ADULT TO DRIVE YOU HOME.     You CANNOT have anything to eat or drink after midnight the night before your surgery, including water and coffee. Your stomach needs to be completely empty. Do NOT chew gum, suck on hard candy, or  smoke. You can brush your teeth the morning of surgery.     The Surgery Education Nurses will call you  1-2 weeks prior to your surgery date at  [phone] or toll free [phone]. Please have your medication and allergy lists ready.    Stop your aspirin or other NSAIDs(Ibuprofen, Motrin, Aleve, Celebrex, Naproxen, etc...) 7 days before your surgery.    Hospital admitting will call you the day before your surgery with your exact arrival time.     Please call your primary care physician if you should become ill within 24 hours of scheduled surgery. (ex.vomiting, diarrhea, fever)        You will need to wash the night before AND the morning of you procedure with a liquid antibacterial soap, like Dial.

## 2023-06-16 NOTE — LETTER
6/16/2023         RE: Shilpi Moore  [address] [facility] [address]        Dear Colleague,    Thank you for referring your patient, Shilpi Moore, to the [facility]. Please see a copy of my visit note below.    Otolaryngology Progress Note          Shilpi Moore is a 33 year old female    Follow-up of chronic recurrent sinusitis with findings of a nasal polyp.  She saw Rajani on 5/1/2023.    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  No prior nasal or sinus surgery  Asthma overall is well controlled    She has avoided all NSAIDs    SNOT 31 with a severe problem with nasal blockage and ear fullness    CT sinus dated 2/2/2023 shows mucoperiosteal thickening of the left maxillary sinus with amber bullosa and turbinate hypertrophy      CT sinus dated 2/2/2023 shows bilateral clear frontal and ethmoid sinuses there is a septal deviation to the right at the mid septum with septal contact against an enlarged turbinate probable polyp off of the middle turbinate on the right on the left there is an obstructive amber bullosa with polyps extending off of the turbinate.  The ostiomeatal complexes are narrowed there is polypoid mucoperiosteal thickening of the maxillary sinuses bilaterally the sphenoid sinuses are clear there is an onidi cell,  the optic nerve is not dehiscent since the lamina is intact the skull base is intact keros 3    Significant history of perennial allergic rhinitis.       MQT completed 5/1/2023:  Dilution 6 (high) birch, Oak, cat  Dilution 5 (moderate) ragweed, pigweed, thistle, grass, maple, elm, jarrell, pine, Leake, Alternaria, Aspergillus, Hormodendrum, penicillium, Epicoccum, Fusarium, Helminthosporium, dog, dust  Dilution 2 (low) walnut     She reports  at this time that her allergy symptoms are overall well well controlled with daily antihistamine.  However over the past 2 weeks her allergies have flared    12 point review of systems is negative aside from that mentioned in the history of present illness      Physical Exam  BP (!) 142/82 (BP Location: Left arm, Patient Position: Sitting, Cuff Size: Adult Large)   Pulse 62   Temp 98.5  F (36.9  C) (Tympanic)   SpO2 98%   General - The patient is well nourished and well developed, and appears to have good nutritional status.  Alert and oriented to person and place, interactive.  Head and Face - Normocephalic and atraumatic, with no gross asymmetry noted of the contour of the facial features.  The facial nerve is intact, with strong symmetric movements.  Neck-no palpable lymphadenopathy or thyroid mass.  Trachea is midline.  Eyes - Extraocular movements intact.   Ears- External auditory canals are patent, tympanic membranes are intact without effusion or worrisome retractions mild pars tensa retraction right  Nose - Nasal mucosa is pink and moist with abnormal mucus.    Mouth - Examination of the oral cavity shows pink, healthy, moist mucosa.  No lesions or ulceration noted.  The dentition are in good repair.  The tongue is mobile and midline.  Throat - The walls of the oropharynx were smooth, pink, moist, symmetric, and had no lesions or ulcerations.  The tonsillar pillars and soft palate were symmetric.  The uvula was midline on elevation.        To evaluate the nose and sinuses, I performed rigid nasal endoscopy.  I applied topical nasal lidocaine and neosynephrine.    I began with the LEFT side using a 0 degree rigid nasal endoscope, and then similarly examined the RIGHT side    Findings:  Inferior turbinates:  4+  Left amber with a polyp obstructing the inferior meatus and narrowing the middle meatus.  There is a septal deviation to the right with 90% obstruction and septal contact  There is a right polyp  occluding the middle meatus  Superior meatus was evaluated  Frontal recess clear left, narrow right  Mucosa is edematous throughout,   polyps noted  Sphenoethmoidal recess without purulence   Nasopharynx severe polypoid mucosal inflammation surrounding the eustachian tubes bilaterally grade 1 polypoid adenoids.  Allergic mucin debrided from the inferior and middle meatus and nasopharynx with a 7 Turner    The patient tolerated the procedure well      Impression/Plan  Shilpi Moore is a 33 year old female    ICD-10-CM    1. Nasal polyp  J33.9       2. nsaid-exacerbated respiratory disease (AERD)  J45.909     J33.9     Z88.6       3. Perennial allergic rhinitis  J30.89       4. asthma  J98.9       5. Chronic maxillary sinusitis  J32.0       6. DNS (deviated nasal septum)  J34.2       7. Nasal turbinate hypertrophy  J34.3             NO ibu or NSAIDS    The risks and complications of bilateral endoscopic sinus surgery, septoplasty, turbinate reduction were openly discussed.  The potential risks include bleeding, general anesthesia, infection, scar formation, need for additional surgery, septal perforation, and rarely injury to the eye with the possibility of blindness,  injury to the brain, meningitis or other intracranial complications.  Nonsurgical options were discussed including prolonged antibioitics and/or oral and topical steroids.   Sinus anatomy and sinus disease were reviewed.  CT sinus was reviewed with the patient.  All questions were answered.     Max, polypectomy, excision amber  No alondra      Continue all current medications recommended by me or my staff.    Continue budesonide irrigations.  Verbal and written education on use provided.    The importance of budesonide irrigations postoperatively was reinforced.    The correct use of nasal irrigations as prescribed will prevent synechiae and allow for proper recovery of the sinus mucosa.       Risks of oral steroid use were discussed and include  psychiatric/mood changes, insomnia, stomach ulcers and potential GI bleeding, blood sugar elevation/worsening diabetes, hip/bone necrosis called avascular necrosis, or bone demineralization.          Daniella Schmitt D.O.  Otolaryngology/Head and Neck Surgery  Allergy              Again, thank you for allowing me to participate in the care of your patient.        Sincerely,        Daniella Schmitt MD

## 2023-06-29 DIAGNOSIS — F41.9 ANXIETY: ICD-10-CM

## 2023-06-29 RX ORDER — SERTRALINE HYDROCHLORIDE 100 MG/1
TABLET, FILM COATED ORAL
Qty: 90 TABLET | Refills: 3 | Status: SHIPPED | OUTPATIENT
Start: 2023-06-29 | End: 2024-07-09

## 2023-08-26 DIAGNOSIS — K21.9 GASTROESOPHAGEAL REFLUX DISEASE WITHOUT ESOPHAGITIS: ICD-10-CM

## 2023-08-28 RX ORDER — OMEPRAZOLE 40 MG/1
CAPSULE, DELAYED RELEASE ORAL
Qty: 90 CAPSULE | Refills: 0 | Status: SHIPPED | OUTPATIENT
Start: 2023-08-28 | End: 2023-11-16

## 2023-09-06 ENCOUNTER — MYC MEDICAL ADVICE (OUTPATIENT)
Dept: FAMILY MEDICINE | Facility: OTHER | Age: 33
End: 2023-09-06

## 2023-09-06 NOTE — TELEPHONE ENCOUNTER
Pt called and has some swelling left neck-thyroid, per pt the dentist noticed it about a month ago.  Per pt she didn't even realize it was swollen.  Pt denies any SOB or difficulty breathing.  Pt scheduled with a covering provider tomorrow.

## 2023-09-07 ENCOUNTER — OFFICE VISIT (OUTPATIENT)
Dept: FAMILY MEDICINE | Facility: OTHER | Age: 33
End: 2023-09-07
Attending: NURSE PRACTITIONER
Payer: COMMERCIAL

## 2023-09-07 VITALS
TEMPERATURE: 99 F | OXYGEN SATURATION: 97 % | WEIGHT: 233.4 LBS | HEART RATE: 71 BPM | BODY MASS INDEX: 36.56 KG/M2 | SYSTOLIC BLOOD PRESSURE: 136 MMHG | RESPIRATION RATE: 20 BRPM | DIASTOLIC BLOOD PRESSURE: 86 MMHG

## 2023-09-07 DIAGNOSIS — R22.1 MASS OF NECK: Primary | ICD-10-CM

## 2023-09-07 LAB
BASOPHILS # BLD AUTO: 0.1 10E3/UL (ref 0–0.2)
BASOPHILS NFR BLD AUTO: 1 %
EOSINOPHIL # BLD AUTO: 0.3 10E3/UL (ref 0–0.7)
EOSINOPHIL NFR BLD AUTO: 6 %
ERYTHROCYTE [DISTWIDTH] IN BLOOD BY AUTOMATED COUNT: 13.6 % (ref 10–15)
HCT VFR BLD AUTO: 43.5 % (ref 35–47)
HGB BLD-MCNC: 14.2 G/DL (ref 11.7–15.7)
IMM GRANULOCYTES # BLD: 0 10E3/UL
IMM GRANULOCYTES NFR BLD: 0 %
LYMPHOCYTES # BLD AUTO: 1.9 10E3/UL (ref 0.8–5.3)
LYMPHOCYTES NFR BLD AUTO: 41 %
MCH RBC QN AUTO: 28.1 PG (ref 26.5–33)
MCHC RBC AUTO-ENTMCNC: 32.6 G/DL (ref 31.5–36.5)
MCV RBC AUTO: 86 FL (ref 78–100)
MONOCYTES # BLD AUTO: 0.6 10E3/UL (ref 0–1.3)
MONOCYTES NFR BLD AUTO: 12 %
NEUTROPHILS # BLD AUTO: 2 10E3/UL (ref 1.6–8.3)
NEUTROPHILS NFR BLD AUTO: 41 %
PLATELET # BLD AUTO: 259 10E3/UL (ref 150–450)
RBC # BLD AUTO: 5.05 10E6/UL (ref 3.8–5.2)
T4 FREE SERPL-MCNC: 0.96 NG/DL (ref 0.9–1.7)
TSH SERPL DL<=0.005 MIU/L-ACNC: 1.88 UIU/ML (ref 0.3–4.2)
WBC # BLD AUTO: 4.8 10E3/UL (ref 4–11)

## 2023-09-07 PROCEDURE — 36415 COLL VENOUS BLD VENIPUNCTURE: CPT | Performed by: NURSE PRACTITIONER

## 2023-09-07 PROCEDURE — 84439 ASSAY OF FREE THYROXINE: CPT | Performed by: NURSE PRACTITIONER

## 2023-09-07 PROCEDURE — 84443 ASSAY THYROID STIM HORMONE: CPT | Performed by: NURSE PRACTITIONER

## 2023-09-07 PROCEDURE — 85025 COMPLETE CBC W/AUTO DIFF WBC: CPT | Performed by: NURSE PRACTITIONER

## 2023-09-07 PROCEDURE — 99212 OFFICE O/P EST SF 10 MIN: CPT | Performed by: NURSE PRACTITIONER

## 2023-09-07 RX ORDER — METFORMIN HCL 500 MG
1000 TABLET, EXTENDED RELEASE 24 HR ORAL
Status: ON HOLD | COMMUNITY
Start: 2023-07-20 | End: 2024-01-03

## 2023-09-07 ASSESSMENT — ENCOUNTER SYMPTOMS
GASTROINTESTINAL NEGATIVE: 1
PSYCHIATRIC NEGATIVE: 1
PALPITATIONS: 0
SHORTNESS OF BREATH: 0
COUGH: 0
EYES NEGATIVE: 1
ACTIVITY CHANGE: 0
TROUBLE SWALLOWING: 0
CHOKING: 0
APPETITE CHANGE: 0
ENDOCRINE NEGATIVE: 1

## 2023-09-07 ASSESSMENT — ANXIETY QUESTIONNAIRES
GAD7 TOTAL SCORE: 4
5. BEING SO RESTLESS THAT IT IS HARD TO SIT STILL: NOT AT ALL
4. TROUBLE RELAXING: SEVERAL DAYS
7. FEELING AFRAID AS IF SOMETHING AWFUL MIGHT HAPPEN: NOT AT ALL
6. BECOMING EASILY ANNOYED OR IRRITABLE: NOT AT ALL
1. FEELING NERVOUS, ANXIOUS, OR ON EDGE: SEVERAL DAYS
3. WORRYING TOO MUCH ABOUT DIFFERENT THINGS: SEVERAL DAYS
GAD7 TOTAL SCORE: 4
2. NOT BEING ABLE TO STOP OR CONTROL WORRYING: SEVERAL DAYS
IF YOU CHECKED OFF ANY PROBLEMS ON THIS QUESTIONNAIRE, HOW DIFFICULT HAVE THESE PROBLEMS MADE IT FOR YOU TO DO YOUR WORK, TAKE CARE OF THINGS AT HOME, OR GET ALONG WITH OTHER PEOPLE: SOMEWHAT DIFFICULT

## 2023-09-07 ASSESSMENT — PATIENT HEALTH QUESTIONNAIRE - PHQ9: SUM OF ALL RESPONSES TO PHQ QUESTIONS 1-9: 1

## 2023-09-07 ASSESSMENT — PAIN SCALES - GENERAL: PAINLEVEL: NO PAIN (0)

## 2023-09-07 NOTE — PROGRESS NOTES
"  Assessment & Plan     Mass of neck  Consistent with left sided thyroid nodule. Obtaining US to confirm.   - CBC with platelets and differential; Future  - US Thyroid; Future  - TSH; Future  - T4, free; Future    Ordering of each unique test  No LOS data to display   Time spent by me doing chart review, history and exam, documentation and further activities per the note       BMI:   Estimated body mass index is 36.56 kg/m  as calculated from the following:    Height as of 5/1/23: 1.702 m (5' 7\").    Weight as of this encounter: 105.9 kg (233 lb 6.4 oz).   Weight management plan: working with weight management.        No follow-ups on file.    Sheree Edwards, CNP  [facility]   Shilpi is a 33 year old, presenting for the following health issues:  No chief complaint on file.      HPI     Concern - swelling left side of neck   Onset: x 2 months ago. Dentist pointed it out to her.   Description: swelling  Intensity: no pain   Progression of Symptoms:  same  Accompanying Signs & Symptoms: none   Previous history of similar problem: no  Precipitating factors:        Worsened by: none  Alleviating factors:        Improved by: none  Therapies tried and outcome: None    ENT note from June reviewed and no abnormality noted. Shilpi reports it started growing within past 2 months.         Review of Systems   Constitutional:  Negative for activity change and appetite change.        Is undergoing weight management. No significant unexpected changes in wt or appetite.    HENT:  Negative for trouble swallowing.         See HPI   Eyes: Negative.    Respiratory:  Negative for cough, choking and shortness of breath.    Cardiovascular:  Negative for chest pain and palpitations.   Gastrointestinal: Negative.    Endocrine: Negative.    Psychiatric/Behavioral: Negative.        ise noted.      Objective    /86 (BP Location: Right arm, Patient Position: Sitting, Cuff Size: Adult Regular)   Pulse " 71   Temp 99  F (37.2  C) (Tympanic)   Resp 20   Wt 105.9 kg (233 lb 6.4 oz)   SpO2 97%   BMI 36.56 kg/m    Body mass index is 36.56 kg/m .    Physical Exam   GENERAL: healthy, alert and no distress  NECK: visible left sided mass to thyroid area approximately 3 cm diameter. Mass is diffuse and with no increased density on palpation.  No palpable mass to right side of thyroid.

## 2023-09-12 ENCOUNTER — HOSPITAL ENCOUNTER (OUTPATIENT)
Dept: ULTRASOUND IMAGING | Facility: HOSPITAL | Age: 33
Discharge: HOME | End: 2023-09-12
Attending: NURSE PRACTITIONER | Admitting: NURSE PRACTITIONER
Payer: COMMERCIAL

## 2023-09-12 DIAGNOSIS — R22.1 MASS OF NECK: ICD-10-CM

## 2023-09-12 PROCEDURE — 76536 US EXAM OF HEAD AND NECK: CPT

## 2023-09-13 DIAGNOSIS — E04.1 LEFT THYROID NODULE: Primary | ICD-10-CM

## 2023-09-13 NOTE — PROGRESS NOTES
1. Left thyroid nodule  Chart cc'd to Dr. Schmitt for review. Normal TSH, FreeT4, and CBC  - Adult ENT  Referral; Future

## 2023-09-14 ENCOUNTER — TELEPHONE (OUTPATIENT)
Dept: OTOLARYNGOLOGY | Facility: OTHER | Age: 33
End: 2023-09-14

## 2023-09-14 DIAGNOSIS — E04.1 LEFT THYROID NODULE: Primary | ICD-10-CM

## 2023-09-20 ENCOUNTER — HOSPITAL ENCOUNTER (OUTPATIENT)
Dept: CT IMAGING | Facility: HOSPITAL | Age: 33
Discharge: HOME | End: 2023-09-20
Attending: OTOLARYNGOLOGY | Admitting: OTOLARYNGOLOGY
Payer: COMMERCIAL

## 2023-09-20 DIAGNOSIS — E04.1 LEFT THYROID NODULE: ICD-10-CM

## 2023-09-20 PROCEDURE — 250N000011 HC RX IP 250 OP 636: Performed by: OTOLARYNGOLOGY

## 2023-09-20 PROCEDURE — 70491 CT SOFT TISSUE NECK W/DYE: CPT

## 2023-09-20 RX ORDER — IOPAMIDOL 755 MG/ML
75 INJECTION, SOLUTION INTRAVASCULAR ONCE
Status: COMPLETED | OUTPATIENT
Start: 2023-09-20 | End: 2023-09-20

## 2023-09-20 RX ADMIN — IOPAMIDOL 75 ML: 755 INJECTION, SOLUTION INTRAVENOUS at 16:14

## 2023-09-20 NOTE — PATIENT INSTRUCTIONS
"Postoperative Discharge Instructions for Partial Thyroidectomy  Activity  Most patients are able to return to a full time work schedule in 1-2 weeks; however, this may vary according to your job. It may take longer to return to heavy physical or other demanding wirk, or shorter if you are feeling well  Do not drive a car until you are able to turn the neck side to side, which may take 1-2 weeks  Do not drive while you are taking pain medications  No heavy lifting over 10 pounds or  heavy exercising for 2 weeks    Diet  You may have temporary throat discomfort or difficulty swallowing. This is due to the surgery around your larynx (voice box) and esophagus (swallowing tube)  Drink and eat foods that can be swallowed easily, e.g. juice, soup, gelatin, applesauce  You may be able to return to your usual diet in a couple of days    Incision Care  Please leave the steri strips in place on you incision and allow them to fall off on their own. If they have not fallen off in 10-14 days, you may remove them yourself  You may shower 48 hours after surgery, but keep the incision dry. Please do not swim or soak in a tub for atleast 2 weeks. After you are done showering, just pat your incision dry. If it is draining, replace the pressure dressing and wear as much as possible for 1 week. Do not scrub with soap or washcloth for the first 10 days  Mild swelling at the incision site will go away in 4-6 weeks. The pink line will slowly fade to white during the next 6-12 months  Use a sunscreen (SPF #30 or higher) or wear a scarf for protection if in the sun for the first 6 months to 1 year as the sun may darken your scar  You may begin to use a hypoallergenic moisturizing cream (No Vitamin E, Mederma or other \"scar\" creams) along incision after 2 weeks    Common Problems  Numbness of the skin under the chin or above the incision is normal and should go away in a few weeks  You may feel a lump or pressure in your throat when " swallowing for a few days  Your incision may feel itchy while it heals. Avoid rubbing or scratching if possible  You may feel neck stiffness, tightness, a pulling feeling, mild aching, chest discomfort, headache, ear pain or congestion. Take a mild pain medicine such as Tylenol or Advil. Put heat on the area using a hot water bottle, heating pad or warm shower  Some people prefer to sleep with an extra pillow for the first few days after the surgery, this helps keep swelling around your incision to a minimum  Your voice may be hoarse or weak. Pitch or tone may change. You may have difficulty singing. This usually goes back to normal over 6 weeks to 6 months  After surgery, you may notice a change in your mood, emotional ups and downs, depression, irritabilty or fatigue and weakness. These changes will get better as time passes  You do not need to be on bed rest, being active is normally well tolerated within reason    Hormones and Medications  Most of the time after only half the thyroid has been removed no thyroid hormone supplementation or additional calcium is needed. However, there are exceptions and if your physician/surgeon has asked that you take thyroid hormone or additional calcium, please follow their instructions  If you were taking thyroid hormone before one half of your thyroid was removed with this most recent surgery then take your thyroid hormone (e.g. Synthroid, Levoxyl, Cytomel) as prescribed when you go home. These medications are identical to the hormone made by the thyroid. Calcium and thyroid hormone should be taken 1-2 hours apart. Thyroid medication should be taken on an empty stomach  Please resume your pre-hospital medications. You should follow up with your primary care physician regarding new prescriptions and refills  We will supply you with a prescription for a mild narcotic pain medication. You are not required to take it. If you do take it, please do not drive or drink alcohol as  these in combination may make you drowsy. Most patients do not need strong pain medicine by the time you leave the hospital. You can take Tylenol and if your pain is not controlled take the pain medication you were sent home with after surgery. Make sure to complete the prescribed antibiotic  If you are taking supplemental calcium or narcotics, you may also want to take a stool softener, such as OTC Colace or Senna to avoid constipation. Stay hydrated by drinking some extra water  When to Call a Doctor  For any non urgent questions, call your doctor's office or the nursing unit where you were a patient  Call your doctor or go the the Emergency Room if you have a fever (Temperature greater than 100.5), chills, lightheadedness, shortness of breath, difficulty breathing, nausea, vomiting, numbness or tingling in your fingers, hands or mouth, muscle spasms, or if you notice signs of wound infection (redness, tenderness, or drainage from the incision). Please call or go the the Emergency Room if you have any other urgent concerns  Follow-up Care  Please call your doctor to schedule a follow up appointment if you have not already done so to set one up for approximately 1-3 weeks after surgery    [facility]  ENT Department  Dr. Daniella Schmitt  [phone]      Thank you for allowing Dr. Schmitt and our ENT team to participate in your care.  If your medications are too expensive, please give the nurse a call.  We can possibly change this medication.  If you have a scheduling or an appointment question please contact our Health Unit Coordinator at their direct line [phone].   ALL nursing questions or concerns can be directed to your ENT nurse, Crow, at: [phone]

## 2023-09-20 NOTE — PROGRESS NOTES
Otolaryngology Consultation    Patient: Shilpi Moore  : 1990    Patient presents with:  Ent Problem: Left thyroid nodule.  Referred by, Sheree Edwards.      HPI:  Shilpi Moore is a 33 year old female seen today at the request of Tiana Tucker  for evaluation of a thyroid nodule.    This nodule was found incidentally at [facility]  The patient denies dysphonia, dysphagia or any other compressive symptoms.  There is no family history of thyroid cancer, and no personal history of head or neck irradiation.    She has lost over 20 pounds in the last 6 months.  She is been deliberately losing weight after her child's delivery.    The thyroid ultrasound was personally reviewed dated 2023  The dominant nodule(s) measures 6.1 cm, solid LEFT nodule    FNAB  pending    CT neck dated 2023 is negative for lymphadenopathy.  Large left thyroid nodule measures 5.9 x 4.9 x 2.9 cm and has internal calcifications with right tracheal deviation    Thyroid labs were reviewed.  TSH 1.88 on .    Calcum 9.1 on     The patient denies tremor, hair loss or weight loss.       I formally saw Shilpi on 2023 for chronic recurrent sinusitis with nasal polyps in the setting of aspirin exacerbated respiratory disease.  Bilateral endoscopic sinus surgery septoplasty and turbinate reduction were discussed.  Surgery date pending in October    Her sinus disease is stable no new complaints.  She has chronic bilateral aural fullness.  History of TMJ and wears an oral appliance.    Current Outpatient Rx   Medication Sig Dispense Refill    ALBUTEROL IN       budesonide (PULMICORT) 0.5 MG/2ML neb solution Mix 240 ml Ken Med Sinus rinse, add 0.5 mg budesonide vial, rinse 1/2 bottle in each nostril twice daily 120 mL 4    metFORMIN (GLUCOPHAGE XR) 500 MG 24 hr tablet Take 1,000 mg by mouth daily (with breakfast)      omeprazole (PRILOSEC) 40 MG DR capsule TAKE 1 CAPSULE BY MOUTH EVERY DAY 30 TO 60 MINUTES BEFORE A MEAL 90 capsule 0  "   predniSONE (DELTASONE) 10 MG tablet Take 3 tabs after breakfast starting 2 days prior to sinus surgery 6 tablet 1    sertraline (ZOLOFT) 100 MG tablet TAKE ONE-HALF TABLET BY MOUTH TWICE DAILY 90 tablet 3    VITAMIN D PO          Allergies: Ibuprofen and Nsaids     Past Medical History:   Diagnosis Date    Allergic rhinitis 6/15/2011    Anxiety 9/17/2019    Benign essential hypertension 4/27/2017    Gastroesophageal reflux disease without esophagitis 9/21/2018    Major depressive disorder, recurrent episode, unspecified 6/15/2011    Obesity 4/27/2017    Reactive airway disease that is not asthma 2/12/2018    Seasonal allergic rhinitis due to pollen 4/27/2017    Unspecified vitamin D deficiency 4/11/2014       Past Surgical History:   Procedure Laterality Date    CHOLECYSTECTOMY, LAPOROSCOPIC N/A 09/27/2020    [facility].    ENT SURGERY      tonsillectomy    OTHER SURGICAL HISTORY      I&D rt abscess axilla    SOFT TISSUE SURGERY      IND cyst right axilla       ENT family history reviewed    Social History     Tobacco Use    Smoking status: Never    Smokeless tobacco: Never    Tobacco comments:     Passive exposure   Substance Use Topics    Alcohol use: Yes     Comment: Beer; Occasionally    Drug use: No       Review of Systems  ROS: 10 point ROS neg other than the symptoms noted above in the HPI jaw pain, headache    Physical Exam  /88   Pulse 75   Temp 98.4  F (36.9  C) (Tympanic)   Ht 1.702 m (5' 7\")   Wt 103 kg (227 lb)   SpO2 98%   BMI 35.55 kg/m      General - The patient is well nourished and well developed, and appears to have good nutritional status.  Alert and oriented to person and place, answers questions and cooperates with examination appropriately.   Head and Face - Normocephalic and atraumatic, with no gross asymmetry noted.  The facial nerve is intact, with strong symmetric movements.  Voice and Breathing - The patient was breathing comfortably without the use of accessory " muscles. There was no wheezing, stridor, or stertor.  The patients voice was clear and strong, and had appropriate pitch and quality.  Ears -The external auditory canals are patent, the tympanic membranes are intact without effusion, retraction or mass.  Bony landmarks are intact.  Bilateral TMJ crepitus and click with class 2 oclusion  Eyes - Extraocular movements intact, and the pupils were reactive to light.  Sclera were not icteric or injected, conjunctiva were pink and moist.  Mouth - Examination of the oral cavity showed pink, healthy oral mucosa. No lesions or ulcerations noted.  The tongue was mobile and midline, and the dentition were in good condition.    Throat - The walls of the oropharynx were smooth, pink, moist, symmetric, and had no lesions or ulcerations.  The tonsillar pillars and soft palate were symmetric.  The uvula was midline on elevation.    Neck - Normal midline excursion of the laryngotracheal complex during swallowing.  Full range of motion on passive movement.  Palpation of the occipital, submental, submandibular, internal jugular chain, and supraclavicular nodes did not demonstrate any abnormal lymph nodes or masses.  Visible left nodule. Palpation of the thyroid shows a large firm LEFT nodule measuring 6 cm, mobile.  No palpable right thyroid nodule.    The trachea was deviated right.    Nose - External contour is symmetric, no gross deflection or scars.  Nasal mucosa is pink and moist with no abnormal mucus.  The septum was deviated right, 3+ polypoid turbinates.   No polyps, masses, or purulence noted on examination.    Attempts at mirror laryngoscopy were not possible due to gag reflex.  Therefore I proceeded with a fiberoptic examination after informed consent.  First I sprayed both sides of the nose with a mixture of lidocaine and neosynephrine.  I then passed the scope through the nasal cavity.  The nasal cavity was remarkable for polypoid middle turbinates and viscous mucous.   The nasopharynx was mucosally covered and symmetric.  The eustachian tube openings were unobstructed.  Going further, the base of tongue was free of lesions, and the vallecula was open.  The epiglottis was smooth and mucosally covered.  The supraglottic larynx was then clearly visualized.  The vocal cords moved smoothly and symmetrically.  The pyriform sinuses were open and without bonita mass or pooling of secretions, and the limited view of the postcricoid region did not show any lesions.  The patient tolerated the procedure well.        Impression and Plan- Shilpi Moore is a 33 year old female with:    ICD-10-CM    1. Chronic pansinusitis  J32.4       2. Left thyroid nodule  E04.1 Adult ENT  Referral     Calcium      3. TMJ (temporomandibular joint syndrome)  M26.609       4. Ear fullness, bilateral  H93.8X3           FNAB pending:  addendum LEFT FNAB benign.    inal Diagnosis   A.  Thyroid, left lobe nodule, ultrasound-guided fine-needle aspiration:  -BENIGN.  -Benign-appearing follicular cells and abundant colloid, consistent with benign follicular nodule (includes    adenomatoid, hyperplastic or colloid nodule).       Serum calcium:  normal 9.0  TSH:  9/7, 1.88    Thyroid anatomy was discussed, and thyroid surgery was discussed.      The next step is obtaining cellular material to decide whether or not surgery may be necessary, or clinical surveillance with repeat ultrasounds and clinical follow up.   US guided  fine needle aspiration is pending.    The possibility of inadequate cellular material or non-diagnostic results, and the possibility of repeat fine needle aspiration was discussed with the patient    Due to the size of the nodule and concern for false negative fine-needle aspiration I recommend:  LEFT thyroid lobectomy, frozens, possible total     Risks and complications including anesthesia, bleeding, carotid or other major vascular injury, infection, injury to the recurrent laryngeal  nerve and resulting hoarseness or change in voice, calcium metabolism problems, scar formation: hypertrophic or keloid, benign versus malignant pathology and need for further surgery.  All questions were answered and wishes are proceed with surgery.    I told Shilpi she may need to stay 1 or more nights in the hospital depending on postop calcium levels if a total thyroidectomy is necessary.    Continue budesonide irrigations  Sinus surgery will be rescheduled in the future after she recovers from thyroidectomy      Daniella Schmitt D.O.  Otolaryngology/Head and Neck Surgery  Allergy

## 2023-09-21 ENCOUNTER — TELEPHONE (OUTPATIENT)
Dept: INTERVENTIONAL RADIOLOGY/VASCULAR | Facility: HOSPITAL | Age: 33
End: 2023-09-21

## 2023-09-21 ENCOUNTER — PREP FOR PROCEDURE (OUTPATIENT)
Dept: OTOLARYNGOLOGY | Facility: OTHER | Age: 33
End: 2023-09-21

## 2023-09-21 ENCOUNTER — OFFICE VISIT (OUTPATIENT)
Dept: OTOLARYNGOLOGY | Facility: OTHER | Age: 33
End: 2023-09-21
Attending: OTOLARYNGOLOGY
Payer: COMMERCIAL

## 2023-09-21 VITALS
SYSTOLIC BLOOD PRESSURE: 128 MMHG | OXYGEN SATURATION: 98 % | WEIGHT: 227 LBS | BODY MASS INDEX: 35.63 KG/M2 | TEMPERATURE: 98.4 F | HEART RATE: 75 BPM | HEIGHT: 67 IN | DIASTOLIC BLOOD PRESSURE: 88 MMHG

## 2023-09-21 DIAGNOSIS — E04.1 LEFT THYROID NODULE: ICD-10-CM

## 2023-09-21 DIAGNOSIS — H93.8X3 EAR FULLNESS, BILATERAL: ICD-10-CM

## 2023-09-21 DIAGNOSIS — J32.4 CHRONIC PANSINUSITIS: Primary | ICD-10-CM

## 2023-09-21 DIAGNOSIS — M26.609 TMJ (TEMPOROMANDIBULAR JOINT SYNDROME): ICD-10-CM

## 2023-09-21 PROCEDURE — 31575 DIAGNOSTIC LARYNGOSCOPY: CPT | Performed by: OTOLARYNGOLOGY

## 2023-09-21 PROCEDURE — 99214 OFFICE O/P EST MOD 30 MIN: CPT | Mod: 25 | Performed by: OTOLARYNGOLOGY

## 2023-09-21 RX ORDER — NICOTINE POLACRILEX 4 MG
15-30 LOZENGE BUCCAL
Status: CANCELLED | OUTPATIENT
Start: 2023-09-21

## 2023-09-21 RX ORDER — SODIUM CHLORIDE 9 MG/ML
INJECTION, SOLUTION INTRAVENOUS CONTINUOUS PRN
Status: CANCELLED | OUTPATIENT
Start: 2023-09-21

## 2023-09-21 RX ORDER — DEXTROSE MONOHYDRATE 25 G/50ML
25-50 INJECTION, SOLUTION INTRAVENOUS
Status: CANCELLED | OUTPATIENT
Start: 2023-09-21

## 2023-09-21 RX ORDER — LIDOCAINE HYDROCHLORIDE 10 MG/ML
10 INJECTION, SOLUTION EPIDURAL; INFILTRATION; INTRACAUDAL; PERINEURAL ONCE
Status: CANCELLED | OUTPATIENT
Start: 2023-09-21 | End: 2023-09-21

## 2023-09-21 RX ORDER — LIDOCAINE 40 MG/G
CREAM TOPICAL
Status: CANCELLED | OUTPATIENT
Start: 2023-09-21

## 2023-09-21 ASSESSMENT — PAIN SCALES - GENERAL: PAINLEVEL: NO PAIN (0)

## 2023-09-21 NOTE — LETTER
2023         RE: Shilpi Moore  [address] [facility] [address]        Dear Colleague,    Thank you for referring your patient, Shilpi Moore, to the [facility]. Please see a copy of my visit note below.      Otolaryngology Consultation    Patient: Shilpi Moore  : 1990    Patient presents with:  Ent Problem: Left thyroid nodule.  Referred by, Sheree Edwards.      HPI:  Shilpi Moore is a 33 year old female seen today at the request of Tiana Tucker  for evaluation of a thyroid nodule.    This nodule was found incidentally at [facility]  The patient denies dysphonia, dysphagia or any other compressive symptoms.  There is no family history of thyroid cancer, and no personal history of head or neck irradiation.    She has lost over 20 pounds in the last 6 months.  She is been deliberately losing weight after her child's delivery.    The thyroid ultrasound was personally reviewed dated 2023  The dominant nodule(s) measures 6.1 cm, solid LEFT nodule    FNAB  pending    CT neck dated 2023 is negative for lymphadenopathy.  Large left thyroid nodule measures 5.9 x 4.9 x 2.9 cm and has internal calcifications with right tracheal deviation    Thyroid labs were reviewed.  TSH 1.88 on .    Calcum 9.1 on     The patient denies tremor, hair loss or weight loss.       I formally saw Shilpi on 2023 for chronic recurrent sinusitis with nasal polyps in the setting of aspirin exacerbated respiratory disease.  Bilateral endoscopic sinus surgery septoplasty and turbinate reduction were discussed.  Surgery date pending in October    Her sinus disease is stable no new complaints.  She has chronic bilateral aural fullness.  History of TMJ and wears an oral appliance.    Current Outpatient Rx   Medication Sig Dispense Refill     ALBUTEROL IN        budesonide (PULMICORT) 0.5 MG/2ML neb solution Mix 240 ml Ken Med Sinus rinse, add 0.5 mg budesonide vial, rinse 1/2 bottle  "in each nostril twice daily 120 mL 4     metFORMIN (GLUCOPHAGE XR) 500 MG 24 hr tablet Take 1,000 mg by mouth daily (with breakfast)       omeprazole (PRILOSEC) 40 MG DR capsule TAKE 1 CAPSULE BY MOUTH EVERY DAY 30 TO 60 MINUTES BEFORE A MEAL 90 capsule 0     predniSONE (DELTASONE) 10 MG tablet Take 3 tabs after breakfast starting 2 days prior to sinus surgery 6 tablet 1     sertraline (ZOLOFT) 100 MG tablet TAKE ONE-HALF TABLET BY MOUTH TWICE DAILY 90 tablet 3     VITAMIN D PO          Allergies: Ibuprofen and Nsaids     Past Medical History:   Diagnosis Date     Allergic rhinitis 6/15/2011     Anxiety 9/17/2019     Benign essential hypertension 4/27/2017     Gastroesophageal reflux disease without esophagitis 9/21/2018     Major depressive disorder, recurrent episode, unspecified 6/15/2011     Obesity 4/27/2017     Reactive airway disease that is not asthma 2/12/2018     Seasonal allergic rhinitis due to pollen 4/27/2017     Unspecified vitamin D deficiency 4/11/2014       Past Surgical History:   Procedure Laterality Date     CHOLECYSTECTOMY, LAPOROSCOPIC N/A 09/27/2020    [facility].     ENT SURGERY      tonsillectomy     OTHER SURGICAL HISTORY      I&D rt abscess axilla     SOFT TISSUE SURGERY      IND cyst right axilla       ENT family history reviewed    Social History     Tobacco Use     Smoking status: Never     Smokeless tobacco: Never     Tobacco comments:     Passive exposure   Substance Use Topics     Alcohol use: Yes     Comment: Beer; Occasionally     Drug use: No       Review of Systems  ROS: 10 point ROS neg other than the symptoms noted above in the HPI jaw pain, headache    Physical Exam  /88   Pulse 75   Temp 98.4  F (36.9  C) (Tympanic)   Ht 1.702 m (5' 7\")   Wt 103 kg (227 lb)   SpO2 98%   BMI 35.55 kg/m      General - The patient is well nourished and well developed, and appears to have good nutritional status.  Alert and oriented to person and place, answers questions and " cooperates with examination appropriately.   Head and Face - Normocephalic and atraumatic, with no gross asymmetry noted.  The facial nerve is intact, with strong symmetric movements.  Voice and Breathing - The patient was breathing comfortably without the use of accessory muscles. There was no wheezing, stridor, or stertor.  The patients voice was clear and strong, and had appropriate pitch and quality.  Ears -The external auditory canals are patent, the tympanic membranes are intact without effusion, retraction or mass.  Bony landmarks are intact.  Bilateral TMJ crepitus and click with class 2 oclusion  Eyes - Extraocular movements intact, and the pupils were reactive to light.  Sclera were not icteric or injected, conjunctiva were pink and moist.  Mouth - Examination of the oral cavity showed pink, healthy oral mucosa. No lesions or ulcerations noted.  The tongue was mobile and midline, and the dentition were in good condition.    Throat - The walls of the oropharynx were smooth, pink, moist, symmetric, and had no lesions or ulcerations.  The tonsillar pillars and soft palate were symmetric.  The uvula was midline on elevation.    Neck - Normal midline excursion of the laryngotracheal complex during swallowing.  Full range of motion on passive movement.  Palpation of the occipital, submental, submandibular, internal jugular chain, and supraclavicular nodes did not demonstrate any abnormal lymph nodes or masses.  Visible left nodule. Palpation of the thyroid shows a large firm LEFT nodule measuring 6 cm, mobile.  No palpable right thyroid nodule.    The trachea was deviated right.    Nose - External contour is symmetric, no gross deflection or scars.  Nasal mucosa is pink and moist with no abnormal mucus.  The septum was deviated right, 3+ polypoid turbinates.   No polyps, masses, or purulence noted on examination.    Attempts at mirror laryngoscopy were not possible due to gag reflex.  Therefore I proceeded with  a fiberoptic examination after informed consent.  First I sprayed both sides of the nose with a mixture of lidocaine and neosynephrine.  I then passed the scope through the nasal cavity.  The nasal cavity was remarkable for polypoid middle turbinates and viscous mucous.  The nasopharynx was mucosally covered and symmetric.  The eustachian tube openings were unobstructed.  Going further, the base of tongue was free of lesions, and the vallecula was open.  The epiglottis was smooth and mucosally covered.  The supraglottic larynx was then clearly visualized.  The vocal cords moved smoothly and symmetrically.  The pyriform sinuses were open and without bonita mass or pooling of secretions, and the limited view of the postcricoid region did not show any lesions.  The patient tolerated the procedure well.        Impression and Plan- Shilpi Moore is a 33 year old female with:    ICD-10-CM    1. Chronic pansinusitis  J32.4       2. Left thyroid nodule  E04.1 Adult ENT  Referral     Calcium      3. TMJ (temporomandibular joint syndrome)  M26.609       4. Ear fullness, bilateral  H93.8X3           FNAB pending  Serum calcium    Thyroid anatomy was discussed, and thyroid surgery was discussed.      The next step is obtaining cellular material to decide whether or not surgery may be necessary, or clinical surveillance with repeat ultrasounds and clinical follow up.   US guided  fine needle aspiration is pending.    The possibility of inadequate cellular material or non-diagnostic results, and the possibility of repeat fine needle aspiration was discussed with the patient    Due to the size of the nodule and concern for false negative fine-needle aspiration I recommend:  LEFT thyroid lobectomy, frozens, possible total     Risks and complications including anesthesia, bleeding, carotid or other major vascular injury, infection, injury to the recurrent laryngeal nerve and resulting hoarseness or change in voice,  calcium metabolism problems, scar formation: hypertrophic or keloid, benign versus malignant pathology and need for further surgery.  All questions were answered and wishes are proceed with surgery.    I told Shilpi she may need to stay 1 or more nights in the hospital depending on postop calcium levels if a total thyroidectomy is necessary.    Continue budesonide irrigations  Sinus surgery will be rescheduled in the future after she recovers from thyroidectomy      Daniella Schmitt D.O.  Otolaryngology/Head and Neck Surgery  Allergy          Again, thank you for allowing me to participate in the care of your patient.        Sincerely,        Daniella Schmitt MD

## 2023-09-22 ENCOUNTER — TELEPHONE (OUTPATIENT)
Dept: OTOLARYNGOLOGY | Facility: OTHER | Age: 33
End: 2023-09-22

## 2023-09-22 ENCOUNTER — HOSPITAL ENCOUNTER (OUTPATIENT)
Dept: ULTRASOUND IMAGING | Facility: HOSPITAL | Age: 33
Discharge: HOME | End: 2023-09-22
Attending: OTOLARYNGOLOGY | Admitting: RADIOLOGY
Payer: COMMERCIAL

## 2023-09-22 ENCOUNTER — HOSPITAL ENCOUNTER (OUTPATIENT)
Facility: HOSPITAL | Age: 33
Discharge: HOME | End: 2023-09-22
Attending: RADIOLOGY | Admitting: RADIOLOGY
Payer: COMMERCIAL

## 2023-09-22 VITALS — HEART RATE: 68 BPM | OXYGEN SATURATION: 99 % | SYSTOLIC BLOOD PRESSURE: 147 MMHG | DIASTOLIC BLOOD PRESSURE: 98 MMHG

## 2023-09-22 DIAGNOSIS — E04.1 LEFT THYROID NODULE: ICD-10-CM

## 2023-09-22 LAB — CALCIUM SERPL-MCNC: 9 MG/DL (ref 8.6–10)

## 2023-09-22 PROCEDURE — 10005 FNA BX W/US GDN 1ST LES: CPT

## 2023-09-22 PROCEDURE — 88172 CYTP DX EVAL FNA 1ST EA SITE: CPT | Mod: 26 | Performed by: PATHOLOGY

## 2023-09-22 PROCEDURE — 88172 CYTP DX EVAL FNA 1ST EA SITE: CPT | Mod: TC | Performed by: OTOLARYNGOLOGY

## 2023-09-22 PROCEDURE — 88173 CYTOPATH EVAL FNA REPORT: CPT | Mod: 26 | Performed by: PATHOLOGY

## 2023-09-22 PROCEDURE — 36415 COLL VENOUS BLD VENIPUNCTURE: CPT

## 2023-09-22 PROCEDURE — 88305 TISSUE EXAM BY PATHOLOGIST: CPT | Mod: 26 | Performed by: PATHOLOGY

## 2023-09-22 PROCEDURE — 250N000009 HC RX 250: Performed by: RADIOLOGY

## 2023-09-22 PROCEDURE — 82310 ASSAY OF CALCIUM: CPT

## 2023-09-22 RX ORDER — NICOTINE POLACRILEX 4 MG
15-30 LOZENGE BUCCAL
Status: DISCONTINUED | OUTPATIENT
Start: 2023-09-22 | End: 2023-09-23 | Stop reason: HOSPADM

## 2023-09-22 RX ORDER — DEXTROSE MONOHYDRATE 25 G/50ML
25-50 INJECTION, SOLUTION INTRAVENOUS
Status: DISCONTINUED | OUTPATIENT
Start: 2023-09-22 | End: 2023-09-23 | Stop reason: HOSPADM

## 2023-09-22 RX ORDER — ACETAMINOPHEN 500 MG
500-1000 TABLET ORAL EVERY 6 HOURS PRN
COMMUNITY

## 2023-09-22 RX ORDER — SODIUM CHLORIDE 9 MG/ML
INJECTION, SOLUTION INTRAVENOUS CONTINUOUS PRN
Status: DISCONTINUED | OUTPATIENT
Start: 2023-09-22 | End: 2023-09-23 | Stop reason: HOSPADM

## 2023-09-22 RX ORDER — LIDOCAINE 40 MG/G
CREAM TOPICAL
Status: DISCONTINUED | OUTPATIENT
Start: 2023-09-22 | End: 2023-09-23 | Stop reason: HOSPADM

## 2023-09-22 RX ORDER — LIDOCAINE HYDROCHLORIDE 10 MG/ML
10-20 INJECTION, SOLUTION EPIDURAL; INFILTRATION; INTRACAUDAL; PERINEURAL ONCE
Status: COMPLETED | OUTPATIENT
Start: 2023-09-22 | End: 2023-09-22

## 2023-09-22 RX ADMIN — LIDOCAINE HYDROCHLORIDE 2 ML: 10 INJECTION, SOLUTION EPIDURAL; INFILTRATION; INTRACAUDAL; PERINEURAL at 09:50

## 2023-09-22 ASSESSMENT — ACTIVITIES OF DAILY LIVING (ADL): ADLS_ACUITY_SCORE: 35

## 2023-09-22 NOTE — IP AVS SNAPSHOT
[facility]  [address] [place] [id number]  Phone: [phone]                              After Visit Summary   9/22/2023    Shilpi Moore   MRN: [id number]           After Visit Summary Signature Page    I have received my discharge instructions, and my questions have been answered. I have discussed any challenges I see with this plan with the nurse or doctor.    ..........................................................................................................................................  Patient/Patient Representative Signature      ..........................................................................................................................................  Patient Representative Print Name and Relationship to Patient    ..................................................               ................................................  Date                                   Time    ..........................................................................................................................................  Reviewed by Signature/Title    ...................................................              ..............................................  Date                                               Time    22EPIC Rev 08/18

## 2023-09-22 NOTE — PROGRESS NOTES
Procedure: Fine Needle Biopsy, Thyroid, left    There were  no complications and patient has no symptoms..      Tolerated procedure well.    Patient to US.  Consent complete.  Supine on Ultrasound table.      Radiologist: Dr Izaguirre    Time Out: Prior to the start of the procedure and with procedural staff participation, I verbally confirmed the patient s identity using two indicators, relevant allergies, that the procedure was appropriate and matched the consent or emergent situation, and that the correct equipment/implants were available. Immediately prior to starting the procedure I conducted the Time Out with the procedural staff and re-confirmed the patient s name, procedure, and site/side. (The Joint Commission universal protocol was followed.)  Yes    Position:  supine    Pain:  1    Sedation:None. Local Anesthestic used  No sedation    Estimated Blood Loss: Minimal     Condition: Stable    Comments: See dictated procedure note for full details     Federica Jo RN

## 2023-09-22 NOTE — TELEPHONE ENCOUNTER
Spoke with patient via phone advised her calcium level result is within normal range. Patient verbally expressed understanding and had no further questions.

## 2023-09-25 ENCOUNTER — TELEPHONE (OUTPATIENT)
Dept: INTERVENTIONAL RADIOLOGY/VASCULAR | Facility: HOSPITAL | Age: 33
End: 2023-09-25

## 2023-09-25 NOTE — TELEPHONE ENCOUNTER
Patient called to see how she is feeling after her biopsy on Friday. She denies any issues; no pain, no signs or symptoms of infection. Encouraged to call us with any questions or concerns.

## 2023-09-26 LAB
PATH REPORT.COMMENTS IMP SPEC: NORMAL
PATH REPORT.FINAL DX SPEC: NORMAL
PATH REPORT.GROSS SPEC: NORMAL

## 2023-10-12 ENCOUNTER — PREP FOR PROCEDURE (OUTPATIENT)
Dept: OTOLARYNGOLOGY | Facility: OTHER | Age: 33
End: 2023-10-12

## 2023-10-18 ENCOUNTER — OFFICE VISIT (OUTPATIENT)
Dept: FAMILY MEDICINE | Facility: OTHER | Age: 33
End: 2023-10-18
Attending: NURSE PRACTITIONER
Payer: COMMERCIAL

## 2023-10-18 VITALS
HEART RATE: 70 BPM | OXYGEN SATURATION: 98 % | DIASTOLIC BLOOD PRESSURE: 80 MMHG | RESPIRATION RATE: 18 BRPM | TEMPERATURE: 98.1 F | WEIGHT: 233.8 LBS | SYSTOLIC BLOOD PRESSURE: 120 MMHG | BODY MASS INDEX: 36.62 KG/M2

## 2023-10-18 DIAGNOSIS — E04.1 THYROID NODULE: ICD-10-CM

## 2023-10-18 DIAGNOSIS — Z01.818 PRE-OP EXAM: Primary | ICD-10-CM

## 2023-10-18 LAB
ALBUMIN SERPL BCG-MCNC: 4.4 G/DL (ref 3.5–5.2)
ALP SERPL-CCNC: 43 U/L (ref 35–104)
ALT SERPL W P-5'-P-CCNC: 14 U/L (ref 0–50)
ANION GAP SERPL CALCULATED.3IONS-SCNC: 12 MMOL/L (ref 7–15)
AST SERPL W P-5'-P-CCNC: 13 U/L (ref 0–45)
BASO+EOS+MONOS # BLD AUTO: NORMAL 10*3/UL
BASO+EOS+MONOS NFR BLD AUTO: NORMAL %
BASOPHILS # BLD AUTO: 0.1 10E3/UL (ref 0–0.2)
BASOPHILS NFR BLD AUTO: 1 %
BILIRUB SERPL-MCNC: <0.2 MG/DL
BUN SERPL-MCNC: 12.6 MG/DL (ref 6–20)
CALCIUM SERPL-MCNC: 9.4 MG/DL (ref 8.6–10)
CHLORIDE SERPL-SCNC: 102 MMOL/L (ref 98–107)
CREAT SERPL-MCNC: 0.84 MG/DL (ref 0.51–0.95)
DEPRECATED HCO3 PLAS-SCNC: 25 MMOL/L (ref 22–29)
EGFRCR SERPLBLD CKD-EPI 2021: >90 ML/MIN/1.73M2
EOSINOPHIL # BLD AUTO: 0.4 10E3/UL (ref 0–0.7)
EOSINOPHIL NFR BLD AUTO: 5 %
ERYTHROCYTE [DISTWIDTH] IN BLOOD BY AUTOMATED COUNT: 13.7 % (ref 10–15)
GLUCOSE SERPL-MCNC: 89 MG/DL (ref 70–99)
HCG UR QL: NEGATIVE
HCT VFR BLD AUTO: 40.5 % (ref 35–47)
HGB BLD-MCNC: 13.1 G/DL (ref 11.7–15.7)
IMM GRANULOCYTES # BLD: 0 10E3/UL
IMM GRANULOCYTES NFR BLD: 0 %
LYMPHOCYTES # BLD AUTO: 2.7 10E3/UL (ref 0.8–5.3)
LYMPHOCYTES NFR BLD AUTO: 37 %
MCH RBC QN AUTO: 28.6 PG (ref 26.5–33)
MCHC RBC AUTO-ENTMCNC: 32.3 G/DL (ref 31.5–36.5)
MCV RBC AUTO: 88 FL (ref 78–100)
MONOCYTES # BLD AUTO: 0.6 10E3/UL (ref 0–1.3)
MONOCYTES NFR BLD AUTO: 8 %
NEUTROPHILS # BLD AUTO: 3.7 10E3/UL (ref 1.6–8.3)
NEUTROPHILS NFR BLD AUTO: 50 %
PLATELET # BLD AUTO: 313 10E3/UL (ref 150–450)
POTASSIUM SERPL-SCNC: 4.2 MMOL/L (ref 3.4–5.3)
PROT SERPL-MCNC: 6.9 G/DL (ref 6.4–8.3)
RBC # BLD AUTO: 4.58 10E6/UL (ref 3.8–5.2)
SODIUM SERPL-SCNC: 139 MMOL/L (ref 135–145)
WBC # BLD AUTO: 7.4 10E3/UL (ref 4–11)

## 2023-10-18 PROCEDURE — 99214 OFFICE O/P EST MOD 30 MIN: CPT | Performed by: NURSE PRACTITIONER

## 2023-10-18 PROCEDURE — 80053 COMPREHEN METABOLIC PANEL: CPT | Performed by: NURSE PRACTITIONER

## 2023-10-18 PROCEDURE — 36415 COLL VENOUS BLD VENIPUNCTURE: CPT | Performed by: NURSE PRACTITIONER

## 2023-10-18 PROCEDURE — 81025 URINE PREGNANCY TEST: CPT | Performed by: NURSE PRACTITIONER

## 2023-10-18 PROCEDURE — 85025 COMPLETE CBC W/AUTO DIFF WBC: CPT | Performed by: NURSE PRACTITIONER

## 2023-10-18 ASSESSMENT — PAIN SCALES - GENERAL: PAINLEVEL: NO PAIN (0)

## 2023-10-18 NOTE — H&P (VIEW-ONLY)
[facility]  [address]  [facility] [address]  Phone: [phone]  Primary Provider: Tiana Solorzano  Pre-op Performing Provider: TIANA SOLORZANO        PREOPERATIVE EVALUATION:  Today's date: 10/18/2023      Shilpi is a 33 year old female who presents for a preoperative evaluation.      Surgical Information:  Surgery/Procedure: Left thyroid lobectomy, possible total  Surgery Location: [facility]  Surgeon: Dr. Schmitt  Surgery Date: 10/25/2023  Time of Surgery: TBD  Where patient plans to recover: At home with family  Fax number for surgical facility: Note does not need to be faxed, will be available electronically in Epic.          HPI related to upcoming procedure:  Left thyroid nodule        PROCEDURE: US THYROID     HISTORY: Mass of neck     TECHNIQUE: Ultrasound of the thyroid gland was performed.     COMPARISON: None     FINDINGS:      Size: The left lobe is enlarged.     Echogenicity: The background echogenicity is homogenous.     Vascularity: The thyroid gland has normal vascularity.     Nodules: There is a solid isoechoic circumscribed parallel nodule in  the lower and mid left lobe spanning 6.1 x 4.2 x 3.3 cm.                                                                      IMPRESSION:      6.1 cm solid left thyroid nodule. Recommend FNA.     FREDERICK HANCOCK MD           Procedure: US BIOPSY THYROID FINE NEEDLE ASPIRATION     HISTORY:  6.1 cm solid left thyroid nodule; Left thyroid nodule     COMPARISON: Ultrasound 9/12/2023; CT neck 9/20/2023     TECHNIQUE AND FINDINGS:     Targeted sonography over the patient's left thyroid lobe  redemonstrates the 6.1 cm nonspecific nodule. The overlying skin was  marked.     The risks and benefits of ultrasound-guided FNA of a thyroid nodule  were discussed the patient including infection, bleeding and vascular  injury. The patient was understanding and willingness to proceed.  Written informed consent was obtained.  Standard timeout was performed.     The patient was placed supine on the ultrasound table. The skin of the  neck was prepped and draped in standard sterile fashion. 1% lidocaine  without epinephrine was used to anesthetize the skin entry site and  subcutaneous soft tissues down to the thyroid gland. Under sonographic  guidance, a total of 4 25-gauge FNA samples of the nodule were  obtained. Immediate sample adequacy was assessed by pathology. The  procedure was ended after sufficient sampling was obtained. The  patient tolerated the procedure well.                                                                      IMPRESSION:      Technically successful left thyroid nodule FNA without complication.     ILANA SANCHEZ MD           CT SOFT TISSUE NECK W CONTRAST     HISTORY: 33 yearsFemale Left thyroid nodule     TECHNIQUE: Axial CT imaging of the neck was performed with intervenous  contrast. Coronal and sagittal reconstructions were obtained.  This exam was performed using one or more of the following dose  reduction techniques:  Automated exposure control, adjustment of the KAITLIN and/or KV according  to patient's size, and/or use of iterative reconstruction technique.     COMPARISON: Ultrasound 9/12/2023     FINDINGS: Is a large, heterogeneously enhancing nodule of the left  lobe of the thyroid. Internal calcification is present. There is  deviation of the trachea to the right of midline. The nodule measures  5.9 x 4.9 x 2.9 cm in dimension. A graft in the right thyroid is  unremarkable.     Normal-size anterior cervical chain lymph nodes are present. The  parotid glands and submandibular glands are unremarkable.     Lung apices are clear.                                                                      IMPRESSION: Large nodule left lobe of thyroid. FNA is recommended.     SHANA JETER MD               10/12/2023     8:15 AM   Preop Questions   1. Have you ever had a heart attack or stroke? No   2. Have you  ever had surgery on your heart or blood vessels, such as a stent placement, a coronary artery bypass, or surgery on an artery in your head, neck, heart, or legs? No   3. Do you have chest pain with activity? No   4. Do you have a history of  heart failure? No   5. Do you currently have a cold, bronchitis or symptoms of other infection? No   6. Do you have a cough, shortness of breath, or wheezing? No   7. Do you or anyone in your family have previous history of blood clots? No   8. Do you or does anyone in your family have a serious bleeding problem such as prolonged bleeding following surgeries or cuts? No   9. Have you ever had problems with anemia or been told to take iron pills? No   10. Have you had any abnormal blood loss such as black, tarry or bloody stools, or abnormal vaginal bleeding? No   11. Have you ever had a blood transfusion? No   12. Are you willing to have a blood transfusion if it is medically needed before, during, or after your surgery? Yes   13. Have you or any of your relatives ever had problems with anesthesia? No   14. Do you have sleep apnea, excessive snoring or daytime drowsiness? No   15. Do you have any artifical heart valves or other implanted medical devices like a pacemaker, defibrillator, or continuous glucose monitor? No   16. Do you have artificial joints? No   17. Are you allergic to latex? No   18. Is there any chance that you may be pregnant? No         Health Care Directive:  Patient does not have a Health Care Directive or Living Will: Discussed advance care planning with patient; however, patient declined at this time.        Status of Chronic Conditions:  See problem list for active medical problems.  Problems all longstanding and stable, except as noted/documented.  See ROS for pertinent symptoms related to these conditions.        Review of Systems  CONSTITUTIONAL: NEGATIVE for fever, chills, change in weight  INTEGUMENTARY/SKIN: NEGATIVE for worrisome rashes, moles or  lesions  EYES: NEGATIVE for vision changes or irritation  ENT/MOUTH: NEGATIVE for ear, mouth and throat problems  RESP: NEGATIVE for significant cough or SOB  CV: NEGATIVE for chest pain, palpitations or peripheral edema  GI: NEGATIVE for nausea, abdominal pain, heartburn, or change in bowel habits  : NEGATIVE for frequency, dysuria, or hematuria  MUSCULOSKELETAL: NEGATIVE for significant arthralgias or myalgia  NEURO: NEGATIVE for weakness, dizziness or paresthesias  ENDOCRINE: NEGATIVE for temperature intolerance, skin/hair changes  HEME: NEGATIVE for bleeding problems  PSYCHIATRIC: NEGATIVE for changes in mood or affect        Patient Active Problem List    Diagnosis Date Noted    Lactation problem 09/30/2019     Priority: Medium    Anxiety 09/17/2019     Priority: Medium    Gastroesophageal reflux disease without esophagitis 09/21/2018     Priority: Medium    Reactive airway disease that is not asthma 02/12/2018     Priority: Medium     Replacing diagnoses that were inactivated after the 10/1/2021 regulatory import.      Seasonal allergic rhinitis due to pollen 04/27/2017     Priority: Medium    Benign essential hypertension 04/27/2017     Priority: Medium    Morbid obesity (H) 04/27/2017     Priority: Medium    ACP (advance care planning) 06/06/2016     Priority: Medium     Advance Care Planning 6/6/2016: ACP Review of Chart / Resources Provided:  Reviewed chart for advance care plan.  Shilpi Moore has no plan or code status on file. Discussed available resources and provided with information. Confirmed code status reflects current choices pending further ACP discussions.  Confirmed/documented legally designated decision makers.  Added by TRANG LARSON            Vitamin D deficiency 04/11/2014     Priority: Medium    Family history of melanoma 10/13/2011     Priority: Medium          Past Medical History:   Diagnosis Date    Allergic rhinitis 6/15/2011    Anxiety 9/17/2019    Benign essential hypertension  4/27/2017    Gastroesophageal reflux disease without esophagitis 9/21/2018    Major depressive disorder, recurrent episode, unspecified 6/15/2011    Obesity 4/27/2017    Reactive airway disease that is not asthma 2/12/2018    Seasonal allergic rhinitis due to pollen 4/27/2017    Unspecified vitamin D deficiency 4/11/2014         Past Surgical History:   Procedure Laterality Date    CHOLECYSTECTOMY, LAPOROSCOPIC N/A 09/27/2020    .    ENT SURGERY      tonsillectomy    OTHER SURGICAL HISTORY      I&D rt abscess axilla    SOFT TISSUE SURGERY      IND cyst right axilla     Current Outpatient Medications   Medication Sig Dispense Refill    acetaminophen (TYLENOL) 500 MG tablet Take 500-1,000 mg by mouth every 6 hours as needed for mild pain      ALBUTEROL IN       budesonide (PULMICORT) 0.5 MG/2ML neb solution Mix 240 ml Ken Med Sinus rinse, add 0.5 mg budesonide vial, rinse 1/2 bottle in each nostril twice daily 120 mL 4    metFORMIN (GLUCOPHAGE XR) 500 MG 24 hr tablet Take 1,000 mg by mouth daily (with breakfast)      omeprazole (PRILOSEC) 40 MG DR capsule TAKE 1 CAPSULE BY MOUTH EVERY DAY 30 TO 60 MINUTES BEFORE A MEAL 90 capsule 0    predniSONE (DELTASONE) 10 MG tablet Take 3 tabs after breakfast starting 2 days prior to sinus surgery 6 tablet 1    sertraline (ZOLOFT) 100 MG tablet TAKE ONE-HALF TABLET BY MOUTH TWICE DAILY 90 tablet 3    VITAMIN D PO          Allergies   Allergen Reactions    Ibuprofen Anaphylaxis    Nsaids Swelling        Social History     Tobacco Use    Smoking status: Never    Smokeless tobacco: Never    Tobacco comments:     Passive exposure   Substance Use Topics    Alcohol use: Yes     Comment: Beer; Occasionally       Family History   Problem Relation Age of Onset    Asthma Mother     Hypertension Father     Asthma Brother     Asthma Sister     Cancer Maternal Grandfather 62        Mesothelioma, cause of death    Cancer Other 28        Cousin; Melanoma, cause of death     Thyroid Disease No family hx of      History   Drug Use No         Objective     /80 (BP Location: Left arm, Patient Position: Sitting, Cuff Size: Adult Large)   Pulse 70   Temp 98.1  F (36.7  C) (Tympanic)   Resp 18   Wt 106.1 kg (233 lb 12.8 oz)   SpO2 98%   BMI 36.62 kg/m        Physical Exam    GENERAL APPEARANCE: healthy, alert and no distress     EYES: EOMI, PERRL     HENT: ear canals and TM's normal and nose and mouth without ulcers or lesions     NECK: no adenopathy, no asymmetry, masses, or scars and thyroid normal to palpation     RESP: lungs clear to auscultation - no rales, rhonchi or wheezes     CV: regular rates and rhythm, normal S1 S2, no S3 or S4 and no murmur, click or rub     ABDOMEN:  soft, nontender, no HSM or masses and bowel sounds normal     MS: extremities normal- no gross deformities noted, no evidence of inflammation in joints, FROM in all extremities.     SKIN: no suspicious lesions or rashes     NEURO: Normal strength and tone, sensory exam grossly normal, mentation intact and speech normal     PSYCH: mentation appears normal. and affect normal/bright     LYMPHATICS: No cervical adenopathy      Recent Labs   Lab Test 09/07/23  1213 01/17/23  1607   HGB 14.2  --      --    NA  --  140   POTASSIUM  --  3.8   CR  --  0.86          Diagnostics:  Recent Results (from the past 168 hour(s))   Comprehensive metabolic panel    Collection Time: 10/18/23  3:23 PM   Result Value Ref Range    Sodium 139 135 - 145 mmol/L    Potassium 4.2 3.4 - 5.3 mmol/L    Carbon Dioxide (CO2) 25 22 - 29 mmol/L    Anion Gap 12 7 - 15 mmol/L    Urea Nitrogen 12.6 6.0 - 20.0 mg/dL    Creatinine 0.84 0.51 - 0.95 mg/dL    GFR Estimate >90 >60 mL/min/1.73m2    Calcium 9.4 8.6 - 10.0 mg/dL    Chloride 102 98 - 107 mmol/L    Glucose 89 70 - 99 mg/dL    Alkaline Phosphatase 43 35 - 104 U/L    AST 13 0 - 45 U/L    ALT 14 0 - 50 U/L    Protein Total 6.9 6.4 - 8.3 g/dL    Albumin 4.4 3.5 - 5.2 g/dL     Bilirubin Total <0.2 <=1.2 mg/dL   CBC with platelets and differential    Collection Time: 10/18/23  3:23 PM   Result Value Ref Range    WBC Count 7.4 4.0 - 11.0 10e3/uL    RBC Count 4.58 3.80 - 5.20 10e6/uL    Hemoglobin 13.1 11.7 - 15.7 g/dL    Hematocrit 40.5 35.0 - 47.0 %    MCV 88 78 - 100 fL    MCH 28.6 26.5 - 33.0 pg    MCHC 32.3 31.5 - 36.5 g/dL    RDW 13.7 10.0 - 15.0 %    Platelet Count 313 150 - 450 10e3/uL    % Neutrophils 50 %    % Lymphocytes 37 %    % Monocytes 8 %    Mids % (Monos, Eos, Basos)      % Eosinophils 5 %    % Basophils 1 %    % Immature Granulocytes 0 %    Absolute Neutrophils 3.7 1.6 - 8.3 10e3/uL    Absolute Lymphocytes 2.7 0.8 - 5.3 10e3/uL    Absolute Monocytes 0.6 0.0 - 1.3 10e3/uL    Mids Abs (Monos, Eos, Basos)      Absolute Eosinophils 0.4 0.0 - 0.7 10e3/uL    Absolute Basophils 0.1 0.0 - 0.2 10e3/uL    Absolute Immature Granulocytes 0.0 <=0.4 10e3/uL   HCG Qual, Urine ([id number])    Collection Time: 10/18/23  3:28 PM   Result Value Ref Range    hCG Urine Qualitative Negative Negative            Revised Cardiac Risk Index (RCRI):  The patient has the following serious cardiovascular risks for perioperative complications:   - No serious cardiac risks = 0 points     RCRI Interpretation: 0 points: Class I (very low risk - 0.4% complication rate)         Assessment & Plan       The proposed surgical procedure is considered LOW risk.    Pre-op exam    - Comprehensive metabolic panel; Future  - HCG Qual, Urine ([id number]); Future  - CBC with platelets and differential; Future    Thyroid nodule  - See above           - No identified additional risk factors other than previously addressed      Antiplatelet or Anticoagulation Medication Instructions:   - Patient is on no antiplatelet or anticoagulation medications.          Additional Medication Instructions:  Hold all medication the morning of surgery        RECOMMENDATION:  APPROVAL GIVEN to proceed with proposed procedure, without further  diagnostic evaluation.      Signed Electronically by: Tiana Tucker CNP  Copy of this evaluation report is provided to requesting physician.

## 2023-10-18 NOTE — PATIENT INSTRUCTIONS
Assessment & Plan     The proposed surgical procedure is considered LOW risk.  Pre-op exam  - Comprehensive metabolic panel; Future  - HCG Qual, Urine ([id number]); Future  - CBC with platelets and differential; Future    Thyroid nodule  - See above    Preparing for Your Surgery  Getting started  A nurse will call you to review your health history and instructions. They will give you an arrival time based on your scheduled surgery time. Please be ready to share:  Your doctor's clinic name and phone number  Your medical, surgical, and anesthesia history  A list of allergies and sensitivities  A list of medicines, including herbal treatments and over-the-counter drugs  Whether the patient has a legal guardian (ask how to send us the papers in advance)  Please tell us if you're pregnant--or if there's any chance you might be pregnant. Some surgeries may injure a fetus (unborn baby), so they require a pregnancy test. Surgeries that are safe for a fetus don't always need a test, and you can choose whether to have one.   If you have a child who's having surgery, please ask for a copy of Preparing for Your Child's Surgery.    Preparing for surgery  Within 10 to 30 days of surgery: Have a pre-op exam (sometimes called an H&P, or History and Physical). This can be done at a clinic or pre-operative center.  If you're having a , you may not need this exam. Talk to your care team.  At your pre-op exam, talk to your care team about all medicines you take. If you need to stop any medicines before surgery, ask when to start taking them again.  We do this for your safety. Many medicines can make you bleed too much during surgery. Some change how well surgery (anesthesia) drugs work.  Call your insurance company to let them know you're having surgery. (If you don't have insurance, call [phone].)  Call your clinic if there's any change in your health. This includes signs of a cold or flu (sore throat, runny nose, cough,  rash, fever). It also includes a scrape or scratch near the surgery site.  If you have questions on the day of surgery, call your hospital or surgery center.  Eating and drinking guidelines  For your safety: Unless your surgeon tells you otherwise, follow the guidelines below.  Eat and drink as usual until 8 hours before you arrive for surgery. After that, no food or milk.  Drink clear liquids until 2 hours before you arrive. These are liquids you can see through, like water, Gatorade, and Propel Water. They also include plain black coffee and tea (no cream or milk), candy, and breath mints. You can spit out gum when you arrive.  If you drink alcohol: Stop drinking it the night before surgery.  If your care team tells you to take medicine on the morning of surgery, it's okay to take it with a sip of water.  Preventing infection  Shower or bathe the night before and morning of your surgery. Follow the instructions your clinic gave you. (If no instructions, use regular soap.)  Don't shave or clip hair near your surgery site. We'll remove the hair if needed.  Don't smoke or vape the morning of surgery. You may chew nicotine gum up to 2 hours before surgery. A nicotine patch is okay.  Note: Some surgeries require you to completely quit smoking and nicotine. Check with your surgeon.  Your care team will make every effort to keep you safe from infection. We will:  Clean our hands often with soap and water (or an alcohol-based hand rub).  Clean the skin at your surgery site with a special soap that kills germs.  Give you a special gown to keep you warm. (Cold raises the risk of infection.)  Wear special hair covers, masks, gowns and gloves during surgery.  Give antibiotic medicine, if prescribed. Not all surgeries need antibiotics.  What to bring on the day of surgery  Photo ID and insurance card  Copy of your health care directive, if you have one  Glasses and hearing aids (bring cases)  You can't wear contacts during  surgery  Inhaler and eye drops, if you use them (tell us about these when you arrive)  CPAP machine or breathing device, if you use them  A few personal items, if spending the night  If you have . . .  A pacemaker, ICD (cardiac defibrillator) or other implant: Bring the ID card.  An implanted stimulator: Bring the remote control.  A legal guardian: Bring a copy of the certified (court-stamped) guardianship papers.  Please remove any jewelry, including body piercings. Leave jewelry and other valuables at home.  If you're going home the day of surgery  You must have a responsible adult drive you home. They should stay with you overnight as well.  If you don't have someone to stay with you, and you aren't safe to go home alone, we may keep you overnight. Insurance often won't pay for this.  After surgery  If it's hard to control your pain or you need more pain medicine, please call your surgeon's office.  Questions?   If you have any questions for your care team, list them here: _________________________________________________________________________________________________________________________________________________________________________ ____________________________________ ____________________________________ ____________________________________  For informational purposes only. Not to replace the advice of your health care provider. Copyright   2003, 2019 [facility]. All rights reserved. Clinically reviewed by Rocio Hoyt MD. [employer] [id number] - REV 12/22.    How to Take Your Medication Before Surgery  - you can hold all medication the morning of your surgery

## 2023-10-18 NOTE — PROGRESS NOTES
[facility]  [address]  [facility] [address]  Phone: [phone]  Primary Provider: Tiana Solorzano  Pre-op Performing Provider: TIANA SOLORZANO        PREOPERATIVE EVALUATION:  Today's date: 10/18/2023      Shilpi is a 33 year old female who presents for a preoperative evaluation.      Surgical Information:  Surgery/Procedure: Left thyroid lobectomy, possible total  Surgery Location: [facility]  Surgeon: Dr. Schmitt  Surgery Date: 10/25/2023  Time of Surgery: TBD  Where patient plans to recover: At home with family  Fax number for surgical facility: Note does not need to be faxed, will be available electronically in Epic.          HPI related to upcoming procedure:  Left thyroid nodule        PROCEDURE: US THYROID     HISTORY: Mass of neck     TECHNIQUE: Ultrasound of the thyroid gland was performed.     COMPARISON: None     FINDINGS:      Size: The left lobe is enlarged.     Echogenicity: The background echogenicity is homogenous.     Vascularity: The thyroid gland has normal vascularity.     Nodules: There is a solid isoechoic circumscribed parallel nodule in  the lower and mid left lobe spanning 6.1 x 4.2 x 3.3 cm.                                                                      IMPRESSION:      6.1 cm solid left thyroid nodule. Recommend FNA.     FREDERICK HANCOCK MD           Procedure: US BIOPSY THYROID FINE NEEDLE ASPIRATION     HISTORY:  6.1 cm solid left thyroid nodule; Left thyroid nodule     COMPARISON: Ultrasound 9/12/2023; CT neck 9/20/2023     TECHNIQUE AND FINDINGS:     Targeted sonography over the patient's left thyroid lobe  redemonstrates the 6.1 cm nonspecific nodule. The overlying skin was  marked.     The risks and benefits of ultrasound-guided FNA of a thyroid nodule  were discussed the patient including infection, bleeding and vascular  injury. The patient was understanding and willingness to proceed.  Written informed consent was obtained.  Standard timeout was performed.     The patient was placed supine on the ultrasound table. The skin of the  neck was prepped and draped in standard sterile fashion. 1% lidocaine  without epinephrine was used to anesthetize the skin entry site and  subcutaneous soft tissues down to the thyroid gland. Under sonographic  guidance, a total of 4 25-gauge FNA samples of the nodule were  obtained. Immediate sample adequacy was assessed by pathology. The  procedure was ended after sufficient sampling was obtained. The  patient tolerated the procedure well.                                                                      IMPRESSION:      Technically successful left thyroid nodule FNA without complication.     ILANA SANCHEZ MD           CT SOFT TISSUE NECK W CONTRAST     HISTORY: 33 yearsFemale Left thyroid nodule     TECHNIQUE: Axial CT imaging of the neck was performed with intervenous  contrast. Coronal and sagittal reconstructions were obtained.  This exam was performed using one or more of the following dose  reduction techniques:  Automated exposure control, adjustment of the KAITLIN and/or KV according  to patient's size, and/or use of iterative reconstruction technique.     COMPARISON: Ultrasound 9/12/2023     FINDINGS: Is a large, heterogeneously enhancing nodule of the left  lobe of the thyroid. Internal calcification is present. There is  deviation of the trachea to the right of midline. The nodule measures  5.9 x 4.9 x 2.9 cm in dimension. A graft in the right thyroid is  unremarkable.     Normal-size anterior cervical chain lymph nodes are present. The  parotid glands and submandibular glands are unremarkable.     Lung apices are clear.                                                                      IMPRESSION: Large nodule left lobe of thyroid. FNA is recommended.     SHANA JETER MD               10/12/2023     8:15 AM   Preop Questions   1. Have you ever had a heart attack or stroke? No   2. Have you  ever had surgery on your heart or blood vessels, such as a stent placement, a coronary artery bypass, or surgery on an artery in your head, neck, heart, or legs? No   3. Do you have chest pain with activity? No   4. Do you have a history of  heart failure? No   5. Do you currently have a cold, bronchitis or symptoms of other infection? No   6. Do you have a cough, shortness of breath, or wheezing? No   7. Do you or anyone in your family have previous history of blood clots? No   8. Do you or does anyone in your family have a serious bleeding problem such as prolonged bleeding following surgeries or cuts? No   9. Have you ever had problems with anemia or been told to take iron pills? No   10. Have you had any abnormal blood loss such as black, tarry or bloody stools, or abnormal vaginal bleeding? No   11. Have you ever had a blood transfusion? No   12. Are you willing to have a blood transfusion if it is medically needed before, during, or after your surgery? Yes   13. Have you or any of your relatives ever had problems with anesthesia? No   14. Do you have sleep apnea, excessive snoring or daytime drowsiness? No   15. Do you have any artifical heart valves or other implanted medical devices like a pacemaker, defibrillator, or continuous glucose monitor? No   16. Do you have artificial joints? No   17. Are you allergic to latex? No   18. Is there any chance that you may be pregnant? No         Health Care Directive:  Patient does not have a Health Care Directive or Living Will: Discussed advance care planning with patient; however, patient declined at this time.        Status of Chronic Conditions:  See problem list for active medical problems.  Problems all longstanding and stable, except as noted/documented.  See ROS for pertinent symptoms related to these conditions.        Review of Systems  CONSTITUTIONAL: NEGATIVE for fever, chills, change in weight  INTEGUMENTARY/SKIN: NEGATIVE for worrisome rashes, moles or  lesions  EYES: NEGATIVE for vision changes or irritation  ENT/MOUTH: NEGATIVE for ear, mouth and throat problems  RESP: NEGATIVE for significant cough or SOB  CV: NEGATIVE for chest pain, palpitations or peripheral edema  GI: NEGATIVE for nausea, abdominal pain, heartburn, or change in bowel habits  : NEGATIVE for frequency, dysuria, or hematuria  MUSCULOSKELETAL: NEGATIVE for significant arthralgias or myalgia  NEURO: NEGATIVE for weakness, dizziness or paresthesias  ENDOCRINE: NEGATIVE for temperature intolerance, skin/hair changes  HEME: NEGATIVE for bleeding problems  PSYCHIATRIC: NEGATIVE for changes in mood or affect        Patient Active Problem List    Diagnosis Date Noted    Lactation problem 09/30/2019     Priority: Medium    Anxiety 09/17/2019     Priority: Medium    Gastroesophageal reflux disease without esophagitis 09/21/2018     Priority: Medium    Reactive airway disease that is not asthma 02/12/2018     Priority: Medium     Replacing diagnoses that were inactivated after the 10/1/2021 regulatory import.      Seasonal allergic rhinitis due to pollen 04/27/2017     Priority: Medium    Benign essential hypertension 04/27/2017     Priority: Medium    Morbid obesity (H) 04/27/2017     Priority: Medium    ACP (advance care planning) 06/06/2016     Priority: Medium     Advance Care Planning 6/6/2016: ACP Review of Chart / Resources Provided:  Reviewed chart for advance care plan.  Shilpi Moore has no plan or code status on file. Discussed available resources and provided with information. Confirmed code status reflects current choices pending further ACP discussions.  Confirmed/documented legally designated decision makers.  Added by TRANG LARSON            Vitamin D deficiency 04/11/2014     Priority: Medium    Family history of melanoma 10/13/2011     Priority: Medium          Past Medical History:   Diagnosis Date    Allergic rhinitis 6/15/2011    Anxiety 9/17/2019    Benign essential hypertension  4/27/2017    Gastroesophageal reflux disease without esophagitis 9/21/2018    Major depressive disorder, recurrent episode, unspecified 6/15/2011    Obesity 4/27/2017    Reactive airway disease that is not asthma 2/12/2018    Seasonal allergic rhinitis due to pollen 4/27/2017    Unspecified vitamin D deficiency 4/11/2014         Past Surgical History:   Procedure Laterality Date    CHOLECYSTECTOMY, LAPOROSCOPIC N/A 09/27/2020    [facility].    ENT SURGERY      tonsillectomy    OTHER SURGICAL HISTORY      I&D rt abscess axilla    SOFT TISSUE SURGERY      IND cyst right axilla     Current Outpatient Medications   Medication Sig Dispense Refill    acetaminophen (TYLENOL) 500 MG tablet Take 500-1,000 mg by mouth every 6 hours as needed for mild pain      ALBUTEROL IN       budesonide (PULMICORT) 0.5 MG/2ML neb solution Mix 240 ml Ken Med Sinus rinse, add 0.5 mg budesonide vial, rinse 1/2 bottle in each nostril twice daily 120 mL 4    metFORMIN (GLUCOPHAGE XR) 500 MG 24 hr tablet Take 1,000 mg by mouth daily (with breakfast)      omeprazole (PRILOSEC) 40 MG DR capsule TAKE 1 CAPSULE BY MOUTH EVERY DAY 30 TO 60 MINUTES BEFORE A MEAL 90 capsule 0    predniSONE (DELTASONE) 10 MG tablet Take 3 tabs after breakfast starting 2 days prior to sinus surgery 6 tablet 1    sertraline (ZOLOFT) 100 MG tablet TAKE ONE-HALF TABLET BY MOUTH TWICE DAILY 90 tablet 3    VITAMIN D PO          Allergies   Allergen Reactions    Ibuprofen Anaphylaxis    Nsaids Swelling        Social History     Tobacco Use    Smoking status: Never    Smokeless tobacco: Never    Tobacco comments:     Passive exposure   Substance Use Topics    Alcohol use: Yes     Comment: Beer; Occasionally       Family History   Problem Relation Age of Onset    Asthma Mother     Hypertension Father     Asthma Brother     Asthma Sister     Cancer Maternal Grandfather 62        Mesothelioma, cause of death    Cancer Other 28        Cousin; Melanoma, cause of death     Thyroid Disease No family hx of      History   Drug Use No         Objective     /80 (BP Location: Left arm, Patient Position: Sitting, Cuff Size: Adult Large)   Pulse 70   Temp 98.1  F (36.7  C) (Tympanic)   Resp 18   Wt 106.1 kg (233 lb 12.8 oz)   SpO2 98%   BMI 36.62 kg/m        Physical Exam    GENERAL APPEARANCE: healthy, alert and no distress     EYES: EOMI, PERRL     HENT: ear canals and TM's normal and nose and mouth without ulcers or lesions     NECK: no adenopathy, no asymmetry, masses, or scars and thyroid normal to palpation     RESP: lungs clear to auscultation - no rales, rhonchi or wheezes     CV: regular rates and rhythm, normal S1 S2, no S3 or S4 and no murmur, click or rub     ABDOMEN:  soft, nontender, no HSM or masses and bowel sounds normal     MS: extremities normal- no gross deformities noted, no evidence of inflammation in joints, FROM in all extremities.     SKIN: no suspicious lesions or rashes     NEURO: Normal strength and tone, sensory exam grossly normal, mentation intact and speech normal     PSYCH: mentation appears normal. and affect normal/bright     LYMPHATICS: No cervical adenopathy      Recent Labs   Lab Test 09/07/23  1213 01/17/23  1607   HGB 14.2  --      --    NA  --  140   POTASSIUM  --  3.8   CR  --  0.86          Diagnostics:  Recent Results (from the past 168 hour(s))   Comprehensive metabolic panel    Collection Time: 10/18/23  3:23 PM   Result Value Ref Range    Sodium 139 135 - 145 mmol/L    Potassium 4.2 3.4 - 5.3 mmol/L    Carbon Dioxide (CO2) 25 22 - 29 mmol/L    Anion Gap 12 7 - 15 mmol/L    Urea Nitrogen 12.6 6.0 - 20.0 mg/dL    Creatinine 0.84 0.51 - 0.95 mg/dL    GFR Estimate >90 >60 mL/min/1.73m2    Calcium 9.4 8.6 - 10.0 mg/dL    Chloride 102 98 - 107 mmol/L    Glucose 89 70 - 99 mg/dL    Alkaline Phosphatase 43 35 - 104 U/L    AST 13 0 - 45 U/L    ALT 14 0 - 50 U/L    Protein Total 6.9 6.4 - 8.3 g/dL    Albumin 4.4 3.5 - 5.2 g/dL     Bilirubin Total <0.2 <=1.2 mg/dL   CBC with platelets and differential    Collection Time: 10/18/23  3:23 PM   Result Value Ref Range    WBC Count 7.4 4.0 - 11.0 10e3/uL    RBC Count 4.58 3.80 - 5.20 10e6/uL    Hemoglobin 13.1 11.7 - 15.7 g/dL    Hematocrit 40.5 35.0 - 47.0 %    MCV 88 78 - 100 fL    MCH 28.6 26.5 - 33.0 pg    MCHC 32.3 31.5 - 36.5 g/dL    RDW 13.7 10.0 - 15.0 %    Platelet Count 313 150 - 450 10e3/uL    % Neutrophils 50 %    % Lymphocytes 37 %    % Monocytes 8 %    Mids % (Monos, Eos, Basos)      % Eosinophils 5 %    % Basophils 1 %    % Immature Granulocytes 0 %    Absolute Neutrophils 3.7 1.6 - 8.3 10e3/uL    Absolute Lymphocytes 2.7 0.8 - 5.3 10e3/uL    Absolute Monocytes 0.6 0.0 - 1.3 10e3/uL    Mids Abs (Monos, Eos, Basos)      Absolute Eosinophils 0.4 0.0 - 0.7 10e3/uL    Absolute Basophils 0.1 0.0 - 0.2 10e3/uL    Absolute Immature Granulocytes 0.0 <=0.4 10e3/uL   HCG Qual, Urine ([id number])    Collection Time: 10/18/23  3:28 PM   Result Value Ref Range    hCG Urine Qualitative Negative Negative            Revised Cardiac Risk Index (RCRI):  The patient has the following serious cardiovascular risks for perioperative complications:   - No serious cardiac risks = 0 points     RCRI Interpretation: 0 points: Class I (very low risk - 0.4% complication rate)         Assessment & Plan       The proposed surgical procedure is considered LOW risk.    Pre-op exam    - Comprehensive metabolic panel; Future  - HCG Qual, Urine ([id number]); Future  - CBC with platelets and differential; Future    Thyroid nodule  - See above           - No identified additional risk factors other than previously addressed      Antiplatelet or Anticoagulation Medication Instructions:   - Patient is on no antiplatelet or anticoagulation medications.          Additional Medication Instructions:  Hold all medication the morning of surgery        RECOMMENDATION:  APPROVAL GIVEN to proceed with proposed procedure, without further  diagnostic evaluation.      Signed Electronically by: Tiana Tucker CNP  Copy of this evaluation report is provided to requesting physician.

## 2023-10-23 ENCOUNTER — ANESTHESIA EVENT (OUTPATIENT)
Dept: SURGERY | Facility: HOSPITAL | Age: 33
End: 2023-10-23
Payer: COMMERCIAL

## 2023-10-23 NOTE — ANESTHESIA PREPROCEDURE EVALUATION
Anesthesia Pre-Procedure Evaluation    Patient: Shilpi Moore   MRN: [id number] : 1990        Procedure : Procedure(s):  Left Thyroid Lobectomy, Possible total          Past Medical History:   Diagnosis Date     Allergic rhinitis 6/15/2011     Anxiety 2019     Benign essential hypertension 2017     Gastroesophageal reflux disease without esophagitis 2018     Major depressive disorder, recurrent episode, unspecified 6/15/2011     Obesity 2017     Reactive airway disease that is not asthma 2018     Seasonal allergic rhinitis due to pollen 2017     Unspecified vitamin D deficiency 2014      Past Surgical History:   Procedure Laterality Date     CHOLECYSTECTOMY, LAPOROSCOPIC N/A 2020    [facility].     ENT SURGERY      tonsillectomy     OTHER SURGICAL HISTORY      I&D rt abscess axilla     SOFT TISSUE SURGERY      IND cyst right axilla      Allergies   Allergen Reactions     Ibuprofen Anaphylaxis     Nsaids Swelling      Social History     Tobacco Use     Smoking status: Never     Smokeless tobacco: Never     Tobacco comments:     Passive exposure   Substance Use Topics     Alcohol use: Yes     Comment: Beer; Occasionally      Wt Readings from Last 1 Encounters:   10/18/23 106.1 kg (233 lb 12.8 oz)        Anesthesia Evaluation   Pt has had prior anesthetic. Type: General.        ROS/MED HX  ENT/Pulmonary: Comment: RAD    (+)     KATHY risk factors,  hypertension, obese,   allergic rhinitis,                            Neurologic:  - neg neurologic ROS     Cardiovascular:     (+)  hypertension- -   -  - -                                      METS/Exercise Tolerance: >4 METS    Hematologic:  - neg hematologic  ROS     Musculoskeletal:  - neg musculoskeletal ROS     GI/Hepatic:     (+) GERD, Asymptomatic on medication,                  Renal/Genitourinary:  - neg Renal ROS     Endo:     (+)               Obesity,       Psychiatric/Substance Use:     (+) psychiatric  "history anxiety and depression       Infectious Disease:  - neg infectious disease ROS     Malignancy:  - neg malignancy ROS     Other:  - neg other ROS          Physical Exam    Airway        Mallampati: II   TM distance: < 3 FB   Neck ROM: full   Mouth opening: > 3 cm    Respiratory Devices and Support         Dental       (+) Minor Abnormalities - some fillings, tiny chips      Cardiovascular   cardiovascular exam normal          Pulmonary   pulmonary exam normal            OUTSIDE LABS:  CBC:   Lab Results   Component Value Date    WBC 7.4 10/18/2023    WBC 4.8 09/07/2023    HGB 13.1 10/18/2023    HGB 14.2 09/07/2023    HCT 40.5 10/18/2023    HCT 43.5 09/07/2023     10/18/2023     09/07/2023     BMP:   Lab Results   Component Value Date     10/18/2023     01/17/2023    POTASSIUM 4.2 10/18/2023    POTASSIUM 3.8 01/17/2023    CHLORIDE 102 10/18/2023    CHLORIDE 100 01/17/2023    CO2 25 10/18/2023    CO2 28 01/17/2023    BUN 12.6 10/18/2023    BUN 15.5 01/17/2023    CR 0.84 10/18/2023    CR 0.86 01/17/2023    GLC 89 10/18/2023    GLC 88 01/17/2023     COAGS: No results found for: \"PTT\", \"INR\", \"FIBR\"  POC:   Lab Results   Component Value Date    HCG Negative 10/18/2023     HEPATIC:   Lab Results   Component Value Date    ALBUMIN 4.4 10/18/2023    PROTTOTAL 6.9 10/18/2023    ALT 14 10/18/2023    AST 13 10/18/2023    ALKPHOS 43 10/18/2023    BILITOTAL <0.2 10/18/2023     OTHER:   Lab Results   Component Value Date    A1C 5.2 12/11/2018    SYED 9.4 10/18/2023    TSH 1.88 09/07/2023    T4 0.96 09/07/2023       Anesthesia Plan    ASA Status:  3    NPO Status:  NPO Appropriate    Anesthesia Type: General.     - Airway: ETT   Induction: Intravenous, Propofol.   Maintenance: Balanced.        Consents    Anesthesia Plan(s) and associated risks, benefits, and realistic alternatives discussed. Questions answered and patient/representative(s) expressed understanding.     - Discussed: Risks, Benefits " and Alternatives for BOTH SEDATION and the PROCEDURE were discussed     - Discussed with:  Patient      - Extended Intubation/Ventilatory Support Discussed: No.      - Patient is DNR/DNI Status: No     Use of blood products discussed: No .     Postoperative Care    Pain management: IV analgesics.   PONV prophylaxis: Ondansetron (or other 5HT-3), Dexamethasone or Solumedrol, Scopolamine patch     Comments:    Other Comments: Discussed risks and benefits with patient for general anesthesia including sore throat, nausea, vomiting, aspiration, dental damage, loss of airway, CV complications, stroke, MI, death. Pt wishes to proceed.  10/18/23            PAULIE Buitrago CRNA

## 2023-10-24 ENCOUNTER — PREP FOR PROCEDURE (OUTPATIENT)
Dept: OTOLARYNGOLOGY | Facility: OTHER | Age: 33
End: 2023-10-24

## 2023-10-25 ENCOUNTER — ANESTHESIA (OUTPATIENT)
Dept: SURGERY | Facility: HOSPITAL | Age: 33
End: 2023-10-25
Payer: COMMERCIAL

## 2023-10-25 ENCOUNTER — HOSPITAL ENCOUNTER (OUTPATIENT)
Facility: HOSPITAL | Age: 33
Discharge: HOME | End: 2023-10-25
Attending: OTOLARYNGOLOGY | Admitting: OTOLARYNGOLOGY
Payer: COMMERCIAL

## 2023-10-25 ENCOUNTER — LAB (OUTPATIENT)
Dept: LAB | Facility: HOSPITAL | Age: 33
End: 2023-10-25
Attending: OTOLARYNGOLOGY
Payer: COMMERCIAL

## 2023-10-25 VITALS
HEIGHT: 66 IN | OXYGEN SATURATION: 96 % | SYSTOLIC BLOOD PRESSURE: 165 MMHG | BODY MASS INDEX: 36.77 KG/M2 | DIASTOLIC BLOOD PRESSURE: 98 MMHG | HEART RATE: 72 BPM | TEMPERATURE: 98.3 F | RESPIRATION RATE: 16 BRPM | WEIGHT: 228.8 LBS

## 2023-10-25 DIAGNOSIS — G89.18 ACUTE POST-OPERATIVE PAIN: Primary | ICD-10-CM

## 2023-10-25 LAB
GLUCOSE BLDC GLUCOMTR-MCNC: 79 MG/DL (ref 70–99)
HCG UR QL: NEGATIVE

## 2023-10-25 PROCEDURE — 250N000011 HC RX IP 250 OP 636: Performed by: NURSE ANESTHETIST, CERTIFIED REGISTERED

## 2023-10-25 PROCEDURE — 250N000009 HC RX 250: Performed by: OTOLARYNGOLOGY

## 2023-10-25 PROCEDURE — 710N000012 HC RECOVERY PHASE 2, PER MINUTE: Performed by: OTOLARYNGOLOGY

## 2023-10-25 PROCEDURE — 81025 URINE PREGNANCY TEST: CPT | Performed by: NURSE ANESTHETIST, CERTIFIED REGISTERED

## 2023-10-25 PROCEDURE — 250N000009 HC RX 250: Performed by: NURSE ANESTHETIST, CERTIFIED REGISTERED

## 2023-10-25 PROCEDURE — 60220 PARTIAL REMOVAL OF THYROID: CPT | Mod: LT | Performed by: OTOLARYNGOLOGY

## 2023-10-25 PROCEDURE — 250N000011 HC RX IP 250 OP 636: Mod: JZ | Performed by: OTOLARYNGOLOGY

## 2023-10-25 PROCEDURE — 360N000077 HC SURGERY LEVEL 4, PER MIN: Performed by: OTOLARYNGOLOGY

## 2023-10-25 PROCEDURE — 88342 IMHCHEM/IMCYTCHM 1ST ANTB: CPT | Mod: TC | Performed by: OTOLARYNGOLOGY

## 2023-10-25 PROCEDURE — 250N000013 HC RX MED GY IP 250 OP 250 PS 637: Performed by: NURSE ANESTHETIST, CERTIFIED REGISTERED

## 2023-10-25 PROCEDURE — 60220 PARTIAL REMOVAL OF THYROID: CPT

## 2023-10-25 PROCEDURE — 272N000001 HC OR GENERAL SUPPLY STERILE: Performed by: OTOLARYNGOLOGY

## 2023-10-25 PROCEDURE — 250N000011 HC RX IP 250 OP 636: Mod: JZ

## 2023-10-25 PROCEDURE — 258N000003 HC RX IP 258 OP 636: Performed by: NURSE ANESTHETIST, CERTIFIED REGISTERED

## 2023-10-25 PROCEDURE — 250N000025 HC SEVOFLURANE, PER MIN: Performed by: OTOLARYNGOLOGY

## 2023-10-25 PROCEDURE — 88307 TISSUE EXAM BY PATHOLOGIST: CPT | Mod: 26

## 2023-10-25 PROCEDURE — 999N000141 HC STATISTIC PRE-PROCEDURE NURSING ASSESSMENT: Performed by: OTOLARYNGOLOGY

## 2023-10-25 PROCEDURE — 710N000010 HC RECOVERY PHASE 1, LEVEL 2, PER MIN: Performed by: OTOLARYNGOLOGY

## 2023-10-25 PROCEDURE — 88307 TISSUE EXAM BY PATHOLOGIST: CPT | Mod: TC | Performed by: OTOLARYNGOLOGY

## 2023-10-25 PROCEDURE — 272N000004 HC RX 272: Performed by: OTOLARYNGOLOGY

## 2023-10-25 PROCEDURE — 370N000017 HC ANESTHESIA TECHNICAL FEE, PER MIN: Performed by: OTOLARYNGOLOGY

## 2023-10-25 PROCEDURE — 250N000011 HC RX IP 250 OP 636: Mod: JZ | Performed by: NURSE ANESTHETIST, CERTIFIED REGISTERED

## 2023-10-25 PROCEDURE — 82962 GLUCOSE BLOOD TEST: CPT

## 2023-10-25 RX ORDER — NALOXONE HYDROCHLORIDE 0.4 MG/ML
0.4 INJECTION, SOLUTION INTRAMUSCULAR; INTRAVENOUS; SUBCUTANEOUS
Status: DISCONTINUED | OUTPATIENT
Start: 2023-10-25 | End: 2023-10-25 | Stop reason: HOSPADM

## 2023-10-25 RX ORDER — FENTANYL CITRATE 50 UG/ML
50 INJECTION, SOLUTION INTRAMUSCULAR; INTRAVENOUS EVERY 5 MIN PRN
Status: DISCONTINUED | OUTPATIENT
Start: 2023-10-25 | End: 2023-10-25 | Stop reason: HOSPADM

## 2023-10-25 RX ORDER — CEFAZOLIN SODIUM 1 G/3ML
INJECTION, POWDER, FOR SOLUTION INTRAMUSCULAR; INTRAVENOUS
Status: DISCONTINUED
Start: 2023-10-25 | End: 2023-10-25 | Stop reason: WASHOUT

## 2023-10-25 RX ORDER — NALOXONE HYDROCHLORIDE 0.4 MG/ML
0.2 INJECTION, SOLUTION INTRAMUSCULAR; INTRAVENOUS; SUBCUTANEOUS
Status: DISCONTINUED | OUTPATIENT
Start: 2023-10-25 | End: 2023-10-25 | Stop reason: HOSPADM

## 2023-10-25 RX ORDER — OXYCODONE HYDROCHLORIDE 5 MG/1
5 TABLET ORAL EVERY 6 HOURS PRN
Qty: 5 TABLET | Refills: 0 | Status: SHIPPED | OUTPATIENT
Start: 2023-10-25 | End: 2023-11-03

## 2023-10-25 RX ORDER — HYDROMORPHONE HYDROCHLORIDE 1 MG/ML
0.4 INJECTION, SOLUTION INTRAMUSCULAR; INTRAVENOUS; SUBCUTANEOUS EVERY 5 MIN PRN
Status: DISCONTINUED | OUTPATIENT
Start: 2023-10-25 | End: 2023-10-25 | Stop reason: HOSPADM

## 2023-10-25 RX ORDER — EPHEDRINE SULFATE 50 MG/ML
INJECTION, SOLUTION INTRAMUSCULAR; INTRAVENOUS; SUBCUTANEOUS PRN
Status: DISCONTINUED | OUTPATIENT
Start: 2023-10-25 | End: 2023-10-25

## 2023-10-25 RX ORDER — FENTANYL CITRATE 50 UG/ML
25 INJECTION, SOLUTION INTRAMUSCULAR; INTRAVENOUS EVERY 5 MIN PRN
Status: DISCONTINUED | OUTPATIENT
Start: 2023-10-25 | End: 2023-10-25 | Stop reason: HOSPADM

## 2023-10-25 RX ORDER — LIDOCAINE HYDROCHLORIDE 20 MG/ML
INJECTION, SOLUTION INFILTRATION; PERINEURAL PRN
Status: DISCONTINUED | OUTPATIENT
Start: 2023-10-25 | End: 2023-10-25

## 2023-10-25 RX ORDER — ONDANSETRON 2 MG/ML
4 INJECTION INTRAMUSCULAR; INTRAVENOUS EVERY 30 MIN PRN
Status: DISCONTINUED | OUTPATIENT
Start: 2023-10-25 | End: 2023-10-25 | Stop reason: HOSPADM

## 2023-10-25 RX ORDER — EPINEPHRINE 1 MG/ML
INJECTION, SOLUTION INTRAMUSCULAR; SUBCUTANEOUS PRN
Status: DISCONTINUED | OUTPATIENT
Start: 2023-10-25 | End: 2023-10-25 | Stop reason: HOSPADM

## 2023-10-25 RX ORDER — SCOLOPAMINE TRANSDERMAL SYSTEM 1 MG/1
1 PATCH, EXTENDED RELEASE TRANSDERMAL
Status: DISCONTINUED | OUTPATIENT
Start: 2023-10-25 | End: 2023-10-25 | Stop reason: HOSPADM

## 2023-10-25 RX ORDER — LIDOCAINE HYDROCHLORIDE AND EPINEPHRINE 10; 10 MG/ML; UG/ML
INJECTION, SOLUTION INFILTRATION; PERINEURAL
Status: DISCONTINUED
Start: 2023-10-25 | End: 2023-10-25 | Stop reason: HOSPADM

## 2023-10-25 RX ORDER — DEXMEDETOMIDINE HYDROCHLORIDE 4 UG/ML
INJECTION, SOLUTION INTRAVENOUS PRN
Status: DISCONTINUED | OUTPATIENT
Start: 2023-10-25 | End: 2023-10-25

## 2023-10-25 RX ORDER — ALBUTEROL SULFATE 0.83 MG/ML
2.5 SOLUTION RESPIRATORY (INHALATION) EVERY 4 HOURS PRN
Status: DISCONTINUED | OUTPATIENT
Start: 2023-10-25 | End: 2023-10-25 | Stop reason: HOSPADM

## 2023-10-25 RX ORDER — DEXAMETHASONE SODIUM PHOSPHATE 4 MG/ML
INJECTION, SOLUTION INTRA-ARTICULAR; INTRALESIONAL; INTRAMUSCULAR; INTRAVENOUS; SOFT TISSUE PRN
Status: DISCONTINUED | OUTPATIENT
Start: 2023-10-25 | End: 2023-10-25

## 2023-10-25 RX ORDER — HYDRALAZINE HYDROCHLORIDE 20 MG/ML
INJECTION INTRAMUSCULAR; INTRAVENOUS
Status: COMPLETED
Start: 2023-10-25 | End: 2023-10-25

## 2023-10-25 RX ORDER — LABETALOL 20 MG/4 ML (5 MG/ML) INTRAVENOUS SYRINGE
10
Status: DISCONTINUED | OUTPATIENT
Start: 2023-10-25 | End: 2023-10-25 | Stop reason: HOSPADM

## 2023-10-25 RX ORDER — ONDANSETRON 4 MG/1
4 TABLET, ORALLY DISINTEGRATING ORAL EVERY 30 MIN PRN
Status: DISCONTINUED | OUTPATIENT
Start: 2023-10-25 | End: 2023-10-25 | Stop reason: HOSPADM

## 2023-10-25 RX ORDER — ONDANSETRON 2 MG/ML
INJECTION INTRAMUSCULAR; INTRAVENOUS PRN
Status: DISCONTINUED | OUTPATIENT
Start: 2023-10-25 | End: 2023-10-25

## 2023-10-25 RX ORDER — HYDRALAZINE HYDROCHLORIDE 20 MG/ML
10 INJECTION INTRAMUSCULAR; INTRAVENOUS
Status: COMPLETED | OUTPATIENT
Start: 2023-10-25 | End: 2023-10-25

## 2023-10-25 RX ORDER — LIDOCAINE 40 MG/G
CREAM TOPICAL
Status: DISCONTINUED | OUTPATIENT
Start: 2023-10-25 | End: 2023-10-25 | Stop reason: HOSPADM

## 2023-10-25 RX ORDER — SODIUM CHLORIDE, SODIUM LACTATE, POTASSIUM CHLORIDE, CALCIUM CHLORIDE 600; 310; 30; 20 MG/100ML; MG/100ML; MG/100ML; MG/100ML
INJECTION, SOLUTION INTRAVENOUS CONTINUOUS
Status: DISCONTINUED | OUTPATIENT
Start: 2023-10-25 | End: 2023-10-25 | Stop reason: HOSPADM

## 2023-10-25 RX ORDER — HYDRALAZINE HYDROCHLORIDE 20 MG/ML
10 INJECTION INTRAMUSCULAR; INTRAVENOUS ONCE
Status: COMPLETED | OUTPATIENT
Start: 2023-10-25 | End: 2023-10-25

## 2023-10-25 RX ORDER — LIDOCAINE HYDROCHLORIDE AND EPINEPHRINE 10; 10 MG/ML; UG/ML
INJECTION, SOLUTION INFILTRATION; PERINEURAL PRN
Status: DISCONTINUED | OUTPATIENT
Start: 2023-10-25 | End: 2023-10-25 | Stop reason: HOSPADM

## 2023-10-25 RX ORDER — OXYCODONE HYDROCHLORIDE 5 MG/1
10 TABLET ORAL
Status: DISCONTINUED | OUTPATIENT
Start: 2023-10-25 | End: 2023-10-25 | Stop reason: HOSPADM

## 2023-10-25 RX ORDER — OXYCODONE HYDROCHLORIDE 5 MG/1
5 TABLET ORAL
Status: COMPLETED | OUTPATIENT
Start: 2023-10-25 | End: 2023-10-25

## 2023-10-25 RX ORDER — FENTANYL CITRATE 50 UG/ML
INJECTION, SOLUTION INTRAMUSCULAR; INTRAVENOUS PRN
Status: DISCONTINUED | OUTPATIENT
Start: 2023-10-25 | End: 2023-10-25

## 2023-10-25 RX ORDER — HYDROMORPHONE HYDROCHLORIDE 1 MG/ML
0.2 INJECTION, SOLUTION INTRAMUSCULAR; INTRAVENOUS; SUBCUTANEOUS EVERY 5 MIN PRN
Status: DISCONTINUED | OUTPATIENT
Start: 2023-10-25 | End: 2023-10-25 | Stop reason: HOSPADM

## 2023-10-25 RX ORDER — LABETALOL 20 MG/4 ML (5 MG/ML) INTRAVENOUS SYRINGE
PRN
Status: DISCONTINUED | OUTPATIENT
Start: 2023-10-25 | End: 2023-10-25

## 2023-10-25 RX ORDER — OXYMETAZOLINE HYDROCHLORIDE 0.05 G/100ML
2 SPRAY NASAL ONCE
Status: COMPLETED | OUTPATIENT
Start: 2023-10-25 | End: 2023-10-25

## 2023-10-25 RX ORDER — PROPOFOL 10 MG/ML
INJECTION, EMULSION INTRAVENOUS CONTINUOUS PRN
Status: DISCONTINUED | OUTPATIENT
Start: 2023-10-25 | End: 2023-10-25

## 2023-10-25 RX ORDER — PROPOFOL 10 MG/ML
INJECTION, EMULSION INTRAVENOUS PRN
Status: DISCONTINUED | OUTPATIENT
Start: 2023-10-25 | End: 2023-10-25

## 2023-10-25 RX ORDER — BACITRACIN ZINC 500 [USP'U]/G
OINTMENT TOPICAL
Status: DISCONTINUED
Start: 2023-10-25 | End: 2023-10-25 | Stop reason: WASHOUT

## 2023-10-25 RX ADMIN — OXYCODONE HYDROCHLORIDE 5 MG: 5 TABLET ORAL at 13:37

## 2023-10-25 RX ADMIN — SCOPALAMINE 1 PATCH: 1 PATCH, EXTENDED RELEASE TRANSDERMAL at 10:10

## 2023-10-25 RX ADMIN — SODIUM CHLORIDE, POTASSIUM CHLORIDE, SODIUM LACTATE AND CALCIUM CHLORIDE: 600; 310; 30; 20 INJECTION, SOLUTION INTRAVENOUS at 08:41

## 2023-10-25 RX ADMIN — OXYMETHAZOLINE HCL 2 SPRAY: 0.05 SPRAY NASAL at 08:46

## 2023-10-25 RX ADMIN — FENTANYL CITRATE 50 MCG: 50 INJECTION INTRAMUSCULAR; INTRAVENOUS at 11:21

## 2023-10-25 RX ADMIN — HYDRALAZINE HYDROCHLORIDE 10 MG: 20 INJECTION INTRAMUSCULAR; INTRAVENOUS at 14:40

## 2023-10-25 RX ADMIN — LIDOCAINE HYDROCHLORIDE 40 MG: 20 INJECTION, SOLUTION INFILTRATION; PERINEURAL at 10:26

## 2023-10-25 RX ADMIN — DEXMEDETOMIDINE 10 MCG: 100 INJECTION, SOLUTION, CONCENTRATE INTRAVENOUS at 11:18

## 2023-10-25 RX ADMIN — PROPOFOL 75 MCG/KG/MIN: 10 INJECTION, EMULSION INTRAVENOUS at 10:58

## 2023-10-25 RX ADMIN — FENTANYL CITRATE 100 MCG: 50 INJECTION INTRAMUSCULAR; INTRAVENOUS at 10:26

## 2023-10-25 RX ADMIN — DEXAMETHASONE SODIUM PHOSPHATE 10 MG: 4 INJECTION, SOLUTION INTRA-ARTICULAR; INTRALESIONAL; INTRAMUSCULAR; INTRAVENOUS; SOFT TISSUE at 10:40

## 2023-10-25 RX ADMIN — LABETALOL 20 MG/4 ML (5 MG/ML) INTRAVENOUS SYRINGE 5 MG: at 11:48

## 2023-10-25 RX ADMIN — FENTANYL CITRATE 50 MCG: 50 INJECTION INTRAMUSCULAR; INTRAVENOUS at 11:27

## 2023-10-25 RX ADMIN — PROPOFOL 150 MG: 10 INJECTION, EMULSION INTRAVENOUS at 10:28

## 2023-10-25 RX ADMIN — MIDAZOLAM 2 MG: 1 INJECTION INTRAMUSCULAR; INTRAVENOUS at 10:11

## 2023-10-25 RX ADMIN — LABETALOL 20 MG/4 ML (5 MG/ML) INTRAVENOUS SYRINGE 5 MG: at 11:31

## 2023-10-25 RX ADMIN — Medication 100 MG: at 10:29

## 2023-10-25 RX ADMIN — Medication 10 MG: at 11:02

## 2023-10-25 RX ADMIN — PROPOFOL 50 MG: 10 INJECTION, EMULSION INTRAVENOUS at 11:48

## 2023-10-25 RX ADMIN — HYDRALAZINE HYDROCHLORIDE 10 MG: 20 INJECTION INTRAMUSCULAR; INTRAVENOUS at 14:14

## 2023-10-25 RX ADMIN — ONDANSETRON 4 MG: 2 INJECTION INTRAMUSCULAR; INTRAVENOUS at 10:39

## 2023-10-25 RX ADMIN — Medication 5 MG: at 10:37

## 2023-10-25 RX ADMIN — SODIUM CHLORIDE, POTASSIUM CHLORIDE, SODIUM LACTATE AND CALCIUM CHLORIDE: 600; 310; 30; 20 INJECTION, SOLUTION INTRAVENOUS at 11:55

## 2023-10-25 ASSESSMENT — ACTIVITIES OF DAILY LIVING (ADL)
ADLS_ACUITY_SCORE: 18

## 2023-10-25 NOTE — OP NOTE
Otolaryngology Operative Note     Pre-op Diagnosis: Left thyroid nodule  Post-op Diagnosis:  same    Procedure:   Left thyroid lobectomy   1.5 hours nerve monitoring of the recurrent laryngeal nerve    Surgeon:  Daniella Schmitt D.O.  Assistant:  Christine Aguiar NP   Anesthesia:  General endotracheal  EBL:  50 ml  Findings: 6.0 cm left thyroid nodule comprising most of the left lobe  Complications:  none  Condition:  stable     Description of the Procedure  After surgical consent was obtained the patient was brought back to the operating room laid in a comfortable and supine position.  She was administered a general anesthetic by member of anesthesia.  The DeNAtronic endotracheal tube was used by anesthesia and the electrodes were placed for continuous monitoring of the recurrent laryngeal nerve.  An injection timeout was taken.  The neck was placed in a gentle extension and cleansed with alcohol.  I delineated a collar incision and anesthetized the skin with 1% lidocaine with 1-100,000 epinephrine.  She was prepped and draped in normal sterile fashion and a timeout was taken.  Surgical loops were worn throughout the procedure.  I used a 15 blade to incise the skin.  I continued dissecting with electrocautery down through platysma and subcutaneous tissue.  Skin rakes were placed and I dissected sharply and bluntly in the midline  the strap muscles up to the level of the hyoid and down to the level of the sternal notch.  Army[employer]y retractors were placed and I continued  sternohyoid and sternothyroid from the left lobe.  There is a large 6 cm firm nodule which was reflected inferior to allow dissection of the superior pole.  The assistant retracted the cricothyroid muscle superior and I ligated the branches of the superior thyroid vessels separately flush with the thyroid capsule.  I then turned my attention to the mid pole and continued  muscle from the left lobe.  The recurrent  laryngeal nerve was identified within the tracheoesophageal groove.  The inferior thyroid artery was deep to the nerve and was identified and preserved.  Using capsular dissection I  the left nodule from the surrounding tissue.  I ligated the deep branch of the inferior thyroid artery directly adjacent to the thyroid gland preserving the superficial branch to the parathyroid.  The superior parathyroid was found during dissection deep to the recurrent laryngeal nerve.  The parathyroid was reflected into the neck and I continued dissecting up to the cricothyroid articulation where the nerve dove into the larynx.  There were two smaller branches of the nerve at this location which were preserved.  At this point I used the LigaSure to separate the right lobe from the trachea with careful dissection through the tracheoesophageal groove.  The left lobe and isthmus were completely removed.  A suture was placed through the isthmus and the gland was examined and then placed in formalin for permanent analysis.  The wound was irrigated and gently suctions.  The recurrent laryngeal nerve is visually intact and stimulated with the probe the superior parathyroid is intact with good color.  The inferior parathyroid was not directly encountered during the dissection.  Hemostasis was achieved with scant use of bipolar cautery away from the location of the nerve.  Hemostasis is adequate.  I placed half a piece of surgicel  into the left thyroid bed.  The right lobe was palpated and there are no palpable nodules on the contralateral side.  The strap muscles were then reapproximated with 3-0 Vicryl leaving dehiscence  inferior.  The subcutaneous tissue was closed with 4-0 Vicryl.  The skin was closed with Dermabond followed by a Steri-Strip.  A gentle compression dressing was placed with fluffs and tape.  She was handed back over to anesthesia was awakened and brought to the recovery room in stable condition having tolerated  the procedure well.  Postoperative flexible nasolaryngoscopy was performed in the PACU and the vocal cord mobility is normal.

## 2023-10-25 NOTE — OR NURSING
Pt's Bp now 124/70 after hydralazine, states pain 4/10, meets criteria for discharge. Patient and responsible adult given discharge instructions with no questions regarding instructions. Perla score 19/20. Pain level 4/10.  Discharged from unit via wheelchair. Patient discharged to home with mother.

## 2023-10-25 NOTE — INTERVAL H&P NOTE
"I have reviewed the surgical (or preoperative) H&P that is linked to this encounter, and examined the patient. There are no significant changes    Clinical Conditions Present on Arrival:  Clinically Significant Risk Factors Present on Admission                  # Obesity: Estimated body mass index is 36.93 kg/m  as calculated from the following:    Height as of this encounter: 1.676 m (5' 6\").    Weight as of this encounter: 103.8 kg (228 lb 12.8 oz).       "

## 2023-10-25 NOTE — DISCHARGE INSTRUCTIONS
"Postoperative Discharge Instructions for Partial Thyroidectomy  Activity  Most patients are able to return to a full time work schedule in 1-2 weeks; however, this may vary according to your job. It may take longer to return to heavy physical or other demanding work, or shorter if you are feeling well  Do not drive a car until you are able to turn the neck side to side, which may take 1-2 weeks  Do not drive while you are taking pain medications  No heavy lifting over 10 pounds or  heavy exercising for 2 weeks    Diet  You may have temporary throat discomfort or difficulty swallowing. This is due to the surgery around your larynx (voice box) and esophagus (swallowing tube)  Drink and eat foods that can be swallowed easily, e.g. juice, soup, gelatin, applesauce  You may be able to return to your usual diet in a couple of days    Incision Care  Please leave the steri strips in place on you incision and allow them to fall off on their own. If they have not fallen off in 10-14 days, you may remove them yourself  You may shower 48 hours after surgery, but keep the incision dry. Please do not swim or soak in a tub for atleast 2 weeks. After you are done showering, just pat your incision dry. If it is draining, replace the pressure dressing and wear as much as possible for 1 week. Do not scrub with soap or washcloth for the first 10 days  Mild swelling at the incision site will go away in 4-6 weeks. The pink line will slowly fade to white during the next 6-12 months  Use a sunscreen (SPF #30 or higher) or wear a scarf for protection if in the sun for the first 6 months to 1 year as the sun may darken your scar  You may begin to use a hypoallergenic moisturizing cream (No Vitamin E, Mederma or other \"scar\" creams) along incision after 2 weeks    Common Problems  Numbness of the skin under the chin or above the incision is normal and should go away in a few weeks  You may feel a lump or pressure in your throat when " swallowing for a few days  Your incision may feel itchy while it heals. Avoid rubbing or scratching if possible  You may feel neck stiffness, tightness, a pulling feeling, mild aching, chest discomfort, headache, ear pain or congestion. Take a mild pain medicine such as Tylenol or Advil. Put heat on the area using a hot water bottle, heating pad or warm shower  Some people prefer to sleep with an extra pillow for the first few days after the surgery, this helps keep swelling around your incision to a minimum  Your voice may be hoarse or weak. Pitch or tone may change. You may have difficulty singing. This usually goes back to normal over 6 weeks to 6 months  After surgery, you may notice a change in your mood, emotional ups and downs, depression, irritabilty or fatigue and weakness. These changes will get better as time passes  You do not need to be on bed rest, being active is normally well tolerated within reason    Hormones and Medications  Most of the time after only half the thyroid has been removed no thyroid hormone supplementation or additional calcium is needed. However, there are exceptions and if your physician/surgeon has asked that you take thyroid hormone or additional calcium, please follow their instructions  If you were taking thyroid hormone before one half of your thyroid was removed with this most recent surgery then take your thyroid hormone (e.g. Synthroid, Levoxyl, Cytomel) as prescribed when you go home. These medications are identical to the hormone made by the thyroid. Calcium and thyroid hormone should be taken 1-2 hours apart. Thyroid medication should be taken on an empty stomach  Please resume your pre-hospital medications. You should follow up with your primary care physician regarding new prescriptions and refills  We will supply you with a prescription for a mild narcotic pain medication. You are not required to take it. If you do take it, please do not drive or drink alcohol as  these in combination may make you drowsy. Most patients do not need strong pain medicine by the time you leave the hospital. You can take Tylenol and if your pain is not controlled take the pain medication you were sent home with after surgery. Make sure to complete the prescribed antibiotic.   If you are taking supplemental calcium or narcotics, you may also want to take a stool softener, such as OTC Colace or Senna to avoid constipation. Stay hydrated by drinking some extra water  When to Call a Doctor  For any non urgent questions, call your doctor's office or the nursing unit where you were a patient  Call your doctor or go the the Emergency Room if you have a fever (Temperature greater than 100.5), chills, lightheadedness, shortness of breath, difficulty breathing, nausea, vomiting, numbness or tingling in your fingers, hands or mouth, muscle spasms, or if you notice signs of wound infection (redness, tenderness, or drainage from the incision). Please call or go the the Emergency Room if you have any other urgent concerns  Follow-up Care  Please call your doctor to schedule a follow up appointment if you have not already done so to set one up for approximately 1-3 weeks after surgery    [facility]  ENT Department  Dr. Daniella Schmitt  [phone]        Post-Anesthesia Patient Instructions    IMMEDIATELY FOLLOWING SURGERY:  Do not drive or operate machinery for the first twenty four hours after surgery.  Do not make any important decisions for twenty four hours after surgery or while taking narcotic pain medications or sedatives.  If you develop intractable nausea and vomiting or a severe headache please notify your doctor immediately.    FOLLOW-UP:  follow-up with Christine or Kaila in 1 week.  Please make an appointment with your surgeon as instructed. You do not need to follow up with anesthesia unless specifically instructed to do so.    WOUND CARE INSTRUCTIONS (if applicable):  Keep a dry clean  dressing on the anesthesia/puncture wound site if there is drainage.  Once the wound has quit draining you may leave it open to air.  Generally you should leave the bandage intact for twenty four hours unless there is drainage.  If the epidural site drains for more than 36-48 hours please call the anesthesia department.    QUESTIONS?:  Please feel free to call your physician or the hospital  if you have any questions, and they will be happy to assist you.

## 2023-10-25 NOTE — ANESTHESIA POSTPROCEDURE EVALUATION
Patient: Shilpi Moore    Procedure: Procedure(s):  Left Thyroid Lobectomy with 1.5 hours nerve monitoring       Anesthesia Type:  General    Note:  Disposition: Outpatient   Postop Pain Control: Uneventful            Sign Out: Well controlled pain   PONV: No   Neuro/Psych: Uneventful            Sign Out: Acceptable/Baseline neuro status   Airway/Respiratory: Uneventful            Sign Out: Acceptable/Baseline resp. status   CV/Hemodynamics: Uneventful            Sign Out: Acceptable CV status; No obvious hypovolemia; No obvious fluid overload   Other NRE: NONE   DID A NON-ROUTINE EVENT OCCUR? No       Last vitals:  Vitals Value Taken Time   /91 10/25/23 1305   Temp 97.5  F (36.4  C) 10/25/23 1245   Pulse 81 10/25/23 1305   Resp 16 10/25/23 1305   SpO2 97 % 10/25/23 1305   Vitals shown include unfiled device data.    Electronically Signed By: PAULIE Macdonald CRNA  October 25, 2023  1:38 PM

## 2023-10-25 NOTE — OR NURSING
PACU Respiratory Event Documentation     1) Episodes of Apnea greater than or equal to 10 seconds: 0    2) Bradypnea - less than 8 breaths per minute: 0    3) Pain score on 0 to 10 scale: 0    4) Pain-sedation mismatch (yes or no): no    5) Repeated 02 desaturation less than 90% (yes or no): no    Anesthesia notified? (yes or no): NA     Any of the above events occuring repeatedly in separate 30 minute intervals may be considered recurrent PACU respiratory events.

## 2023-10-25 NOTE — ANESTHESIA PROCEDURE NOTES
Airway       Patient location during procedure: OR       Procedure Start/Stop Times: 10/25/2023 10:31 AM  Staff -        Resident/Fellow: Matt Sky       CRNA: Jerome Keane APRN CRNA       Performed By: SRNAIndications and Patient Condition       Indications for airway management: nory-procedural and airway protection       Induction type:intravenous       Mask difficulty assessment: 0 - not attempted    Final Airway Details       Final airway type: endotracheal airway       Successful airway: NIM  Endotracheal Airway Details        ETT size (mm): 7.0       Cuffed: yes       Cuff volume (mL): 8       Successful intubation technique: video laryngoscopy       VL Blade Size: Love 3 (blue ricardo by the vocal cords)       Grade View of Cords: 1       Adjucts: stylet       Position: Center       Measured from: lips       Secured at (cm): 20       Bite block used: None    Post intubation assessment        Placement verified by: capnometry, equal breath sounds and chest rise        Number of attempts at approach: 1       Number of other approaches attempted: 0       Secured with: plastic tape       Ease of procedure: easy       Dentition: Intact    Medication(s) Administered   Medication Administration Time: 10/25/2023 10:31 AM

## 2023-10-25 NOTE — CARE PLAN
Handoff given to DARREN Phillips. Two doses of hydralazine given. Continues to rate pain 6/10 declines any more pain medication.

## 2023-10-27 ENCOUNTER — TELEPHONE (OUTPATIENT)
Dept: OTOLARYNGOLOGY | Facility: OTHER | Age: 33
End: 2023-10-27

## 2023-10-27 ENCOUNTER — PREP FOR PROCEDURE (OUTPATIENT)
Dept: OTOLARYNGOLOGY | Facility: OTHER | Age: 33
End: 2023-10-27

## 2023-10-27 DIAGNOSIS — J34.2 DNS (DEVIATED NASAL SEPTUM): ICD-10-CM

## 2023-10-27 DIAGNOSIS — J32.9 CHRONIC SINUSITIS, UNSPECIFIED LOCATION: Primary | ICD-10-CM

## 2023-10-27 DIAGNOSIS — J34.3 NASAL TURBINATE HYPERTROPHY: ICD-10-CM

## 2023-10-27 NOTE — TELEPHONE ENCOUNTER
----- Message from Daniella Schmitt MD sent at 10/25/2023  1:06 PM CDT -----  Regarding: schedule sinus surgery  bilateral endoscopic sinus surgery, septoplasty, turbinate reduction      Please schedule surgery 6 weeks or later from now so she has time to recovery from thyroidectomy.  This was d/w Shilpi today.  Call her tomorrow or later this week to pick a date.  Consent above  thanks      Surgery notes:    No alondra  No balloons  1 hour

## 2023-11-01 ENCOUNTER — DOCUMENTATION ONLY (OUTPATIENT)
Dept: OTOLARYNGOLOGY | Facility: OTHER | Age: 33
End: 2023-11-01

## 2023-11-01 PROCEDURE — 88321 CONSLTJ&REPRT SLD PREP ELSWR: CPT

## 2023-11-01 PROCEDURE — 84999 UNLISTED CHEMISTRY PROCEDURE: CPT

## 2023-11-01 NOTE — PROGRESS NOTES
I spoke to Dr. oPmpa in pathology.  Left thyroid lobe has been submitted to [facility] for best practices for a second opinion.      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     I called Shilpi.  She is doing well.  She is eating and drinking, no voice concerns no swelling.  Her postop appointment this Friday.  I let her know pathology has been sent to [facility] for a second opinion and it may be 1 week or longer until we have the final diagnosis.    She expressed understanding.  I will contact her with the final pathology.

## 2023-11-02 ENCOUNTER — PREP FOR PROCEDURE (OUTPATIENT)
Dept: OTOLARYNGOLOGY | Facility: OTHER | Age: 33
End: 2023-11-02

## 2023-11-03 ENCOUNTER — OFFICE VISIT (OUTPATIENT)
Dept: OTOLARYNGOLOGY | Facility: OTHER | Age: 33
End: 2023-11-03
Attending: NURSE PRACTITIONER
Payer: COMMERCIAL

## 2023-11-03 VITALS
HEIGHT: 66 IN | WEIGHT: 222 LBS | TEMPERATURE: 98.8 F | SYSTOLIC BLOOD PRESSURE: 120 MMHG | DIASTOLIC BLOOD PRESSURE: 70 MMHG | RESPIRATION RATE: 14 BRPM | OXYGEN SATURATION: 100 % | HEART RATE: 75 BPM | BODY MASS INDEX: 35.68 KG/M2

## 2023-11-03 DIAGNOSIS — Z48.01 ENCOUNTER FOR CHANGE OR REMOVAL OF SURGICAL WOUND DRESSING: ICD-10-CM

## 2023-11-03 DIAGNOSIS — E89.0 S/P PARTIAL THYROIDECTOMY: Primary | ICD-10-CM

## 2023-11-03 PROCEDURE — 99024 POSTOP FOLLOW-UP VISIT: CPT | Performed by: NURSE PRACTITIONER

## 2023-11-03 ASSESSMENT — PAIN SCALES - GENERAL: PAINLEVEL: NO PAIN (0)

## 2023-11-03 NOTE — LETTER
11/3/2023         RE: Shipli Moore  [address] [facility] [address]        Dear Colleague,    Thank you for referring your patient, Shilpi Moore, to the [facility]. Please see a copy of my visit note below.    Otolaryngology Note         Chief Complaint:     Patient presents with:  Surgical Followup: S/P 10/25 Thyroid            History of Present Illness:     Shilpi Moore is a 33 year old female seen today for follow up left thyroidectomy.  She has been doing well, no concerns for fevers, chills, bleeding, swelling.  She denies dysphagia.  Surgical path report is still pending, it was sent to [place].  Pain is well controlled with oral analgesics.           Medications:     Current Outpatient Rx   Medication Sig Dispense Refill     acetaminophen (TYLENOL) 500 MG tablet Take 500-1,000 mg by mouth every 6 hours as needed for mild pain       ALBUTEROL IN        budesonide (PULMICORT) 0.5 MG/2ML neb solution Mix 240 ml Ken Med Sinus rinse, add 0.5 mg budesonide vial, rinse 1/2 bottle in each nostril twice daily 120 mL 4     metFORMIN (GLUCOPHAGE XR) 500 MG 24 hr tablet Take 1,000 mg by mouth daily (with breakfast)       Multiple Vitamins-Minerals (QC WOMENS DAILY MULTIVITAMIN PO) Take 1 Dose by mouth daily       omeprazole (PRILOSEC) 40 MG DR capsule TAKE 1 CAPSULE BY MOUTH EVERY DAY 30 TO 60 MINUTES BEFORE A MEAL 90 capsule 0     sertraline (ZOLOFT) 100 MG tablet TAKE ONE-HALF TABLET BY MOUTH TWICE DAILY 90 tablet 3     VITAMIN D PO               Allergies:     Allergies: Ibuprofen and Nsaids          Past Medical History:     Past Medical History:   Diagnosis Date     Allergic rhinitis 6/15/2011     Anxiety 9/17/2019     Benign essential hypertension 4/27/2017     Gastroesophageal reflux disease without esophagitis 9/21/2018     Major depressive disorder, recurrent episode, unspecified 6/15/2011     Obesity 4/27/2017     Reactive airway disease that is not asthma 2/12/2018  "    Seasonal allergic rhinitis due to pollen 4/27/2017     Unspecified vitamin D deficiency 4/11/2014            Past Surgical History:     Past Surgical History:   Procedure Laterality Date     CHOLECYSTECTOMY, LAPOROSCOPIC N/A 09/27/2020    [facility].     ENT SURGERY      tonsillectomy     OTHER SURGICAL HISTORY      I&D rt abscess axilla     SOFT TISSUE SURGERY      IND cyst right axilla     THYROIDECTOMY Left 10/25/2023    Procedure: Left Thyroid Lobectomy with 1.5 hours nerve monitoring;  Surgeon: Daniella Schmitt MD;  Location: [facility] OR       ENT family history reviewed         Social History:     Social History     Tobacco Use     Smoking status: Never     Smokeless tobacco: Never     Tobacco comments:     Passive exposure   Vaping Use     Vaping Use: Never used   Substance Use Topics     Alcohol use: Yes     Comment: Beer; Occasionally     Drug use: No            Review of Systems:     ROS: See HPI         Physical Exam:     /70 (BP Location: Right arm, Cuff Size: Adult Large)   Pulse 75   Temp 98.8  F (37.1  C) (Tympanic)   Resp 14   Ht 1.676 m (5' 6\")   Wt 100.7 kg (222 lb)   LMP 10/11/2023 (Approximate)   SpO2 100%   BMI 35.83 kg/m      General - The patient is well nourished and well developed, and appears to have good nutritional status.  Alert and oriented to person and place, answers questions and cooperates with examination appropriately.   Head and Face - Normocephalic and atraumatic, with no gross asymmetry noted.  The facial nerve is intact, with strong symmetric movements.  Voice and Breathing - The patient was breathing comfortably without the use of accessory muscles. There was no wheezing, stridor. The patients voice was clear and strong, and had appropriate pitch and quality.  Ears - External ear normal. Canals are patent. Right tympanic membrane is intact without effusion, retraction or mass. Left tympanic membrane is intact without effusion, retraction or mass.  Eyes - " Extraocular movements intact, sclera were not icteric or injected.  Mouth - Examination of the oral cavity showed pink, healthy oral mucosa. Dentition in good condition. No lesions or ulcerations noted. The tongue was mobile and midline.   Throat - The walls of the oropharynx were smooth, pink, moist, symmetric, and had no lesions or ulcerations.  The tonsillar pillars and soft palate were symmetric. The uvula was midline on elevation.    Neck - Midline anterior surgical incision has clean and dry steri strip, this was removed using adhesive remover.  The surgical incision is well approximated, healing nicely.  No erythema, edema, drainage or warmth.   Palpation of the thyroid was soft and smooth, with no nodules or goiter appreciated.  The trachea was mobile and midline.  Nose - External contour is symmetric, no gross deflection or scars.           Assessment and Plan:       ICD-10-CM    1. S/P partial thyroidectomy  E89.0     left      2. Encounter for change or removal of surgical wound dressing  Z48.01           Follow up as needed  We will call you with the final pathology report  Follow up for sinus surgery in January.    Follow up sooner with concerns or changes in symptoms    Rajani TORO  [facility] ENT      Again, thank you for allowing me to participate in the care of your patient.        Sincerely,        Rajani Aguiar NP

## 2023-11-03 NOTE — PROGRESS NOTES
Otolaryngology Note         Chief Complaint:     Patient presents with:  Surgical Followup: S/P 10/25 Thyroid            History of Present Illness:     Shilpi Moore is a 33 year old female seen today for follow up left thyroidectomy.  She has been doing well, no concerns for fevers, chills, bleeding, swelling.  She denies dysphagia.  Surgical path report is still pending, it was sent to [place].  Pain is well controlled with oral analgesics.           Medications:     Current Outpatient Rx   Medication Sig Dispense Refill    acetaminophen (TYLENOL) 500 MG tablet Take 500-1,000 mg by mouth every 6 hours as needed for mild pain      ALBUTEROL IN       budesonide (PULMICORT) 0.5 MG/2ML neb solution Mix 240 ml Ken Med Sinus rinse, add 0.5 mg budesonide vial, rinse 1/2 bottle in each nostril twice daily 120 mL 4    metFORMIN (GLUCOPHAGE XR) 500 MG 24 hr tablet Take 1,000 mg by mouth daily (with breakfast)      Multiple Vitamins-Minerals (QC WOMENS DAILY MULTIVITAMIN PO) Take 1 Dose by mouth daily      omeprazole (PRILOSEC) 40 MG DR capsule TAKE 1 CAPSULE BY MOUTH EVERY DAY 30 TO 60 MINUTES BEFORE A MEAL 90 capsule 0    sertraline (ZOLOFT) 100 MG tablet TAKE ONE-HALF TABLET BY MOUTH TWICE DAILY 90 tablet 3    VITAMIN D PO               Allergies:     Allergies: Ibuprofen and Nsaids          Past Medical History:     Past Medical History:   Diagnosis Date    Allergic rhinitis 6/15/2011    Anxiety 9/17/2019    Benign essential hypertension 4/27/2017    Gastroesophageal reflux disease without esophagitis 9/21/2018    Major depressive disorder, recurrent episode, unspecified 6/15/2011    Obesity 4/27/2017    Reactive airway disease that is not asthma 2/12/2018    Seasonal allergic rhinitis due to pollen 4/27/2017    Unspecified vitamin D deficiency 4/11/2014            Past Surgical History:     Past Surgical History:   Procedure Laterality Date    CHOLECYSTECTOMY, LAPOROSCOPIC N/A 09/27/2020    [facility].    ENT  "SURGERY      tonsillectomy    OTHER SURGICAL HISTORY      I&D rt abscess axilla    SOFT TISSUE SURGERY      IND cyst right axilla    THYROIDECTOMY Left 10/25/2023    Procedure: Left Thyroid Lobectomy with 1.5 hours nerve monitoring;  Surgeon: Daniella Schmitt MD;  Location: [facility] OR       ENT family history reviewed         Social History:     Social History     Tobacco Use    Smoking status: Never    Smokeless tobacco: Never    Tobacco comments:     Passive exposure   Vaping Use    Vaping Use: Never used   Substance Use Topics    Alcohol use: Yes     Comment: Beer; Occasionally    Drug use: No            Review of Systems:     ROS: See HPI         Physical Exam:     /70 (BP Location: Right arm, Cuff Size: Adult Large)   Pulse 75   Temp 98.8  F (37.1  C) (Tympanic)   Resp 14   Ht 1.676 m (5' 6\")   Wt 100.7 kg (222 lb)   LMP 10/11/2023 (Approximate)   SpO2 100%   BMI 35.83 kg/m      General - The patient is well nourished and well developed, and appears to have good nutritional status.  Alert and oriented to person and place, answers questions and cooperates with examination appropriately.   Head and Face - Normocephalic and atraumatic, with no gross asymmetry noted.  The facial nerve is intact, with strong symmetric movements.  Voice and Breathing - The patient was breathing comfortably without the use of accessory muscles. There was no wheezing, stridor. The patients voice was clear and strong, and had appropriate pitch and quality.  Ears - External ear normal. Canals are patent. Right tympanic membrane is intact without effusion, retraction or mass. Left tympanic membrane is intact without effusion, retraction or mass.  Eyes - Extraocular movements intact, sclera were not icteric or injected.  Mouth - Examination of the oral cavity showed pink, healthy oral mucosa. Dentition in good condition. No lesions or ulcerations noted. The tongue was mobile and midline.   Throat - The walls of the oropharynx " were smooth, pink, moist, symmetric, and had no lesions or ulcerations.  The tonsillar pillars and soft palate were symmetric. The uvula was midline on elevation.    Neck - Midline anterior surgical incision has clean and dry steri strip, this was removed using adhesive remover.  The surgical incision is well approximated, healing nicely.  No erythema, edema, drainage or warmth.   Palpation of the thyroid was soft and smooth, with no nodules or goiter appreciated.  The trachea was mobile and midline.  Nose - External contour is symmetric, no gross deflection or scars.           Assessment and Plan:       ICD-10-CM    1. S/P partial thyroidectomy  E89.0     left      2. Encounter for change or removal of surgical wound dressing  Z48.01           Follow up as needed  We will call you with the final pathology report  Follow up for sinus surgery in January.    Follow up sooner with concerns or changes in symptoms    Rajani TORO  [facility] ENT

## 2023-11-03 NOTE — PATIENT INSTRUCTIONS
Thank you for allowing Rajani Aguiar and our ENT team to participate in your care.  If your medications are too expensive, please give the nurse a call.  We can possibly change this medication.  If you have a scheduling or an appointment question please contact our Health Unit Coordinator at their direct line [phone].   ALL nursing questions or concerns can be directed to your ENT nurse at: [phone] - [place]     Follow up as needed  We will call you with the final pathology report  Follow up for sinus surgery in January.    Follow up sooner with concerns or changes in symptoms

## 2023-11-09 ENCOUNTER — TELEPHONE (OUTPATIENT)
Dept: OTOLARYNGOLOGY | Facility: OTHER | Age: 33
End: 2023-11-09

## 2023-11-09 DIAGNOSIS — E89.0 HISTORY OF THYROIDECTOMY: Primary | ICD-10-CM

## 2023-11-15 DIAGNOSIS — K21.9 GASTROESOPHAGEAL REFLUX DISEASE WITHOUT ESOPHAGITIS: ICD-10-CM

## 2023-11-15 NOTE — TELEPHONE ENCOUNTER
Omeprazole (PRILOSEC) 40 mg DR cap      Last Written Prescription Date:  8/28/23  Last Fill Quantity: 90,   # refills: 0  Last Office Visit: 10/18/23  Future Office visit:    Next 5 appointments (look out 90 days)      Dec 29, 2023  3:30 PM  (Arrive by 3:15 PM)  Pre-Op physical with Tiana Tucker CNP  [facility] ([facility] ) [address]  [facility] [address]  [phone]

## 2023-11-16 RX ORDER — OMEPRAZOLE 40 MG/1
CAPSULE, DELAYED RELEASE ORAL
Qty: 90 CAPSULE | Refills: 3 | Status: SHIPPED | OUTPATIENT
Start: 2023-11-16

## 2023-11-18 DIAGNOSIS — K21.9 GASTROESOPHAGEAL REFLUX DISEASE WITHOUT ESOPHAGITIS: ICD-10-CM

## 2023-11-20 RX ORDER — OMEPRAZOLE 40 MG/1
CAPSULE, DELAYED RELEASE ORAL
Qty: 90 CAPSULE | Refills: 3 | OUTPATIENT
Start: 2023-11-20

## 2023-11-20 NOTE — TELEPHONE ENCOUNTER
Prilosec       Last Written Prescription Date:  11/16/2023  Last Fill Quantity: 90,   # refills: 3  Last Office Visit: 10/18/2023  Future Office visit:    Next 5 appointments (look out 90 days)      Dec 29, 2023  3:30 PM  (Arrive by 3:15 PM)  Pre-Op physical with Tiana Tucker CNP  [facility] ([facility] ) [address]  [place] [address]  [phone]

## 2023-11-22 LAB
PATH REPORT.ADDENDUM SPEC: ABNORMAL
PATH REPORT.COMMENTS IMP SPEC: ABNORMAL
PATH REPORT.COMMENTS IMP SPEC: YES
PATH REPORT.FINAL DX SPEC: ABNORMAL
PATH REPORT.GROSS SPEC: ABNORMAL
PATH REPORT.MICROSCOPIC SPEC OTHER STN: ABNORMAL
PATH REPORT.RELEVANT HX SPEC: ABNORMAL
PATHOLOGY SYNOPTIC REPORT: ABNORMAL
PHOTO IMAGE: ABNORMAL

## 2023-11-22 PROCEDURE — 88342 IMHCHEM/IMCYTCHM 1ST ANTB: CPT | Mod: 26

## 2023-11-29 ENCOUNTER — TELEPHONE (OUTPATIENT)
Dept: OTOLARYNGOLOGY | Facility: OTHER | Age: 33
End: 2023-11-29

## 2023-11-29 DIAGNOSIS — C73 PAPILLARY MICROCARCINOMA OF THYROID (H): Primary | ICD-10-CM

## 2023-11-29 DIAGNOSIS — Z90.09 HISTORY OF LOBECTOMY OF THYROID: ICD-10-CM

## 2023-12-19 LAB — SCANNED LAB RESULT: NORMAL

## 2023-12-20 NOTE — PROGRESS NOTES
[facility]  [address]  [place] [address]  Phone: [phone]  Primary Provider: Tiana Solorzano  Pre-op Performing Provider: TIANA SOLORZANO        PREOPERATIVE EVALUATION:  Today's date: 12/29/2023        Shilpi is a 33 year old, presenting for the following:  Pre-Op Exam      Surgical Information:  Surgery/Procedure: Bilateral endoscopic sinus surgery  Surgery Location: [facility]  Surgeon: Dr. Schmitt  Surgery Date: 01/03/2023  Time of Surgery: TBD  Where patient plans to recover: At home with family  Fax number for surgical facility: Note does not need to be faxed, will be available electronically in Epic.        HPI related to upcoming procedure:     Chronic sinusitis            12/27/2023     6:29 AM   Preop Questions   1. Have you ever had a heart attack or stroke? No   2. Have you ever had surgery on your heart or blood vessels, such as a stent placement, a coronary artery bypass, or surgery on an artery in your head, neck, heart, or legs? No   3. Do you have chest pain with activity? No   4. Do you have a history of  heart failure? No   5. Do you currently have a cold, bronchitis or symptoms of other infection? No   6. Do you have a cough, shortness of breath, or wheezing? No   7. Do you or anyone in your family have previous history of blood clots? No   8. Do you or does anyone in your family have a serious bleeding problem such as prolonged bleeding following surgeries or cuts? No   9. Have you ever had problems with anemia or been told to take iron pills? No   10. Have you had any abnormal blood loss such as black, tarry or bloody stools, or abnormal vaginal bleeding? No   11. Have you ever had a blood transfusion? No   12. Are you willing to have a blood transfusion if it is medically needed before, during, or after your surgery? Yes   13. Have you or any of your relatives ever had problems with anesthesia? No   14. Do you have sleep apnea, excessive snoring or  daytime drowsiness? No   15. Do you have any artifical heart valves or other implanted medical devices like a pacemaker, defibrillator, or continuous glucose monitor? No   16. Do you have artificial joints? No   17. Are you allergic to latex? No   18. Is there any chance that you may be pregnant? No         Health Care Directive:  Patient does not have a Health Care Directive or Living Will: Discussed advance care planning with patient; however, patient declined at this time.        Status of Chronic Conditions:  See problem list for active medical problems.  Problems all longstanding and stable, except as noted/documented.  See ROS for pertinent symptoms related to these conditions.        Review of Systems  CONSTITUTIONAL: NEGATIVE for fever, chills, change in weight  INTEGUMENTARY/SKIN: NEGATIVE for worrisome rashes, moles or lesions  EYES: NEGATIVE for vision changes or irritation  ENT/MOUTH: NEGATIVE for ear, mouth and throat problems  RESP: NEGATIVE for significant cough or SOB  CV: NEGATIVE for chest pain, palpitations or peripheral edema  GI: NEGATIVE for nausea, abdominal pain, heartburn, or change in bowel habits  : NEGATIVE for frequency, dysuria, or hematuria  MUSCULOSKELETAL: NEGATIVE for significant arthralgias or myalgia  NEURO: NEGATIVE for weakness, dizziness or paresthesias  ENDOCRINE: NEGATIVE for temperature intolerance, skin/hair changes  HEME: NEGATIVE for bleeding problems  PSYCHIATRIC: NEGATIVE for changes in mood or affect        Patient Active Problem List    Diagnosis Date Noted    Lactation problem 09/30/2019     Priority: Medium    Anxiety 09/17/2019     Priority: Medium    Gastroesophageal reflux disease without esophagitis 09/21/2018     Priority: Medium    Reactive airway disease that is not asthma 02/12/2018     Priority: Medium     Replacing diagnoses that were inactivated after the 10/1/2021 regulatory import.      Seasonal allergic rhinitis due to pollen 04/27/2017     Priority:  Medium    Benign essential hypertension 04/27/2017     Priority: Medium    Morbid obesity (H) 04/27/2017     Priority: Medium    ACP (advance care planning) 06/06/2016     Priority: Medium     Advance Care Planning 6/6/2016: ACP Review of Chart / Resources Provided:  Reviewed chart for advance care plan.  Shilpi Moore has no plan or code status on file. Discussed available resources and provided with information. Confirmed code status reflects current choices pending further ACP discussions.  Confirmed/documented legally designated decision makers.  Added by TRANG LARSON            Vitamin D deficiency 04/11/2014     Priority: Medium    Family history of melanoma 10/13/2011     Priority: Medium          Past Medical History:   Diagnosis Date    Allergic rhinitis 6/15/2011    Anxiety 9/17/2019    Benign essential hypertension 4/27/2017    Gastroesophageal reflux disease without esophagitis 9/21/2018    Major depressive disorder, recurrent episode, unspecified 6/15/2011    Obesity 4/27/2017    Reactive airway disease that is not asthma 2/12/2018    Seasonal allergic rhinitis due to pollen 4/27/2017    Unspecified vitamin D deficiency 4/11/2014         Past Surgical History:   Procedure Laterality Date    CHOLECYSTECTOMY, LAPOROSCOPIC N/A 09/27/2020    [facility].    ENT SURGERY      tonsillectomy    OTHER SURGICAL HISTORY      I&D rt abscess axilla    SOFT TISSUE SURGERY      IND cyst right axilla    THYROIDECTOMY Left 10/25/2023    Procedure: Left Thyroid Lobectomy with 1.5 hours nerve monitoring;  Surgeon: Daniella Schmitt MD;  Location: [facility] OR         Current Outpatient Medications   Medication Sig Dispense Refill    acetaminophen (TYLENOL) 500 MG tablet Take 500-1,000 mg by mouth every 6 hours as needed for mild pain      ALBUTEROL IN       budesonide (PULMICORT) 0.5 MG/2ML neb solution Mix 240 ml Ken Med Sinus rinse, add 0.5 mg budesonide vial, rinse 1/2 bottle in each nostril twice daily 120 mL 4  "   metFORMIN (GLUCOPHAGE XR) 500 MG 24 hr tablet Take 1,000 mg by mouth daily (with breakfast)      Multiple Vitamins-Minerals (QC WOMENS DAILY MULTIVITAMIN PO) Take 1 Dose by mouth daily      omeprazole (PRILOSEC) 40 MG DR capsule TAKE ONE CAPSULE BY MOUTH EVERY DAY 30 TO 60 MINUTES BEFORE A MEAL 90 capsule 3    sertraline (ZOLOFT) 100 MG tablet TAKE ONE-HALF TABLET BY MOUTH TWICE DAILY 90 tablet 3    VITAMIN D PO            Allergies   Allergen Reactions    Ibuprofen Anaphylaxis    Nsaids Swelling          Social History     Tobacco Use    Smoking status: Never    Smokeless tobacco: Never    Tobacco comments:     Passive exposure   Substance Use Topics    Alcohol use: Yes     Comment: Beer; Occasionally       Family History   Problem Relation Age of Onset    Asthma Mother     Hypertension Father     Asthma Brother     Asthma Sister     Cancer Maternal Grandfather 62        Mesothelioma, cause of death    Cancer Other 28        Cousin; Melanoma, cause of death    Thyroid Disease No family hx of          History   Drug Use No         Objective     /80 (BP Location: Right arm, Patient Position: Chair, Cuff Size: Adult Large)   Pulse 73   Temp 98  F (36.7  C) (Tympanic)   Resp 16   Ht 1.676 m (5' 6\")   Wt 103.3 kg (227 lb 11.2 oz)   LMP 12/18/2023 (Exact Date)   SpO2 99%   BMI 36.75 kg/m          Physical Exam    GENERAL APPEARANCE: healthy, alert and no distress     EYES: EOMI, PERRL     HENT: ear canals and TM's normal and nose and mouth without ulcers or lesions     NECK: no adenopathy, no asymmetry, masses, or scars and thyroid normal to palpation     RESP: lungs clear to auscultation - no rales, rhonchi or wheezes     CV: regular rates and rhythm, normal S1 S2, no S3 or S4 and no murmur, click or rub     ABDOMEN:  soft, nontender, no HSM or masses and bowel sounds normal     MS: extremities normal- no gross deformities noted, no evidence of inflammation in joints, FROM in all extremities.     SKIN: " no suspicious lesions or rashes     NEURO: Normal strength and tone, sensory exam grossly normal, mentation intact and speech normal     PSYCH: mentation appears normal. and affect normal/bright     LYMPHATICS: No cervical adenopathy          Recent Labs   Lab Test 10/18/23  1523 09/07/23  1213 01/17/23  1607   HGB 13.1 14.2  --     259  --      --  140   POTASSIUM 4.2  --  3.8   CR 0.84  --  0.86            Diagnostics:  Recent Results (from the past 24 hour(s))   HCG Qual, Urine ([id number])    Collection Time: 12/29/23  3:37 PM   Result Value Ref Range    hCG Urine Qualitative Negative Negative   CBC with platelets and differential    Collection Time: 12/29/23  3:37 PM   Result Value Ref Range    WBC Count 7.5 4.0 - 11.0 10e3/uL    RBC Count 4.37 3.80 - 5.20 10e6/uL    Hemoglobin 12.6 11.7 - 15.7 g/dL    Hematocrit 39.0 35.0 - 47.0 %    MCV 89 78 - 100 fL    MCH 28.8 26.5 - 33.0 pg    MCHC 32.3 31.5 - 36.5 g/dL    RDW 12.9 10.0 - 15.0 %    Platelet Count 287 150 - 450 10e3/uL    % Neutrophils 51 %    % Lymphocytes 39 %    % Monocytes 7 %    % Eosinophils 3 %    % Basophils 0 %    % Immature Granulocytes 0 %    Absolute Neutrophils 3.8 1.6 - 8.3 10e3/uL    Absolute Lymphocytes 2.9 0.8 - 5.3 10e3/uL    Absolute Monocytes 0.5 0.0 - 1.3 10e3/uL    Absolute Eosinophils 0.2 0.0 - 0.7 10e3/uL    Absolute Basophils 0.0 0.0 - 0.2 10e3/uL    Absolute Immature Granulocytes 0.0 <=0.4 10e3/uL        CMP pending, will review prior to surgery        No EKG required, no history of coronary heart disease, significant arrhythmia, peripheral arterial disease or other structural heart disease.        Revised Cardiac Risk Index (RCRI):  The patient has the following serious cardiovascular risks for perioperative complications:   - No serious cardiac risks = 0 points         RCRI Interpretation: 0 points: Class I (very low risk - 0.4% complication rate)      Assessment & Plan         The proposed surgical procedure is  considered LOW risk.      Pre-op exam  - Comprehensive metabolic panel  - CBC with platelets and differential  - HCG Qual, Urine ([id number])      Chronic sinusitis, unspecified location  - See above            - No identified additional risk factors other than previously addressed        Additional Medication Instructions:  Hold all medication the morning of surgery        RECOMMENDATION:  APPROVAL GIVEN to proceed with proposed procedure, without further diagnostic evaluation.         Signed Electronically by: Tiana Tucker CNP  Copy of this evaluation report is provided to requesting physician.

## 2023-12-27 ENCOUNTER — ANESTHESIA EVENT (OUTPATIENT)
Dept: SURGERY | Facility: HOSPITAL | Age: 33
End: 2023-12-27
Payer: COMMERCIAL

## 2023-12-27 ENCOUNTER — TELEPHONE (OUTPATIENT)
Dept: OTOLARYNGOLOGY | Facility: OTHER | Age: 33
End: 2023-12-27

## 2023-12-27 NOTE — TELEPHONE ENCOUNTER
----- Message from Veronica Cantu RN sent at 12/27/2023  9:58 AM CST -----  Regarding: Patient asking question about prednisone  She's scheduled 01/03 with Dr. Schmitt.  She states she was rescheduled to this date and with her previous surgery schedule she was supposed to take prednisone before surgery.  She is asking if Dr. Schmitt wants her to take before this surgery.  Could you call patient and let her know? Thank you.

## 2023-12-27 NOTE — ANESTHESIA PREPROCEDURE EVALUATION
"Anesthesia Pre-Procedure Evaluation    Patient: Shilpi Moore   MRN: [id number] : 1990        Procedure : Procedure(s):  BILATERAL ENDOSCOPIC SINUS SURGERY  Septoplasty, Turbinate Reduction          Past Medical History:   Diagnosis Date    Allergic rhinitis 6/15/2011    Anxiety 2019    Benign essential hypertension 2017    Gastroesophageal reflux disease without esophagitis 2018    Major depressive disorder, recurrent episode, unspecified 6/15/2011    Obesity 2017    Reactive airway disease that is not asthma 2018    Seasonal allergic rhinitis due to pollen 2017    Unspecified vitamin D deficiency 2014      Past Surgical History:   Procedure Laterality Date    CHOLECYSTECTOMY, LAPOROSCOPIC N/A 2020    [facility].    ENT SURGERY      tonsillectomy    OTHER SURGICAL HISTORY      I&D rt abscess axilla    SOFT TISSUE SURGERY      IND cyst right axilla    THYROIDECTOMY Left 10/25/2023    Procedure: Left Thyroid Lobectomy with 1.5 hours nerve monitoring;  Surgeon: Daniella Schmitt MD;  Location: [facility]      Allergies   Allergen Reactions    Ibuprofen Anaphylaxis    Nsaids Swelling      Social History     Tobacco Use    Smoking status: Never    Smokeless tobacco: Never    Tobacco comments:     Passive exposure   Substance Use Topics    Alcohol use: Yes     Comment: Beer; Occasionally      Wt Readings from Last 1 Encounters:   23 100.7 kg (222 lb)        Anesthesia Evaluation   Pt has had prior anesthetic. Type: General and MAC (thyroidectomy 8 weeks ago grade 1 view with DL).    No history of anesthetic complications       ROS/MED HX  ENT/Pulmonary: Comment: RAD    (+)     KATHY risk factors, snores loudly, hypertension, obese,   allergic rhinitis,          Intermittent, asthma (environmental; dust exposure brings on tightness per patient; breathing feels \"good\" today) Last exacerbation: last use 3 weeks ago,  Treatment: Inhaler prn,               "   Neurologic:  - neg neurologic ROS     Cardiovascular: Comment: 2017 for tachycardia:   ASSESSMENT:  ZIO XT Patch Report revealing the followin.  Sinus rhythm throughout.  2.  No bradycardic episodes, pauses, or heart block.  3.  Very rare isolated premature atrial contractions.  No  supraventricular tachycardia.  4.  Isolated rare premature ventricular contractions, with occasional  bigeminy and trigeminy seen.  No ventricular tachycardia, but still  very rare.  5.  24 Triggered events; 16 of them had unifocal premature ventricular  contractions and 1 did have an episode of bigeminy/trigeminy noted.  6.  12 Diary entries; 7 of these had premature ventricular  contractions noted.  Most of these were symptoms of fluttering.  The  others had more dizziness.  These were unifocal premature ventricular  contractions.  One of them did have a little bigeminy.  One did have  some trigeminal episode.      (+)  hypertension-range: 144/87 pt states up because anxious; versed ordered; not on BP meds at this time/ -   -  - -                                 Previous cardiac testing   Echo: Date: Results:    Stress Test:  Date: Results:    ECG Reviewed:  Date: 2017 Results:  2017: HR 69, NSR  Cath:  Date: Results:      METS/Exercise Tolerance: >4 METS    Hematologic:  - neg hematologic  ROS     Musculoskeletal: Comment: Scoliosis       GI/Hepatic:     (+) GERD (on daily omeprazole 40 mg; none this am), Asymptomatic on medication,                  Renal/Genitourinary:  - neg Renal ROS     Endo: Comment: 10/25/23 left thyroid lobectomy    On metformin 1,000 mg once daily (weight loss?)    (+)          thyroid problem, does not need replacement; right lobe functional,    Obesity,       Psychiatric/Substance Use: Comment: Etoh 1-2 times per week 1-2 at a sitting     (+) psychiatric history depression and anxiety       Infectious Disease:  - neg infectious disease ROS     Malignancy:  - neg malignancy ROS     Other: Comment:  "12/29/23 urine HCG negative           Physical Exam    Airway      Comment: Narrow palate     Mallampati: II   TM distance: < 3 FB   Neck ROM: full   Mouth opening: > 3 cm    Respiratory Devices and Support         Dental       (+) Minor Abnormalities - some fillings, tiny chips      Cardiovascular          Rhythm and rate: regular and normal     Pulmonary           breath sounds clear to auscultation           OUTSIDE LABS:  CBC:   Lab Results   Component Value Date    WBC 7.4 10/18/2023    WBC 4.8 09/07/2023    HGB 13.1 10/18/2023    HGB 14.2 09/07/2023    HCT 40.5 10/18/2023    HCT 43.5 09/07/2023     10/18/2023     09/07/2023     BMP:   Lab Results   Component Value Date     10/18/2023     01/17/2023    POTASSIUM 4.2 10/18/2023    POTASSIUM 3.8 01/17/2023    CHLORIDE 102 10/18/2023    CHLORIDE 100 01/17/2023    CO2 25 10/18/2023    CO2 28 01/17/2023    BUN 12.6 10/18/2023    BUN 15.5 01/17/2023    CR 0.84 10/18/2023    CR 0.86 01/17/2023    GLC 79 10/25/2023    GLC 89 10/18/2023     COAGS: No results found for: \"PTT\", \"INR\", \"FIBR\"  POC:   Lab Results   Component Value Date    HCG Negative 10/25/2023     HEPATIC:   Lab Results   Component Value Date    ALBUMIN 4.4 10/18/2023    PROTTOTAL 6.9 10/18/2023    ALT 14 10/18/2023    AST 13 10/18/2023    ALKPHOS 43 10/18/2023    BILITOTAL <0.2 10/18/2023     OTHER:   Lab Results   Component Value Date    A1C 5.2 12/11/2018    SYED 9.4 10/18/2023    TSH 1.88 09/07/2023    T4 0.96 09/07/2023       Anesthesia Plan    ASA Status:  3    NPO Status:  NPO Appropriate (1700 food; water 0500 sips)    Anesthesia Type: General.     - Airway: ETT   Induction: Propofol.   Maintenance: Balanced.        Consents    Anesthesia Plan(s) and associated risks, benefits, and realistic alternatives discussed. Questions answered and patient/representative(s) expressed understanding.     - Discussed:     - Discussed with:  Patient      - Extended Intubation/Ventilatory " Support Discussed: No.      - Patient is DNR/DNI Status: No     Use of blood products discussed: No .     Postoperative Care       PONV prophylaxis: Ondansetron (or other 5HT-3), Dexamethasone or Solumedrol, Background Propofol Infusion     Comments:    Other Comments: ROYER Tucker 12/29/23  BMI 36.75    Discussed risks and benefits with patient for general anesthesia including sore throat, nausea, vomiting, aspiration, dental damage, loss of airway, CV complications, stroke, MI, death. Pt wishes to proceed.              PAULIE Burkett CRNA    I have reviewed the pertinent notes and labs in the chart from the past 30 days and (re)examined the patient.  Any updates or changes from those notes are reflected in this note.

## 2023-12-29 ENCOUNTER — OFFICE VISIT (OUTPATIENT)
Dept: FAMILY MEDICINE | Facility: OTHER | Age: 33
End: 2023-12-29
Attending: NURSE PRACTITIONER
Payer: COMMERCIAL

## 2023-12-29 VITALS
SYSTOLIC BLOOD PRESSURE: 118 MMHG | HEIGHT: 66 IN | OXYGEN SATURATION: 99 % | HEART RATE: 73 BPM | TEMPERATURE: 98 F | WEIGHT: 227.7 LBS | RESPIRATION RATE: 16 BRPM | BODY MASS INDEX: 36.59 KG/M2 | DIASTOLIC BLOOD PRESSURE: 80 MMHG

## 2023-12-29 DIAGNOSIS — Z01.818 PRE-OP EXAM: Primary | ICD-10-CM

## 2023-12-29 DIAGNOSIS — J32.9 CHRONIC SINUSITIS, UNSPECIFIED LOCATION: ICD-10-CM

## 2023-12-29 LAB
ALBUMIN SERPL BCG-MCNC: 4.2 G/DL (ref 3.5–5.2)
ALP SERPL-CCNC: 46 U/L (ref 40–150)
ALT SERPL W P-5'-P-CCNC: 10 U/L (ref 0–50)
ANION GAP SERPL CALCULATED.3IONS-SCNC: 11 MMOL/L (ref 7–15)
AST SERPL W P-5'-P-CCNC: 15 U/L (ref 0–45)
BASOPHILS # BLD AUTO: 0 10E3/UL (ref 0–0.2)
BASOPHILS NFR BLD AUTO: 0 %
BILIRUB SERPL-MCNC: <0.2 MG/DL
BUN SERPL-MCNC: 8.7 MG/DL (ref 6–20)
CALCIUM SERPL-MCNC: 8.9 MG/DL (ref 8.6–10)
CHLORIDE SERPL-SCNC: 103 MMOL/L (ref 98–107)
CREAT SERPL-MCNC: 0.7 MG/DL (ref 0.51–0.95)
DEPRECATED HCO3 PLAS-SCNC: 25 MMOL/L (ref 22–29)
EGFRCR SERPLBLD CKD-EPI 2021: >90 ML/MIN/1.73M2
EOSINOPHIL # BLD AUTO: 0.2 10E3/UL (ref 0–0.7)
EOSINOPHIL NFR BLD AUTO: 3 %
ERYTHROCYTE [DISTWIDTH] IN BLOOD BY AUTOMATED COUNT: 12.9 % (ref 10–15)
GLUCOSE SERPL-MCNC: 87 MG/DL (ref 70–99)
HCG UR QL: NEGATIVE
HCT VFR BLD AUTO: 39 % (ref 35–47)
HGB BLD-MCNC: 12.6 G/DL (ref 11.7–15.7)
IMM GRANULOCYTES # BLD: 0 10E3/UL
IMM GRANULOCYTES NFR BLD: 0 %
LYMPHOCYTES # BLD AUTO: 2.9 10E3/UL (ref 0.8–5.3)
LYMPHOCYTES NFR BLD AUTO: 39 %
MCH RBC QN AUTO: 28.8 PG (ref 26.5–33)
MCHC RBC AUTO-ENTMCNC: 32.3 G/DL (ref 31.5–36.5)
MCV RBC AUTO: 89 FL (ref 78–100)
MONOCYTES # BLD AUTO: 0.5 10E3/UL (ref 0–1.3)
MONOCYTES NFR BLD AUTO: 7 %
NEUTROPHILS # BLD AUTO: 3.8 10E3/UL (ref 1.6–8.3)
NEUTROPHILS NFR BLD AUTO: 51 %
PLATELET # BLD AUTO: 287 10E3/UL (ref 150–450)
POTASSIUM SERPL-SCNC: 4.1 MMOL/L (ref 3.4–5.3)
PROT SERPL-MCNC: 6.6 G/DL (ref 6.4–8.3)
RBC # BLD AUTO: 4.37 10E6/UL (ref 3.8–5.2)
SODIUM SERPL-SCNC: 139 MMOL/L (ref 135–145)
WBC # BLD AUTO: 7.5 10E3/UL (ref 4–11)

## 2023-12-29 PROCEDURE — 85025 COMPLETE CBC W/AUTO DIFF WBC: CPT | Performed by: NURSE PRACTITIONER

## 2023-12-29 PROCEDURE — 36415 COLL VENOUS BLD VENIPUNCTURE: CPT | Performed by: NURSE PRACTITIONER

## 2023-12-29 PROCEDURE — 99213 OFFICE O/P EST LOW 20 MIN: CPT | Mod: 24 | Performed by: NURSE PRACTITIONER

## 2023-12-29 PROCEDURE — 80053 COMPREHEN METABOLIC PANEL: CPT | Performed by: NURSE PRACTITIONER

## 2023-12-29 PROCEDURE — 81025 URINE PREGNANCY TEST: CPT | Performed by: NURSE PRACTITIONER

## 2023-12-29 ASSESSMENT — ASTHMA QUESTIONNAIRES
ACT_TOTALSCORE: 24
QUESTION_4 LAST FOUR WEEKS HOW OFTEN HAVE YOU USED YOUR RESCUE INHALER OR NEBULIZER MEDICATION (SUCH AS ALBUTEROL): NOT AT ALL
QUESTION_1 LAST FOUR WEEKS HOW MUCH OF THE TIME DID YOUR ASTHMA KEEP YOU FROM GETTING AS MUCH DONE AT WORK, SCHOOL OR AT HOME: NONE OF THE TIME
QUESTION_2 LAST FOUR WEEKS HOW OFTEN HAVE YOU HAD SHORTNESS OF BREATH: NOT AT ALL
QUESTION_3 LAST FOUR WEEKS HOW OFTEN DID YOUR ASTHMA SYMPTOMS (WHEEZING, COUGHING, SHORTNESS OF BREATH, CHEST TIGHTNESS OR PAIN) WAKE YOU UP AT NIGHT OR EARLIER THAN USUAL IN THE MORNING: NOT AT ALL
ACT_TOTALSCORE: 24
QUESTION_5 LAST FOUR WEEKS HOW WOULD YOU RATE YOUR ASTHMA CONTROL: WELL CONTROLLED

## 2023-12-29 ASSESSMENT — PAIN SCALES - GENERAL: PAINLEVEL: NO PAIN (0)

## 2023-12-29 NOTE — LETTER
My Asthma Action Plan    Name: Shilpi Moore   YOB: 1990  Date: 12/29/2023   My doctor: Tiana Tucker CNP   My clinic: [facility]        My Rescue Medicine:   Albuterol inhaler (Proair/Ventolin/Proventil HFA)  2-4 puffs EVERY 4 HOURS as needed. Use a spacer if recommended by your provider.   My Asthma Severity:   Intermittent / Exercise Induced  Know your asthma triggers: dust mites, pollens, and animal dander  upper respiratory infections          GREEN ZONE   Good Control  I feel good  No cough or wheeze  Can work, sleep and play without asthma symptoms       Take your asthma control medicine every day.     If exercise triggers your asthma, take your rescue medication  15 minutes before exercise or sports, and  During exercise if you have asthma symptoms  Spacer to use with inhaler: If you have a spacer, make sure to use it with your inhaler             YELLOW ZONE Getting Worse  I have ANY of these:  I do not feel good  Cough or wheeze  Chest feels tight  Wake up at night   Keep taking your Green Zone medications  Start taking your rescue medicine:  every 20 minutes for up to 1 hour. Then every 4 hours for 24-48 hours.  If you stay in the Yellow Zone for more than 12-24 hours, contact your doctor.  If you do not return to the Green Zone in 12-24 hours or you get worse, start taking your oral steroid medicine if prescribed by your provider.           RED ZONE Medical Alert - Get Help  I have ANY of these:  I feel awful  Medicine is not helping  Breathing getting harder  Trouble walking or talking  Nose opens wide to breathe       Take your rescue medicine NOW  If your provider has prescribed an oral steroid medicine, start taking it NOW  Call your doctor NOW  If you are still in the Red Zone after 20 minutes and you have not reached your doctor:  Take your rescue medicine again and  Call 911 or go to the emergency room right away    See your regular doctor within 2 weeks of  an Emergency Room or Urgent Care visit for follow-up treatment.          Annual Reminders:  Meet with Asthma Educator,  Flu Shot in the Fall, consider Pneumonia Vaccination for patients with asthma (aged 19 and older).    Pharmacy:    [id number] - [place], [place] - [address]  [facility] [place] [facility] [address] [id number] - [facility] [address] AT [facility] [address] - [place], [place] - [address]    Electronically signed by Tiana Tucker CNP   Date: 12/29/23                    Asthma Triggers  How To Control Things That Make Your Asthma Worse    Triggers are things that make your asthma worse.  Look at the list below to help you find your triggers and   what you can do about them. You can help prevent asthma flare-ups by staying away from your triggers.      Trigger                                                          What you can do   Cigarette Smoke  Tobacco smoke can make asthma worse. Do not allow smoking in your home, car or around you.  Be sure no one smokes at a child s day care or school.  If you smoke, ask your health care provider for ways to help you quit.  Ask family members to quit too.  Ask your health care provider for a referral to Quit Plan to help you quit smoking, or call 3-207-856-PLAN.     Colds, Flu, Bronchitis  These are common triggers of asthma. Wash your hands often.  Don t touch your eyes, nose or mouth.  Get a flu shot every year.     Dust Mites  These are tiny bugs that live in cloth or carpet. They are too small to see. Wash sheets and blankets in hot water every week.   Encase pillows and mattress in dust mite proof covers.  Avoid having carpet if you can. If you have carpet, vacuum weekly.   Use a dust mask and HEPA vacuum.   Pollen and Outdoor Mold  Some people are allergic to trees, grass, or weed pollen, or molds. Try to keep your windows closed.  Limit time out doors  when pollen count is high.   Ask you health care provider about taking medicine during allergy season.     Animal Dander  Some people are allergic to skin flakes, urine or saliva from pets with fur or feathers. Keep pets with fur or feathers out of your home.    If you can t keep the pet outdoors, then keep the pet out of your bedroom.  Keep the bedroom door closed.  Keep pets off cloth furniture and away from stuffed toys.     Mice, Rats, and Cockroaches  Some people are allergic to the waste from these pests.   Cover food and garbage.  Clean up spills and food crumbs.  Store grease in the refrigerator.   Keep food out of the bedroom.   Indoor Mold  This can be a trigger if your home has high moisture. Fix leaking faucets, pipes, or other sources of water.   Clean moldy surfaces.  Dehumidify basement if it is damp and smelly.   Smoke, Strong Odors, and Sprays  These can reduce air quality. Stay away from strong odors and sprays, such as perfume, powder, hair spray, paints, smoke incense, paint, cleaning products, candles and new carpet.   Exercise or Sports  Some people with asthma have this trigger. Be active!  Ask your doctor about taking medicine before sports or exercise to prevent symptoms.    Warm up for 5-10 minutes before and after sports or exercise.     Other Triggers of Asthma  Cold air:  Cover your nose and mouth with a scarf.  Sometimes laughing or crying can be a trigger.  Some medicines and food can trigger asthma.

## 2023-12-29 NOTE — PATIENT INSTRUCTIONS
Preparing for Your Surgery  Getting started  A nurse will call you to review your health history and instructions. They will give you an arrival time based on your scheduled surgery time. Please be ready to share:  Your doctor's clinic name and phone number  Your medical, surgical, and anesthesia history  A list of allergies and sensitivities  A list of medicines, including herbal treatments and over-the-counter drugs  Whether the patient has a legal guardian (ask how to send us the papers in advance)  Please tell us if you're pregnant--or if there's any chance you might be pregnant. Some surgeries may injure a fetus (unborn baby), so they require a pregnancy test. Surgeries that are safe for a fetus don't always need a test, and you can choose whether to have one.   If you have a child who's having surgery, please ask for a copy of Preparing for Your Child's Surgery.    Preparing for surgery  Within 10 to 30 days of surgery: Have a pre-op exam (sometimes called an H&P, or History and Physical). This can be done at a clinic or pre-operative center.  If you're having a , you may not need this exam. Talk to your care team.  At your pre-op exam, talk to your care team about all medicines you take. If you need to stop any medicines before surgery, ask when to start taking them again.  We do this for your safety. Many medicines can make you bleed too much during surgery. Some change how well surgery (anesthesia) drugs work.  Call your insurance company to let them know you're having surgery. (If you don't have insurance, call [phone].)  Call your clinic if there's any change in your health. This includes signs of a cold or flu (sore throat, runny nose, cough, rash, fever). It also includes a scrape or scratch near the surgery site.  If you have questions on the day of surgery, call your hospital or surgery center.  Eating and drinking guidelines  For your safety: Unless your surgeon tells you otherwise,  follow the guidelines below.  Eat and drink as usual until 8 hours before you arrive for surgery. After that, no food or milk.  Drink clear liquids until 2 hours before you arrive. These are liquids you can see through, like water, Gatorade, and Propel Water. They also include plain black coffee and tea (no cream or milk), candy, and breath mints. You can spit out gum when you arrive.  If you drink alcohol: Stop drinking it the night before surgery.  If your care team tells you to take medicine on the morning of surgery, it's okay to take it with a sip of water.  Preventing infection  Shower or bathe the night before and morning of your surgery. Follow the instructions your clinic gave you. (If no instructions, use regular soap.)  Don't shave or clip hair near your surgery site. We'll remove the hair if needed.  Don't smoke or vape the morning of surgery. You may chew nicotine gum up to 2 hours before surgery. A nicotine patch is okay.  Note: Some surgeries require you to completely quit smoking and nicotine. Check with your surgeon.  Your care team will make every effort to keep you safe from infection. We will:  Clean our hands often with soap and water (or an alcohol-based hand rub).  Clean the skin at your surgery site with a special soap that kills germs.  Give you a special gown to keep you warm. (Cold raises the risk of infection.)  Wear special hair covers, masks, gowns and gloves during surgery.  Give antibiotic medicine, if prescribed. Not all surgeries need antibiotics.  What to bring on the day of surgery  Photo ID and insurance card  Copy of your health care directive, if you have one  Glasses and hearing aids (bring cases)  You can't wear contacts during surgery  Inhaler and eye drops, if you use them (tell us about these when you arrive)  CPAP machine or breathing device, if you use them  A few personal items, if spending the night  If you have . . .  A pacemaker, ICD (cardiac defibrillator) or other  implant: Bring the ID card.  An implanted stimulator: Bring the remote control.  A legal guardian: Bring a copy of the certified (court-stamped) guardianship papers.  Please remove any jewelry, including body piercings. Leave jewelry and other valuables at home.  If you're going home the day of surgery  You must have a responsible adult drive you home. They should stay with you overnight as well.  If you don't have someone to stay with you, and you aren't safe to go home alone, we may keep you overnight. Insurance often won't pay for this.  After surgery  If it's hard to control your pain or you need more pain medicine, please call your surgeon's office.  Questions?   If you have any questions for your care team, list them here: _________________________________________________________________________________________________________________________________________________________________________ ____________________________________ ____________________________________ ____________________________________  For informational purposes only. Not to replace the advice of your health care provider. Copyright   2003, 2019 [employer]. All rights reserved. Clinically reviewed by Rocio Hoyt MD. [employer] 757370 - REV 12/22.    How to Take Your Medication Before Surgery  - Hold all medication the morning of surgery    Avoid aspirin, anti-inflammatories until after surgery

## 2024-01-03 ENCOUNTER — HOSPITAL ENCOUNTER (OUTPATIENT)
Facility: HOSPITAL | Age: 34
Discharge: HOME | End: 2024-01-03
Attending: OTOLARYNGOLOGY | Admitting: OTOLARYNGOLOGY
Payer: COMMERCIAL

## 2024-01-03 ENCOUNTER — ANESTHESIA (OUTPATIENT)
Dept: SURGERY | Facility: HOSPITAL | Age: 34
End: 2024-01-03
Payer: COMMERCIAL

## 2024-01-03 VITALS
HEART RATE: 73 BPM | DIASTOLIC BLOOD PRESSURE: 98 MMHG | WEIGHT: 226 LBS | RESPIRATION RATE: 18 BRPM | BODY MASS INDEX: 36.32 KG/M2 | SYSTOLIC BLOOD PRESSURE: 156 MMHG | OXYGEN SATURATION: 98 % | TEMPERATURE: 98.5 F | HEIGHT: 66 IN

## 2024-01-03 DIAGNOSIS — Z98.890 S/P FESS (FUNCTIONAL ENDOSCOPIC SINUS SURGERY): Primary | ICD-10-CM

## 2024-01-03 DIAGNOSIS — J33.9 NASAL POLYP: ICD-10-CM

## 2024-01-03 PROCEDURE — 250N000025 HC SEVOFLURANE, PER MIN: Performed by: OTOLARYNGOLOGY

## 2024-01-03 PROCEDURE — 30520 REPAIR OF NASAL SEPTUM: CPT | Performed by: OTOLARYNGOLOGY

## 2024-01-03 PROCEDURE — 250N000009 HC RX 250: Performed by: OTOLARYNGOLOGY

## 2024-01-03 PROCEDURE — 250N000011 HC RX IP 250 OP 636: Performed by: NURSE ANESTHETIST, CERTIFIED REGISTERED

## 2024-01-03 PROCEDURE — 30520 REPAIR OF NASAL SEPTUM: CPT | Performed by: NURSE ANESTHETIST, CERTIFIED REGISTERED

## 2024-01-03 PROCEDURE — 88305 TISSUE EXAM BY PATHOLOGIST: CPT | Mod: TC | Performed by: OTOLARYNGOLOGY

## 2024-01-03 PROCEDURE — 250N000009 HC RX 250: Performed by: NURSE ANESTHETIST, CERTIFIED REGISTERED

## 2024-01-03 PROCEDURE — 710N000012 HC RECOVERY PHASE 2, PER MINUTE: Performed by: OTOLARYNGOLOGY

## 2024-01-03 PROCEDURE — 31256 EXPLORATION MAXILLARY SINUS: CPT | Mod: 50 | Performed by: OTOLARYNGOLOGY

## 2024-01-03 PROCEDURE — C9046 COCAINE HCL NASAL SOLUTION: HCPCS | Performed by: OTOLARYNGOLOGY

## 2024-01-03 PROCEDURE — C1726 CATH, BAL DIL, NON-VASCULAR: HCPCS | Performed by: OTOLARYNGOLOGY

## 2024-01-03 PROCEDURE — 250N000013 HC RX MED GY IP 250 OP 250 PS 637: Performed by: OTOLARYNGOLOGY

## 2024-01-03 PROCEDURE — 258N000003 HC RX IP 258 OP 636: Performed by: NURSE ANESTHETIST, CERTIFIED REGISTERED

## 2024-01-03 PROCEDURE — 30930 THER FX NASAL INF TURBINATE: CPT | Performed by: OTOLARYNGOLOGY

## 2024-01-03 PROCEDURE — 272N000001 HC OR GENERAL SUPPLY STERILE: Performed by: OTOLARYNGOLOGY

## 2024-01-03 PROCEDURE — 88305 TISSUE EXAM BY PATHOLOGIST: CPT | Mod: 26 | Performed by: PATHOLOGY

## 2024-01-03 PROCEDURE — 710N000010 HC RECOVERY PHASE 1, LEVEL 2, PER MIN: Performed by: OTOLARYNGOLOGY

## 2024-01-03 PROCEDURE — 999N000141 HC STATISTIC PRE-PROCEDURE NURSING ASSESSMENT: Performed by: OTOLARYNGOLOGY

## 2024-01-03 PROCEDURE — 31240 NSL/SNS NDSC CNCH BULL RESCJ: CPT | Mod: LT | Performed by: OTOLARYNGOLOGY

## 2024-01-03 PROCEDURE — 370N000017 HC ANESTHESIA TECHNICAL FEE, PER MIN: Performed by: OTOLARYNGOLOGY

## 2024-01-03 PROCEDURE — 360N000076 HC SURGERY LEVEL 3, PER MIN: Performed by: OTOLARYNGOLOGY

## 2024-01-03 PROCEDURE — 250N000011 HC RX IP 250 OP 636: Performed by: OTOLARYNGOLOGY

## 2024-01-03 RX ORDER — SODIUM CHLORIDE, SODIUM LACTATE, POTASSIUM CHLORIDE, CALCIUM CHLORIDE 600; 310; 30; 20 MG/100ML; MG/100ML; MG/100ML; MG/100ML
INJECTION, SOLUTION INTRAVENOUS CONTINUOUS
Status: DISCONTINUED | OUTPATIENT
Start: 2024-01-03 | End: 2024-01-03 | Stop reason: HOSPADM

## 2024-01-03 RX ORDER — OXYCODONE HYDROCHLORIDE 5 MG/1
5 TABLET ORAL EVERY 6 HOURS PRN
Qty: 8 TABLET | Refills: 0 | Status: SHIPPED | OUTPATIENT
Start: 2024-01-03 | End: 2024-01-06

## 2024-01-03 RX ORDER — ONDANSETRON 4 MG/1
4 TABLET, ORALLY DISINTEGRATING ORAL EVERY 30 MIN PRN
Status: DISCONTINUED | OUTPATIENT
Start: 2024-01-03 | End: 2024-01-03 | Stop reason: HOSPADM

## 2024-01-03 RX ORDER — TRIAMCINOLONE ACETONIDE 40 MG/ML
INJECTION, SUSPENSION INTRA-ARTICULAR; INTRAMUSCULAR PRN
Status: DISCONTINUED | OUTPATIENT
Start: 2024-01-03 | End: 2024-01-03 | Stop reason: HOSPADM

## 2024-01-03 RX ORDER — HYDRALAZINE HYDROCHLORIDE 20 MG/ML
2.5-5 INJECTION INTRAMUSCULAR; INTRAVENOUS EVERY 10 MIN PRN
Status: DISCONTINUED | OUTPATIENT
Start: 2024-01-03 | End: 2024-01-03 | Stop reason: HOSPADM

## 2024-01-03 RX ORDER — LABETALOL 20 MG/4 ML (5 MG/ML) INTRAVENOUS SYRINGE
10
Status: DISCONTINUED | OUTPATIENT
Start: 2024-01-03 | End: 2024-01-03 | Stop reason: HOSPADM

## 2024-01-03 RX ORDER — HYDROMORPHONE HYDROCHLORIDE 1 MG/ML
0.4 INJECTION, SOLUTION INTRAMUSCULAR; INTRAVENOUS; SUBCUTANEOUS EVERY 5 MIN PRN
Status: DISCONTINUED | OUTPATIENT
Start: 2024-01-03 | End: 2024-01-03 | Stop reason: HOSPADM

## 2024-01-03 RX ORDER — PREDNISONE 20 MG/1
TABLET ORAL
Qty: 5 TABLET | Refills: 0 | Status: SHIPPED | OUTPATIENT
Start: 2024-01-04 | End: 2024-04-26

## 2024-01-03 RX ORDER — COCAINE HYDROCHLORIDE 40 MG/ML
SOLUTION NASAL PRN
Status: DISCONTINUED | OUTPATIENT
Start: 2024-01-03 | End: 2024-01-03 | Stop reason: HOSPADM

## 2024-01-03 RX ORDER — COCAINE HYDROCHLORIDE 40 MG/ML
SOLUTION NASAL
Status: DISCONTINUED
Start: 2024-01-03 | End: 2024-01-03 | Stop reason: HOSPADM

## 2024-01-03 RX ORDER — OXYCODONE HYDROCHLORIDE 5 MG/1
5 TABLET ORAL ONCE
Status: COMPLETED | OUTPATIENT
Start: 2024-01-03 | End: 2024-01-03

## 2024-01-03 RX ORDER — NALOXONE HYDROCHLORIDE 0.4 MG/ML
0.4 INJECTION, SOLUTION INTRAMUSCULAR; INTRAVENOUS; SUBCUTANEOUS
Status: DISCONTINUED | OUTPATIENT
Start: 2024-01-03 | End: 2024-01-03 | Stop reason: HOSPADM

## 2024-01-03 RX ORDER — FENTANYL CITRATE 50 UG/ML
25 INJECTION, SOLUTION INTRAMUSCULAR; INTRAVENOUS EVERY 5 MIN PRN
Status: DISCONTINUED | OUTPATIENT
Start: 2024-01-03 | End: 2024-01-03 | Stop reason: HOSPADM

## 2024-01-03 RX ORDER — LIDOCAINE 40 MG/G
CREAM TOPICAL
Status: DISCONTINUED | OUTPATIENT
Start: 2024-01-03 | End: 2024-01-03 | Stop reason: HOSPADM

## 2024-01-03 RX ORDER — PROPOFOL 10 MG/ML
INJECTION, EMULSION INTRAVENOUS CONTINUOUS PRN
Status: DISCONTINUED | OUTPATIENT
Start: 2024-01-03 | End: 2024-01-03

## 2024-01-03 RX ORDER — PROPOFOL 10 MG/ML
INJECTION, EMULSION INTRAVENOUS PRN
Status: DISCONTINUED | OUTPATIENT
Start: 2024-01-03 | End: 2024-01-03

## 2024-01-03 RX ORDER — LIDOCAINE HYDROCHLORIDE AND EPINEPHRINE 10; 10 MG/ML; UG/ML
INJECTION, SOLUTION INFILTRATION; PERINEURAL PRN
Status: DISCONTINUED | OUTPATIENT
Start: 2024-01-03 | End: 2024-01-03 | Stop reason: HOSPADM

## 2024-01-03 RX ORDER — ONDANSETRON 2 MG/ML
4 INJECTION INTRAMUSCULAR; INTRAVENOUS EVERY 30 MIN PRN
Status: DISCONTINUED | OUTPATIENT
Start: 2024-01-03 | End: 2024-01-03 | Stop reason: HOSPADM

## 2024-01-03 RX ORDER — LABETALOL 20 MG/4 ML (5 MG/ML) INTRAVENOUS SYRINGE
10
Status: COMPLETED | OUTPATIENT
Start: 2024-01-03 | End: 2024-01-03

## 2024-01-03 RX ORDER — KETAMINE HYDROCHLORIDE 10 MG/ML
INJECTION INTRAMUSCULAR; INTRAVENOUS PRN
Status: DISCONTINUED | OUTPATIENT
Start: 2024-01-03 | End: 2024-01-03

## 2024-01-03 RX ORDER — LORATADINE 10 MG/1
10 TABLET ORAL DAILY
COMMUNITY
End: 2024-04-26

## 2024-01-03 RX ORDER — FENTANYL CITRATE 50 UG/ML
INJECTION, SOLUTION INTRAMUSCULAR; INTRAVENOUS PRN
Status: DISCONTINUED | OUTPATIENT
Start: 2024-01-03 | End: 2024-01-03

## 2024-01-03 RX ORDER — BUDESONIDE 0.5 MG/2ML
INHALANT ORAL
Qty: 120 ML | Refills: 11 | Status: SHIPPED | OUTPATIENT
Start: 2024-01-03 | End: 2024-08-02

## 2024-01-03 RX ORDER — FENTANYL CITRATE 50 UG/ML
50 INJECTION, SOLUTION INTRAMUSCULAR; INTRAVENOUS EVERY 5 MIN PRN
Status: DISCONTINUED | OUTPATIENT
Start: 2024-01-03 | End: 2024-01-03 | Stop reason: HOSPADM

## 2024-01-03 RX ORDER — NALOXONE HYDROCHLORIDE 0.4 MG/ML
0.2 INJECTION, SOLUTION INTRAMUSCULAR; INTRAVENOUS; SUBCUTANEOUS
Status: DISCONTINUED | OUTPATIENT
Start: 2024-01-03 | End: 2024-01-03 | Stop reason: HOSPADM

## 2024-01-03 RX ORDER — HYDROMORPHONE HYDROCHLORIDE 1 MG/ML
0.2 INJECTION, SOLUTION INTRAMUSCULAR; INTRAVENOUS; SUBCUTANEOUS EVERY 5 MIN PRN
Status: DISCONTINUED | OUTPATIENT
Start: 2024-01-03 | End: 2024-01-03 | Stop reason: HOSPADM

## 2024-01-03 RX ORDER — DEXAMETHASONE SODIUM PHOSPHATE 4 MG/ML
INJECTION, SOLUTION INTRA-ARTICULAR; INTRALESIONAL; INTRAMUSCULAR; INTRAVENOUS; SOFT TISSUE PRN
Status: DISCONTINUED | OUTPATIENT
Start: 2024-01-03 | End: 2024-01-03

## 2024-01-03 RX ORDER — LIDOCAINE HYDROCHLORIDE 20 MG/ML
INJECTION, SOLUTION INFILTRATION; PERINEURAL PRN
Status: DISCONTINUED | OUTPATIENT
Start: 2024-01-03 | End: 2024-01-03

## 2024-01-03 RX ORDER — OXYMETAZOLINE HYDROCHLORIDE 0.05 G/100ML
2 SPRAY NASAL
Status: COMPLETED | OUTPATIENT
Start: 2024-01-03 | End: 2024-01-03

## 2024-01-03 RX ORDER — ONDANSETRON 2 MG/ML
INJECTION INTRAMUSCULAR; INTRAVENOUS PRN
Status: DISCONTINUED | OUTPATIENT
Start: 2024-01-03 | End: 2024-01-03

## 2024-01-03 RX ORDER — ALBUTEROL SULFATE 0.83 MG/ML
2.5 SOLUTION RESPIRATORY (INHALATION) EVERY 4 HOURS PRN
Status: DISCONTINUED | OUTPATIENT
Start: 2024-01-03 | End: 2024-01-03 | Stop reason: HOSPADM

## 2024-01-03 RX ADMIN — ONDANSETRON 4 MG: 2 INJECTION INTRAMUSCULAR; INTRAVENOUS at 11:45

## 2024-01-03 RX ADMIN — OXYMETHAZOLINE HCL 2 SPRAY: 0.05 SPRAY NASAL at 08:00

## 2024-01-03 RX ADMIN — OXYCODONE HYDROCHLORIDE 5 MG: 5 TABLET ORAL at 14:12

## 2024-01-03 RX ADMIN — FENTANYL CITRATE 25 MCG: 50 INJECTION INTRAMUSCULAR; INTRAVENOUS at 11:47

## 2024-01-03 RX ADMIN — MIDAZOLAM 2 MG: 1 INJECTION INTRAMUSCULAR; INTRAVENOUS at 10:28

## 2024-01-03 RX ADMIN — Medication 100 MG: at 10:35

## 2024-01-03 RX ADMIN — FENTANYL CITRATE 50 MCG: 50 INJECTION INTRAMUSCULAR; INTRAVENOUS at 10:34

## 2024-01-03 RX ADMIN — PROPOFOL 100 MCG/KG/MIN: 10 INJECTION, EMULSION INTRAVENOUS at 10:35

## 2024-01-03 RX ADMIN — OXYMETHAZOLINE HCL 2 SPRAY: 0.05 SPRAY NASAL at 08:17

## 2024-01-03 RX ADMIN — FENTANYL CITRATE 25 MCG: 50 INJECTION INTRAMUSCULAR; INTRAVENOUS at 11:32

## 2024-01-03 RX ADMIN — OXYMETHAZOLINE HCL 2 SPRAY: 0.05 SPRAY NASAL at 08:32

## 2024-01-03 RX ADMIN — FENTANYL CITRATE 50 MCG: 50 INJECTION INTRAMUSCULAR; INTRAVENOUS at 13:20

## 2024-01-03 RX ADMIN — DEXAMETHASONE SODIUM PHOSPHATE 10 MG: 4 INJECTION, SOLUTION INTRA-ARTICULAR; INTRALESIONAL; INTRAMUSCULAR; INTRAVENOUS; SOFT TISSUE at 10:35

## 2024-01-03 RX ADMIN — SODIUM CHLORIDE, POTASSIUM CHLORIDE, SODIUM LACTATE AND CALCIUM CHLORIDE: 600; 310; 30; 20 INJECTION, SOLUTION INTRAVENOUS at 08:09

## 2024-01-03 RX ADMIN — LABETALOL HYDROCHLORIDE 10 MG: 5 INJECTION, SOLUTION INTRAVENOUS at 13:00

## 2024-01-03 RX ADMIN — Medication 50 MG: at 10:35

## 2024-01-03 RX ADMIN — MIDAZOLAM 2 MG: 1 INJECTION INTRAMUSCULAR; INTRAVENOUS at 08:34

## 2024-01-03 RX ADMIN — ROCURONIUM BROMIDE 40 MG: 10 INJECTION INTRAVENOUS at 10:55

## 2024-01-03 RX ADMIN — LIDOCAINE HYDROCHLORIDE 100 MG: 20 INJECTION, SOLUTION INFILTRATION; PERINEURAL at 10:34

## 2024-01-03 RX ADMIN — PROPOFOL 200 MG: 10 INJECTION, EMULSION INTRAVENOUS at 10:34

## 2024-01-03 RX ADMIN — SODIUM CHLORIDE, POTASSIUM CHLORIDE, SODIUM LACTATE AND CALCIUM CHLORIDE: 600; 310; 30; 20 INJECTION, SOLUTION INTRAVENOUS at 11:43

## 2024-01-03 RX ADMIN — FENTANYL CITRATE 50 MCG: 50 INJECTION INTRAMUSCULAR; INTRAVENOUS at 12:32

## 2024-01-03 RX ADMIN — LABETALOL HYDROCHLORIDE 10 MG: 5 INJECTION, SOLUTION INTRAVENOUS at 12:24

## 2024-01-03 RX ADMIN — SUGAMMADEX 200 MG: 100 INJECTION, SOLUTION INTRAVENOUS at 12:01

## 2024-01-03 RX ADMIN — HYDRALAZINE HYDROCHLORIDE 5 MG: 20 INJECTION INTRAMUSCULAR; INTRAVENOUS at 13:46

## 2024-01-03 ASSESSMENT — ACTIVITIES OF DAILY LIVING (ADL)
ADLS_ACUITY_SCORE: 29

## 2024-01-03 NOTE — OR NURSING
Patient and responsible adult given discharge instructions with no questions regarding instructions. Perla score 19. Pain level 4/10.  Discharged from unit via w/c. Patient discharged to home with .

## 2024-01-03 NOTE — OP NOTE
Otolaryngology Operative Note     Pre-op Diagnosis: Chronic maxillary sinusitis, bilateral inferior turbinate hypertrophy, amber bullosa, deviated nasal septum  Post-op Diagnosis:  same    Procedures:    1.  Bilateral maxillary antrostomy with tissue removal  2.  Endoscopic septoplasty  3.  Bilateral submucous reduction inferior turbinates  4.  Partial excision bilateral middle turbinates with polypectomy    Surgeon:  Daniella Schmitt D.O.  Anesthesia:  General endotracheal  EBL:  10 ml  Findings: preop DNS right, polyps extending off of the middle turbinates bilaterally  Complications:  none  Condition:  stable     Description of the Procedure  After surgical consent was obtained the patient was brought back to the operating room and laid in a comfortable and supine position.  The patient was administered a general anesthetic by a member of anesthesia.  The table was turned 180 degrees.  The patient was draped in the normal clean fashion and a timeout was taken.  The bed was placed into reverse Trendelenburg positioning.  A timeout was taken.  Cocaine pledgets were placed into the nares for several minutes and removed.  The lateral nasal wall, middle turbinates, inferior turbinates were anesthetized with 1% lidocaine with 1-100,000 epinephrine.    I proceeded with the submucosal reduction of the inferior turbinates. Under endoscopy, I used a 2mm inferior turbinate blade and started on the left side. I made a stab incision at the anterior insertion of the left inferior turbinate and raised a submucoperiosteal tunnel along the medial surface of the left inferior turbinate bone. I then slowly withdrew the shaver blade as I ran the shaver to perform my submucous resection.  Careful attention was turned to the area of the internal nasal valve for complete reduction.  The turbinate was then outfractured with a Columbia.  I then performed the same procedure on the right side in a similar fashion with  outfracture.    Next I used a 30 degree endoscope and entered the left side.  There is a left amber bullosa with polyps extending off of the middle turbinate.  I used a sickle blade and consul scissors to remove portion of the middle turbinate including the polyps.  I then identified the uncinate process was reflected anterior with a ball seeker.  The uncinate was removed with backbiting Blakesley and the microdebrider blade.  The natural ostia was identified and enlarged with a Siddhartha-Cut and the microdebrider blade.  The sinus was irrigated and gently suctioned to the mucosa is healthy there is no bleeding.  The lamina was preserved throughout.    I then turned my attention to the septoplasty.  I switched to his 0 degree endoscope and used the tract septoplasty balloon which was placed in the right nares inflated and held in place for 2 minutes.  The balloon was deflated.  Under endoscopy I then similarly excised a portion of the right middle turbinate and polyps.  I then similarly performed the right maxillary antrostomy with similar findings and results.  I then used the tract balloon to back fracture the septum on the left side.  The balloon was removed.  I entered the right nares with a 0 degree endoscope and made a right hemitransfixion incision with a 15 blade.  I then elevated mucoperichondrial and periosteal flap with a [place].  I used a D knife to separate the posterior quadrangular cartilage from the right sided spur at the level of the vomer.  The obstructive bone and cartilage was removed with a D knife straight Siddhartha-Cut and Blakesley.  I was then able to bluntly medialize the septum.  I reevaluated the septum and used a D knife to excise the posterior deviated portion of the quadrangular cartilage which was displaced into the midline.  The septum is midline and intact.  The hemitransfixion incision was closed with 4-0 Vicryl in a horizontal mattress fashion.  Hemostasis was achieved at the lateral nasal  wall bilaterally with silver nitrate and suction cautery and is adequate.  1/2 piece of NasoPore soaked in Kenalog was placed into the lateral nasal walls bilaterally.  She was handed back over to anesthesia was awakened and brought to the recovery room in stable condition having tolerated the procedure well.

## 2024-01-03 NOTE — ANESTHESIA POSTPROCEDURE EVALUATION
Patient: Shilpi Moore    Procedure: Procedure(s):  BILATERAL ENDOSCOPIC SINUS SURGERY  Septoplasty, Turbinate Reduction       Anesthesia Type:  General    Note:  Disposition: Outpatient   Postop Pain Control: Uneventful            Sign Out: Well controlled pain   PONV: No   Neuro/Psych: Uneventful            Sign Out: Acceptable/Baseline neuro status   Airway/Respiratory: Uneventful            Sign Out: Acceptable/Baseline resp. status   CV/Hemodynamics: Uneventful            Sign Out: Acceptable CV status; No obvious hypovolemia; No obvious fluid overload   Other NRE: NONE   DID A NON-ROUTINE EVENT OCCUR? No    Event details/Postop Comments:  Rx'd BP in PACU. WNL before discharge            Last vitals:  Vitals Value Taken Time   /100 01/03/24 1400   Temp 98.3  F (36.8  C) 01/03/24 1355   Pulse 65 01/03/24 1400   Resp 11 01/03/24 1400   SpO2 97 % 01/03/24 1401   Vitals shown include unfiled device data.    Electronically Signed By: PAULIE POTTER CRNA  January 3, 2024  3:48 PM   show

## 2024-01-03 NOTE — ANESTHESIA CARE TRANSFER NOTE
Patient: Shilpi Moore    Procedure: Procedure(s):  BILATERAL ENDOSCOPIC SINUS SURGERY  Septoplasty, Turbinate Reduction       Diagnosis: Chronic sinusitis, unspecified location [J32.9]  DNS (deviated nasal septum) [J34.2]  Nasal turbinate hypertrophy [J34.3]  Diagnosis Additional Information: No value filed.    Anesthesia Type:   General     Note:    Oropharynx: oropharynx clear of all foreign objects and spontaneously breathing  Level of Consciousness: awake and drowsy  Oxygen Supplementation: nasal cannula  Level of Supplemental Oxygen (L/min / FiO2): 3  Independent Airway: airway patency satisfactory and stable  Dentition: dentition unchanged  Vital Signs Stable: post-procedure vital signs reviewed and stable  Report to RN Given: handoff report given  Patient transferred to: PACU    Handoff Report: Identifed the Patient, Identified the Reponsible Provider, Reviewed the pertinent medical history, Discussed the surgical course, Reviewed Intra-OP anesthesia mangement and issues during anesthesia, Set expectations for post-procedure period and Allowed opportunity for questions and acknowledgement of understanding    Vitals:  Vitals Value Taken Time   /104 01/03/24 1215   Temp 97.6  F (36.4  C) 01/03/24 1209   Pulse 69 01/03/24 1218   Resp 13 01/03/24 1218   SpO2 100 % 01/03/24 1218   Vitals shown include unfiled device data.    Electronically Signed By: PAULIE Pastor CRNA  January 3, 2024  12:19 PM

## 2024-01-03 NOTE — ANESTHESIA PROCEDURE NOTES
Airway       Patient location during procedure: OR       Procedure Start/Stop Times: 1/3/2024 10:38 AM  Staff -        CRNA: Lul Sellers APRN CRNA       Performed By: CRNA  Consent for Airway        Urgency: elective  Indications and Patient Condition       Indications for airway management: nory-procedural       Induction type:intravenous       Mask difficulty assessment: 1 - vent by mask    Final Airway Details       Final airway type: endotracheal airway       Successful airway: ETT - single and Oral  Endotracheal Airway Details        ETT size (mm): 7.0       Cuffed: yes       Cuff volume (mL): 8       Successful intubation technique: direct laryngoscopy       DL Blade Type: Simon 2       Grade View of Cords: 1       Adjucts: stylet       Position: Center       Measured from: gums/teeth       Secured at (cm): 23       Bite block used: None    Post intubation assessment        Placement verified by: capnometry, equal breath sounds and chest rise        Number of attempts at approach: 1       Number of other approaches attempted: 0       Secured with: plastic tape       Ease of procedure: easy       Dentition: Intact and Unchanged    Medication(s) Administered   Medication Administration Time: 1/3/2024 10:38 AM

## 2024-01-03 NOTE — DISCHARGE INSTRUCTIONS
Instructions for Sinus Surgery    Recovery - Everyone recovers differently from a general anesthetic.  Symptoms such as fatigue, nausea, light-headedness, and sometimes a low grade fever (up to 100 degrees) are not unusual.  As your body removes the anesthetic drugs from circulation, these symptoms will resolve.  Your nose will be sore after surgery, and you may even have symptoms similar to a sinus infection with headache, congestion, and pressure.  These will resolve with healing.  For several days you may experience bloody drainage from the nose, please use the drip pad as necessary for this.  If there is persistent bleeding, please call the office during business hours or the on call ENT physician after hours.  There are no diet restrictions after sinus surgery, and you can resume your home medications.      Please do not blow your nose until 2 weeks after surgery.       Limit your activity to no strenuous activities until I see you for the first follow-up visit in approximately 2 weeks.      Medications - You were sent home with narcotic pain medication.  If you can tolerate the discomfort during your recovery by using just plain Tylenol or ibuprofen (advil), please do so.  However, do not hesitate to use the stronger pain medication if needed.  If you were sent home with an antibiotic, it is primarily used to help the healing process.  If it causes loose bowel movements or other signs of intolerance, it is appropriate to discontinue it.  By far the most important measure you can take to speed recovery, and maximize the chances of long term success of sinus surgery is using the sinus rinses at least three time per day for the first month after surgery.       Start budesonide irrigations tomorrow.  Use 2 times daily for one month and then as directed.  Start Ken Med saline irrigation tonight and use at least 3 times daily.   Continue twice daily budesonide irrigations and 2-3 times daily NeilMed saline  irrigations for 1 month and then as directed by Dr. Schmitt    Budesonide instructions  Make the saline solution using 2 packages of salt and previously boiled or distilled water.  This will make 240 ml of saline solution.  Mix the entire vial of budesonide into the solution.   Irrigate your nose 2 times a day with the warm budesonide solution using 1 bottle between nostrils in the morning, and one bottle at night.   Use plain maciel med saline without the steroid 2-3 times during the afternoon    Perform gentle irrigation for the first week.  Starting 1 week after surgery, you can start to irrigate a bit more forcefully.  The more often you irrigate with the maciel med saline, the faster you will heal.      At 3 weeks after surgery you may restart nasal steroids if needed (flonase, nasonex, etc).      Complications - Problems related to sinus surgery almost always are detected during the operation, and special instruction will be given in that situation.  However, unexpected things can happen, and are all related to the structures around the sinus cavities.  Symptoms that should alert you to a possible problem include: severe eye pain or eye swelling, persistent heavy bleeding from the nose, and high fevers with headache and neck pain.  Any of these symptoms should be called into my office or to the on call ENT if after hours.  The most common non-emergency complication of sinus surgery is the formation of scar tissue which can re-block the sinuses.  This is addressed below.    Follow-up -  As you have noted, there are quite a few follow-up visits after sinus surgery.  This is done to aggressively manage the most common complication of this technique, which is scar tissue blocking the sinuses.  These visits will require the examination of your nose and possibly removal of crusts of dry mucous and blood, with possible removal of early scar tissue.  Please prepare for these visits by using your sinus rinses.    If  there are any questions or issues with the above, or if there are other issues that concern you, always feel free to call the clinic and I am happy to speak with you as soon as I can.    Daniella Schmitt D.O.  Otolaryngology/Head and Neck Surgery  Allergy    [phone]   extension [phone]

## 2024-01-04 LAB
PATH REPORT.COMMENTS IMP SPEC: NORMAL
PATH REPORT.FINAL DX SPEC: NORMAL
PATH REPORT.GROSS SPEC: NORMAL
PATH REPORT.MICROSCOPIC SPEC OTHER STN: NORMAL
PATH REPORT.RELEVANT HX SPEC: NORMAL
PHOTO IMAGE: NORMAL

## 2024-01-16 ENCOUNTER — OFFICE VISIT (OUTPATIENT)
Dept: OTOLARYNGOLOGY | Facility: OTHER | Age: 34
End: 2024-01-16
Attending: PHYSICIAN ASSISTANT
Payer: COMMERCIAL

## 2024-01-16 VITALS
DIASTOLIC BLOOD PRESSURE: 84 MMHG | WEIGHT: 225.97 LBS | HEIGHT: 66 IN | TEMPERATURE: 97.3 F | BODY MASS INDEX: 36.32 KG/M2 | HEART RATE: 83 BPM | OXYGEN SATURATION: 100 % | SYSTOLIC BLOOD PRESSURE: 143 MMHG

## 2024-01-16 DIAGNOSIS — Z98.890 S/P NASAL SEPTOPLASTY: ICD-10-CM

## 2024-01-16 DIAGNOSIS — Z98.890 S/P FESS (FUNCTIONAL ENDOSCOPIC SINUS SURGERY): Primary | ICD-10-CM

## 2024-01-16 PROCEDURE — 99024 POSTOP FOLLOW-UP VISIT: CPT | Performed by: PHYSICIAN ASSISTANT

## 2024-01-16 ASSESSMENT — PAIN SCALES - GENERAL: PAINLEVEL: NO PAIN (0)

## 2024-01-16 NOTE — LETTER
1/16/2024         RE: Shilpi Moore  [address] [facility] [address]        Dear Colleague,    Thank you for referring your patient, Shilpi Moore, to the [facility]. Please see a copy of my visit note below.    Chief Complaint   Patient presents with     Surgical Followup     Post op s/p 1/3 FESS, TR, Septo      HPI - Shilpi Moore is here for their first postoperative visit, status post endoscopic sinus surgery (with septoplasty and turbinate reduction) performed on 1/3/24.  There was the expected amount of congestion which has now mostly resolved, and no bleeding has occurred.  The generalized facial pressure has been resolving, and there have been no fevers or chills since surgery. The sinus rinses are continuing three times per day, and are being tolerated well.  No visual changes.  She has been using Budesonide rinses BID, Ken Med rinse TID.   She feels well breathing and mild congestion.   She does have intermittent sinus headache which cleared out.   Complete prednisone without concerns.     Operative- 1/3/24  Procedures:    1.  Bilateral maxillary antrostomy with tissue removal  2.  Endoscopic septoplasty  3.  Bilateral submucous reduction inferior turbinates  4.  Partial excision bilateral middle turbinates with polypectomy     Surgeon:  Daniella Schmitt D.O.  Anesthesia:  General endotracheal  EBL:  10 ml  Findings: preop DNS right, polyps extending off of the middle turbinates bilaterally  Complications:  none  Condition:  stable     Past Medical History:   Diagnosis Date     Allergic rhinitis 6/15/2011     Anxiety 9/17/2019     Benign essential hypertension 4/27/2017     Gastroesophageal reflux disease without esophagitis 9/21/2018     Major depressive disorder, recurrent episode, unspecified 6/15/2011     Obesity 4/27/2017     Reactive airway disease that is not asthma 2/12/2018     Seasonal allergic rhinitis due to pollen 4/27/2017     Unspecified  "vitamin D deficiency 4/11/2014        Allergies   Allergen Reactions     Ibuprofen Anaphylaxis     Nsaids Swelling     Current Outpatient Medications   Medication     acetaminophen (TYLENOL) 500 MG tablet     ALBUTEROL IN     budesonide (PULMICORT) 0.5 MG/2ML neb solution     loratadine (CLARITIN) 10 MG tablet     Multiple Vitamins-Minerals (QC WOMENS DAILY MULTIVITAMIN PO)     omeprazole (PRILOSEC) 40 MG DR capsule     predniSONE (DELTASONE) 20 MG tablet     sertraline (ZOLOFT) 100 MG tablet     VITAMIN D PO     No current facility-administered medications for this visit.     ROS- SEE HPI    Physical Exam -   BP (!) 143/84 (BP Location: Right arm, Patient Position: Sitting, Cuff Size: Adult Large)   Pulse 83   Temp 97.3  F (36.3  C) (Temporal)   Ht 1.676 m (5' 5.98\")   Wt 102.5 kg (225 lb 15.5 oz)   LMP 12/21/2023 (Exact Date)   SpO2 100%   BMI 36.49 kg/m      General - The patient is awake and alert, and answers questions appropriately during the history and physical.  The vocal quality is hypernasal, but there is no dyspnea or stridor noted.  Eyes - The EOMI, there is no conjuncitval or scleral injection.  Pupils are equally round and reactive to light.  Oral - The oral mucosa is pink and moist.  The tongue is mobile and midline on protrusion, no edema noted.  To evaluate the nose and sinuses in the post operative state, I performed rigid nasal endoscopy. The nose was anesthetized with home afrin or topical lidocaine and neosynephrine in the office.    I began with the LEFT side using a 0 degree rigid nasal endoscope, and then similarly examined the RIGHT side    Findings:  Inferior turbinates:  Reduced, lateralized.   Middle turbinate and middle meatus:  No purulence, no polyposis, no synechiae  Antrostomy Patent, good access.   MT sites appear healing well, scant debris removed. One clot on left remaining.   Mucosa is  healthy throughout without polyps nor polypoid degeneration  Superior meatus is " clear  Frontal recess clear  Sphenoethmoidal clear  Nasopharynx is clear    The patient tolerated procedure well        A/P -       ICD-10-CM    1. S/P FESS (functional endoscopic sinus surgery)  Z98.890 lidocaine 2%-oxymetazoline 0.025% nasal solution 2 spray      2. S/P nasal septoplasty  Z98.890             Maintain postoperative instructions.   Continue w/ Budesonide rinse BID  Continue with Ken med rinse 2-3+ times daily.   Use nasal saline PRN  Follow up as scheduled with Dr. Keshia Sanchez PA-C  ENT  [facility], [place]      Again, thank you for allowing me to participate in the care of your patient.        Sincerely,        Kaila Sanchez PA-C

## 2024-01-16 NOTE — PROGRESS NOTES
Chief Complaint   Patient presents with    Surgical Followup     Post op s/p 1/3 FESS, TR, Septo      HPI - Shilpi Moore is here for their first postoperative visit, status post endoscopic sinus surgery (with septoplasty and turbinate reduction) performed on 1/3/24.  There was the expected amount of congestion which has now mostly resolved, and no bleeding has occurred.  The generalized facial pressure has been resolving, and there have been no fevers or chills since surgery. The sinus rinses are continuing three times per day, and are being tolerated well.  No visual changes.  She has been using Budesonide rinses BID, Ken Med rinse TID.   She feels well breathing and mild congestion.   She does have intermittent sinus headache which cleared out.   Complete prednisone without concerns.     Operative- 1/3/24  Procedures:    1.  Bilateral maxillary antrostomy with tissue removal  2.  Endoscopic septoplasty  3.  Bilateral submucous reduction inferior turbinates  4.  Partial excision bilateral middle turbinates with polypectomy     Surgeon:  Daniella Schmitt D.O.  Anesthesia:  General endotracheal  EBL:  10 ml  Findings: preop DNS right, polyps extending off of the middle turbinates bilaterally  Complications:  none  Condition:  stable     Past Medical History:   Diagnosis Date    Allergic rhinitis 6/15/2011    Anxiety 9/17/2019    Benign essential hypertension 4/27/2017    Gastroesophageal reflux disease without esophagitis 9/21/2018    Major depressive disorder, recurrent episode, unspecified 6/15/2011    Obesity 4/27/2017    Reactive airway disease that is not asthma 2/12/2018    Seasonal allergic rhinitis due to pollen 4/27/2017    Unspecified vitamin D deficiency 4/11/2014        Allergies   Allergen Reactions    Ibuprofen Anaphylaxis    Nsaids Swelling     Current Outpatient Medications   Medication    acetaminophen (TYLENOL) 500 MG tablet    ALBUTEROL IN    budesonide (PULMICORT) 0.5 MG/2ML neb  "solution    loratadine (CLARITIN) 10 MG tablet    Multiple Vitamins-Minerals (QC WOMENS DAILY MULTIVITAMIN PO)    omeprazole (PRILOSEC) 40 MG DR capsule    predniSONE (DELTASONE) 20 MG tablet    sertraline (ZOLOFT) 100 MG tablet    VITAMIN D PO     No current facility-administered medications for this visit.     ROS- SEE HPI    Physical Exam -   BP (!) 143/84 (BP Location: Right arm, Patient Position: Sitting, Cuff Size: Adult Large)   Pulse 83   Temp 97.3  F (36.3  C) (Temporal)   Ht 1.676 m (5' 5.98\")   Wt 102.5 kg (225 lb 15.5 oz)   LMP 12/21/2023 (Exact Date)   SpO2 100%   BMI 36.49 kg/m      General - The patient is awake and alert, and answers questions appropriately during the history and physical.  The vocal quality is hypernasal, but there is no dyspnea or stridor noted.  Eyes - The EOMI, there is no conjuncitval or scleral injection.  Pupils are equally round and reactive to light.  Oral - The oral mucosa is pink and moist.  The tongue is mobile and midline on protrusion, no edema noted.  To evaluate the nose and sinuses in the post operative state, I performed rigid nasal endoscopy. The nose was anesthetized with home afrin or topical lidocaine and neosynephrine in the office.    I began with the LEFT side using a 0 degree rigid nasal endoscope, and then similarly examined the RIGHT side    Findings:  Inferior turbinates:  Reduced, lateralized.   Middle turbinate and middle meatus:  No purulence, no polyposis, no synechiae  Antrostomy Patent, good access.   MT sites appear healing well, scant debris removed. One clot on left remaining.   Mucosa is  healthy throughout without polyps nor polypoid degeneration  Superior meatus is clear  Frontal recess clear  Sphenoethmoidal clear  Nasopharynx is clear    The patient tolerated procedure well        A/P -       ICD-10-CM    1. S/P FESS (functional endoscopic sinus surgery)  Z98.890 lidocaine 2%-oxymetazoline 0.025% nasal solution 2 spray      2. S/P " nasal septoplasty  Z98.890             Maintain postoperative instructions.   Continue w/ Budesonide rinse BID  Continue with Ken med rinse 2-3+ times daily.   Use nasal saline PRN  Follow up as scheduled with Dr. Keshia Sanchez PA-C  ENT  [facility], [place]

## 2024-01-16 NOTE — PATIENT INSTRUCTIONS
Maintain postoperative instructions.   Continue w/ Budesonide rinse twice a day  Continue with Ken med rinse 2-3+ times daily.   Follow up as scheduled with Dr. Schmitt      Thank you for allowing SUNNY Briones and our ENT team to participate in your care.  If your medications are too expensive, please give the nurse a call.  We can possibly change this medication.  If you have a scheduling or an appointment question please contact our Health Unit Coordinator at their direct line [phone].   ALL nursing questions or concerns can be directed to your ENT nurse, Charisma at: [phone].

## 2024-01-31 NOTE — PROGRESS NOTES
"Otolaryngology Progress Note      Presents for their one month postoperative visit with me status post endoscopic sinus surgery     Procedures on 1/3/24:    1.  Bilateral maxillary antrostomy with tissue removal  2.  Endoscopic septoplasty  3.  Bilateral submucous reduction inferior turbinates  4.  Partial excision bilateral middle turbinates with polypectomy    Findings: preop DNS right, polyps extending off of the middle turbinates bilaterally     SNOT 35 today, pre-debridement    No heavy bleeding or pain  States nasal breathing is improved  Using budesonide irrigations    Sino-Nasal Outcome Test (SNOT - 22)    1. Need to Blow Nose: (P) Moderate  2. Nasal Blockage: (P) Mild or slight  3. Sneezing: (P) None  4. Runny Nose: (P) Severe  5. Cough: (P) Moderate  6. Post-nasal discharge: (P) Moderate  7. Thick nasal discharge: (P) Severe  8. Ear fullness: (P) Severe  9. Dizziness: (P) Mild or slight  10. Ear Pain: (P) Mild or slight  11. Facial pain/pressure: (P) Moderate  12. Decreased Sense of Smell/Taste: (P) Moderate  13. Difficulty falling asleep: (P) None  14. Wake up at night: (P) None  15. Lack of a good night's sleep: (P) None  16. Wake up tired: (P) None  17. Fatigue: (P) Mild or slight  18. Reduced Productivity: (P) None  19. Reduced Concentration: (P) None  20. Frustrated/restless/irritable: (P) None  21. Sad: (P) None  22. Embarrassed: (P) None    Total Score: (P) 35    COPYRIGHT 1996. [facility] [place], [place]      Physical Exam  /86 (BP Location: Left arm, Patient Position: Sitting, Cuff Size: Adult Large)   Pulse 66   Temp 98.3  F (36.8  C) (Temporal)   Resp 14   Ht 1.676 m (5' 5.98\")   Wt 102.5 kg (225 lb 15.5 oz)   LMP 12/21/2023 (Exact Date)   SpO2 99%   BMI 36.49 kg/m        General - The patient is well nourished and well developed, and appears to have good nutritional status.  Alert and oriented to person and place, interactive.  Head and Face - Normocephalic and " atraumatic, with no gross asymmetry noted of the contour of the facial features.  The facial nerve is intact, with strong symmetric movements.  Eyes - Extraocular movements intact.   Nose - Nasal mucosa is pink and moist with no abnormal mucus.  The septum was grossly midline, turbinates are without excess edema.  No polyps, masses, or purulence noted on examination.      To evaluate the nose and sinuses in the post operative state, I performed rigid nasal endoscopy. The nose was anesthetized with home afrin or topical lidocaine and neosynephrine in the office.    I began with the LEFT side using a 0 degree rigid nasal endoscope, and then similarly examined the RIGHT side    Findings:  Inferior turbinates:  Lateralized  Septum midline, intact  I used a mayer suction to remove normal postoperative secretions from the maxillary antrostomies  Middle turbinate and middle meatus:  No purulence, no polyposis, no synechiae  Antrostomy clear  Mild polypoid degeneration ethmoid bulla  Left MT /amber site lateralizing, I used a freer to medialize the turbinate but it tends to lateralize, antrostomy widely patent  The superior meatus and frontal recess are clear  The sphenoethmoid recess is clear  The nasopharynx is clear  Mucosa is healthy throughout without polyps nor polypoid degeneration    Impression/Plan    1. S/p FESS    Continue budesonide irrigations as indicated bid  Prn use maciel med saline  Return in 1 month with Kaila or Christine

## 2024-02-01 ENCOUNTER — OFFICE VISIT (OUTPATIENT)
Dept: OTOLARYNGOLOGY | Facility: OTHER | Age: 34
End: 2024-02-01
Attending: OTOLARYNGOLOGY
Payer: COMMERCIAL

## 2024-02-01 VITALS
BODY MASS INDEX: 36.32 KG/M2 | HEIGHT: 66 IN | TEMPERATURE: 98.3 F | WEIGHT: 225.97 LBS | HEART RATE: 66 BPM | SYSTOLIC BLOOD PRESSURE: 131 MMHG | DIASTOLIC BLOOD PRESSURE: 86 MMHG | OXYGEN SATURATION: 99 % | RESPIRATION RATE: 14 BRPM

## 2024-02-01 DIAGNOSIS — Z98.890 S/P FESS (FUNCTIONAL ENDOSCOPIC SINUS SURGERY): Primary | ICD-10-CM

## 2024-02-01 PROCEDURE — 99024 POSTOP FOLLOW-UP VISIT: CPT | Performed by: OTOLARYNGOLOGY

## 2024-02-01 RX ORDER — FEXOFENADINE HCL 60 MG/1
60 TABLET, FILM COATED ORAL 2 TIMES DAILY
COMMUNITY

## 2024-02-01 ASSESSMENT — PAIN SCALES - GENERAL: PAINLEVEL: NO PAIN (0)

## 2024-02-01 NOTE — LETTER
"    2/1/2024         RE: Shilpi Moore  [address] [facility] [address]        Dear Colleague,    Thank you for referring your patient, Shilpi Moore, to the [facility]. Please see a copy of my visit note below.    Otolaryngology Progress Note      Presents for their one month postoperative visit with me status post endoscopic sinus surgery     Procedures on 1/3/24:    1.  Bilateral maxillary antrostomy with tissue removal  2.  Endoscopic septoplasty  3.  Bilateral submucous reduction inferior turbinates  4.  Partial excision bilateral middle turbinates with polypectomy    Findings: preop DNS right, polyps extending off of the middle turbinates bilaterally     SNOT 35 today, pre-debridement    No heavy bleeding or pain  States nasal breathing is improved  Using budesonide irrigations    Sino-Nasal Outcome Test (SNOT - 22)    1. Need to Blow Nose: (P) Moderate  2. Nasal Blockage: (P) Mild or slight  3. Sneezing: (P) None  4. Runny Nose: (P) Severe  5. Cough: (P) Moderate  6. Post-nasal discharge: (P) Moderate  7. Thick nasal discharge: (P) Severe  8. Ear fullness: (P) Severe  9. Dizziness: (P) Mild or slight  10. Ear Pain: (P) Mild or slight  11. Facial pain/pressure: (P) Moderate  12. Decreased Sense of Smell/Taste: (P) Moderate  13. Difficulty falling asleep: (P) None  14. Wake up at night: (P) None  15. Lack of a good night's sleep: (P) None  16. Wake up tired: (P) None  17. Fatigue: (P) Mild or slight  18. Reduced Productivity: (P) None  19. Reduced Concentration: (P) None  20. Frustrated/restless/irritable: (P) None  21. Sad: (P) None  22. Embarrassed: (P) None    Total Score: (P) 35    COPYRIGHT 1996. [facility] [place]      Physical Exam  /86 (BP Location: Left arm, Patient Position: Sitting, Cuff Size: Adult Large)   Pulse 66   Temp 98.3  F (36.8  C) (Temporal)   Resp 14   Ht 1.676 m (5' 5.98\")   Wt 102.5 kg (225 lb 15.5 oz)   LMP " 12/21/2023 (Exact Date)   SpO2 99%   BMI 36.49 kg/m        General - The patient is well nourished and well developed, and appears to have good nutritional status.  Alert and oriented to person and place, interactive.  Head and Face - Normocephalic and atraumatic, with no gross asymmetry noted of the contour of the facial features.  The facial nerve is intact, with strong symmetric movements.  Eyes - Extraocular movements intact.   Nose - Nasal mucosa is pink and moist with no abnormal mucus.  The septum was grossly midline, turbinates are without excess edema.  No polyps, masses, or purulence noted on examination.      To evaluate the nose and sinuses in the post operative state, I performed rigid nasal endoscopy. The nose was anesthetized with home afrin or topical lidocaine and neosynephrine in the office.    I began with the LEFT side using a 0 degree rigid nasal endoscope, and then similarly examined the RIGHT side    Findings:  Inferior turbinates:  Lateralized  Septum midline, intact  I used a mayer suction to remove normal postoperative secretions from the maxillary antrostomies  Middle turbinate and middle meatus:  No purulence, no polyposis, no synechiae  Antrostomy clear  Mild polypoid degeneration ethmoid bulla  Left MT /amber site lateralizing, I used a freer to medialize the turbinate but it tends to lateralize, antrostomy widely patent  The superior meatus and frontal recess are clear  The sphenoethmoid recess is clear  The nasopharynx is clear  Mucosa is healthy throughout without polyps nor polypoid degeneration    Impression/Plan    1. S/p FESS    Continue budesonide irrigations as indicated bid  Prn use maciel med saline  Return in 1 month with Kaila or Christine      Again, thank you for allowing me to participate in the care of your patient.        Sincerely,        Daniella Schmitt MD

## 2024-02-01 NOTE — PATIENT INSTRUCTIONS
Thank you for allowing Dr. Schmitt and our ENT team to participate in your care.  If your medications are too expensive, please give the nurse a call.  We can possibly change this medication.  If you have a scheduling or an appointment question please contact our Health Unit Coordinator at their direct line [phone].   ALL nursing questions or concerns can be directed to your ENT nurse, Crow, at: [phone]    Continue Budesonide twice a day, plain saline rinses as needed for congestion.     Follow-up in 3-4 weeks with Kaila or Christine.    Massage your scar using moderate pressure and any over-the-counter ointment with Vitamin E.

## 2024-02-13 ENCOUNTER — OFFICE VISIT (OUTPATIENT)
Dept: OTOLARYNGOLOGY | Facility: OTHER | Age: 34
End: 2024-02-13
Attending: NURSE PRACTITIONER
Payer: COMMERCIAL

## 2024-02-13 VITALS
TEMPERATURE: 97.4 F | RESPIRATION RATE: 18 BRPM | WEIGHT: 220 LBS | BODY MASS INDEX: 35.36 KG/M2 | HEIGHT: 66 IN | DIASTOLIC BLOOD PRESSURE: 72 MMHG | OXYGEN SATURATION: 100 % | HEART RATE: 69 BPM | SYSTOLIC BLOOD PRESSURE: 122 MMHG

## 2024-02-13 DIAGNOSIS — J33.9 NASAL POLYP: ICD-10-CM

## 2024-02-13 DIAGNOSIS — Z98.890 S/P FESS (FUNCTIONAL ENDOSCOPIC SINUS SURGERY): ICD-10-CM

## 2024-02-13 DIAGNOSIS — E89.0 H/O PARTIAL THYROIDECTOMY: ICD-10-CM

## 2024-02-13 DIAGNOSIS — Z98.890 S/P FESS (FUNCTIONAL ENDOSCOPIC SINUS SURGERY): Primary | ICD-10-CM

## 2024-02-13 PROCEDURE — 99024 POSTOP FOLLOW-UP VISIT: CPT | Performed by: NURSE PRACTITIONER

## 2024-02-13 RX ORDER — FLUTICASONE PROPIONATE 50 MCG
2 SPRAY, SUSPENSION (ML) NASAL DAILY
Qty: 16 G | Refills: 11 | Status: SHIPPED | OUTPATIENT
Start: 2024-02-13

## 2024-02-13 RX ORDER — PREDNISONE 10 MG/1
TABLET ORAL
Qty: 14.5 TABLET | Refills: 0 | Status: SHIPPED | OUTPATIENT
Start: 2024-02-13 | End: 2024-04-26

## 2024-02-13 ASSESSMENT — PAIN SCALES - GENERAL: PAINLEVEL: NO PAIN (0)

## 2024-02-13 NOTE — LETTER
"    2/13/2024         RE: Shilpi Moore  [address] [facility] [address]        Dear Colleague,    Thank you for referring your patient, Shilpi Moore, to the [facility]. Please see a copy of my visit note below.      Otolaryngology Note         Chief Complaint:     Patient presents with:  Surgical Followup: S/P 01/03 FESS           History of Present Illness:     Shilpi Moore is a 33 year old female who presents today for sinus check.  She is status post bilateral antrostomy with tissue removal, endoscopic septoplasty, bilateral submucous reduction inferior turbinates, partial excision bilateral middle turbinates with polypectomy completed on 1/3/2024 by Dr. Schmitt.    She was last seen in ENT on 2/1/2024 by Dr. Schmitt, exam showed lateralized inferior turbinates and polypoid degeneration to the ethmoid bulla, antrostomy widely patent.    She has been having headaches off and on since surgery.  The headache starts in the frontal area and radiates into the jaw.  She feels like her ears are plugged.  Feels she may be clenching her teeth.  No consistent headaches before surgery.  She did get an occasional \"sinus headache\" prior to surgery.      Headache is rated at 4/10, dull.   She has taken tylenol without much improvement.      She has been rinsing.  She has facial pain and pressure into max sinuses.  No fevers.      She does have a nightguard that she does not wear.  She states she feels like she is trying to yawn to open her jaw and ears.  She did recently see her dentist and is having a new mouthguard made.    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  She is scheduled to see endocrinology in April.  Preop TSH and free T4 within normal limits.  We will recheck this today to assure this continues normal.        Surgical Details:      Otolaryngology Operative Note      Pre-op Diagnosis: Chronic maxillary sinusitis, bilateral " inferior turbinate hypertrophy, amber bullosa, deviated nasal septum  Post-op Diagnosis:  same     Procedures:    1.  Bilateral maxillary antrostomy with tissue removal  2.  Endoscopic septoplasty  3.  Bilateral submucous reduction inferior turbinates  4.  Partial excision bilateral middle turbinates with polypectomy     Surgeon:  Daniella Schmitt D.O.  Anesthesia:  General endotracheal  EBL:  10 ml  Findings: preop DNS right, polyps extending off of the middle turbinates bilaterally  Complications:  none  Condition:  stable          Medications:     Current Outpatient Rx   Medication Sig Dispense Refill     acetaminophen (TYLENOL) 500 MG tablet Take 500-1,000 mg by mouth every 6 hours as needed for mild pain       ALBUTEROL IN        budesonide (PULMICORT) 0.5 MG/2ML neb solution Mix 240 ml Ken Med Sinus rinse, add 0.5 mg budesonide vial, rinse 1/2 bottle in each nostril twice daily 120 mL 11     fexofenadine (ALLEGRA) 60 MG tablet Take 60 mg by mouth 2 times daily       fluticasone (FLONASE) 50 MCG/ACT nasal spray Spray 2 sprays into both nostrils daily 16 g 11     Multiple Vitamins-Minerals (QC WOMENS DAILY MULTIVITAMIN PO) Take 1 Dose by mouth daily       omeprazole (PRILOSEC) 40 MG DR capsule TAKE ONE CAPSULE BY MOUTH EVERY DAY 30 TO 60 MINUTES BEFORE A MEAL 90 capsule 3     predniSONE (DELTASONE) 10 MG tablet 20 mg daily x 5 days, then 10 mg daily x 3 days, then 5 mg daily x 3 days and discontinue 14.5 tablet 0     sertraline (ZOLOFT) 100 MG tablet TAKE ONE-HALF TABLET BY MOUTH TWICE DAILY 90 tablet 3     VITAMIN D PO        loratadine (CLARITIN) 10 MG tablet Take 10 mg by mouth daily (Patient not taking: Reported on 2/1/2024)       predniSONE (DELTASONE) 20 MG tablet 20 mg in the morning for days 1-4, then 10 mg (half tab) days 5 and 6 (Patient not taking: Reported on 1/16/2024) 5 tablet 0            Allergies:     Allergies: Ibuprofen and Nsaids          Past Medical History:     Past Medical  "History:   Diagnosis Date     Allergic rhinitis 6/15/2011     Anxiety 9/17/2019     Benign essential hypertension 4/27/2017     Gastroesophageal reflux disease without esophagitis 9/21/2018     Major depressive disorder, recurrent episode, unspecified 6/15/2011     Obesity 4/27/2017     Reactive airway disease that is not asthma 2/12/2018     Seasonal allergic rhinitis due to pollen 4/27/2017     Unspecified vitamin D deficiency 4/11/2014            Past Surgical History:     Past Surgical History:   Procedure Laterality Date     CHOLECYSTECTOMY, LAPOROSCOPIC N/A 09/27/2020    [facility].     ENDOSCOPIC SINUS SURGERY N/A 1/3/2024    Procedure: BILATERAL ENDOSCOPIC SINUS SURGERY;  Surgeon: Daniella Schmitt MD;  Location: [facility] OR     ENT SURGERY      tonsillectomy     OTHER SURGICAL HISTORY      I&D rt abscess axilla     SEPTOPLASTY, TURBINOPLASTY, COMBINED Bilateral 1/3/2024    Procedure: Septoplasty, Turbinate Reduction;  Surgeon: Daniella Schmitt MD;  Location: [facility] OR     SOFT TISSUE SURGERY      IND cyst right axilla     THYROIDECTOMY Left 10/25/2023    Procedure: Left Thyroid Lobectomy with 1.5 hours nerve monitoring;  Surgeon: Daniella Schmitt MD;  Location: [facility] OR       ENT family history reviewed         Social History:     Social History     Tobacco Use     Smoking status: Never     Smokeless tobacco: Never     Tobacco comments:     Passive exposure   Vaping Use     Vaping Use: Never used   Substance Use Topics     Alcohol use: Yes     Comment: Beer; Occasionally     Drug use: No            Review of Systems:     ROS: See HPI         Physical Exam:     /72 (BP Location: Right arm, Patient Position: Sitting, Cuff Size: Adult Regular)   Pulse 69   Temp 97.4  F (36.3  C) (Tympanic)   Resp 18   Ht 1.676 m (5' 6\")   Wt 99.8 kg (220 lb)   LMP 12/21/2023 (Exact Date)   SpO2 100%   BMI 35.51 kg/m      General - The patient is well nourished and well developed, and appears to have good " nutritional status.  Alert and oriented to person and place, answers questions and cooperates with examination appropriately.   Head and Face - Normocephalic and atraumatic, with no gross asymmetry noted.  The facial nerve is intact, with strong symmetric movements.  Voice and Breathing - The patient was breathing comfortably without the use of accessory muscles. There was no wheezing, stridor. The patients voice was clear and strong, and had appropriate pitch and quality.  Ears - External ear normal. Canals are patent. Right tympanic membrane is intact without effusion, retraction or mass. Left tympanic membrane is intact without effusion, retraction or mass.  Mouth - Examination of the oral cavity showed pink, healthy oral mucosa. Dentition in good condition. No lesions or ulcerations noted. The tongue was mobile and midline.   TMJ - no significant pain with opening and closing jaw, no bonita crepitus or pop/click.   Throat - The walls of the oropharynx were smooth, pink, moist, symmetric, and had no lesions or ulcerations.  The tonsillar pillars and soft palate were symmetric. The uvula was midline on elevation.    Neck - Normal range of motion, no concerning palpable lymphadenopathy.  Palpation of the thyroid was soft and smooth, with no nodules or goiter appreciated.  The trachea was mobile and midline.  Nose - External contour is symmetric, no gross deflection or scars.  Nasal mucosa is pink and moist with no abnormal mucus.  The septum and turbinates were evaluated with nasal speculum, no polyps, masses, or purulence noted on examination.    To evaluate the nose and sinuses in the post operative state, I performed rigid nasal endoscopy.  I sprayed both nares with 2 sprays lidocaine and neosynephrine.     I began with the RIGHT side using a 0 degree rigid nasal endoscope, and then similarly examined the LEFT side     Findings: Septum is grossly midline and intact  Inferior turbinates: Lateralized, small amount  of normal postoperative secretions were removed from the anterior nasal cavity with #8 Utrner and Tellez.  Middle turbinate and middle meatus: Antrostomy patent, bilateral middle turbinates are well-healed.  There is bilateral ethmoid polypoid degeneration.  No bonita polyps or purulence noted.  Superior meatus examined and unremarkable  The sphenoethmoidal recess is examined and unremarkable  Mucosa is healthy throughout,  no polyps nor polypoid degeneration  Nasopharynx clear, ET patent, no edema  The patient tolerated the procedure well            Assessment and Plan:       ICD-10-CM    1. S/P FESS (functional endoscopic sinus surgery)  Z98.890 lidocaine 2%-oxymetazoline 0.025% nasal solution 2 spray     fluticasone (FLONASE) 50 MCG/ACT nasal spray      2. H/O partial thyroidectomy  E89.0 TSH with free T4 reflex      3. Nasal polyp  J33.9 fluticasone (FLONASE) 50 MCG/ACT nasal spray     predniSONE (DELTASONE) 10 MG tablet        Recommend wearing a mouth guard    Start Prednisone taper, 20 mg daily x 5 days, then 10 mg daily x 3 days, then 5 mg daily x 3 days and discontinue     Flonase nasal spray, Spray 2 sprays into both nostrils daily     Follow up in 3-4 weeks with Rajani Aguiar     TSH labs ordered go to lab at your convenience to have this drawn    I do recommend restarting mouthguard and using warm compress to jaw even though no bonita TMJ symptoms today.      Rajani TORO  [facility] ENT    Again, thank you for allowing me to participate in the care of your patient.        Sincerely,        Rajani Aguiar NP

## 2024-02-13 NOTE — PROGRESS NOTES
"  Otolaryngology Note         Chief Complaint:     Patient presents with:  Surgical Followup: S/P 01/03 FESS           History of Present Illness:     Shilpi Moore is a 33 year old female who presents today for sinus check.  She is status post bilateral antrostomy with tissue removal, endoscopic septoplasty, bilateral submucous reduction inferior turbinates, partial excision bilateral middle turbinates with polypectomy completed on 1/3/2024 by Dr. Schmitt.    She was last seen in ENT on 2/1/2024 by Dr. Schmitt, exam showed lateralized inferior turbinates and polypoid degeneration to the ethmoid bulla, antrostomy widely patent.    She has been having headaches off and on since surgery.  The headache starts in the frontal area and radiates into the jaw.  She feels like her ears are plugged.  Feels she may be clenching her teeth.  No consistent headaches before surgery.  She did get an occasional \"sinus headache\" prior to surgery.      Headache is rated at 4/10, dull.   She has taken tylenol without much improvement.      She has been rinsing.  She has facial pain and pressure into max sinuses.  No fevers.      She does have a nightguard that she does not wear.  She states she feels like she is trying to yawn to open her jaw and ears.  She did recently see her dentist and is having a new mouthguard made.    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  She is scheduled to see endocrinology in April.  Preop TSH and free T4 within normal limits.  We will recheck this today to assure this continues normal.        Surgical Details:      Otolaryngology Operative Note      Pre-op Diagnosis: Chronic maxillary sinusitis, bilateral inferior turbinate hypertrophy, amber bullosa, deviated nasal septum  Post-op Diagnosis:  same     Procedures:    1.  Bilateral maxillary antrostomy with tissue removal  2.  Endoscopic septoplasty  3.  Bilateral submucous reduction inferior " turbinates  4.  Partial excision bilateral middle turbinates with polypectomy     Surgeon:  Daniella Schmitt D.O.  Anesthesia:  General endotracheal  EBL:  10 ml  Findings: preop DNS right, polyps extending off of the middle turbinates bilaterally  Complications:  none  Condition:  stable          Medications:     Current Outpatient Rx   Medication Sig Dispense Refill    acetaminophen (TYLENOL) 500 MG tablet Take 500-1,000 mg by mouth every 6 hours as needed for mild pain      ALBUTEROL IN       budesonide (PULMICORT) 0.5 MG/2ML neb solution Mix 240 ml Ken Med Sinus rinse, add 0.5 mg budesonide vial, rinse 1/2 bottle in each nostril twice daily 120 mL 11    fexofenadine (ALLEGRA) 60 MG tablet Take 60 mg by mouth 2 times daily      fluticasone (FLONASE) 50 MCG/ACT nasal spray Spray 2 sprays into both nostrils daily 16 g 11    Multiple Vitamins-Minerals (QC WOMENS DAILY MULTIVITAMIN PO) Take 1 Dose by mouth daily      omeprazole (PRILOSEC) 40 MG DR capsule TAKE ONE CAPSULE BY MOUTH EVERY DAY 30 TO 60 MINUTES BEFORE A MEAL 90 capsule 3    predniSONE (DELTASONE) 10 MG tablet 20 mg daily x 5 days, then 10 mg daily x 3 days, then 5 mg daily x 3 days and discontinue 14.5 tablet 0    sertraline (ZOLOFT) 100 MG tablet TAKE ONE-HALF TABLET BY MOUTH TWICE DAILY 90 tablet 3    VITAMIN D PO       loratadine (CLARITIN) 10 MG tablet Take 10 mg by mouth daily (Patient not taking: Reported on 2/1/2024)      predniSONE (DELTASONE) 20 MG tablet 20 mg in the morning for days 1-4, then 10 mg (half tab) days 5 and 6 (Patient not taking: Reported on 1/16/2024) 5 tablet 0            Allergies:     Allergies: Ibuprofen and Nsaids          Past Medical History:     Past Medical History:   Diagnosis Date    Allergic rhinitis 6/15/2011    Anxiety 9/17/2019    Benign essential hypertension 4/27/2017    Gastroesophageal reflux disease without esophagitis 9/21/2018    Major depressive disorder, recurrent episode, unspecified 6/15/2011     "Obesity 4/27/2017    Reactive airway disease that is not asthma 2/12/2018    Seasonal allergic rhinitis due to pollen 4/27/2017    Unspecified vitamin D deficiency 4/11/2014            Past Surgical History:     Past Surgical History:   Procedure Laterality Date    CHOLECYSTECTOMY, LAPOROSCOPIC N/A 09/27/2020    [facility].    ENDOSCOPIC SINUS SURGERY N/A 1/3/2024    Procedure: BILATERAL ENDOSCOPIC SINUS SURGERY;  Surgeon: Daniella Schmitt MD;  Location: [facility] OR    ENT SURGERY      tonsillectomy    OTHER SURGICAL HISTORY      I&D rt abscess axilla    SEPTOPLASTY, TURBINOPLASTY, COMBINED Bilateral 1/3/2024    Procedure: Septoplasty, Turbinate Reduction;  Surgeon: Daniella Schmitt MD;  Location: [facility] OR    SOFT TISSUE SURGERY      IND cyst right axilla    THYROIDECTOMY Left 10/25/2023    Procedure: Left Thyroid Lobectomy with 1.5 hours nerve monitoring;  Surgeon: Daniella Schmitt MD;  Location: [facility] OR       ENT family history reviewed         Social History:     Social History     Tobacco Use    Smoking status: Never    Smokeless tobacco: Never    Tobacco comments:     Passive exposure   Vaping Use    Vaping Use: Never used   Substance Use Topics    Alcohol use: Yes     Comment: Beer; Occasionally    Drug use: No            Review of Systems:     ROS: See HPI         Physical Exam:     /72 (BP Location: Right arm, Patient Position: Sitting, Cuff Size: Adult Regular)   Pulse 69   Temp 97.4  F (36.3  C) (Tympanic)   Resp 18   Ht 1.676 m (5' 6\")   Wt 99.8 kg (220 lb)   LMP 12/21/2023 (Exact Date)   SpO2 100%   BMI 35.51 kg/m      General - The patient is well nourished and well developed, and appears to have good nutritional status.  Alert and oriented to person and place, answers questions and cooperates with examination appropriately.   Head and Face - Normocephalic and atraumatic, with no gross asymmetry noted.  The facial nerve is intact, with strong symmetric movements.  Voice and " Breathing - The patient was breathing comfortably without the use of accessory muscles. There was no wheezing, stridor. The patients voice was clear and strong, and had appropriate pitch and quality.  Ears - External ear normal. Canals are patent. Right tympanic membrane is intact without effusion, retraction or mass. Left tympanic membrane is intact without effusion, retraction or mass.  Mouth - Examination of the oral cavity showed pink, healthy oral mucosa. Dentition in good condition. No lesions or ulcerations noted. The tongue was mobile and midline.   TMJ - no significant pain with opening and closing jaw, no bonita crepitus or pop/click.   Throat - The walls of the oropharynx were smooth, pink, moist, symmetric, and had no lesions or ulcerations.  The tonsillar pillars and soft palate were symmetric. The uvula was midline on elevation.    Neck - Normal range of motion, no concerning palpable lymphadenopathy.  Palpation of the thyroid was soft and smooth, with no nodules or goiter appreciated.  The trachea was mobile and midline.  Nose - External contour is symmetric, no gross deflection or scars.  Nasal mucosa is pink and moist with no abnormal mucus.  The septum and turbinates were evaluated with nasal speculum, no polyps, masses, or purulence noted on examination.    To evaluate the nose and sinuses in the post operative state, I performed rigid nasal endoscopy.  I sprayed both nares with 2 sprays lidocaine and neosynephrine.     I began with the RIGHT side using a 0 degree rigid nasal endoscope, and then similarly examined the LEFT side     Findings: Septum is grossly midline and intact  Inferior turbinates: Lateralized, small amount of normal postoperative secretions were removed from the anterior nasal cavity with #8 Turner and Tellez.  Middle turbinate and middle meatus: Antrostomy patent, bilateral middle turbinates are well-healed.  There is bilateral ethmoid polypoid degeneration.  No bonita polyps  or purulence noted.  Superior meatus examined and unremarkable  The sphenoethmoidal recess is examined and unremarkable  Mucosa is healthy throughout,  no polyps nor polypoid degeneration  Nasopharynx clear, ET patent, no edema  The patient tolerated the procedure well            Assessment and Plan:       ICD-10-CM    1. S/P FESS (functional endoscopic sinus surgery)  Z98.890 lidocaine 2%-oxymetazoline 0.025% nasal solution 2 spray     fluticasone (FLONASE) 50 MCG/ACT nasal spray      2. H/O partial thyroidectomy  E89.0 TSH with free T4 reflex      3. Nasal polyp  J33.9 fluticasone (FLONASE) 50 MCG/ACT nasal spray     predniSONE (DELTASONE) 10 MG tablet        Recommend wearing a mouth guard    Start Prednisone taper, 20 mg daily x 5 days, then 10 mg daily x 3 days, then 5 mg daily x 3 days and discontinue     Flonase nasal spray, Spray 2 sprays into both nostrils daily     Follow up in 3-4 weeks with Rajani Aguiar     TSH labs ordered go to lab at your convenience to have this drawn    I do recommend restarting mouthguard and using warm compress to jaw even though no bonita TMJ symptoms today.      Rajani TORO  [facility]

## 2024-02-13 NOTE — PATIENT INSTRUCTIONS
Thank you for allowing Rajani Aguiar and our ENT team to participate in your care.  If your medications are too expensive, please give the nurse a call.  We can possibly change this medication.  If you have a scheduling or an appointment question please contact our Health Unit Coordinator at their direct line [phone] ext [phone].   ALL nursing questions or concerns can be directed to your ENT nurse at: [phone] - Fll     Recommend wearing a mouth guard    Start Prednisone taper, 20 mg daily x 5 days, then 10 mg daily x 3 days, then 5 mg daily x 3 days and discontinue     Flonase nasal spray, Spray 2 sprays into both nostrils daily     Follow up in 3-4 weeks with Rajani Aguiar     TSH labs ordered go to lab at your convenience to have this drawn    TMJ education given

## 2024-02-14 RX ORDER — FLUTICASONE PROPIONATE 50 MCG
2 SPRAY, SUSPENSION (ML) NASAL DAILY
Qty: 48 G | OUTPATIENT
Start: 2024-02-14

## 2024-02-18 ENCOUNTER — HEALTH MAINTENANCE LETTER (OUTPATIENT)
Age: 34
End: 2024-02-18

## 2024-03-05 ENCOUNTER — OFFICE VISIT (OUTPATIENT)
Dept: OTOLARYNGOLOGY | Facility: OTHER | Age: 34
End: 2024-03-05
Attending: NURSE PRACTITIONER
Payer: COMMERCIAL

## 2024-03-05 VITALS
WEIGHT: 220.02 LBS | SYSTOLIC BLOOD PRESSURE: 128 MMHG | DIASTOLIC BLOOD PRESSURE: 81 MMHG | RESPIRATION RATE: 18 BRPM | TEMPERATURE: 97.9 F | OXYGEN SATURATION: 99 % | HEART RATE: 63 BPM | BODY MASS INDEX: 35.36 KG/M2 | HEIGHT: 66 IN

## 2024-03-05 DIAGNOSIS — Z98.890 S/P FESS (FUNCTIONAL ENDOSCOPIC SINUS SURGERY): Primary | ICD-10-CM

## 2024-03-05 DIAGNOSIS — J33.9 NASAL POLYPOSIS: ICD-10-CM

## 2024-03-05 DIAGNOSIS — J30.89 SEASONAL ALLERGIC RHINITIS DUE TO OTHER ALLERGIC TRIGGER: ICD-10-CM

## 2024-03-05 PROCEDURE — 99213 OFFICE O/P EST LOW 20 MIN: CPT | Mod: 24 | Performed by: NURSE PRACTITIONER

## 2024-03-05 RX ORDER — AZELASTINE HYDROCHLORIDE, FLUTICASONE PROPIONATE 137; 50 UG/1; UG/1
1 SPRAY, METERED NASAL 2 TIMES DAILY
Qty: 23 G | Refills: 11 | Status: SHIPPED | OUTPATIENT
Start: 2024-03-05 | End: 2024-07-30

## 2024-03-05 RX ORDER — METFORMIN HCL 500 MG
1000 TABLET, EXTENDED RELEASE 24 HR ORAL
COMMUNITY
Start: 2024-01-10 | End: 2024-08-02

## 2024-03-05 ASSESSMENT — ANXIETY QUESTIONNAIRES
4. TROUBLE RELAXING: NOT AT ALL
GAD7 TOTAL SCORE: 0
7. FEELING AFRAID AS IF SOMETHING AWFUL MIGHT HAPPEN: NOT AT ALL
6. BECOMING EASILY ANNOYED OR IRRITABLE: NOT AT ALL
3. WORRYING TOO MUCH ABOUT DIFFERENT THINGS: NOT AT ALL
1. FEELING NERVOUS, ANXIOUS, OR ON EDGE: NOT AT ALL
GAD7 TOTAL SCORE: 0
5. BEING SO RESTLESS THAT IT IS HARD TO SIT STILL: NOT AT ALL
2. NOT BEING ABLE TO STOP OR CONTROL WORRYING: NOT AT ALL

## 2024-03-05 ASSESSMENT — PAIN SCALES - GENERAL: PAINLEVEL: NO PAIN (0)

## 2024-03-05 NOTE — PROGRESS NOTES
Otolaryngology Note         Chief Complaint:     Patient presents with:  Surgical Followup: 3 week follow up S/P 01/03 FESS            History of Present Illness:     Shilpi Moore is a 33 year old female who presents today for her fourtht post op FESS appointment.  She reports today she has been doing much better.  Headaches have improved significantly.  There has been no fever, chills, large amounts of bleeding, visual changes or neck pain.   Has been rinsing as indicated.    She did restart her mouthguard and has noted improvement in headaches and jaw pain.    She does have concerns of clear drainage.    Allergy testing completed 5/1/2023:  High sensitivities to birch, oak, cat  Moderate sensitivities to ragweed, pigweed, thistle, grass, maple, elm, jarrell, pine, [place], Alternaria, Aspergillus, Hormodendrum, penicillium, Epicoccum, fusarium, Helminthosporium, dog, dust  Mild sensitivity to black walnut        Surgical Details:      Otolaryngology Operative Note      Pre-op Diagnosis: Chronic maxillary sinusitis, bilateral inferior turbinate hypertrophy, amber bullosa, deviated nasal septum  Post-op Diagnosis:  same     Procedures:    1.  Bilateral maxillary antrostomy with tissue removal  2.  Endoscopic septoplasty  3.  Bilateral submucous reduction inferior turbinates  4.  Partial excision bilateral middle turbinates with polypectomy         Medications:     Current Outpatient Rx   Medication Sig Dispense Refill    acetaminophen (TYLENOL) 500 MG tablet Take 500-1,000 mg by mouth every 6 hours as needed for mild pain      ALBUTEROL IN       azelastine (ASTELIN) 0.1 % nasal spray Spray 2 sprays into both nostrils 2 times daily 30 mL 12    azelastine-fluticasone (DYMISTA) 137-50 MCG/ACT nasal spray Spray 1 spray into both nostrils 2 times daily 23 g 11    budesonide (PULMICORT) 0.5 MG/2ML neb solution Mix 240 ml Ken Med Sinus rinse, add 0.5 mg budesonide vial, rinse 1/2 bottle in each nostril twice daily  120 mL 11    fexofenadine (ALLEGRA) 60 MG tablet Take 60 mg by mouth 2 times daily      fluticasone (FLONASE) 50 MCG/ACT nasal spray Spray 2 sprays into both nostrils daily 16 g 11    metFORMIN (GLUCOPHAGE XR) 500 MG 24 hr tablet Take 1,000 mg by mouth      Multiple Vitamins-Minerals (QC WOMENS DAILY MULTIVITAMIN PO) Take 1 Dose by mouth daily      omeprazole (PRILOSEC) 40 MG DR capsule TAKE ONE CAPSULE BY MOUTH EVERY DAY 30 TO 60 MINUTES BEFORE A MEAL 90 capsule 3    sertraline (ZOLOFT) 100 MG tablet TAKE ONE-HALF TABLET BY MOUTH TWICE DAILY 90 tablet 3    VITAMIN D PO       loratadine (CLARITIN) 10 MG tablet Take 10 mg by mouth daily (Patient not taking: Reported on 2/1/2024)      predniSONE (DELTASONE) 10 MG tablet 20 mg daily x 5 days, then 10 mg daily x 3 days, then 5 mg daily x 3 days and discontinue 14.5 tablet 0    predniSONE (DELTASONE) 20 MG tablet 20 mg in the morning for days 1-4, then 10 mg (half tab) days 5 and 6 (Patient not taking: Reported on 1/16/2024) 5 tablet 0            Allergies:     Allergies: Ibuprofen and Nsaids          Past Medical History:     Past Medical History:   Diagnosis Date    Allergic rhinitis 6/15/2011    Anxiety 9/17/2019    Benign essential hypertension 4/27/2017    Gastroesophageal reflux disease without esophagitis 9/21/2018    Major depressive disorder, recurrent episode, unspecified 6/15/2011    Obesity 4/27/2017    Reactive airway disease that is not asthma 2/12/2018    Seasonal allergic rhinitis due to pollen 4/27/2017    Unspecified vitamin D deficiency 4/11/2014            Past Surgical History:     Past Surgical History:   Procedure Laterality Date    CHOLECYSTECTOMY, LAPOROSCOPIC N/A 09/27/2020    [facility].    ENDOSCOPIC SINUS SURGERY N/A 1/3/2024    Procedure: BILATERAL ENDOSCOPIC SINUS SURGERY;  Surgeon: Daniella Schmitt MD;  Location: [facility]    ENT SURGERY      tonsillectomy    OTHER SURGICAL HISTORY      I&D rt abscess axilla    SEPTOPLASTY,  "TURBINOPLASTY, COMBINED Bilateral 1/3/2024    Procedure: Septoplasty, Turbinate Reduction;  Surgeon: Daniella Schmitt MD;  Location: [facility] OR    SOFT TISSUE SURGERY      IND cyst right axilla    THYROIDECTOMY Left 10/25/2023    Procedure: Left Thyroid Lobectomy with 1.5 hours nerve monitoring;  Surgeon: Daniella Schmitt MD;  Location: [facility] OR       ENT family history reviewed         Social History:     Social History     Tobacco Use    Smoking status: Never    Smokeless tobacco: Never    Tobacco comments:     Passive exposure   Vaping Use    Vaping Use: Never used   Substance Use Topics    Alcohol use: Yes     Comment: Beer; Occasionally    Drug use: No            Review of Systems:     ROS: See HPI         Physical Exam:     /81 (BP Location: Right arm, Patient Position: Sitting, Cuff Size: Adult Large)   Pulse 63   Temp 97.9  F (36.6  C) (Temporal)   Resp 18   Ht 1.676 m (5' 5.98\")   Wt 99.8 kg (220 lb 0.3 oz)   SpO2 99%   BMI 35.53 kg/m      General - The patient is well nourished and well developed, and appears to have good nutritional status.  Alert and oriented to person and place, answers questions and cooperates with examination appropriately.   Head and Face - Normocephalic and atraumatic, with no gross asymmetry noted.  The facial nerve is intact, with strong symmetric movements.  Voice and Breathing - The patient was breathing comfortably without the use of accessory muscles. There was no wheezing, stridor. The patients voice was clear and strong, and had appropriate pitch and quality.  Ears - External ear normal. Canals are patent. Right tympanic membrane is intact without effusion, retraction or mass. Left tympanic membrane is intact without effusion, retraction or mass.  Eyes - Extraocular movements intact, and the pupils were reactive to light. Sclera were not icteric or injected, conjunctiva were pink and moist.   Mouth - Examination of the oral cavity showed pink, healthy oral " mucosa. Dentition in good condition. No lesions or ulcerations noted. The tongue was mobile and midline.   Throat - The walls of the oropharynx were smooth, pink, moist, symmetric, and had no lesions or ulcerations.  The tonsillar pillars and soft palate were symmetric. The uvula was midline on elevation.    Neck - Normal range of motion, no worrisome palpable lymphadenopathy.  Nose - External contour is symmetric, no gross deflection or scars.  Nasal mucosa is pink and moist with no abnormal mucus.        To evaluate the nose and sinuses in the post operative state, I performed rigid nasal endoscopy.  I sprayed both nares with 2 sprays lidocaine and neosynephrine.     I began with the RIGHT side using a 0 degree rigid nasal endoscope, and then similarly examined the LEFT side     Findings: Septum is grossly midline and intact  Inferior turbinates: Lateralized, small amount of normal postoperative secretions were removed from the anterior nasal cavity with #8 Turner and Tellez.  Middle turbinate and middle meatus: Antrostomy patent, bilateral middle turbinates are well-healed.  There is bilateral ethmoid polypoid degeneration.  No bonita polyps or purulence noted.  Superior meatus examined and unremarkable  The sphenoethmoidal recess is examined and unremarkable  Mucosa is healthy throughout,  no polyps nor polypoid degeneration  Nasopharynx clear, ET patent, no edema  The patient tolerated the procedure well            Assessment and Plan:       ICD-10-CM    1. S/P FESS (functional endoscopic sinus surgery)  Z98.890 lidocaine 2%-oxymetazoline 0.025% nasal solution 2 spray      2. Seasonal allergic rhinitis due to other allergic trigger  J30.89 azelastine-fluticasone (DYMISTA) 137-50 MCG/ACT nasal spray     azelastine (ASTELIN) 0.1 % nasal spray      3. Nasal polyposis  J33.9         Continue rinsing with budesonide 2 times daily  Start dymista - if not covered, I will send astelin and you can continue  flonase  Consider allergy immunotherapy. This can be completed with allergy shots or drops  Continue daily antihistamine  Follow up in 3 months for recheck    Rajani TORO  [facility]

## 2024-03-05 NOTE — LETTER
3/5/2024         RE: Shilpi Moore  [address] [facility] [address]        Dear Colleague,    Thank you for referring your patient, Shilpi Moore, to the [facility]. Please see a copy of my visit note below.      Otolaryngology Note         Chief Complaint:     Patient presents with:  Surgical Followup: 3 week follow up S/P 01/03 FESS            History of Present Illness:     Shilpi Moore is a 33 year old female who presents today for her fourtht post op FESS appointment.  She reports today she has been doing much better.  Headaches have improved significantly.  There has been no fever, chills, large amounts of bleeding, visual changes or neck pain.   Has been rinsing as indicated.    She did restart her mouthguard and has noted improvement in headaches and jaw pain.    She does have concerns of clear drainage.    Allergy testing completed 5/1/2023:  High sensitivities to birch, oak, cat  Moderate sensitivities to ragweed, pigweed, thistle, grass, maple, elm, jarrell, pine, [place], Alternaria, Aspergillus, Hormodendrum, penicillium, Epicoccum, fusarium, Helminthosporium, dog, dust  Mild sensitivity to black walnut        Surgical Details:      Otolaryngology Operative Note      Pre-op Diagnosis: Chronic maxillary sinusitis, bilateral inferior turbinate hypertrophy, amber bullosa, deviated nasal septum  Post-op Diagnosis:  same     Procedures:    1.  Bilateral maxillary antrostomy with tissue removal  2.  Endoscopic septoplasty  3.  Bilateral submucous reduction inferior turbinates  4.  Partial excision bilateral middle turbinates with polypectomy         Medications:     Current Outpatient Rx   Medication Sig Dispense Refill     acetaminophen (TYLENOL) 500 MG tablet Take 500-1,000 mg by mouth every 6 hours as needed for mild pain       ALBUTEROL IN        azelastine (ASTELIN) 0.1 % nasal spray Spray 2 sprays into both nostrils 2 times daily 30 mL 12      azelastine-fluticasone (DYMISTA) 137-50 MCG/ACT nasal spray Spray 1 spray into both nostrils 2 times daily 23 g 11     budesonide (PULMICORT) 0.5 MG/2ML neb solution Mix 240 ml Ken Med Sinus rinse, add 0.5 mg budesonide vial, rinse 1/2 bottle in each nostril twice daily 120 mL 11     fexofenadine (ALLEGRA) 60 MG tablet Take 60 mg by mouth 2 times daily       fluticasone (FLONASE) 50 MCG/ACT nasal spray Spray 2 sprays into both nostrils daily 16 g 11     metFORMIN (GLUCOPHAGE XR) 500 MG 24 hr tablet Take 1,000 mg by mouth       Multiple Vitamins-Minerals (QC WOMENS DAILY MULTIVITAMIN PO) Take 1 Dose by mouth daily       omeprazole (PRILOSEC) 40 MG DR capsule TAKE ONE CAPSULE BY MOUTH EVERY DAY 30 TO 60 MINUTES BEFORE A MEAL 90 capsule 3     sertraline (ZOLOFT) 100 MG tablet TAKE ONE-HALF TABLET BY MOUTH TWICE DAILY 90 tablet 3     VITAMIN D PO        loratadine (CLARITIN) 10 MG tablet Take 10 mg by mouth daily (Patient not taking: Reported on 2/1/2024)       predniSONE (DELTASONE) 10 MG tablet 20 mg daily x 5 days, then 10 mg daily x 3 days, then 5 mg daily x 3 days and discontinue 14.5 tablet 0     predniSONE (DELTASONE) 20 MG tablet 20 mg in the morning for days 1-4, then 10 mg (half tab) days 5 and 6 (Patient not taking: Reported on 1/16/2024) 5 tablet 0            Allergies:     Allergies: Ibuprofen and Nsaids          Past Medical History:     Past Medical History:   Diagnosis Date     Allergic rhinitis 6/15/2011     Anxiety 9/17/2019     Benign essential hypertension 4/27/2017     Gastroesophageal reflux disease without esophagitis 9/21/2018     Major depressive disorder, recurrent episode, unspecified 6/15/2011     Obesity 4/27/2017     Reactive airway disease that is not asthma 2/12/2018     Seasonal allergic rhinitis due to pollen 4/27/2017     Unspecified vitamin D deficiency 4/11/2014            Past Surgical History:     Past Surgical History:   Procedure Laterality Date     CHOLECYSTECTOMY, LAPOROSCOPIC  "N/A 09/27/2020    [facility].     ENDOSCOPIC SINUS SURGERY N/A 1/3/2024    Procedure: BILATERAL ENDOSCOPIC SINUS SURGERY;  Surgeon: Daniella Schmitt MD;  Location: [facility] OR     ENT SURGERY      tonsillectomy     OTHER SURGICAL HISTORY      I&D rt abscess axilla     SEPTOPLASTY, TURBINOPLASTY, COMBINED Bilateral 1/3/2024    Procedure: Septoplasty, Turbinate Reduction;  Surgeon: Daniella Schmitt MD;  Location: [facility] OR     SOFT TISSUE SURGERY      IND cyst right axilla     THYROIDECTOMY Left 10/25/2023    Procedure: Left Thyroid Lobectomy with 1.5 hours nerve monitoring;  Surgeon: Daniella Schmitt MD;  Location: [facility] OR       ENT family history reviewed         Social History:     Social History     Tobacco Use     Smoking status: Never     Smokeless tobacco: Never     Tobacco comments:     Passive exposure   Vaping Use     Vaping Use: Never used   Substance Use Topics     Alcohol use: Yes     Comment: Beer; Occasionally     Drug use: No            Review of Systems:     ROS: See HPI         Physical Exam:     /81 (BP Location: Right arm, Patient Position: Sitting, Cuff Size: Adult Large)   Pulse 63   Temp 97.9  F (36.6  C) (Temporal)   Resp 18   Ht 1.676 m (5' 5.98\")   Wt 99.8 kg (220 lb 0.3 oz)   SpO2 99%   BMI 35.53 kg/m      General - The patient is well nourished and well developed, and appears to have good nutritional status.  Alert and oriented to person and place, answers questions and cooperates with examination appropriately.   Head and Face - Normocephalic and atraumatic, with no gross asymmetry noted.  The facial nerve is intact, with strong symmetric movements.  Voice and Breathing - The patient was breathing comfortably without the use of accessory muscles. There was no wheezing, stridor. The patients voice was clear and strong, and had appropriate pitch and quality.  Ears - External ear normal. Canals are patent. Right tympanic membrane is intact without effusion, retraction or " mass. Left tympanic membrane is intact without effusion, retraction or mass.  Eyes - Extraocular movements intact, and the pupils were reactive to light. Sclera were not icteric or injected, conjunctiva were pink and moist.   Mouth - Examination of the oral cavity showed pink, healthy oral mucosa. Dentition in good condition. No lesions or ulcerations noted. The tongue was mobile and midline.   Throat - The walls of the oropharynx were smooth, pink, moist, symmetric, and had no lesions or ulcerations.  The tonsillar pillars and soft palate were symmetric. The uvula was midline on elevation.    Neck - Normal range of motion, no worrisome palpable lymphadenopathy.  Nose - External contour is symmetric, no gross deflection or scars.  Nasal mucosa is pink and moist with no abnormal mucus.        To evaluate the nose and sinuses in the post operative state, I performed rigid nasal endoscopy.  I sprayed both nares with 2 sprays lidocaine and neosynephrine.     I began with the RIGHT side using a 0 degree rigid nasal endoscope, and then similarly examined the LEFT side     Findings: Septum is grossly midline and intact  Inferior turbinates: Lateralized, small amount of normal postoperative secretions were removed from the anterior nasal cavity with #8 Turner and Tellez.  Middle turbinate and middle meatus: Antrostomy patent, bilateral middle turbinates are well-healed.  There is bilateral ethmoid polypoid degeneration.  No bonita polyps or purulence noted.  Superior meatus examined and unremarkable  The sphenoethmoidal recess is examined and unremarkable  Mucosa is healthy throughout,  no polyps nor polypoid degeneration  Nasopharynx clear, ET patent, no edema  The patient tolerated the procedure well            Assessment and Plan:       ICD-10-CM    1. S/P FESS (functional endoscopic sinus surgery)  Z98.890 lidocaine 2%-oxymetazoline 0.025% nasal solution 2 spray      2. Seasonal allergic rhinitis due to other allergic  trigger  J30.89 azelastine-fluticasone (DYMISTA) 137-50 MCG/ACT nasal spray     azelastine (ASTELIN) 0.1 % nasal spray      3. Nasal polyposis  J33.9         Continue rinsing with budesonide 2 times daily  Start dymista - if not covered, I will send astelin and you can continue flonase  Consider allergy immunotherapy. This can be completed with allergy shots or drops  Continue daily antihistamine  Follow up in 3 months for recheck    Rajani TORO  [facility] ENT        Again, thank you for allowing me to participate in the care of your patient.        Sincerely,        Rajani Aguiar NP

## 2024-03-05 NOTE — PATIENT INSTRUCTIONS
Continue rinsing with budesonide 2 times daily  Start dymista - if not covered, I will send astelin and you can continue flonase  Consider allergy immunotherapy. This can be completed with allergy shots or drops  Continue daily antihistamine  Follow up in 3 months for recheck      Thank you for allowing Rajani TORO and our ENT team to participate in your care.  If your medications are too expensive, please call my nurse at the number listed below.  We can possibly change this medication.    If you have a scheduling or an appointment question please contact our Health Unit Coordinator at their direct line [phone] ext [phone]  ALL nursing questions or concerns can be directed to my Nurse Dora [phone].

## 2024-03-06 ENCOUNTER — TELEPHONE (OUTPATIENT)
Dept: OTOLARYNGOLOGY | Facility: OTHER | Age: 34
End: 2024-03-06

## 2024-03-06 DIAGNOSIS — J30.89 SEASONAL ALLERGIC RHINITIS DUE TO OTHER ALLERGIC TRIGGER: ICD-10-CM

## 2024-03-06 RX ORDER — AZELASTINE 1 MG/ML
2 SPRAY, METERED NASAL 2 TIMES DAILY
Qty: 30 ML | Refills: 12 | Status: SHIPPED | OUTPATIENT
Start: 2024-03-06

## 2024-03-06 NOTE — TELEPHONE ENCOUNTER
Received a PA request from [employer] for azelastine-fluticasone (DYMISTA) 137-50 MCG/ACT nasal spray. Submitted on CMM and got the following message

## 2024-03-07 RX ORDER — AZELASTINE 1 MG/ML
2 SPRAY, METERED NASAL 2 TIMES DAILY
Qty: 90 ML | OUTPATIENT
Start: 2024-03-07

## 2024-03-13 ENCOUNTER — MYC MEDICAL ADVICE (OUTPATIENT)
Dept: OTOLARYNGOLOGY | Facility: OTHER | Age: 34
End: 2024-03-13

## 2024-03-13 DIAGNOSIS — Z98.890 S/P FESS (FUNCTIONAL ENDOSCOPIC SINUS SURGERY): Primary | ICD-10-CM

## 2024-03-14 DIAGNOSIS — T88.6XXA ANAPHYLAXIS AFTER ALLERGEN IMMUNOTHERAPY: Primary | ICD-10-CM

## 2024-03-14 DIAGNOSIS — T45.0X5A ANAPHYLAXIS AFTER ALLERGEN IMMUNOTHERAPY: Primary | ICD-10-CM

## 2024-03-14 RX ORDER — EPINEPHRINE 0.3 MG/.3ML
0.3 INJECTION SUBCUTANEOUS
Qty: 2 EACH | Refills: 11 | Status: SHIPPED | OUTPATIENT
Start: 2024-03-14

## 2024-03-14 NOTE — TELEPHONE ENCOUNTER
Can you randal up an order for allergy drops for her and I will go over the recipe with you two?  I printed her test.  Thanks Christine

## 2024-03-18 NOTE — TELEPHONE ENCOUNTER
Called and spoke to patient. Patient identified with last name and . Verified that patient would like to start allergy drops. Patient stated she did want to start the allergy drops. Writer explained the need to take an antihistamine daily as she completes the build up phase of the drops. Writer stated the build up phase last about 18-24 months depending on the consistency of the drops. Informed patient that an Epipen was sent to her pharmacy and she will need to pick that up prior to allergy appointment. Patient will be called to schedule drop appointment when the drop vial is delivered to the allergy department.     No questions or concerns at end of call.

## 2024-03-26 ENCOUNTER — TELEPHONE (OUTPATIENT)
Dept: ALLERGY | Facility: OTHER | Age: 34
End: 2024-03-26

## 2024-03-26 NOTE — TELEPHONE ENCOUNTER
Called and spoke to patient. Verified last name and . Informed patient that her allergy drops have been delivered to the clinic. Appointment made for 3/28/24. Verified with patient that she will bring her Epipen to the appointment.

## 2024-04-01 ENCOUNTER — ALLIED HEALTH/NURSE VISIT (OUTPATIENT)
Dept: ALLERGY | Facility: OTHER | Age: 34
End: 2024-04-01
Attending: NURSE PRACTITIONER
Payer: COMMERCIAL

## 2024-04-01 DIAGNOSIS — J30.1 SEASONAL ALLERGIC RHINITIS DUE TO POLLEN: Primary | ICD-10-CM

## 2024-04-01 NOTE — PROGRESS NOTES
Pt identified using name and date of birth.         Patient is here for education and  SLIT drops and first administration.  All instructions for SLIT use are reviewed with the patient.  Signs and symptoms of anaphylaxis both local and systemic are discussed.  Patient verbalized understanding.  Patient is taught how to use EPI pen and a return demonstration is given.  First administration of SLIT is done by patient without incident.  Patient has EPI pen with today.  Patient is aware that a follow up is needed in 6 months and may call us for refills of SLIT when it starts to run low.

## 2024-04-02 ENCOUNTER — OFFICE VISIT (OUTPATIENT)
Dept: OTOLARYNGOLOGY | Facility: OTHER | Age: 34
End: 2024-04-02
Attending: NURSE PRACTITIONER
Payer: COMMERCIAL

## 2024-04-02 VITALS
OXYGEN SATURATION: 99 % | SYSTOLIC BLOOD PRESSURE: 138 MMHG | RESPIRATION RATE: 18 BRPM | BODY MASS INDEX: 35.36 KG/M2 | WEIGHT: 220 LBS | DIASTOLIC BLOOD PRESSURE: 78 MMHG | HEART RATE: 61 BPM | TEMPERATURE: 97.8 F | HEIGHT: 66 IN

## 2024-04-02 DIAGNOSIS — H93.8X3 EAR FULLNESS, BILATERAL: Primary | ICD-10-CM

## 2024-04-02 DIAGNOSIS — M26.609 TMJ (TEMPOROMANDIBULAR JOINT SYNDROME): ICD-10-CM

## 2024-04-02 DIAGNOSIS — Z98.890 S/P FESS (FUNCTIONAL ENDOSCOPIC SINUS SURGERY): ICD-10-CM

## 2024-04-02 DIAGNOSIS — J01.01 ACUTE RECURRENT MAXILLARY SINUSITIS: ICD-10-CM

## 2024-04-02 PROCEDURE — 31231 NASAL ENDOSCOPY DX: CPT | Performed by: PHYSICIAN ASSISTANT

## 2024-04-02 PROCEDURE — 99214 OFFICE O/P EST MOD 30 MIN: CPT | Mod: 25 | Performed by: PHYSICIAN ASSISTANT

## 2024-04-02 RX ORDER — PREDNISONE 20 MG/1
20 TABLET ORAL DAILY
Qty: 4 TABLET | Refills: 0 | Status: SHIPPED | OUTPATIENT
Start: 2024-04-02 | End: 2024-04-06

## 2024-04-02 RX ORDER — CEFDINIR 300 MG/1
300 CAPSULE ORAL 2 TIMES DAILY
Qty: 20 CAPSULE | Refills: 0 | Status: SHIPPED | OUTPATIENT
Start: 2024-04-02 | End: 2024-04-12

## 2024-04-02 ASSESSMENT — PAIN SCALES - GENERAL: PAINLEVEL: NO PAIN (0)

## 2024-04-02 NOTE — PATIENT INSTRUCTIONS
Start Cefdinir as directed for 10 days, Stop Amoxil  Start Prednisone 20 mg daily for 4 days  Continue with dental guard.   Follow TMJ precautions, consider PT if ongoing concerns.     Continue with rinses, nasal sprays and Allegra.       Thank you for allowing SUNNY Briones and our ENT team to participate in your care.  If your medications are too expensive, please give the nurse a call.  We can possibly change this medication.  If you have a scheduling or an appointment question please contact our Health Unit Coordinator at their direct line [phone].   ALL nursing questions or concerns can be directed to your ENT nurse, Charisma at: [phone].

## 2024-04-02 NOTE — LETTER
4/2/2024         RE: Shilpi Moore  [address] [facility] [address]        Dear Colleague,    Thank you for referring your patient, Shilpi Moore, to the [facility]. Please see a copy of my visit note below.    Chief Complaint   Patient presents with     Nose Problem     Sinus infection (Hx: s/p FESS), sinus pressure     Ear Problem     Ear infection, UC last week       Patient presents to ENT for concerns regarding minus infection.  Patient has felt sinus pressure history of FESS.  She was seen on 3/25/2024 in urgent care and treated for acute otitis media with amoxicillin.  She has been doing over-the-counter remedies without significant relief.  She has felt increase in nasal symptoms, congestion and developed otalgia. Her otalgia has improved, but ear feel of pressure, fullness. Her nasal symptoms have improved, but does have post nasal drainage.   Her ears have pressure, but right ear is worse.   Describes a tone in her ear, not hearing as well.   Her hearing is normal.   She has been using Budesonide rinses, nasal sprays, Allegra.   She did start SLIT yesterday.       She does have concerns of clear drainage.     Allergy testing completed 5/1/2023:  High sensitivities to birch, oak, cat  Moderate sensitivities to ragweed, pigweed, thistle, grass, maple, elm, jarrell, pine, [place], Alternaria, Aspergillus, Hormodendrum, penicillium, Epicoccum, fusarium, Helminthosporium, dog, dust  Mild sensitivity to black walnut        Surgical Details:       Otolaryngology Operative Note      Pre-op Diagnosis: Chronic maxillary sinusitis, bilateral inferior turbinate hypertrophy, amber bullosa, deviated nasal septum  Post-op Diagnosis:  same     Procedures:    1.  Bilateral maxillary antrostomy with tissue removal  2.  Endoscopic septoplasty  3.  Bilateral submucous reduction inferior turbinates  4.  Partial excision bilateral middle turbinates with polypectomy  Past Medical History:  "  Diagnosis Date     Allergic rhinitis 6/15/2011     Anxiety 9/17/2019     Benign essential hypertension 4/27/2017     Gastroesophageal reflux disease without esophagitis 9/21/2018     Major depressive disorder, recurrent episode, unspecified 6/15/2011     Obesity 4/27/2017     Reactive airway disease that is not asthma 2/12/2018     Seasonal allergic rhinitis due to pollen 4/27/2017     Unspecified vitamin D deficiency 4/11/2014        Allergies   Allergen Reactions     Ibuprofen Anaphylaxis     Nsaids Swelling     Current Outpatient Medications   Medication     acetaminophen (TYLENOL) 500 MG tablet     ALBUTEROL IN     azelastine (ASTELIN) 0.1 % nasal spray     azelastine-fluticasone (DYMISTA) 137-50 MCG/ACT nasal spray     budesonide (PULMICORT) 0.5 MG/2ML neb solution     EPINEPHrine (ANY BX GENERIC EQUIV) 0.3 MG/0.3ML injection 2-pack     fexofenadine (ALLEGRA) 60 MG tablet     fluticasone (FLONASE) 50 MCG/ACT nasal spray     metFORMIN (GLUCOPHAGE XR) 500 MG 24 hr tablet     Multiple Vitamins-Minerals (QC WOMENS DAILY MULTIVITAMIN PO)     omeprazole (PRILOSEC) 40 MG DR capsule     sertraline (ZOLOFT) 100 MG tablet     SUBLINGUAL IMMUNOTHERAPY, SLIT,     VITAMIN D PO     loratadine (CLARITIN) 10 MG tablet     predniSONE (DELTASONE) 10 MG tablet     predniSONE (DELTASONE) 20 MG tablet     No current facility-administered medications for this visit.     ROS- SEE HPI    /78 (BP Location: Right arm, Patient Position: Sitting, Cuff Size: Adult Large)   Pulse 61   Temp 97.8  F (36.6  C) (Tympanic)   Resp 18   Ht 1.676 m (5' 6\")   Wt 99.8 kg (220 lb)   SpO2 99%   BMI 35.51 kg/m      General - The patient is well nourished and well developed, and appears to have good nutritional status.  Alert and oriented to person and place, answers questions and cooperates with examination appropriately.   Head and Face - Normocephalic and atraumatic, with no gross asymmetry noted.  The facial nerve is intact, with " strong symmetric movements.  Voice and Breathing - The patient was breathing comfortably without the use of accessory muscles. There was no wheezing, stridor. The patients voice was clear and strong, and had appropriate pitch and quality.  Ears - External ear normal. Canals are patent. Right tympanic membrane is intact without effusion, retraction or mass. Left tympanic membrane is intact without effusion, retraction or mass.  Eyes - Extraocular movements intact, and the pupils were reactive to light. Sclera were not icteric or injected, conjunctiva were pink and moist.   Mouth - Examination of the oral cavity showed pink, healthy oral mucosa. Dentition in good condition. No lesions or ulcerations noted. The tongue was mobile and midline.   Throat - The walls of the oropharynx were smooth, pink, moist, symmetric, and had no lesions or ulcerations.  The tonsillar pillars and soft palate were symmetric. The uvula was midline on elevation.    Neck - Normal range of motion, no worrisome palpable lymphadenopathy.  Nose - External contour is symmetric, no gross deflection or scars.  Nasal mucosa is pink and moist with no abnormal mucus.          To evaluate the nose and sinuses in the post operative state, I performed rigid nasal endoscopy.  I sprayed both nares with 2 sprays lidocaine and neosynephrine.     I began with the RIGHT side using a 0 degree rigid nasal endoscope, and then similarly examined the LEFT side     Findings: Septum is grossly midline and intact  Inferior turbinates: Lateralized.   Middle turbinate and middle meatus: Antrostomy patent, bilateral middle turbinates are well-healed.  There is bilateral ethmoid polypoid degeneration.  No bonita polyps or purulence noted.  Superior meatus examined and unremarkable  The sphenoethmoidal recess is examined and  with thick drainage.   Mucosa is healthy throughout,  no polyps nor polypoid degeneration  Nasopharynx clear, ET patent, no edema  The patient  tolerated the procedure well             Assessment and Plan:       ICD-10-CM    1. Ear fullness, bilateral  H93.8X3 cefdinir (OMNICEF) 300 MG capsule     predniSONE (DELTASONE) 20 MG tablet      2. S/P FESS (functional endoscopic sinus surgery)  Z98.890 lidocaine 2%-oxymetazoline 0.025% nasal solution 2 spray     cefdinir (OMNICEF) 300 MG capsule     predniSONE (DELTASONE) 20 MG tablet      3. TMJ (temporomandibular joint syndrome)  M26.609 cefdinir (OMNICEF) 300 MG capsule     predniSONE (DELTASONE) 20 MG tablet      4. Acute recurrent maxillary sinusitis  J01.01 cefdinir (OMNICEF) 300 MG capsule     predniSONE (DELTASONE) 20 MG tablet          Start Cefdinir as directed for 10 days, Stop Amoxil  Start Prednisone 20 mg daily for 4 days  Continue with dental guard.   Follow TMJ precautions, consider PT if ongoing concerns.     Continue with rinses, nasal sprays and Allegra.       Risks of oral steroid use were discussed and include psychiatric/mood changes, insomnia, stomach ulcers and potential GI bleeding, blood sugar elevation/worsening diabetes, hip/bone necrosis called avascular necrosis, or bone demineralization.          Kaila Sanchez PA-C  ENT  [facility] [place]      Again, thank you for allowing me to participate in the care of your patient.        Sincerely,        Kaila Sanchez PA-C

## 2024-04-02 NOTE — PROGRESS NOTES
Chief Complaint   Patient presents with    Nose Problem     Sinus infection (Hx: s/p FESS), sinus pressure    Ear Problem     Ear infection, UC last week       Patient presents to ENT for concerns regarding minus infection.  Patient has felt sinus pressure history of FESS.  She was seen on 3/25/2024 in urgent care and treated for acute otitis media with amoxicillin.  She has been doing over-the-counter remedies without significant relief.  She has felt increase in nasal symptoms, congestion and developed otalgia. Her otalgia has improved, but ear feel of pressure, fullness. Her nasal symptoms have improved, but does have post nasal drainage.   Her ears have pressure, but right ear is worse.   Describes a tone in her ear, not hearing as well.   Her hearing is normal.   She has been using Budesonide rinses, nasal sprays, Allegra.   She did start SLIT yesterday.       She does have concerns of clear drainage.     Allergy testing completed 5/1/2023:  High sensitivities to birch, oak, cat  Moderate sensitivities to ragweed, pigweed, thistle, grass, maple, elm, jarrell, pine, Whitfield, Alternaria, Aspergillus, Hormodendrum, penicillium, Epicoccum, fusarium, Helminthosporium, dog, dust  Mild sensitivity to black walnut        Surgical Details:       Otolaryngology Operative Note      Pre-op Diagnosis: Chronic maxillary sinusitis, bilateral inferior turbinate hypertrophy, amber bullosa, deviated nasal septum  Post-op Diagnosis:  same     Procedures:    1.  Bilateral maxillary antrostomy with tissue removal  2.  Endoscopic septoplasty  3.  Bilateral submucous reduction inferior turbinates  4.  Partial excision bilateral middle turbinates with polypectomy  Past Medical History:   Diagnosis Date    Allergic rhinitis 6/15/2011    Anxiety 9/17/2019    Benign essential hypertension 4/27/2017    Gastroesophageal reflux disease without esophagitis 9/21/2018    Major depressive disorder, recurrent episode, unspecified 6/15/2011     "Obesity 4/27/2017    Reactive airway disease that is not asthma 2/12/2018    Seasonal allergic rhinitis due to pollen 4/27/2017    Unspecified vitamin D deficiency 4/11/2014        Allergies   Allergen Reactions    Ibuprofen Anaphylaxis    Nsaids Swelling     Current Outpatient Medications   Medication    acetaminophen (TYLENOL) 500 MG tablet    ALBUTEROL IN    azelastine (ASTELIN) 0.1 % nasal spray    azelastine-fluticasone (DYMISTA) 137-50 MCG/ACT nasal spray    budesonide (PULMICORT) 0.5 MG/2ML neb solution    EPINEPHrine (ANY BX GENERIC EQUIV) 0.3 MG/0.3ML injection 2-pack    fexofenadine (ALLEGRA) 60 MG tablet    fluticasone (FLONASE) 50 MCG/ACT nasal spray    metFORMIN (GLUCOPHAGE XR) 500 MG 24 hr tablet    Multiple Vitamins-Minerals (QC WOMENS DAILY MULTIVITAMIN PO)    omeprazole (PRILOSEC) 40 MG DR capsule    sertraline (ZOLOFT) 100 MG tablet    SUBLINGUAL IMMUNOTHERAPY, SLIT,    VITAMIN D PO    loratadine (CLARITIN) 10 MG tablet    predniSONE (DELTASONE) 10 MG tablet    predniSONE (DELTASONE) 20 MG tablet     No current facility-administered medications for this visit.     ROS- SEE HPI    /78 (BP Location: Right arm, Patient Position: Sitting, Cuff Size: Adult Large)   Pulse 61   Temp 97.8  F (36.6  C) (Tympanic)   Resp 18   Ht 1.676 m (5' 6\")   Wt 99.8 kg (220 lb)   SpO2 99%   BMI 35.51 kg/m      General - The patient is well nourished and well developed, and appears to have good nutritional status.  Alert and oriented to person and place, answers questions and cooperates with examination appropriately.   Head and Face - Normocephalic and atraumatic, with no gross asymmetry noted.  The facial nerve is intact, with strong symmetric movements.  Voice and Breathing - The patient was breathing comfortably without the use of accessory muscles. There was no wheezing, stridor. The patients voice was clear and strong, and had appropriate pitch and quality.  Ears - External ear normal. Canals are " patent. Right tympanic membrane is intact without effusion, retraction or mass. Left tympanic membrane is intact without effusion, retraction or mass.  Eyes - Extraocular movements intact, and the pupils were reactive to light. Sclera were not icteric or injected, conjunctiva were pink and moist.   Mouth - Examination of the oral cavity showed pink, healthy oral mucosa. Dentition in good condition. No lesions or ulcerations noted. The tongue was mobile and midline.   Throat - The walls of the oropharynx were smooth, pink, moist, symmetric, and had no lesions or ulcerations.  The tonsillar pillars and soft palate were symmetric. The uvula was midline on elevation.    Neck - Normal range of motion, no worrisome palpable lymphadenopathy.  Nose - External contour is symmetric, no gross deflection or scars.  Nasal mucosa is pink and moist with no abnormal mucus.          To evaluate the nose and sinuses in the post operative state, I performed rigid nasal endoscopy.  I sprayed both nares with 2 sprays lidocaine and neosynephrine.     I began with the RIGHT side using a 0 degree rigid nasal endoscope, and then similarly examined the LEFT side     Findings: Septum is grossly midline and intact  Inferior turbinates: Lateralized.   Middle turbinate and middle meatus: Antrostomy patent, bilateral middle turbinates are well-healed.  There is bilateral ethmoid polypoid degeneration.  No bonita polyps or purulence noted.  Superior meatus examined and unremarkable  The sphenoethmoidal recess is examined and  with thick drainage.   Mucosa is healthy throughout,  no polyps nor polypoid degeneration  Nasopharynx clear, ET patent, no edema  The patient tolerated the procedure well             Assessment and Plan:       ICD-10-CM    1. Ear fullness, bilateral  H93.8X3 cefdinir (OMNICEF) 300 MG capsule     predniSONE (DELTASONE) 20 MG tablet      2. S/P FESS (functional endoscopic sinus surgery)  Z98.890 lidocaine 2%-oxymetazoline  0.025% nasal solution 2 spray     cefdinir (OMNICEF) 300 MG capsule     predniSONE (DELTASONE) 20 MG tablet      3. TMJ (temporomandibular joint syndrome)  M26.609 cefdinir (OMNICEF) 300 MG capsule     predniSONE (DELTASONE) 20 MG tablet      4. Acute recurrent maxillary sinusitis  J01.01 cefdinir (OMNICEF) 300 MG capsule     predniSONE (DELTASONE) 20 MG tablet          Start Cefdinir as directed for 10 days, Stop Amoxil  Start Prednisone 20 mg daily for 4 days  Continue with dental guard.   Follow TMJ precautions, consider PT if ongoing concerns.     Continue with rinses, nasal sprays and Allegra.       Risks of oral steroid use were discussed and include psychiatric/mood changes, insomnia, stomach ulcers and potential GI bleeding, blood sugar elevation/worsening diabetes, hip/bone necrosis called avascular necrosis, or bone demineralization.          Kaila Sanchez PA-C  ENT  [facility], [place]

## 2024-04-15 DIAGNOSIS — B37.31 CANDIDIASIS OF VAGINA: Primary | ICD-10-CM

## 2024-04-15 RX ORDER — FLUCONAZOLE 150 MG/1
150 TABLET ORAL DAILY
Qty: 3 TABLET | Refills: 0 | Status: SHIPPED | OUTPATIENT
Start: 2024-04-15 | End: 2024-04-18

## 2024-04-26 ENCOUNTER — OFFICE VISIT (OUTPATIENT)
Dept: OTOLARYNGOLOGY | Facility: OTHER | Age: 34
End: 2024-04-26
Attending: NURSE PRACTITIONER
Payer: COMMERCIAL

## 2024-04-26 VITALS
WEIGHT: 220 LBS | RESPIRATION RATE: 15 BRPM | HEIGHT: 66 IN | BODY MASS INDEX: 35.36 KG/M2 | OXYGEN SATURATION: 99 % | TEMPERATURE: 98.2 F | DIASTOLIC BLOOD PRESSURE: 76 MMHG | HEART RATE: 63 BPM | SYSTOLIC BLOOD PRESSURE: 128 MMHG

## 2024-04-26 DIAGNOSIS — Z98.890 S/P FESS (FUNCTIONAL ENDOSCOPIC SINUS SURGERY): ICD-10-CM

## 2024-04-26 DIAGNOSIS — H90.11 CONDUCTIVE HEARING LOSS OF RIGHT EAR, UNSPECIFIED HEARING STATUS ON CONTRALATERAL SIDE: ICD-10-CM

## 2024-04-26 DIAGNOSIS — J33.9 NASAL POLYP: ICD-10-CM

## 2024-04-26 DIAGNOSIS — H65.02 ACUTE SEROUS OTITIS MEDIA OF LEFT EAR, RECURRENCE NOT SPECIFIED: ICD-10-CM

## 2024-04-26 DIAGNOSIS — J01.01 ACUTE RECURRENT MAXILLARY SINUSITIS: ICD-10-CM

## 2024-04-26 DIAGNOSIS — H66.004 RECURRENT ACUTE SUPPURATIVE OTITIS MEDIA OF RIGHT EAR WITHOUT SPONTANEOUS RUPTURE OF TYMPANIC MEMBRANE: Primary | ICD-10-CM

## 2024-04-26 DIAGNOSIS — H69.93 DYSFUNCTION OF BOTH EUSTACHIAN TUBES: ICD-10-CM

## 2024-04-26 PROCEDURE — 87077 CULTURE AEROBIC IDENTIFY: CPT | Performed by: NURSE PRACTITIONER

## 2024-04-26 PROCEDURE — 99214 OFFICE O/P EST MOD 30 MIN: CPT | Mod: 25 | Performed by: NURSE PRACTITIONER

## 2024-04-26 PROCEDURE — 87102 FUNGUS ISOLATION CULTURE: CPT | Performed by: NURSE PRACTITIONER

## 2024-04-26 PROCEDURE — 87205 SMEAR GRAM STAIN: CPT | Performed by: NURSE PRACTITIONER

## 2024-04-26 PROCEDURE — 92504 EAR MICROSCOPY EXAMINATION: CPT | Performed by: NURSE PRACTITIONER

## 2024-04-26 PROCEDURE — 87070 CULTURE OTHR SPECIMN AEROBIC: CPT | Performed by: NURSE PRACTITIONER

## 2024-04-26 PROCEDURE — 31231 NASAL ENDOSCOPY DX: CPT | Performed by: NURSE PRACTITIONER

## 2024-04-26 RX ORDER — CEFDINIR 300 MG/1
300 CAPSULE ORAL 2 TIMES DAILY
COMMUNITY
Start: 2024-04-20 | End: 2024-04-30

## 2024-04-26 RX ORDER — MAGNESIUM HYDROXIDE 1200 MG/15ML
LIQUID ORAL
Qty: 3000 ML | Refills: 11 | Status: SHIPPED | OUTPATIENT
Start: 2024-04-26 | End: 2024-07-30

## 2024-04-26 RX ORDER — MUPIROCIN 20 MG/G
OINTMENT TOPICAL
Qty: 15 G | Refills: 1 | Status: SHIPPED | OUTPATIENT
Start: 2024-04-26 | End: 2024-07-30

## 2024-04-26 RX ORDER — SULFAMETHOXAZOLE/TRIMETHOPRIM 800-160 MG
1 TABLET ORAL 2 TIMES DAILY
Qty: 20 TABLET | Refills: 0 | Status: SHIPPED | OUTPATIENT
Start: 2024-04-26 | End: 2024-05-16

## 2024-04-26 RX ORDER — PREDNISONE 10 MG/1
30 TABLET ORAL DAILY
Qty: 15 TABLET | Refills: 0 | Status: SHIPPED | OUTPATIENT
Start: 2024-04-26 | End: 2024-05-01

## 2024-04-26 ASSESSMENT — PAIN SCALES - GENERAL: PAINLEVEL: MILD PAIN (2)

## 2024-04-26 NOTE — LETTER
4/26/2024         RE: Shilpi Moore  [address] [facility] [address]        Dear Colleague,    Thank you for referring your patient, Shilpi Moore, to the [facility]. Please see a copy of my visit note below.    Otolaryngology Note         Chief Complaint:     Patient presents with:  Follow Up: 3 week follow up recurrent ear infection            History of Present Illness:     Shilpi Moore is a 33 year old female seen today for right ear pain.      She was seen in  on 4/20/2024 for right otalgia, diagnosed with right OM and treated with cefdinir and Decadron without much improvement    She is experiencing pain, plugged sensation and ringing.  Hearing is significantly worsened bilaterally  Noting really makes it better  She has been using rinses and dymista   Constant mucous in her throat  Takes allegra for the past 2 years.    She just started SLIT and tolerating ok.    She was having ear issues etc prior to starting drops, does not feel that starting slit has worsened symptoms.    Last audiogram completed 1/31/2023:  Bilateral type a tympanograms  Thresholds are within normal limits  SRT 10 dB bilaterally  Word recognition 100% at 55 dB bilaterally    Of note there is a history of papillary thyroid cancer s/p thyroidectomy.  She saw Dr Silveira on Tuesday, Updated US imaging was ordered and TSH completed.  She will follow up again in 1 year     Last audiogram on file completed 1/31/2023:  Bilateral type a tympanograms  Thresholds are within normal limits throughout  SRT 10 dB bilaterally  Word recognition is 100% at 55 dB bilaterally         Medications:     Current Outpatient Rx   Medication Sig Dispense Refill     acetaminophen (TYLENOL) 500 MG tablet Take 500-1,000 mg by mouth every 6 hours as needed for mild pain       ALBUTEROL IN        azelastine (ASTELIN) 0.1 % nasal spray Spray 2 sprays into both nostrils 2 times daily 30 mL 12     azelastine-fluticasone  (DYMISTA) 137-50 MCG/ACT nasal spray Spray 1 spray into both nostrils 2 times daily 23 g 11     budesonide (PULMICORT) 0.5 MG/2ML neb solution Mix 240 ml Ken Med Sinus rinse, add 0.5 mg budesonide vial, rinse 1/2 bottle in each nostril twice daily 120 mL 11     cefdinir (OMNICEF) 300 MG capsule Take 300 mg by mouth 2 times daily       EPINEPHrine (ANY BX GENERIC EQUIV) 0.3 MG/0.3ML injection 2-pack Inject 0.3 mLs (0.3 mg) into the muscle once as needed 2 each 11     fexofenadine (ALLEGRA) 60 MG tablet Take 60 mg by mouth 2 times daily       fluticasone (FLONASE) 50 MCG/ACT nasal spray Spray 2 sprays into both nostrils daily 16 g 11     metFORMIN (GLUCOPHAGE XR) 500 MG 24 hr tablet Take 1,000 mg by mouth       Multiple Vitamins-Minerals (QC WOMENS DAILY MULTIVITAMIN PO) Take 1 Dose by mouth daily       mupirocin (BACTROBAN) 2 % external ointment Completely dissolve 1/3 tube (5 grams) in 1000 ml sterile saline and rinse with 240 ml twice daily x 14 days 15 g 1     omeprazole (PRILOSEC) 40 MG DR capsule TAKE ONE CAPSULE BY MOUTH EVERY DAY 30 TO 60 MINUTES BEFORE A MEAL 90 capsule 3     sertraline (ZOLOFT) 100 MG tablet TAKE ONE-HALF TABLET BY MOUTH TWICE DAILY 90 tablet 3     sodium chloride 0.9%, bottle, (ARGYLE STERILE SALINE) 0.9 % irrigation Mix 5 grams (1/3 tube) bactroban with 1000 ml normal saline and rinse with 240 ml twice daily x 14 days. 3000 mL 11     SUBLINGUAL IMMUNOTHERAPY, SLIT, Place 1 drop under the tongue 3 times daily Start allergy drops (SLIT). 1 drop under the tongue daily x 7 days, then 1 drop under the tongue twice daily x 7 days, then 1 drop under the tongue three times daily ongoing. 1 vial PRN     sulfamethoxazole-trimethoprim (BACTRIM DS) 800-160 MG tablet Take 1 tablet by mouth 2 times daily for 20 days 20 tablet 0     VITAMIN D PO               Allergies:     Allergies: Ibuprofen and Nsaids          Past Medical History:     Past Medical History:   Diagnosis Date     Allergic rhinitis  "6/15/2011     Anxiety 9/17/2019     Benign essential hypertension 4/27/2017     Gastroesophageal reflux disease without esophagitis 9/21/2018     Major depressive disorder, recurrent episode, unspecified 6/15/2011     Obesity 4/27/2017     Reactive airway disease that is not asthma 2/12/2018     Seasonal allergic rhinitis due to pollen 4/27/2017     Unspecified vitamin D deficiency 4/11/2014            Past Surgical History:     Past Surgical History:   Procedure Laterality Date     CHOLECYSTECTOMY, LAPOROSCOPIC N/A 09/27/2020    [facility].     ENDOSCOPIC SINUS SURGERY N/A 1/3/2024    Procedure: BILATERAL ENDOSCOPIC SINUS SURGERY;  Surgeon: Daniella Schmitt MD;  Location: [facility] OR     ENT SURGERY      tonsillectomy     OTHER SURGICAL HISTORY      I&D rt abscess axilla     SEPTOPLASTY, TURBINOPLASTY, COMBINED Bilateral 1/3/2024    Procedure: Septoplasty, Turbinate Reduction;  Surgeon: Daniella Schmitt MD;  Location: [facility] OR     SOFT TISSUE SURGERY      IND cyst right axilla     THYROIDECTOMY Left 10/25/2023    Procedure: Left Thyroid Lobectomy with 1.5 hours nerve monitoring;  Surgeon: Daniella Schmitt MD;  Location: [facility] OR       ENT family history reviewed         Social History:     Social History     Tobacco Use     Smoking status: Never     Smokeless tobacco: Never     Tobacco comments:     Passive exposure   Vaping Use     Vaping status: Never Used   Substance Use Topics     Alcohol use: Yes     Comment: Beer; Occasionally     Drug use: No            Review of Systems:     ROS: See HPI         Physical Exam:     /76   Pulse 63   Temp 98.2  F (36.8  C)   Resp 15   Ht 1.676 m (5' 6\")   Wt 99.8 kg (220 lb)   SpO2 99%   BMI 35.51 kg/m      General - The patient is well nourished and well developed, and appears to have good nutritional status.  Alert and oriented to person and place, answers questions and cooperates with examination appropriately.   Head and Face - Normocephalic and " atraumatic, with no gross asymmetry noted.  The facial nerve is intact, with strong symmetric movements.  Voice and Breathing - The patient was breathing comfortably without the use of accessory muscles. There was no wheezing, stridor. The patients voice was clear and strong, and had appropriate pitch and quality.  Ears - External ear normal. The ears were examined under binocular microscopy and with otoscope.  Bilateral canals are grossly patent.  The right tympanic membrane appears intact, she has overall thickening of the tympanic membrane with purulent effusion, bulging and scant crusting of the inferior TM.   No bonita otorrhea.  Due to thickening and purulence I may unable to visualize bony landmarks.  The left tympanic membrane appears intact with serous effusion inferiorly.  I am able to see bony landmarks superiorly.  Rinne with BC>AC on right and AC>BC left.  Soriano lateralized left.    Eyes - Extraocular movements intact, sclera were not icteric or injected.  Mouth - Examination of the oral cavity showed pink, healthy oral mucosa. Dentition in good condition. No lesions or ulcerations noted. The tongue was mobile and midline.   Throat - The walls of the oropharynx were smooth, pink, moist, symmetric, and had no lesions or ulcerations.  The tonsillar pillars and soft palate were symmetric. The uvula was midline on elevation.  Thick mucopurulent debris in the posterior oropharynx.  Neck - Normal range of motion, no bonita palpable lymphadenopathy.  Nose - External contour is symmetric, no gross deflection or scars.     To evaluate the nose and sinuses, I performed rigid nasal endoscopy.  I sprayed both nares with 2 sprays lidocaine and neosynephrine.     I began with the RIGHT side using a 0 degree rigid nasal endoscope, and then similarly examined the LEFT side     Findings:  septum intact and grossly midline  Inferior turbinates: Reduced and lateralized  Right antrostomy is widely patent with purulent  drainage noted in the middle meatus and right maxillary sinus.  I suction debrided the purulent drainage from the middle meatus, I then used curved suction attached to a 10 cc syringe filled with saline and irrigated the right maxillary sinus with sterile saline, I then attached the curved suction to suction and suction debrided a copious amount of purulent drainage from the right maxillary antrostomy.  I irrigated the area a second time without further purulence.  The left antrostomy is patent  Right ethmoid cavity is polypoid filled with purulence, and a culture was obtained.  The superior meatus is examined, there is purulence on the right, the left is grossly unremarkable  Right sphenoethmoidal recess with Stream of purulence from right middle meatus/antrostomy to SER and over the right eustachian tube.  I suction debrided the purulence and the right eustachian tube is edematous however patent.  Left SER patent, left eustachian tube patent, edematous with polypoid change  The patient tolerated the procedure well.           Assessment and Plan:       ICD-10-CM    1. Recurrent acute suppurative otitis media of right ear without spontaneous rupture of tympanic membrane  H66.004       2. S/P FESS (functional endoscopic sinus surgery)  Z98.890 lidocaine 2%-oxymetazoline 0.025% nasal solution 2 spray      3. Nasal polyp  J33.9 lidocaine 2%-oxymetazoline 0.025% nasal solution 2 spray      4. Acute recurrent maxillary sinusitis  J01.01 sulfamethoxazole-trimethoprim (BACTRIM DS) 800-160 MG tablet     mupirocin (BACTROBAN) 2 % external ointment     sodium chloride 0.9%, bottle, (ARGYLE STERILE SALINE) 0.9 % irrigation     Respiratory Aerobic Bacterial Culture with Gram Stain     Fungus Culture, non-blood     Fungus Culture, non-blood     Respiratory Aerobic Bacterial Culture with Gram Stain      5. Dysfunction of both eustachian tubes  H69.93       6. Acute serous otitis media of left ear, recurrence not specified  H65.02        7. Conductive hearing loss of right ear, unspecified hearing status on contralateral side  H90.11         Start Bactrim take 1 tablet by mouth 2 times a day for 10 days     Start Bactroban rinses, she will compound her own at home.    Sinus culture taken from right maxillary sinus.  Nurse will call you with results.     Follow up with Dr Schmitt or Kaila hopefully next week for myringotomy with tube consult.       Rajani TORO  [facility]    Again, thank you for allowing me to participate in the care of your patient.        Sincerely,        Rajani Aguiar NP

## 2024-04-26 NOTE — PATIENT INSTRUCTIONS
Thank you for allowing Rajani Stefania and our ENT team to participate in your care.  If your medications are too expensive, please give the nurse a call.  We can possibly change this medication.  If you have a scheduling or an appointment question please contact our Health Unit Coordinator at their direct line [phone].   ALL nursing questions or concerns can be directed to your ENT nurse at: [phone] - Bfo     Start Bactrim take 1 tablet by mouth 2 times a day for 10 days     Start Bactroban 2 % ointment administer as prescribed     Sinus culture taken from right maxillary sinus.  Nurse will call you with results.     Follow up with Dr Schmitt or Kaila hopefully next week for myringotomy with tube consult.

## 2024-04-26 NOTE — PROGRESS NOTES
Otolaryngology Note         Chief Complaint:     Patient presents with:  Follow Up: 3 week follow up recurrent ear infection            History of Present Illness:     Shilpi Moore is a 33 year old female seen today for right ear pain.      She was seen in  on 4/20/2024 for right otalgia, diagnosed with right OM and treated with cefdinir and Decadron without much improvement    She is experiencing pain, plugged sensation and ringing.  Hearing is significantly worsened bilaterally  Noting really makes it better  She has been using rinses and dymista   Constant mucous in her throat  Takes allegra for the past 2 years.    She just started SLIT and tolerating ok.    She was having ear issues etc prior to starting drops, does not feel that starting slit has worsened symptoms.    Last audiogram completed 1/31/2023:  Bilateral type a tympanograms  Thresholds are within normal limits  SRT 10 dB bilaterally  Word recognition 100% at 55 dB bilaterally    Of note there is a history of papillary thyroid cancer s/p thyroidectomy.  She saw Dr Silveira on Tuesday, Updated US imaging was ordered and TSH completed.  She will follow up again in 1 year     Last audiogram on file completed 1/31/2023:  Bilateral type a tympanograms  Thresholds are within normal limits throughout  SRT 10 dB bilaterally  Word recognition is 100% at 55 dB bilaterally         Medications:     Current Outpatient Rx   Medication Sig Dispense Refill    acetaminophen (TYLENOL) 500 MG tablet Take 500-1,000 mg by mouth every 6 hours as needed for mild pain      ALBUTEROL IN       azelastine (ASTELIN) 0.1 % nasal spray Spray 2 sprays into both nostrils 2 times daily 30 mL 12    azelastine-fluticasone (DYMISTA) 137-50 MCG/ACT nasal spray Spray 1 spray into both nostrils 2 times daily 23 g 11    budesonide (PULMICORT) 0.5 MG/2ML neb solution Mix 240 ml Ken Med Sinus rinse, add 0.5 mg budesonide vial, rinse 1/2 bottle in each nostril twice daily 120 mL 11     cefdinir (OMNICEF) 300 MG capsule Take 300 mg by mouth 2 times daily      EPINEPHrine (ANY BX GENERIC EQUIV) 0.3 MG/0.3ML injection 2-pack Inject 0.3 mLs (0.3 mg) into the muscle once as needed 2 each 11    fexofenadine (ALLEGRA) 60 MG tablet Take 60 mg by mouth 2 times daily      fluticasone (FLONASE) 50 MCG/ACT nasal spray Spray 2 sprays into both nostrils daily 16 g 11    metFORMIN (GLUCOPHAGE XR) 500 MG 24 hr tablet Take 1,000 mg by mouth      Multiple Vitamins-Minerals (QC WOMENS DAILY MULTIVITAMIN PO) Take 1 Dose by mouth daily      mupirocin (BACTROBAN) 2 % external ointment Completely dissolve 1/3 tube (5 grams) in 1000 ml sterile saline and rinse with 240 ml twice daily x 14 days 15 g 1    omeprazole (PRILOSEC) 40 MG DR capsule TAKE ONE CAPSULE BY MOUTH EVERY DAY 30 TO 60 MINUTES BEFORE A MEAL 90 capsule 3    sertraline (ZOLOFT) 100 MG tablet TAKE ONE-HALF TABLET BY MOUTH TWICE DAILY 90 tablet 3    sodium chloride 0.9%, bottle, (ARGYLE STERILE SALINE) 0.9 % irrigation Mix 5 grams (1/3 tube) bactroban with 1000 ml normal saline and rinse with 240 ml twice daily x 14 days. 3000 mL 11    SUBLINGUAL IMMUNOTHERAPY, SLIT, Place 1 drop under the tongue 3 times daily Start allergy drops (SLIT). 1 drop under the tongue daily x 7 days, then 1 drop under the tongue twice daily x 7 days, then 1 drop under the tongue three times daily ongoing. 1 vial PRN    sulfamethoxazole-trimethoprim (BACTRIM DS) 800-160 MG tablet Take 1 tablet by mouth 2 times daily for 20 days 20 tablet 0    VITAMIN D PO               Allergies:     Allergies: Ibuprofen and Nsaids          Past Medical History:     Past Medical History:   Diagnosis Date    Allergic rhinitis 6/15/2011    Anxiety 9/17/2019    Benign essential hypertension 4/27/2017    Gastroesophageal reflux disease without esophagitis 9/21/2018    Major depressive disorder, recurrent episode, unspecified 6/15/2011    Obesity 4/27/2017    Reactive airway disease that is not asthma  "2/12/2018    Seasonal allergic rhinitis due to pollen 4/27/2017    Unspecified vitamin D deficiency 4/11/2014            Past Surgical History:     Past Surgical History:   Procedure Laterality Date    CHOLECYSTECTOMY, LAPOROSCOPIC N/A 09/27/2020    [facility].    ENDOSCOPIC SINUS SURGERY N/A 1/3/2024    Procedure: BILATERAL ENDOSCOPIC SINUS SURGERY;  Surgeon: Daniella Schmitt MD;  Location: [facility] OR    ENT SURGERY      tonsillectomy    OTHER SURGICAL HISTORY      I&D rt abscess axilla    SEPTOPLASTY, TURBINOPLASTY, COMBINED Bilateral 1/3/2024    Procedure: Septoplasty, Turbinate Reduction;  Surgeon: Daniella Schmitt MD;  Location: [facility] OR    SOFT TISSUE SURGERY      IND cyst right axilla    THYROIDECTOMY Left 10/25/2023    Procedure: Left Thyroid Lobectomy with 1.5 hours nerve monitoring;  Surgeon: Daniella Schmitt MD;  Location: [facility] OR       ENT family history reviewed         Social History:     Social History     Tobacco Use    Smoking status: Never    Smokeless tobacco: Never    Tobacco comments:     Passive exposure   Vaping Use    Vaping status: Never Used   Substance Use Topics    Alcohol use: Yes     Comment: Beer; Occasionally    Drug use: No            Review of Systems:     ROS: See HPI         Physical Exam:     /76   Pulse 63   Temp 98.2  F (36.8  C)   Resp 15   Ht 1.676 m (5' 6\")   Wt 99.8 kg (220 lb)   SpO2 99%   BMI 35.51 kg/m      General - The patient is well nourished and well developed, and appears to have good nutritional status.  Alert and oriented to person and place, answers questions and cooperates with examination appropriately.   Head and Face - Normocephalic and atraumatic, with no gross asymmetry noted.  The facial nerve is intact, with strong symmetric movements.  Voice and Breathing - The patient was breathing comfortably without the use of accessory muscles. There was no wheezing, stridor. The patients voice was clear and strong, and had appropriate pitch " and quality.  Ears - External ear normal. The ears were examined under binocular microscopy and with otoscope.  Bilateral canals are grossly patent.  The right tympanic membrane appears intact, she has overall thickening of the tympanic membrane with purulent effusion, bulging and scant crusting of the inferior TM.   No bonita otorrhea.  Due to thickening and purulence I may unable to visualize bony landmarks.  The left tympanic membrane appears intact with serous effusion inferiorly.  I am able to see bony landmarks superiorly.  Rinne with BC>AC on right and AC>BC left.  Soriano lateralized left.    Eyes - Extraocular movements intact, sclera were not icteric or injected.  Mouth - Examination of the oral cavity showed pink, healthy oral mucosa. Dentition in good condition. No lesions or ulcerations noted. The tongue was mobile and midline.   Throat - The walls of the oropharynx were smooth, pink, moist, symmetric, and had no lesions or ulcerations.  The tonsillar pillars and soft palate were symmetric. The uvula was midline on elevation.  Thick mucopurulent debris in the posterior oropharynx.  Neck - Normal range of motion, no bonita palpable lymphadenopathy.  Nose - External contour is symmetric, no gross deflection or scars.     To evaluate the nose and sinuses, I performed rigid nasal endoscopy.  I sprayed both nares with 2 sprays lidocaine and neosynephrine.     I began with the RIGHT side using a 0 degree rigid nasal endoscope, and then similarly examined the LEFT side     Findings:  septum intact and grossly midline  Inferior turbinates: Reduced and lateralized  Right antrostomy is widely patent with purulent drainage noted in the middle meatus and right maxillary sinus.  I suction debrided the purulent drainage from the middle meatus, I then used curved suction attached to a 10 cc syringe filled with saline and irrigated the right maxillary sinus with sterile saline, I then attached the curved suction to suction  and suction debrided a copious amount of purulent drainage from the right maxillary antrostomy.  I irrigated the area a second time without further purulence.  The left antrostomy is patent  Right ethmoid cavity is polypoid filled with purulence, and a culture was obtained.  The superior meatus is examined, there is purulence on the right, the left is grossly unremarkable  Right sphenoethmoidal recess with Stream of purulence from right middle meatus/antrostomy to SER and over the right eustachian tube.  I suction debrided the purulence and the right eustachian tube is edematous however patent.  Left SER patent, left eustachian tube patent, edematous with polypoid change  The patient tolerated the procedure well.           Assessment and Plan:       ICD-10-CM    1. Recurrent acute suppurative otitis media of right ear without spontaneous rupture of tympanic membrane  H66.004       2. S/P FESS (functional endoscopic sinus surgery)  Z98.890 lidocaine 2%-oxymetazoline 0.025% nasal solution 2 spray      3. Nasal polyp  J33.9 lidocaine 2%-oxymetazoline 0.025% nasal solution 2 spray      4. Acute recurrent maxillary sinusitis  J01.01 sulfamethoxazole-trimethoprim (BACTRIM DS) 800-160 MG tablet     mupirocin (BACTROBAN) 2 % external ointment     sodium chloride 0.9%, bottle, (ARGYLE STERILE SALINE) 0.9 % irrigation     Respiratory Aerobic Bacterial Culture with Gram Stain     Fungus Culture, non-blood     Fungus Culture, non-blood     Respiratory Aerobic Bacterial Culture with Gram Stain      5. Dysfunction of both eustachian tubes  H69.93       6. Acute serous otitis media of left ear, recurrence not specified  H65.02       7. Conductive hearing loss of right ear, unspecified hearing status on contralateral side  H90.11         Start Bactrim take 1 tablet by mouth 2 times a day for 10 days     Start Bactroban rinses, she will compound her own at home.    Sinus culture taken from right maxillary sinus.  Nurse will call you  with results.     Follow up with Dr Schmitt or Kaila hopefully next week for myringotomy with tube consult.       Rajani TORO  [facility]

## 2024-05-01 NOTE — PROGRESS NOTES
Otolaryngology Progress Note      Shilpi Moore is a 33 year old female  with a history of chronic recurrent sinusitis with nasal polyps status post endoscopic sinus surgery on 1/3/2024 presents for follow-up of her ears.    She saw Christine on 4/26/2024 for right otalgia.  She had been previously diagnosed with cefdinir and treated with Decadron without improvement.  Christine noted a right acute otitis media and a left serous effusion.  She also had acute on chronic sinusitis with polypoid degeneration.  She was started on Bactrim and Bactroban irrigations.    She has a history of perennial allergic rhinitis and started sublingual immunotherapy.      SNOT 21 today with a moderate problem with ear fullness facial pressure.  Very mild problem with nasal blockage rhinorrhea and postnasal discharge    She has had 4-5 episodes of acute otitis media over the past 2 years.  These usually coincide with sinusitis.  As her sinuses have improved status post as she has not had as many ear infections but they are still bothersome.  The right ear tends to be worse than the left.    SNOT 35 today, pre-debridement on 2/1/24    Audiogram dated 1/31/2023 showed normal thresholds with type a tympanograms.  No recent audiogram    Procedures on 1/3/24:    1.  Bilateral maxillary antrostomy with tissue removal  2.  Endoscopic septoplasty  3.  Bilateral submucous reduction inferior turbinates  4.  Partial excision bilateral middle turbinates with polypectomy    S/p left thyroid lobectomy on 10/25/2023 for 6 cm left thyroid nodule.  She denies dysphagia or dysphonia.  No weight loss.  She is euthyroid and followed by endocrine.  No recent US thyroid.   She developed a hypertrophic scar.  It wound care instructions were provided postoperatively and she feels this has improved.  No current concerns with the scar.    Final pathology dated 11/22/2023 shows left papillary thyroid microcarcinoma margins uninvolved.  There is a separate follicular  "adenoma measuring 4.6 cm.  No parathyroid tissue.  Staged pT1a  BRAF V600E positive    She saw Dr. Silveira,  updated ultrasound ordered at [facility] and TSH was completed.  She was to follow-up annually with endocrine    Endocrine impression/plan 4/23:  1. Papillary microcarcinoma of thyroid (HCC)  Very low to low risk of recurrence.   TSH goal 0.5-2. Biotin effect discussed. Levothyroxine discussed.   No need for Tg tumor marker.   Ultrasound now (6 months after surgery) if no abnormal findings, then annually for the next 2-5 years.     TSH 1.83 on 4/23/24    ROS: 12 point review of systems is negative aside from that mentioned in the history of present illness and frequent headaches, jaw pain, sneezing itchy eyes    Physical Exam  /85 (BP Location: Right arm, Patient Position: Sitting, Cuff Size: Adult Large)   Pulse 70   Temp 97.9  F (36.6  C) (Tympanic)   Ht 1.676 m (5' 6\")   Wt 99.8 kg (220 lb)   SpO2 99%   BMI 35.51 kg/m    General - The patient is well nourished and well developed, and appears to have good nutritional status.  Alert and oriented to person and place, interactive.  Head and Face - Normocephalic and atraumatic, with no gross asymmetry noted of the contour of the facial features.  The facial nerve is intact, with strong symmetric movements.  Neck-hypertrophic central neck scar  no palpable lymphadenopathy or thyroid mass.  Trachea is midline.  Eyes - Extraocular movements intact.   Ears-  examined under microscopy bilaterally  Right mucoid effusion, TM dull, no worrisome retractions no evidence of cholesteatoma   Left tympanic membrane intact without obvious effusion or retraction  Nose - Nasal mucosa is pink and moist with no abnormal mucus.  The septum was grossly midline and non-obstructive, turbinates of normal size and position.  No polyps, masses, or purulence noted on examination.  Mouth - Examination of the oral cavity shows pink, healthy, moist mucosa.  No lesions or " ulceration noted.  The dentition are in good repair.  The tongue is mobile and midline.  Throat - The walls of the oropharynx were smooth, pink, moist, symmetric, and had no lesions or ulcerations.  The tonsillar pillars and soft palate were symmetric.  The uvula was midline on elevation.          Bilateral Myringotomy with Right Tube Placement    Procedure - After discussion of the risks and benefits of myringotomy, informed consent was signed and placed in the chart.  I began with the right side.  I proceeded to position the patient in a supine position in the examination chair.  Using the binocular surgical microscope, I then proceeded to clean the canal of cerumen and squamous debris.  I was able to see the tympanic membrane.  Using a small cotton tipped applicator, I applied a tiny coating of phenol onto the tympanic membrane.  After visualizing a good mariana, I then proceeded to use a myringotomy knife to make a radially oriented incision in the tympanic membrane.  A moderate amount of mucoid effusion was suctioned away.  Next, I proceeded to place a 1.14 brian tube through the incision.  After confirming good positioning and a clearly visible open tube, otic drops were applied and a cotton ball was inserted into the canal.      I now moved on to the contralateral side. Using the binocular surgical microscope, I similarly anesthetized the anterior-inferior quadrant and made a small myringotomy.  I used a 3 Turner to ensure the middle ear was clear.  Floxin drops were placed.  She tolerated the procedure well.    To evaluate the nose and sinuses in the post operative state, I performed rigid nasal endoscopy. The nose was anesthetized with home afrin or topical lidocaine and neosynephrine in the office.    I began with the LEFT side using a 0 degree rigid nasal endoscope, and then similarly examined the RIGHT side    Findings:  Inferior turbinates:  lateralized  Septum midline and intact  Middle turbinate and  middle meatus:  No purulence, no polyposis, small nonobstructive synechiae from the remnant left middle turbinate to the lateral nasal wall   Antrostomy clear bilaterally with scant mucoid secretions removed from the right antrostomy  Mucosa is  healthy throughout without polyps nor polypoid degeneration  Superior meatus is clear  Frontal recess clear  Sphenoethmoidal clear  Nasopharynx is clear    The patient tolerated procedure well        Impression/Plan  Shilpi Moore is a 33 year old female    ICD-10-CM    1. S/P myringotomy with insertion of tube  Z96.22 ofloxacin (FLOXIN) 0.3 % otic solution      2. COME (chronic otitis media with effusion), right  H65.491       3. Recurrent acute otitis media of both ears  H66.93       4. History of endoscopic sinus surgery  Z98.890       5. History of lobectomy of thyroid  Z90.09       6. Papillary microcarcinoma of thyroid (H)  C73     pT1a left papillary thyroid microcarcinoma  -margins clear  -BRAF V600E positive            pT1a left papillary thyroid microcarcinoma  -margins clear  -BRAF V600E positive  -Follow-up for thyroid US next week as scheduled.    -Maintain endocrine follow-up.    -Sunscreen to scar.     Post tube written and verbal instructions provided   -Complete otic drops as prescribed  -Follow-up for tube check and audiogram as directed    -Follow-up in 1-2 months for audiogram and ENT visit with Kaila or Christine    Follow-up in 1 year for sinus check with Kaila or Christine, sooner if concerns.  Restart budesonide irrigations twice daily until follow-up.    Continue sublingual immunotherapy and daily AH use    She is happy with above plan.  Shilpi will contact us sooner with any other concerns.    Total exam time spent on the day of the visit including review of the chart, reviewing pertinent prior imaging and notes, obtaining a detailed history and physical exam, education, discussion with the patient and documenting in epic was over 45 minutes .  This did not  include procedure time.      Daniella Schmitt D.O.  Otolaryngology/Head and Neck Surgery  Allergy

## 2024-05-02 ENCOUNTER — OFFICE VISIT (OUTPATIENT)
Dept: OTOLARYNGOLOGY | Facility: OTHER | Age: 34
End: 2024-05-02
Attending: OTOLARYNGOLOGY
Payer: COMMERCIAL

## 2024-05-02 VITALS
HEIGHT: 66 IN | TEMPERATURE: 97.9 F | HEART RATE: 70 BPM | BODY MASS INDEX: 35.36 KG/M2 | WEIGHT: 220 LBS | SYSTOLIC BLOOD PRESSURE: 136 MMHG | OXYGEN SATURATION: 99 % | DIASTOLIC BLOOD PRESSURE: 85 MMHG

## 2024-05-02 DIAGNOSIS — C73 PAPILLARY MICROCARCINOMA OF THYROID (H): ICD-10-CM

## 2024-05-02 DIAGNOSIS — H66.93 RECURRENT ACUTE OTITIS MEDIA OF BOTH EARS: ICD-10-CM

## 2024-05-02 DIAGNOSIS — Z96.22 S/P MYRINGOTOMY WITH INSERTION OF TUBE: Primary | ICD-10-CM

## 2024-05-02 DIAGNOSIS — Z90.09 HISTORY OF LOBECTOMY OF THYROID: ICD-10-CM

## 2024-05-02 DIAGNOSIS — Z98.890 HISTORY OF ENDOSCOPIC SINUS SURGERY: ICD-10-CM

## 2024-05-02 DIAGNOSIS — H65.491 COME (CHRONIC OTITIS MEDIA WITH EFFUSION), RIGHT: ICD-10-CM

## 2024-05-02 LAB
BACTERIA SPEC CULT: ABNORMAL
BACTERIA SPEC CULT: ABNORMAL
BETA LACTAMASE TEST: NEGATIVE
GRAM STAIN RESULT: ABNORMAL

## 2024-05-02 PROCEDURE — 250N000009 HC RX 250: Performed by: OTOLARYNGOLOGY

## 2024-05-02 PROCEDURE — 31231 NASAL ENDOSCOPY DX: CPT | Performed by: OTOLARYNGOLOGY

## 2024-05-02 PROCEDURE — 99215 OFFICE O/P EST HI 40 MIN: CPT | Mod: 25 | Performed by: OTOLARYNGOLOGY

## 2024-05-02 PROCEDURE — 69433 CREATE EARDRUM OPENING: CPT | Mod: RT | Performed by: OTOLARYNGOLOGY

## 2024-05-02 PROCEDURE — 69420 INCISION OF EARDRUM: CPT | Mod: LT | Performed by: OTOLARYNGOLOGY

## 2024-05-02 RX ORDER — OFLOXACIN 3 MG/ML
5 SOLUTION AURICULAR (OTIC) ONCE
Status: COMPLETED | OUTPATIENT
Start: 2024-05-02 | End: 2024-05-02

## 2024-05-02 RX ORDER — OFLOXACIN 3 MG/ML
5 SOLUTION AURICULAR (OTIC) 2 TIMES DAILY
Qty: 5 ML | Refills: 1 | Status: SHIPPED | OUTPATIENT
Start: 2024-05-02 | End: 2024-05-09

## 2024-05-02 RX ADMIN — OFLOXACIN 5 DROP: 3 SOLUTION AURICULAR (OTIC) at 11:23

## 2024-05-02 ASSESSMENT — PAIN SCALES - GENERAL: PAINLEVEL: NO PAIN (0)

## 2024-05-02 NOTE — LETTER
5/2/2024         RE: Shilpi Moore  [address] [facility] [address]        Dear Colleague,    Thank you for referring your patient, Shilpi Moore, to the [facility]. Please see a copy of my visit note below.    Otolaryngology Progress Note      Shilpi Moore is a 33 year old female  with a history of chronic recurrent sinusitis with nasal polyps status post endoscopic sinus surgery on 1/3/2024 presents for follow-up of her ears.    She saw Christine on 4/26/2024 for right otalgia.  She had been previously diagnosed with cefdinir and treated with Decadron without improvement.  Christine noted a right acute otitis media and a left serous effusion.  She also had acute on chronic sinusitis with polypoid degeneration.  She was started on Bactrim and Bactroban irrigations.    She has a history of perennial allergic rhinitis and started sublingual immunotherapy.      SNOT 21 today with a moderate problem with ear fullness facial pressure.  Very mild problem with nasal blockage rhinorrhea and postnasal discharge    She has had 4-5 episodes of acute otitis media over the past 2 years.  These usually coincide with sinusitis.  As her sinuses have improved status post as she has not had as many ear infections but they are still bothersome.  The right ear tends to be worse than the left.    SNOT 35 today, pre-debridement on 2/1/24    Audiogram dated 1/31/2023 showed normal thresholds with type a tympanograms.  No recent audiogram    Procedures on 1/3/24:    1.  Bilateral maxillary antrostomy with tissue removal  2.  Endoscopic septoplasty  3.  Bilateral submucous reduction inferior turbinates  4.  Partial excision bilateral middle turbinates with polypectomy    S/p left thyroid lobectomy on 10/25/2023 for 6 cm left thyroid nodule.  She denies dysphagia or dysphonia.  No weight loss.  She is euthyroid and followed by endocrine.  No recent US thyroid.   She developed a hypertrophic scar.  It wound  "care instructions were provided postoperatively and she feels this has improved.  No current concerns with the scar.    Final pathology dated 11/22/2023 shows left papillary thyroid microcarcinoma margins uninvolved.  There is a separate follicular adenoma measuring 4.6 cm.  No parathyroid tissue.  Staged pT1a  BRAF V600E positive    She saw Dr. Silveira,  updated ultrasound ordered at [facility] and TSH was completed.  She was to follow-up annually with endocrine    Endocrine impression/plan 4/23:  1. Papillary microcarcinoma of thyroid (HCC)  Very low to low risk of recurrence.   TSH goal 0.5-2. Biotin effect discussed. Levothyroxine discussed.   No need for Tg tumor marker.   Ultrasound now (6 months after surgery) if no abnormal findings, then annually for the next 2-5 years.     TSH 1.83 on 4/23/24    ROS: 12 point review of systems is negative aside from that mentioned in the history of present illness and frequent headaches, jaw pain, sneezing itchy eyes    Physical Exam  /85 (BP Location: Right arm, Patient Position: Sitting, Cuff Size: Adult Large)   Pulse 70   Temp 97.9  F (36.6  C) (Tympanic)   Ht 1.676 m (5' 6\")   Wt 99.8 kg (220 lb)   SpO2 99%   BMI 35.51 kg/m    General - The patient is well nourished and well developed, and appears to have good nutritional status.  Alert and oriented to person and place, interactive.  Head and Face - Normocephalic and atraumatic, with no gross asymmetry noted of the contour of the facial features.  The facial nerve is intact, with strong symmetric movements.  Neck-hypertrophic central neck scar  no palpable lymphadenopathy or thyroid mass.  Trachea is midline.  Eyes - Extraocular movements intact.   Ears-  examined under microscopy bilaterally  Right mucoid effusion, TM dull, no worrisome retractions no evidence of cholesteatoma   Left tympanic membrane intact without obvious effusion or retraction  Nose - Nasal mucosa is pink and moist with no abnormal " mucus.  The septum was grossly midline and non-obstructive, turbinates of normal size and position.  No polyps, masses, or purulence noted on examination.  Mouth - Examination of the oral cavity shows pink, healthy, moist mucosa.  No lesions or ulceration noted.  The dentition are in good repair.  The tongue is mobile and midline.  Throat - The walls of the oropharynx were smooth, pink, moist, symmetric, and had no lesions or ulcerations.  The tonsillar pillars and soft palate were symmetric.  The uvula was midline on elevation.          Bilateral Myringotomy with Right Tube Placement    Procedure - After discussion of the risks and benefits of myringotomy, informed consent was signed and placed in the chart.  I began with the right side.  I proceeded to position the patient in a supine position in the examination chair.  Using the binocular surgical microscope, I then proceeded to clean the canal of cerumen and squamous debris.  I was able to see the tympanic membrane.  Using a small cotton tipped applicator, I applied a tiny coating of phenol onto the tympanic membrane.  After visualizing a good mariana, I then proceeded to use a myringotomy knife to make a radially oriented incision in the tympanic membrane.  A moderate amount of mucoid effusion was suctioned away.  Next, I proceeded to place a 1.14 brian tube through the incision.  After confirming good positioning and a clearly visible open tube, otic drops were applied and a cotton ball was inserted into the canal.      I now moved on to the contralateral side. Using the binocular surgical microscope, I similarly anesthetized the anterior-inferior quadrant and made a small myringotomy.  I used a 3 Turner to ensure the middle ear was clear.  Floxin drops were placed.  She tolerated the procedure well.    To evaluate the nose and sinuses in the post operative state, I performed rigid nasal endoscopy. The nose was anesthetized with home afrin or topical  lidocaine and neosynephrine in the office.    I began with the LEFT side using a 0 degree rigid nasal endoscope, and then similarly examined the RIGHT side    Findings:  Inferior turbinates:  lateralized  Septum midline and intact  Middle turbinate and middle meatus:  No purulence, no polyposis, small nonobstructive synechiae from the remnant left middle turbinate to the lateral nasal wall   Antrostomy clear bilaterally with scant mucoid secretions removed from the right antrostomy  Mucosa is  healthy throughout without polyps nor polypoid degeneration  Superior meatus is clear  Frontal recess clear  Sphenoethmoidal clear  Nasopharynx is clear    The patient tolerated procedure well        Impression/Plan  Shilpi Moore is a 33 year old female    ICD-10-CM    1. S/P myringotomy with insertion of tube  Z96.22 ofloxacin (FLOXIN) 0.3 % otic solution      2. COME (chronic otitis media with effusion), right  H65.491       3. Recurrent acute otitis media of both ears  H66.93       4. History of endoscopic sinus surgery  Z98.890       5. History of lobectomy of thyroid  Z90.09       6. Papillary microcarcinoma of thyroid (H)  C73     pT1a left papillary thyroid microcarcinoma  -margins clear  -BRAF V600E positive            pT1a left papillary thyroid microcarcinoma  -margins clear  -BRAF V600E positive  -Follow-up for thyroid US next week as scheduled.    -Maintain endocrine follow-up.    -Sunscreen to scar.     Post tube written and verbal instructions provided   -Complete otic drops as prescribed  -Follow-up for tube check and audiogram as directed    -Follow-up in 1-2 months for audiogram and ENT visit with Kaila or Christine    Follow-up in 1 year for sinus check with Kaila or Christine, sooner if concerns.  Restart budesonide irrigations twice daily until follow-up.    Continue sublingual immunotherapy and daily AH use    She is happy with above plan.  Shilpi will contact us sooner with any other concerns.    Total exam time  spent on the day of the visit including review of the chart, reviewing pertinent prior imaging and notes, obtaining a detailed history and physical exam, education, discussion with the patient and documenting in epic was over 45 minutes .  This did not include procedure time.      Daniella Schmitt D.O.  Otolaryngology/Head and Neck Surgery  Allergy        Again, thank you for allowing me to participate in the care of your patient.        Sincerely,        Daniella Schmitt MD

## 2024-05-02 NOTE — PATIENT INSTRUCTIONS
Thank you for allowing Dr. Schmitt and our ENT team to participate in your care.  If your medications are too expensive, please give the nurse a call.  We can possibly change this medication.  If you have a scheduling or an appointment question please contact our Health Unit Coordinator at their direct line [phone].   ALL nursing questions or concerns can be directed to your ENT nurse, Crow, at: [phone]      Follow-up in 1-2 months for audiogram and ENT visit with Kaila or Christine  Follow-up in 1 year for sinus check with Kaila or Christine, sooner if concerns       Instructions for Myringotomy Tubes ( Ear Tubes)    Recovery - The placement of ear tubes is a brief operation, and therefore the recovery from the anesthetic is usually less than a day.  However, in young children the sleep patterns, feeding, and behavior can be altered for several days.  Try to return to the daily routine as soon as possible and this issue will resolve without problems.  There are no restrictions to diet or activity after ear tube placement.    Medications - Children and adults can return to all preoperative medications after this procedure, including blood thinners.  You were sent home with ear drops, please use them as directed to assist in the rapid healing of the ear drum around the tube.  Pain medication may have been sent home with you, but a vast majority of the time, over the counter Tylenol or ibuprofen (advil) I sufficient. Finish prescription ear drops (4 drops twice a day).     Complications - A low grade fever (up to 100 degrees ) is not unusual in the day after tubes are placed.  Treat this with cool wash cloths to the forehead and Tylenol.  If the fever is higher, or does not respond to medication, call the Doctor s office or call service after hours.  A small amount of bloody drainage can occur for a day or two after ear tubes, and is perfectly normal, continue the ear drops as directed and it will clear up.    Water  Precautions - Recent clinical research has shown that absolute water precautions are not always necessary.  Ear plugs or water head bands are not necessary unless the ear is actively draining, or if your child does not like the sensation of water in the ear.    Follow up - Approximately 1 month after the tubes are placed I like to examine the ears to make sure there are no signs of complications, which are extremely rare.  You should already have an appointment in 1 month with ENT PA and audiology.  If not, call our office at [phone].  In some unusual cases the ears  reject  the tubes.  Depending on the situation, a hearing test may or may not be performed at that time.  Afterwards, follow up is done every 6 months, but of course earlier if there are any issues or problems.    Advantages of Tubes - After ear tube placement, there are certain benefits from having a direct communication of the middle ear space with the ear canal.  In the event of drainage from the ears with ear tubes in place ( which is common with colds and flus ) use the ear drops you were discharged home with using the same dosage and instructions.  This will clear up the ears without the need for oral antibiotics a majority of the time.  Another advantage is that with tubes in place, the ears automatically adjust to changes in atmospheric pressure ( such as in airplanes or elevation ).  In other words, if the tubes are open the ears will not hurt or pop!

## 2024-05-25 LAB — BACTERIA SPEC CULT: NO GROWTH

## 2024-06-27 DIAGNOSIS — H91.93 DECREASED HEARING OF BOTH EARS: Primary | ICD-10-CM

## 2024-07-02 NOTE — PROGRESS NOTES
"Chief Complaint   Patient presents with    Ear Problem     HEA/follow up per KF     History of Present Illness - Shilpi Moore is a 34 year old female who is status post bilateral myringotomy  RIGHT tube placement on 5/2/24.  Her ear was plugged for several weeks and hearing decreased. She did have drainage for about 1 week and then had large amounts of drainage over the last few weeks.   Her right ear is plugged, muffled, congested. She did use drops yesterday AM/ PM.   Sinuses have been stable. Using Budesonide rinses, Flonase. No recent infections.       She has had 4-5 episodes of acute otitis media over the past 2 years.  These usually coincide with sinusitis.  As her sinuses have improved status post as she has not had as many ear infections but they are still bothersome.  The right ear tends to be worse than the left.       ROS- SEE HPI  BP (!) 142/83 (BP Location: Right arm, Patient Position: Sitting, Cuff Size: Adult Large)   Pulse 60   Temp 97.5  F (36.4  C) (Tympanic)   Resp 18   Ht 1.676 m (5' 5.98\")   Wt 99.8 kg (220 lb 0.3 oz)   SpO2 98%   BMI 35.53 kg/m      General - The patient is well nourished and well developed, and appears to have good nutritional status.    Head and Face - Normocephalic and atraumatic, with no gross asymmetry noted of the contour of the facial features.  The facial nerve is intact, with strong symmetric movements.  Eyes - Extraocular movements intact, and the pupils were reactive to light.  Sclera were not icteric or injected, conjunctiva were pink and moist.  Mouth - Examination of the oral cavity shows pink, healthy, moist mucosa.  No lesions or ulceration noted.  The dentition are in good repair.  The tongue is mobile and midline.  Ears -  Ears examined with otoscope and under otologic microscopy.  Left EAC is clear.  Left tympanic membrane appears with healed site from Brent.  Right canal filled with otorrhea.  Copious amount.  Culture was obtained.  Canal " debrided with #5 #3 Turner throat.  Lumen was suctioned with #3 Turner.      A/P -     ICD-10-CM    1. Otorrhea, right  H92.11 Adult Audiology  Referral     Anaerobic Bacterial Culture Routine     Fungal or Yeast Culture Routine     Respiratory Aerobic Bacterial Culture with Gram Stain     Respiratory Aerobic Bacterial Culture with Gram Stain     Fungal or Yeast Culture Routine     Anaerobic Bacterial Culture Routine      2. S/P myringotomy with insertion of tube  Z96.22 Adult Audiology  Referral      3. Recurrent acute otitis media of both ears  H66.93           Start Floxin 5 drops twice daily for the next 7 days to right ear.  Water precautions at this time.    Your culture is pending and will monitor.    Refollow-up in clinic in 3 to 4 weeks with audiogram.  If worsening progressive artery issues to return sooner.    Continue with rinses, Flonase and Allegra.      Kaila Sanchez PA-C  ENT  [facility], [place]

## 2024-07-03 ENCOUNTER — OFFICE VISIT (OUTPATIENT)
Dept: AUDIOLOGY | Facility: OTHER | Age: 34
End: 2024-07-03
Attending: AUDIOLOGIST
Payer: COMMERCIAL

## 2024-07-03 ENCOUNTER — OFFICE VISIT (OUTPATIENT)
Dept: OTOLARYNGOLOGY | Facility: OTHER | Age: 34
End: 2024-07-03
Attending: NURSE PRACTITIONER
Payer: COMMERCIAL

## 2024-07-03 VITALS
SYSTOLIC BLOOD PRESSURE: 142 MMHG | HEIGHT: 66 IN | OXYGEN SATURATION: 98 % | DIASTOLIC BLOOD PRESSURE: 83 MMHG | BODY MASS INDEX: 35.36 KG/M2 | TEMPERATURE: 97.5 F | RESPIRATION RATE: 18 BRPM | WEIGHT: 220.02 LBS | HEART RATE: 60 BPM

## 2024-07-03 DIAGNOSIS — H92.11 OTORRHEA, RIGHT: Primary | ICD-10-CM

## 2024-07-03 DIAGNOSIS — H91.93 DECREASED HEARING OF BOTH EARS: ICD-10-CM

## 2024-07-03 DIAGNOSIS — Z96.22 S/P MYRINGOTOMY WITH INSERTION OF TUBE: ICD-10-CM

## 2024-07-03 DIAGNOSIS — H69.93 DYSFUNCTION OF EUSTACHIAN TUBE, BILATERAL: Primary | ICD-10-CM

## 2024-07-03 DIAGNOSIS — H66.93 RECURRENT ACUTE OTITIS MEDIA OF BOTH EARS: ICD-10-CM

## 2024-07-03 PROCEDURE — 87070 CULTURE OTHR SPECIMN AEROBIC: CPT | Performed by: PHYSICIAN ASSISTANT

## 2024-07-03 PROCEDURE — 87075 CULTR BACTERIA EXCEPT BLOOD: CPT | Performed by: PHYSICIAN ASSISTANT

## 2024-07-03 PROCEDURE — 92567 TYMPANOMETRY: CPT | Performed by: AUDIOLOGIST

## 2024-07-03 PROCEDURE — 99213 OFFICE O/P EST LOW 20 MIN: CPT | Mod: 25 | Performed by: PHYSICIAN ASSISTANT

## 2024-07-03 PROCEDURE — 87102 FUNGUS ISOLATION CULTURE: CPT | Performed by: PHYSICIAN ASSISTANT

## 2024-07-03 PROCEDURE — 87077 CULTURE AEROBIC IDENTIFY: CPT | Performed by: PHYSICIAN ASSISTANT

## 2024-07-03 PROCEDURE — 92504 EAR MICROSCOPY EXAMINATION: CPT | Performed by: PHYSICIAN ASSISTANT

## 2024-07-03 PROCEDURE — 87205 SMEAR GRAM STAIN: CPT | Performed by: PHYSICIAN ASSISTANT

## 2024-07-03 ASSESSMENT — PAIN SCALES - GENERAL: PAINLEVEL: NO PAIN (0)

## 2024-07-03 NOTE — PROGRESS NOTES
Audiology Evaluation Completed. Please refer SCANNED AUDIOGRAM and/or TYMPANOGRAM for BACKGROUND, RESULTS, RECOMMENDATIONS.      Gina IVAN, [facility]  Audiologist #[id number]

## 2024-07-03 NOTE — LETTER
"7/3/2024      Shilpi Moore  [address] [facility] [address]      Dear Colleague,    Thank you for referring your patient, Shilpi Moore, to the [facility]. Please see a copy of my visit note below.    Chief Complaint   Patient presents with     Ear Problem     HEA/follow up per KF     History of Present Illness - Shilpi Moore is a 34 year old female who is status post bilateral myringotomy  RIGHT tube placement on 5/2/24.  Her ear was plugged for several weeks and hearing decreased. She did have drainage for about 1 week and then had large amounts of drainage over the last few weeks.   Her right ear is plugged, muffled, congested. She did use drops yesterday AM/ PM.   Sinuses have been stable. Using Budesonide rinses, Flonase. No recent infections.       She has had 4-5 episodes of acute otitis media over the past 2 years.  These usually coincide with sinusitis.  As her sinuses have improved status post as she has not had as many ear infections but they are still bothersome.  The right ear tends to be worse than the left.       ROS- SEE HPI  BP (!) 142/83 (BP Location: Right arm, Patient Position: Sitting, Cuff Size: Adult Large)   Pulse 60   Temp 97.5  F (36.4  C) (Tympanic)   Resp 18   Ht 1.676 m (5' 5.98\")   Wt 99.8 kg (220 lb 0.3 oz)   SpO2 98%   BMI 35.53 kg/m      General - The patient is well nourished and well developed, and appears to have good nutritional status.    Head and Face - Normocephalic and atraumatic, with no gross asymmetry noted of the contour of the facial features.  The facial nerve is intact, with strong symmetric movements.  Eyes - Extraocular movements intact, and the pupils were reactive to light.  Sclera were not icteric or injected, conjunctiva were pink and moist.  Mouth - Examination of the oral cavity shows pink, healthy, moist mucosa.  No lesions or ulceration noted.  The dentition are in good repair.  The tongue is mobile and " midline.  Ears -  Ears examined with otoscope and under otologic microscopy.  Left EAC is clear.  Left tympanic membrane appears with healed site from Brent.  Right canal filled with otorrhea.  Copious amount.  Culture was obtained.  Canal debrided with #5 #3 Turner throat.  Lumen was suctioned with #3 Turner.      A/P -     ICD-10-CM    1. Otorrhea, right  H92.11 Adult Audiology  Referral     Anaerobic Bacterial Culture Routine     Fungal or Yeast Culture Routine     Respiratory Aerobic Bacterial Culture with Gram Stain     Respiratory Aerobic Bacterial Culture with Gram Stain     Fungal or Yeast Culture Routine     Anaerobic Bacterial Culture Routine      2. S/P myringotomy with insertion of tube  Z96.22 Adult Audiology  Referral      3. Recurrent acute otitis media of both ears  H66.93           Start Floxin 5 drops twice daily for the next 7 days to right ear.  Water precautions at this time.    Your culture is pending and will monitor.    Refollow-up in clinic in 3 to 4 weeks with audiogram.  If worsening progressive artery issues to return sooner.    Continue with rinses, Flonase and Allegra.      Kaila Sanchez PA-C  ENT  [facility], [place]        Again, thank you for allowing me to participate in the care of your patient.        Sincerely,        Kaila Sanchez PA-C

## 2024-07-03 NOTE — PATIENT INSTRUCTIONS
Start Floxin 5 drops twice a day for 7 days to right ear  Continue with rinses, Flonase, Allegra.   Follow up in 3-4 weeks with audiogram.     Ear culture is pending- will call w/ results.       Thank you for allowing Kaila Sanchez PA-C and our ENT team to participate in your care.  If your medications are too expensive, please give the nurse a call.  We can possibly change this medication.  If you have a scheduling or an appointment question please contact our Health Unit Coordinator at [phone], [phone]. [phone].    ALL nursing questions or concerns can be directed to your ENT nurse at: [phone] Char[facility]

## 2024-07-07 LAB
BACTERIA SPEC CULT: ABNORMAL
BACTERIA SPEC CULT: ABNORMAL
BETA LACTAMASE TEST: NEGATIVE
GRAM STAIN RESULT: ABNORMAL
GRAM STAIN RESULT: ABNORMAL

## 2024-07-08 LAB — BACTERIA SPEC CULT: NORMAL

## 2024-07-09 DIAGNOSIS — F41.9 ANXIETY: ICD-10-CM

## 2024-07-09 RX ORDER — SERTRALINE HYDROCHLORIDE 100 MG/1
50 TABLET, FILM COATED ORAL 2 TIMES DAILY
Qty: 90 TABLET | Refills: 1 | Status: SHIPPED | OUTPATIENT
Start: 2024-07-09

## 2024-07-17 ENCOUNTER — TELEPHONE (OUTPATIENT)
Dept: ALLERGY | Facility: OTHER | Age: 34
End: 2024-07-17

## 2024-07-17 NOTE — TELEPHONE ENCOUNTER
Called and spoke to patient. Verified last name and . Informed patient that the allergy refill for her allergy drops has been faxed over to [facility]. Denies the need for a new Epipen and has no questions or concerns at call completion.

## 2024-07-23 ENCOUNTER — OFFICE VISIT (OUTPATIENT)
Dept: AUDIOLOGY | Facility: OTHER | Age: 34
End: 2024-07-23
Attending: AUDIOLOGIST
Payer: COMMERCIAL

## 2024-07-23 DIAGNOSIS — H69.93 DYSFUNCTION OF EUSTACHIAN TUBE, BILATERAL: Primary | ICD-10-CM

## 2024-07-23 DIAGNOSIS — H92.11 OTORRHEA, RIGHT: ICD-10-CM

## 2024-07-23 DIAGNOSIS — Z96.22 S/P MYRINGOTOMY WITH INSERTION OF TUBE: ICD-10-CM

## 2024-07-23 PROCEDURE — 92567 TYMPANOMETRY: CPT | Performed by: AUDIOLOGIST

## 2024-07-23 PROCEDURE — 92557 COMPREHENSIVE HEARING TEST: CPT | Performed by: AUDIOLOGIST

## 2024-07-23 NOTE — PROGRESS NOTES
Audiology Evaluation Completed. Please refer SCANNED AUDIOGRAM and/or TYMPANOGRAM for BACKGROUND, RESULTS, RECOMMENDATIONS.      Gina IVAN, [facility]  Audiologist #[id number]

## 2024-07-28 ASSESSMENT — ASTHMA QUESTIONNAIRES
ACT_TOTALSCORE: 25
ACT_TOTALSCORE: 25
QUESTION_2 LAST FOUR WEEKS HOW OFTEN HAVE YOU HAD SHORTNESS OF BREATH: NOT AT ALL
QUESTION_4 LAST FOUR WEEKS HOW OFTEN HAVE YOU USED YOUR RESCUE INHALER OR NEBULIZER MEDICATION (SUCH AS ALBUTEROL): NOT AT ALL
QUESTION_5 LAST FOUR WEEKS HOW WOULD YOU RATE YOUR ASTHMA CONTROL: COMPLETELY CONTROLLED
QUESTION_3 LAST FOUR WEEKS HOW OFTEN DID YOUR ASTHMA SYMPTOMS (WHEEZING, COUGHING, SHORTNESS OF BREATH, CHEST TIGHTNESS OR PAIN) WAKE YOU UP AT NIGHT OR EARLIER THAN USUAL IN THE MORNING: NOT AT ALL
QUESTION_1 LAST FOUR WEEKS HOW MUCH OF THE TIME DID YOUR ASTHMA KEEP YOU FROM GETTING AS MUCH DONE AT WORK, SCHOOL OR AT HOME: NONE OF THE TIME

## 2024-07-30 ENCOUNTER — MYC MEDICAL ADVICE (OUTPATIENT)
Dept: OTOLARYNGOLOGY | Facility: OTHER | Age: 34
End: 2024-07-30

## 2024-07-30 ENCOUNTER — OFFICE VISIT (OUTPATIENT)
Dept: OTOLARYNGOLOGY | Facility: OTHER | Age: 34
End: 2024-07-30
Attending: PHYSICIAN ASSISTANT
Payer: COMMERCIAL

## 2024-07-30 VITALS
WEIGHT: 220 LBS | SYSTOLIC BLOOD PRESSURE: 112 MMHG | HEIGHT: 66 IN | HEART RATE: 67 BPM | OXYGEN SATURATION: 100 % | BODY MASS INDEX: 35.36 KG/M2 | RESPIRATION RATE: 18 BRPM | TEMPERATURE: 97.7 F | DIASTOLIC BLOOD PRESSURE: 80 MMHG

## 2024-07-30 DIAGNOSIS — Z96.22 S/P MYRINGOTOMY WITH INSERTION OF TUBE: Primary | ICD-10-CM

## 2024-07-30 DIAGNOSIS — J32.4 CHRONIC PANSINUSITIS: Primary | ICD-10-CM

## 2024-07-30 PROCEDURE — 99212 OFFICE O/P EST SF 10 MIN: CPT | Performed by: PHYSICIAN ASSISTANT

## 2024-07-30 RX ORDER — BUDESONIDE 0.5 MG/2ML
INHALANT ORAL
Qty: 200 ML | Refills: 11 | Status: SHIPPED | OUTPATIENT
Start: 2024-07-30

## 2024-07-30 ASSESSMENT — PAIN SCALES - GENERAL: PAINLEVEL: NO PAIN (0)

## 2024-07-30 NOTE — PATIENT INSTRUCTIONS
Hearing is normal range.   Hearing protection  If any drainage or ear concerns- contact use sooner  Continue with rinses/ Flonase/ allergy drops.   Follow up in 1 year for allergy/ sinus/ ear check      Thank you for allowing SUNNY Briones and our ENT team to participate in your care.  If your medications are too expensive, please give the nurse a call.  We can possibly change this medication.  If you have a scheduling or an appointment question please contact our Health Unit Coordinator at their direct line [phone].   ALL nursing questions or concerns can be directed to your ENT nurse, Charisma at: [phone].

## 2024-07-30 NOTE — PROGRESS NOTES
Chief Complaint   Patient presents with    Follow Up     Follow up ears per TR. Audio w/Gina scheduled 7/23/24.       Shilpi Moore is a 34 year old female who is status post myringotomy  RIGHT tube placement on 5/2/24.  Hearing improved.   No recent drainage.   Completed drops w/o concerns.     Sinuses have been stable. Using Budesonide rinses, Flonase. No recent infections.         She has had 4-5 episodes of acute otitis media over the past 2 years.  These usually coincide with sinusitis.  As her sinuses have improved status post as she has not had as many ear infections but they are still bothersome.  The right ear tends to be worse than the left.         Past Medical History:   Diagnosis Date    Allergic rhinitis 6/15/2011    Anxiety 9/17/2019    Benign essential hypertension 4/27/2017    Gastroesophageal reflux disease without esophagitis 9/21/2018    Major depressive disorder, recurrent episode, unspecified 6/15/2011    Obesity 4/27/2017    Reactive airway disease that is not asthma 2/12/2018    Seasonal allergic rhinitis due to pollen 4/27/2017    Unspecified vitamin D deficiency 4/11/2014        Allergies   Allergen Reactions    Ibuprofen Anaphylaxis    Nsaids Swelling     Current Outpatient Medications   Medication Sig Dispense Refill    acetaminophen (TYLENOL) 500 MG tablet Take 500-1,000 mg by mouth every 6 hours as needed for mild pain      ALBUTEROL IN       azelastine (ASTELIN) 0.1 % nasal spray Spray 2 sprays into both nostrils 2 times daily 30 mL 12    azelastine-fluticasone (DYMISTA) 137-50 MCG/ACT nasal spray Spray 1 spray into both nostrils 2 times daily 23 g 11    budesonide (PULMICORT) 0.5 MG/2ML neb solution Mix 240 ml Kne Med Sinus rinse, add 0.5 mg budesonide vial, rinse 1/2 bottle in each nostril twice daily 120 mL 11    EPINEPHrine (ANY BX GENERIC EQUIV) 0.3 MG/0.3ML injection 2-pack Inject 0.3 mLs (0.3 mg) into the muscle once as needed 2 each 11    fexofenadine (ALLEGRA) 60 MG tablet  "Take 60 mg by mouth 2 times daily      fluticasone (FLONASE) 50 MCG/ACT nasal spray Spray 2 sprays into both nostrils daily 16 g 11    metFORMIN (GLUCOPHAGE XR) 500 MG 24 hr tablet Take 1,000 mg by mouth      Multiple Vitamins-Minerals (QC WOMENS DAILY MULTIVITAMIN PO) Take 1 Dose by mouth daily      mupirocin (BACTROBAN) 2 % external ointment Completely dissolve 1/3 tube (5 grams) in 1000 ml sterile saline and rinse with 240 ml twice daily x 14 days 15 g 1    omeprazole (PRILOSEC) 40 MG DR capsule TAKE ONE CAPSULE BY MOUTH EVERY DAY 30 TO 60 MINUTES BEFORE A MEAL 90 capsule 3    sertraline (ZOLOFT) 100 MG tablet Take 0.5 tablets (50 mg) by mouth 2 times daily 90 tablet 1    sodium chloride 0.9%, bottle, (ARGYLE STERILE SALINE) 0.9 % irrigation Mix 5 grams (1/3 tube) bactroban with 1000 ml normal saline and rinse with 240 ml twice daily x 14 days. 3000 mL 11    SUBLINGUAL IMMUNOTHERAPY, SLIT, Place 1 drop under the tongue 3 times daily Start allergy drops (SLIT). 1 drop under the tongue daily x 7 days, then 1 drop under the tongue twice daily x 7 days, then 1 drop under the tongue three times daily ongoing. 1 vial PRN    VITAMIN D PO        No current facility-administered medications for this visit.     ROS- SEE HPI  /80 (BP Location: Right arm, Patient Position: Sitting, Cuff Size: Adult Large)   Pulse 67   Temp 97.7  F (36.5  C) (Tympanic)   Resp 18   Ht 1.676 m (5' 5.98\")   Wt 99.8 kg (220 lb)   SpO2 100%   BMI 35.53 kg/m      General - The patient is well nourished and well developed, and appears to have good nutritional status.    Head and Face - Normocephalic and atraumatic, with no gross asymmetry noted of the contour of the facial features.  The facial nerve is intact, with strong symmetric movements.  Eyes - Extraocular movements intact, and the pupils were reactive to light.  Sclera were not icteric or injected, conjunctiva were pink and moist.  Mouth - Examination of the oral cavity shows " pink, healthy, moist mucosa.  No lesions or ulceration noted.  The dentition are in good repair.  The tongue is mobile and midline.  Ears -left EAC is clear.  Left tympanic membrane intact without effusion or retraction.  Right EAC is greatly improved.  Right tympanic membrane appears with patent tube.    ASSESSMENT/ PLAN:    ICD-10-CM    1. S/P myringotomy with insertion of tube  Z96.22             Ear has greatly proved at this time.  Right tube is in good position and patent.  Recommended annual follow-up for sinus, sublingual immunotherapy and tube check.          Kaila Sanchez PA-C  ENT  [facility]

## 2024-07-30 NOTE — LETTER
7/30/2024      Shilpi Moore  [address] [facility] [address]      Dear Colleague,    Thank you for referring your patient, Shilpi Moore, to the [facility]. Please see a copy of my visit note below.    Chief Complaint   Patient presents with     Follow Up     Follow up ears per TR. Audio w/Gina scheduled 7/23/24.       Shilpi Moore is a 34 year old female who is status post myringotomy  RIGHT tube placement on 5/2/24.  Hearing improved.   No recent drainage.   Completed drops w/o concerns.     Sinuses have been stable. Using Budesonide rinses, Flonase. No recent infections.         She has had 4-5 episodes of acute otitis media over the past 2 years.  These usually coincide with sinusitis.  As her sinuses have improved status post as she has not had as many ear infections but they are still bothersome.  The right ear tends to be worse than the left.         Past Medical History:   Diagnosis Date     Allergic rhinitis 6/15/2011     Anxiety 9/17/2019     Benign essential hypertension 4/27/2017     Gastroesophageal reflux disease without esophagitis 9/21/2018     Major depressive disorder, recurrent episode, unspecified 6/15/2011     Obesity 4/27/2017     Reactive airway disease that is not asthma 2/12/2018     Seasonal allergic rhinitis due to pollen 4/27/2017     Unspecified vitamin D deficiency 4/11/2014        Allergies   Allergen Reactions     Ibuprofen Anaphylaxis     Nsaids Swelling     Current Outpatient Medications   Medication Sig Dispense Refill     acetaminophen (TYLENOL) 500 MG tablet Take 500-1,000 mg by mouth every 6 hours as needed for mild pain       ALBUTEROL IN        azelastine (ASTELIN) 0.1 % nasal spray Spray 2 sprays into both nostrils 2 times daily 30 mL 12     azelastine-fluticasone (DYMISTA) 137-50 MCG/ACT nasal spray Spray 1 spray into both nostrils 2 times daily 23 g 11     budesonide (PULMICORT) 0.5 MG/2ML neb solution Mix 240 ml Ken Med Sinus rinse,  "add 0.5 mg budesonide vial, rinse 1/2 bottle in each nostril twice daily 120 mL 11     EPINEPHrine (ANY BX GENERIC EQUIV) 0.3 MG/0.3ML injection 2-pack Inject 0.3 mLs (0.3 mg) into the muscle once as needed 2 each 11     fexofenadine (ALLEGRA) 60 MG tablet Take 60 mg by mouth 2 times daily       fluticasone (FLONASE) 50 MCG/ACT nasal spray Spray 2 sprays into both nostrils daily 16 g 11     metFORMIN (GLUCOPHAGE XR) 500 MG 24 hr tablet Take 1,000 mg by mouth       Multiple Vitamins-Minerals (QC WOMENS DAILY MULTIVITAMIN PO) Take 1 Dose by mouth daily       mupirocin (BACTROBAN) 2 % external ointment Completely dissolve 1/3 tube (5 grams) in 1000 ml sterile saline and rinse with 240 ml twice daily x 14 days 15 g 1     omeprazole (PRILOSEC) 40 MG DR capsule TAKE ONE CAPSULE BY MOUTH EVERY DAY 30 TO 60 MINUTES BEFORE A MEAL 90 capsule 3     sertraline (ZOLOFT) 100 MG tablet Take 0.5 tablets (50 mg) by mouth 2 times daily 90 tablet 1     sodium chloride 0.9%, bottle, (ARGYLE STERILE SALINE) 0.9 % irrigation Mix 5 grams (1/3 tube) bactroban with 1000 ml normal saline and rinse with 240 ml twice daily x 14 days. 3000 mL 11     SUBLINGUAL IMMUNOTHERAPY, SLIT, Place 1 drop under the tongue 3 times daily Start allergy drops (SLIT). 1 drop under the tongue daily x 7 days, then 1 drop under the tongue twice daily x 7 days, then 1 drop under the tongue three times daily ongoing. 1 vial PRN     VITAMIN D PO        No current facility-administered medications for this visit.     ROS- SEE HPI  /80 (BP Location: Right arm, Patient Position: Sitting, Cuff Size: Adult Large)   Pulse 67   Temp 97.7  F (36.5  C) (Tympanic)   Resp 18   Ht 1.676 m (5' 5.98\")   Wt 99.8 kg (220 lb)   SpO2 100%   BMI 35.53 kg/m      General - The patient is well nourished and well developed, and appears to have good nutritional status.    Head and Face - Normocephalic and atraumatic, with no gross asymmetry noted of the contour of the facial " features.  The facial nerve is intact, with strong symmetric movements.  Eyes - Extraocular movements intact, and the pupils were reactive to light.  Sclera were not icteric or injected, conjunctiva were pink and moist.  Mouth - Examination of the oral cavity shows pink, healthy, moist mucosa.  No lesions or ulceration noted.  The dentition are in good repair.  The tongue is mobile and midline.  Ears -left EAC is clear.  Left tympanic membrane intact without effusion or retraction.  Right EAC is greatly improved.  Right tympanic membrane appears with patent tube.    ASSESSMENT/ PLAN:    ICD-10-CM    1. S/P myringotomy with insertion of tube  Z96.22             Ear has greatly proved at this time.  Right tube is in good position and patent.  Recommended annual follow-up for sinus, sublingual immunotherapy and tube check.          Kaila Sanchez PA-C  ENT  [facility], [place]        Again, thank you for allowing me to participate in the care of your patient.        Sincerely,        Kaila Sanchez PA-C

## 2024-07-31 LAB — BACTERIA SPEC CULT: NO GROWTH

## 2024-08-02 ENCOUNTER — OFFICE VISIT (OUTPATIENT)
Dept: FAMILY MEDICINE | Facility: OTHER | Age: 34
End: 2024-08-02
Attending: NURSE PRACTITIONER
Payer: COMMERCIAL

## 2024-08-02 VITALS
OXYGEN SATURATION: 98 % | TEMPERATURE: 98.3 F | SYSTOLIC BLOOD PRESSURE: 150 MMHG | RESPIRATION RATE: 18 BRPM | WEIGHT: 233.5 LBS | BODY MASS INDEX: 37.71 KG/M2 | HEART RATE: 71 BPM | DIASTOLIC BLOOD PRESSURE: 90 MMHG

## 2024-08-02 DIAGNOSIS — E89.0 H/O PARTIAL THYROIDECTOMY: ICD-10-CM

## 2024-08-02 DIAGNOSIS — E66.01 MORBID OBESITY (H): ICD-10-CM

## 2024-08-02 DIAGNOSIS — K21.9 GASTROESOPHAGEAL REFLUX DISEASE WITHOUT ESOPHAGITIS: ICD-10-CM

## 2024-08-02 DIAGNOSIS — I10 BENIGN ESSENTIAL HYPERTENSION: Primary | ICD-10-CM

## 2024-08-02 DIAGNOSIS — F41.9 ANXIETY: ICD-10-CM

## 2024-08-02 LAB
ALBUMIN SERPL BCG-MCNC: 4.3 G/DL (ref 3.5–5.2)
ALP SERPL-CCNC: 43 U/L (ref 40–150)
ALT SERPL W P-5'-P-CCNC: 13 U/L (ref 0–50)
ANION GAP SERPL CALCULATED.3IONS-SCNC: 12 MMOL/L (ref 7–15)
AST SERPL W P-5'-P-CCNC: 16 U/L (ref 0–45)
BILIRUB SERPL-MCNC: 0.2 MG/DL
BUN SERPL-MCNC: 12.9 MG/DL (ref 6–20)
CALCIUM SERPL-MCNC: 9.2 MG/DL (ref 8.8–10.4)
CHLORIDE SERPL-SCNC: 104 MMOL/L (ref 98–107)
CHOLEST SERPL-MCNC: 181 MG/DL
CREAT SERPL-MCNC: 0.75 MG/DL (ref 0.51–0.95)
EGFRCR SERPLBLD CKD-EPI 2021: >90 ML/MIN/1.73M2
FASTING STATUS PATIENT QL REPORTED: NO
FASTING STATUS PATIENT QL REPORTED: NO
GLUCOSE SERPL-MCNC: 91 MG/DL (ref 70–99)
HCO3 SERPL-SCNC: 22 MMOL/L (ref 22–29)
HDLC SERPL-MCNC: 65 MG/DL
LDLC SERPL CALC-MCNC: 95 MG/DL
NONHDLC SERPL-MCNC: 116 MG/DL
POTASSIUM SERPL-SCNC: 4 MMOL/L (ref 3.4–5.3)
PROT SERPL-MCNC: 7.2 G/DL (ref 6.4–8.3)
SODIUM SERPL-SCNC: 138 MMOL/L (ref 135–145)
TRIGL SERPL-MCNC: 107 MG/DL
TSH SERPL DL<=0.005 MIU/L-ACNC: 3.39 UIU/ML (ref 0.3–4.2)

## 2024-08-02 PROCEDURE — 99214 OFFICE O/P EST MOD 30 MIN: CPT | Performed by: NURSE PRACTITIONER

## 2024-08-02 PROCEDURE — 80061 LIPID PANEL: CPT | Performed by: NURSE PRACTITIONER

## 2024-08-02 PROCEDURE — 84443 ASSAY THYROID STIM HORMONE: CPT | Performed by: NURSE PRACTITIONER

## 2024-08-02 PROCEDURE — 36415 COLL VENOUS BLD VENIPUNCTURE: CPT | Performed by: NURSE PRACTITIONER

## 2024-08-02 PROCEDURE — G2211 COMPLEX E/M VISIT ADD ON: HCPCS | Performed by: NURSE PRACTITIONER

## 2024-08-02 PROCEDURE — 80053 COMPREHEN METABOLIC PANEL: CPT | Performed by: NURSE PRACTITIONER

## 2024-08-02 RX ORDER — METFORMIN HCL 500 MG
1000 TABLET, EXTENDED RELEASE 24 HR ORAL
Qty: 180 TABLET | Refills: 1 | Status: SHIPPED | OUTPATIENT
Start: 2024-08-02

## 2024-08-02 RX ORDER — LOSARTAN POTASSIUM 50 MG/1
50 TABLET ORAL DAILY
Qty: 90 TABLET | Refills: 1 | Status: SHIPPED | OUTPATIENT
Start: 2024-08-02

## 2024-08-02 ASSESSMENT — ANXIETY QUESTIONNAIRES
GAD7 TOTAL SCORE: 6
4. TROUBLE RELAXING: MORE THAN HALF THE DAYS
7. FEELING AFRAID AS IF SOMETHING AWFUL MIGHT HAPPEN: NOT AT ALL
8. IF YOU CHECKED OFF ANY PROBLEMS, HOW DIFFICULT HAVE THESE MADE IT FOR YOU TO DO YOUR WORK, TAKE CARE OF THINGS AT HOME, OR GET ALONG WITH OTHER PEOPLE?: SOMEWHAT DIFFICULT
2. NOT BEING ABLE TO STOP OR CONTROL WORRYING: SEVERAL DAYS
7. FEELING AFRAID AS IF SOMETHING AWFUL MIGHT HAPPEN: NOT AT ALL
5. BEING SO RESTLESS THAT IT IS HARD TO SIT STILL: SEVERAL DAYS
3. WORRYING TOO MUCH ABOUT DIFFERENT THINGS: SEVERAL DAYS
6. BECOMING EASILY ANNOYED OR IRRITABLE: NOT AT ALL
GAD7 TOTAL SCORE: 6
IF YOU CHECKED OFF ANY PROBLEMS ON THIS QUESTIONNAIRE, HOW DIFFICULT HAVE THESE PROBLEMS MADE IT FOR YOU TO DO YOUR WORK, TAKE CARE OF THINGS AT HOME, OR GET ALONG WITH OTHER PEOPLE: SOMEWHAT DIFFICULT
GAD7 TOTAL SCORE: 6
1. FEELING NERVOUS, ANXIOUS, OR ON EDGE: SEVERAL DAYS

## 2024-08-02 ASSESSMENT — PATIENT HEALTH QUESTIONNAIRE - PHQ9
10. IF YOU CHECKED OFF ANY PROBLEMS, HOW DIFFICULT HAVE THESE PROBLEMS MADE IT FOR YOU TO DO YOUR WORK, TAKE CARE OF THINGS AT HOME, OR GET ALONG WITH OTHER PEOPLE: NOT DIFFICULT AT ALL
SUM OF ALL RESPONSES TO PHQ QUESTIONS 1-9: 0
SUM OF ALL RESPONSES TO PHQ QUESTIONS 1-9: 0

## 2024-08-02 ASSESSMENT — PAIN SCALES - GENERAL: PAINLEVEL: NO PAIN (0)

## 2024-08-02 NOTE — PATIENT INSTRUCTIONS
Assessment & Plan         Benign essential hypertension  - Comprehensive metabolic panel  - Lipid Profile (Chol, Trig, HDL, LDL calc)  - losartan (COZAAR) 50 MG tablet; Take 1 tablet (50 mg) by mouth daily      H/O partial thyroidectomy  - TSH with free T4 reflex      Gastroesophageal reflux disease without esophagitis  - Continue plan of care  - Can add OTC pepcid for breakthrough symptoms      Anxiety  - Continue plan of care      Morbid obesity (H)  - metFORMIN (GLUCOPHAGE XR) 500 MG 24 hr tablet; Take 2 tablets (1,000 mg) by mouth daily (with dinner)        Please continue to take all medication as directed  Please notify your pharmacy or our refill line of future refills needed.  Please return sooner than your next scheduled appointment with any concerns.      When your lab results return, we will call you with abnormal findings  You can also view this information in your MyChart, if you have an account.  Please call or Fiberstarhart message us with any questions you may have.          Return in about 6 months (around 2/2/2025).        Tiana KEMP  [phone]

## 2024-08-02 NOTE — PROGRESS NOTES
Assessment & Plan         Benign essential hypertension  - Comprehensive metabolic panel  - Lipid Profile (Chol, Trig, HDL, LDL calc)  - losartan (COZAAR) 50 MG tablet; Take 1 tablet (50 mg) by mouth daily      H/O partial thyroidectomy  - TSH with free T4 reflex      Gastroesophageal reflux disease without esophagitis  - Continue plan of care  - Can add OTC pepcid for breakthrough symptoms      Anxiety  - Continue plan of care      Morbid obesity (H)  - metFORMIN (GLUCOPHAGE XR) 500 MG 24 hr tablet; Take 2 tablets (1,000 mg) by mouth daily (with dinner)        Please continue to take all medication as directed  Please notify your pharmacy or our refill line of future refills needed.  Please return sooner than your next scheduled appointment with any concerns.      When your lab results return, we will call you with abnormal findings  You can also view this information in your MyChart, if you have an account.  Please call or SuperCloud message us with any questions you may have.          Return in about 6 months (around 2/2/2025).        Tiana [facility]  [phone]           Shilpi is a 34 year old, presenting for the following health issues:  Hypertension, Anxiety, and Gastrophageal Reflux        Hypertension Follow-up  Do you check your blood pressure regularly outside of the clinic? No   Are you following a low salt diet? Yes  Are your blood pressures ever more than 140 on the top number (systolic) OR more   than 90 on the bottom number (diastolic), for example 140/90? Yes        Anxiety   How are you doing with your anxiety since your last visit? No change  Are you having other symptoms that might be associated with anxiety? No  Have you had a significant life event? No   Are you feeling depressed? No  Do you have any concerns with your use of alcohol or other drugs? No        Social History     Tobacco Use    Smoking status: Never    Smokeless tobacco: Never    Tobacco comments:     Passive exposure   Vaping  Use    Vaping status: Never Used   Substance Use Topics    Alcohol use: Yes     Comment: Beer; Occasionally    Drug use: No             9/7/2023    11:30 AM 3/5/2024     2:21 PM 8/2/2024    10:26 AM   KING-7 SCORE   Total Score   6 (mild anxiety)   Total Score 4 0 6             1/17/2023     3:47 PM 9/7/2023    11:29 AM 8/2/2024    10:25 AM   PHQ   PHQ-9 Total Score 5 1 0   Q9: Thoughts of better off dead/self-harm past 2 weeks Not at all Not at all Not at all             8/2/2024    10:25 AM   Last PHQ-9   1.  Little interest or pleasure in doing things 0   2.  Feeling down, depressed, or hopeless 0   3.  Trouble falling or staying asleep, or sleeping too much 0   4.  Feeling tired or having little energy 0   5.  Poor appetite or overeating 0   6.  Feeling bad about yourself 0   7.  Trouble concentrating 0   8.  Moving slowly or restless 0   Q9: Thoughts of better off dead/self-harm past 2 weeks 0   PHQ-9 Total Score 0             8/2/2024    10:26 AM   KING-7    1. Feeling nervous, anxious, or on edge 1   2. Not being able to stop or control worrying 1   3. Worrying too much about different things 1   4. Trouble relaxing 2   5. Being so restless that it is hard to sit still 1   6. Becoming easily annoyed or irritable 0   7. Feeling afraid, as if something awful might happen 0   KING-7 Total Score 6   If you checked any problems, how difficult have they made it for you to do your work, take care of things at home, or get along with other people? Somewhat difficult         GERD/Heartburn  Onset/Duration: prior  Accompanying Signs & Symptoms:  Does it feel like food gets stuck or trouble swallowing: No  Nausea: No  Vomiting (bloody?): No  Abdominal Pain: No  Black-Tarry stools: No  Bloody stools: No  History:  Previous ulcers: No  Therapies tried and outcome:             Medications: Omeprazole (Prilosec)                Patient Active Problem List   Diagnosis    Vitamin D deficiency    ACP (advance care planning)     Seasonal allergic rhinitis due to pollen    Benign essential hypertension    Morbid obesity (H)    Reactive airway disease that is not asthma    Family history of melanoma    Gastroesophageal reflux disease without esophagitis    Anxiety    Lactation problem     Past Surgical History:   Procedure Laterality Date    CHOLECYSTECTOMY, LAPOROSCOPIC N/A 09/27/2020    [facility].    ENDOSCOPIC SINUS SURGERY N/A 1/3/2024    Procedure: BILATERAL ENDOSCOPIC SINUS SURGERY;  Surgeon: Daniella Schmitt MD;  Location: [facility] OR    ENT SURGERY      tonsillectomy    OTHER SURGICAL HISTORY      I&D rt abscess axilla    SEPTOPLASTY, TURBINOPLASTY, COMBINED Bilateral 1/3/2024    Procedure: Septoplasty, Turbinate Reduction;  Surgeon: Daniella Schmitt MD;  Location: [facility] OR    SOFT TISSUE SURGERY      IND cyst right axilla    THYROIDECTOMY Left 10/25/2023    Procedure: Left Thyroid Lobectomy with 1.5 hours nerve monitoring;  Surgeon: Daniella Schmitt MD;  Location: [facility] OR       Social History     Tobacco Use    Smoking status: Never    Smokeless tobacco: Never    Tobacco comments:     Passive exposure   Substance Use Topics    Alcohol use: Yes     Comment: Beer; Occasionally     Family History   Problem Relation Age of Onset    Asthma Mother     Hypertension Father     Asthma Brother     Asthma Sister     Cancer Maternal Grandfather 62        Mesothelioma, cause of death    Cancer Other 28        Cousin; Melanoma, cause of death    Thyroid Disease No family hx of              Current Outpatient Medications   Medication Sig Dispense Refill    acetaminophen (TYLENOL) 500 MG tablet Take 500-1,000 mg by mouth every 6 hours as needed for mild pain      ALBUTEROL IN       azelastine (ASTELIN) 0.1 % nasal spray Spray 2 sprays into both nostrils 2 times daily 30 mL 12    budesonide (PULMICORT) 0.5 MG/2ML neb solution Squirt entire vial into maciel med saline solution, mix, and irrigate each nostril until entire bottle empty.  Do this  twice daily. 200 mL 11    EPINEPHrine (ANY BX GENERIC EQUIV) 0.3 MG/0.3ML injection 2-pack Inject 0.3 mLs (0.3 mg) into the muscle once as needed 2 each 11    fexofenadine (ALLEGRA) 60 MG tablet Take 60 mg by mouth 2 times daily      fluticasone (FLONASE) 50 MCG/ACT nasal spray Spray 2 sprays into both nostrils daily 16 g 11    losartan (COZAAR) 50 MG tablet Take 1 tablet (50 mg) by mouth daily 90 tablet 1    metFORMIN (GLUCOPHAGE XR) 500 MG 24 hr tablet Take 2 tablets (1,000 mg) by mouth daily (with dinner) 180 tablet 1    Multiple Vitamins-Minerals (QC WOMENS DAILY MULTIVITAMIN PO) Take 1 Dose by mouth daily      omeprazole (PRILOSEC) 40 MG DR capsule TAKE ONE CAPSULE BY MOUTH EVERY DAY 30 TO 60 MINUTES BEFORE A MEAL 90 capsule 3    sertraline (ZOLOFT) 100 MG tablet Take 0.5 tablets (50 mg) by mouth 2 times daily 90 tablet 1    SUBLINGUAL IMMUNOTHERAPY, SLIT, Place 1 drop under the tongue 3 times daily Start allergy drops (SLIT). 1 drop under the tongue daily x 7 days, then 1 drop under the tongue twice daily x 7 days, then 1 drop under the tongue three times daily ongoing. 1 vial PRN    VITAMIN D PO            Allergies   Allergen Reactions    Ibuprofen Anaphylaxis    Nsaids Swelling         Recent Labs   Lab Test 12/29/23  1537 10/18/23  1523 09/07/23  1213 01/17/23  1607 08/27/20  0000 09/17/19  1429 12/11/18  1700 09/21/18  1357 04/04/17  0919 04/04/17  0919   A1C  --   --   --   --   --   --  5.2  --   --   --    LDL  --   --   --   --   --   --   --   --   --  101*   HDL  --   --   --   --   --   --   --   --   --  59   TRIG  --   --   --   --   --   --   --   --   --  134   ALT 10 14  --  16   < > 31  --  33   < >  --    CR 0.70 0.84  --  0.86   < > 0.79  --  0.78  --  0.82   GFRESTIMATED >90 >90  --  >90   < > >90  --  88  --  84   GFRESTBLACK  --   --   --   --   --  >90  --  >90  --  >90  African American GFR Calc     POTASSIUM 4.1 4.2  --  3.8   < > 3.7  --  4.0  --  3.8   TSH  --   --  1.88 2.09  --   0.70  --  2.40  --  2.19    < > = values in this interval not displayed.          BP Readings from Last 3 Encounters:   08/02/24 (!) 150/90   07/30/24 112/80   07/03/24 (!) 142/83    Wt Readings from Last 3 Encounters:   08/02/24 105.9 kg (233 lb 8 oz)   07/30/24 99.8 kg (220 lb)   07/03/24 99.8 kg (220 lb 0.3 oz)                       Review of Systems  Constitutional, HEENT, cardiovascular, pulmonary, GI, , musculoskeletal, neuro, skin, endocrine and psych systems are negative, except as otherwise noted.          Objective    BP (!) 150/90 (BP Location: Left arm, Patient Position: Sitting, Cuff Size: Adult Large)   Pulse 71   Temp 98.3  F (36.8  C) (Tympanic)   Resp 18   Wt 105.9 kg (233 lb 8 oz)   SpO2 98%   BMI 37.71 kg/m    Body mass index is 37.71 kg/m .        Physical Exam   GENERAL: alert and no distress  EYES: Eyes grossly normal to inspection, PERRL and conjunctivae and sclerae normal  HENT: ear canals and TM's normal, nose and mouth without ulcers or lesions  NECK: no adenopathy, no asymmetry, masses, or scars  RESP: lungs clear to auscultation - no rales, rhonchi or wheezes  CV: regular rate and rhythm, normal S1 S2, no S3 or S4, no murmur, click or rub, no peripheral edema  MS: no gross musculoskeletal defects noted, no edema  SKIN: no suspicious lesions or rashes  PSYCH: mentation appears normal, affect normal/bright        The longitudinal plan of care for the diagnosis(es)/condition(s) as documented were addressed during this visit. Due to the added complexity in care, I will continue to support Shilpi in the subsequent management and with ongoing continuity of care.         Signed Electronically by: Tiana Tucker CNP

## 2024-08-06 ENCOUNTER — TELEPHONE (OUTPATIENT)
Dept: FAMILY MEDICINE | Facility: OTHER | Age: 34
End: 2024-08-06

## 2024-08-06 NOTE — TELEPHONE ENCOUNTER
----- Message from Radha MAZARIEGOS sent at 8/6/2024 11:35 AM CDT -----  Regarding: Please call to schedule Nurse Only Visit  For What: Nurse Only Visit to Recheck BP, please schedule around 9/2/2024, at whichever location is most convenient for her.     PCP: Tiana Tucker       Thank you,  DARREN Garcia.   [facility]

## 2024-10-11 ENCOUNTER — MYC MEDICAL ADVICE (OUTPATIENT)
Dept: OTOLARYNGOLOGY | Facility: OTHER | Age: 34
End: 2024-10-11

## 2024-10-11 ENCOUNTER — OFFICE VISIT (OUTPATIENT)
Dept: OTOLARYNGOLOGY | Facility: OTHER | Age: 34
End: 2024-10-11
Attending: PHYSICIAN ASSISTANT
Payer: COMMERCIAL

## 2024-10-11 VITALS
HEIGHT: 66 IN | HEART RATE: 76 BPM | OXYGEN SATURATION: 99 % | SYSTOLIC BLOOD PRESSURE: 134 MMHG | WEIGHT: 225 LBS | TEMPERATURE: 97.5 F | DIASTOLIC BLOOD PRESSURE: 72 MMHG | BODY MASS INDEX: 36.16 KG/M2 | RESPIRATION RATE: 14 BRPM

## 2024-10-11 DIAGNOSIS — H65.192 ACUTE OTITIS MEDIA WITH EFFUSION OF LEFT EAR: Primary | ICD-10-CM

## 2024-10-11 DIAGNOSIS — Z96.22 S/P MYRINGOTOMY WITH INSERTION OF TUBE: ICD-10-CM

## 2024-10-11 DIAGNOSIS — J32.4 CHRONIC PANSINUSITIS: ICD-10-CM

## 2024-10-11 PROCEDURE — 99213 OFFICE O/P EST LOW 20 MIN: CPT | Performed by: PHYSICIAN ASSISTANT

## 2024-10-11 RX ORDER — CEFDINIR 300 MG/1
300 CAPSULE ORAL 2 TIMES DAILY
Qty: 20 CAPSULE | Refills: 0 | Status: SHIPPED | OUTPATIENT
Start: 2024-10-11 | End: 2024-10-21

## 2024-10-11 ASSESSMENT — PAIN SCALES - GENERAL: PAINLEVEL: MILD PAIN (3)

## 2024-10-11 NOTE — LETTER
"10/11/2024      Shilpi Moore  [address] [facility] [address]      Dear Colleague,    Thank you for referring your patient, Shilpi Moore, to the [facility]. Please see a copy of my visit note below.    Chief Complaint   Patient presents with     Ear Problem     Otalgia and clogged feeling in ear     Patient returns to ENT for concerns with regards to otalgia.  She was last seen ENT on 7/30/2024 by myself.  At that time she was status post right tube placement on 5/2/2024.  Her hearing had improved there had been no recent drainage she completed drops without concerns.  She states she had 1-1.5 weeks of icky/ ill feeling. Increase in congestion, post nasal drainage, coughing. Increase in inhaler use.   Used ear drops last night and had improvement with use.   No recent otorrhea  It was popping/ crackling this AM and PM.       Patient has been following with endocrinology last visit was 8/26/2024.  They will continue to follow every 6 months with repeat imaging and labs.  Past Medical History:   Diagnosis Date     Allergic rhinitis 6/15/2011     Anxiety 9/17/2019     Benign essential hypertension 4/27/2017     Gastroesophageal reflux disease without esophagitis 9/21/2018     Major depressive disorder, recurrent episode, unspecified 6/15/2011     Obesity 4/27/2017     Reactive airway disease that is not asthma 2/12/2018     Seasonal allergic rhinitis due to pollen 4/27/2017     Unspecified vitamin D deficiency 4/11/2014        Allergies   Allergen Reactions     Ibuprofen Anaphylaxis     Nsaids Swelling     ROS- SEE HPI    /72 (BP Location: Right arm, Patient Position: Sitting, Cuff Size: Adult Large)   Pulse 76   Temp 97.5  F (36.4  C) (Tympanic)   Resp 14   Ht 1.676 m (5' 6\")   Wt 102.1 kg (225 lb)   SpO2 99%   BMI 36.32 kg/m    General - The patient is well nourished and well developed, and appears to have good nutritional status.    Head and Face - Normocephalic and " atraumatic, with no gross asymmetry noted of the contour of the facial features.  The facial nerve is intact, with strong symmetric movements.  Eyes - Extraocular movements intact, and the pupils were reactive to light.  Sclera were not icteric or injected, conjunctiva were pink and moist.  Mouth - Examination of the oral cavity shows pink, healthy, moist mucosa.  No lesions or ulceration noted.  The dentition are in good repair.  The tongue is mobile and midline.  Ears -left EAC is clear.  Left tympanic membrane intact with effusion, bulging and erythema.  Right EAC is greatly improved.  Right tympanic membrane appears with patent tube.     ASSESSMENT/ PLAN:      I       ICD-10-CM    1. Acute otitis media with effusion of left ear  H65.192 cefdinir (OMNICEF) 300 MG capsule      2. S/P myringotomy with insertion of tube  Z96.22       3. Chronic pansinusitis  J32.4             Start oral Omnicef twice daily for 10 days.  Continue with budesonide rinses nasal sprays and antihistamines.    Right tube appears patent at this time and no active otorrhea.  If otorrhea develops may start using Floxin drops.      Kaila Sanchez PA-C  ENT  [facility], [place]    Again, thank you for allowing me to participate in the care of your patient.        Sincerely,        Kaila Sanchez PA-C

## 2024-10-11 NOTE — PROGRESS NOTES
"Chief Complaint   Patient presents with    Ear Problem     Otalgia and clogged feeling in ear     Patient returns to ENT for concerns with regards to otalgia.  She was last seen ENT on 7/30/2024 by myself.  At that time she was status post right tube placement on 5/2/2024.  Her hearing had improved there had been no recent drainage she completed drops without concerns.  She states she had 1-1.5 weeks of icky/ ill feeling. Increase in congestion, post nasal drainage, coughing. Increase in inhaler use.   Used ear drops last night and had improvement with use.   No recent otorrhea  It was popping/ crackling this AM and PM.       Patient has been following with endocrinology last visit was 8/26/2024.  They will continue to follow every 6 months with repeat imaging and labs.  Past Medical History:   Diagnosis Date    Allergic rhinitis 6/15/2011    Anxiety 9/17/2019    Benign essential hypertension 4/27/2017    Gastroesophageal reflux disease without esophagitis 9/21/2018    Major depressive disorder, recurrent episode, unspecified 6/15/2011    Obesity 4/27/2017    Reactive airway disease that is not asthma 2/12/2018    Seasonal allergic rhinitis due to pollen 4/27/2017    Unspecified vitamin D deficiency 4/11/2014        Allergies   Allergen Reactions    Ibuprofen Anaphylaxis    Nsaids Swelling     ROS- SEE HPI    /72 (BP Location: Right arm, Patient Position: Sitting, Cuff Size: Adult Large)   Pulse 76   Temp 97.5  F (36.4  C) (Tympanic)   Resp 14   Ht 1.676 m (5' 6\")   Wt 102.1 kg (225 lb)   SpO2 99%   BMI 36.32 kg/m    General - The patient is well nourished and well developed, and appears to have good nutritional status.    Head and Face - Normocephalic and atraumatic, with no gross asymmetry noted of the contour of the facial features.  The facial nerve is intact, with strong symmetric movements.  Eyes - Extraocular movements intact, and the pupils were reactive to light.  Sclera were not icteric or " injected, conjunctiva were pink and moist.  Mouth - Examination of the oral cavity shows pink, healthy, moist mucosa.  No lesions or ulceration noted.  The dentition are in good repair.  The tongue is mobile and midline.  Ears -left EAC is clear.  Left tympanic membrane intact with effusion, bulging and erythema.  Right EAC is greatly improved.  Right tympanic membrane appears with patent tube.     ASSESSMENT/ PLAN:      I       ICD-10-CM    1. Acute otitis media with effusion of left ear  H65.192 cefdinir (OMNICEF) 300 MG capsule      2. S/P myringotomy with insertion of tube  Z96.22       3. Chronic pansinusitis  J32.4             Start oral Omnicef twice daily for 10 days.  Continue with budesonide rinses nasal sprays and antihistamines.    Right tube appears patent at this time and no active otorrhea.  If otorrhea develops may start using Floxin drops.      Kaila Sanchez PA-C  ENT  [facility], [place]

## 2024-10-11 NOTE — PATIENT INSTRUCTIONS
Start Cefdinir twice a day for 10 days.   Continue with budesonide rinses, Flonase.     Thank you for allowing SUNNY Briones and our ENT team to participate in your care.  If your medications are too expensive, please give the nurse a call.  We can possibly change this medication.  If you have a scheduling or an appointment question please contact our Health Unit Coordinator at their direct line [phone].   ALL nursing questions or concerns can be directed to your ENT nurse, Charisma at: [phone].

## 2024-10-25 ENCOUNTER — OFFICE VISIT (OUTPATIENT)
Dept: OTOLARYNGOLOGY | Facility: OTHER | Age: 34
End: 2024-10-25
Attending: OTOLARYNGOLOGY
Payer: COMMERCIAL

## 2024-10-25 VITALS
RESPIRATION RATE: 16 BRPM | WEIGHT: 225.09 LBS | HEIGHT: 66 IN | BODY MASS INDEX: 36.17 KG/M2 | OXYGEN SATURATION: 100 % | TEMPERATURE: 98 F | SYSTOLIC BLOOD PRESSURE: 146 MMHG | HEART RATE: 71 BPM | DIASTOLIC BLOOD PRESSURE: 83 MMHG

## 2024-10-25 DIAGNOSIS — H65.192 ACUTE OTITIS MEDIA WITH EFFUSION OF LEFT EAR: Primary | ICD-10-CM

## 2024-10-25 DIAGNOSIS — E89.0 HISTORY OF THYROIDECTOMY: ICD-10-CM

## 2024-10-25 DIAGNOSIS — Z96.22 S/P MYRINGOTOMY WITH INSERTION OF TUBE: ICD-10-CM

## 2024-10-25 DIAGNOSIS — Z98.890 S/P FESS (FUNCTIONAL ENDOSCOPIC SINUS SURGERY): ICD-10-CM

## 2024-10-25 PROCEDURE — 92504 EAR MICROSCOPY EXAMINATION: CPT | Performed by: OTOLARYNGOLOGY

## 2024-10-25 PROCEDURE — 31231 NASAL ENDOSCOPY DX: CPT | Performed by: OTOLARYNGOLOGY

## 2024-10-25 PROCEDURE — 99213 OFFICE O/P EST LOW 20 MIN: CPT | Mod: 25 | Performed by: OTOLARYNGOLOGY

## 2024-10-25 RX ORDER — CIPROFLOXACIN AND DEXAMETHASONE 3; 1 MG/ML; MG/ML
4 SUSPENSION/ DROPS AURICULAR (OTIC) ONCE
Status: DISCONTINUED | OUTPATIENT
Start: 2024-10-25 | End: 2024-10-25

## 2024-10-25 ASSESSMENT — PAIN SCALES - GENERAL: PAINLEVEL_OUTOF10: NO PAIN (0)

## 2024-10-25 NOTE — PATIENT INSTRUCTIONS
Ear drops 4 drops twice a day for 3 days  Continue Budesonide twice a day    Thank you for allowing Dr. Schmitt and our ENT team to participate in your care.  If your medications are too expensive, please give the nurse a call.  We can possibly change this medication.  If you have a scheduling or an appointment question please contact our Health Unit Coordinator at their direct line [phone].   ALL nursing questions or concerns can be directed to your ENT nurse, Crow, at: [phone]

## 2024-10-25 NOTE — PROGRESS NOTES
"Otolaryngology Progress Note      Shilpi Moore is a 34 year old female  For evaluation of her ears.  History of right tube insertion on 5/2/2024.  She saw Kaila on 10/11/2024 for left acute otitis media associated with an upper respiratory tract infection.  The right tube is intact.  She was started on oral Omnicef.      Today she notes a sensation of some drainage from the right ear but no heavy or chronic otorrhea no otalgia.  The left ear is improved.  She denies hearing loss or otalgia.  The left ear feels slightly plugged.  She feels like she has thick mucus draining down the back of her throat for over 3 months.  No preceding URI.  She is using budesonide irrigations    Audiogram 7/23/2024 shows normal thresholds with large volume right B tympanogram type a left    pT1a left papillary thyroid microcarcinoma  -margins clear  -BRAF V600E positive   No thyroid concerns, follows with endocrine      Procedure 10/25/23:   Left thyroid lobectomy   1.5 hours nerve monitoring of the recurrent laryngeal nerve     Surgeon:  Daniella Schmitt D.O.  Assistant:  Christine Aguiar NP   Anesthesia:  General endotracheal  EBL:  50 ml  Findings: 6.0 cm left thyroid nodule comprising most of the left lobe      Procedures 1/3/24:    1.  Bilateral maxillary antrostomy with tissue removal  2.  Endoscopic septoplasty  3.  Bilateral submucous reduction inferior turbinates  4.  Partial excision bilateral middle turbinates with polypectomy      Physical Exam  BP (!) 146/83 (BP Location: Right arm, Patient Position: Sitting, Cuff Size: Adult Large)   Pulse 71   Temp 98  F (36.7  C) (Tympanic)   Resp 16   Ht 1.676 m (5' 5.98\")   Wt 102.1 kg (225 lb 1.4 oz)   SpO2 100%   BMI 36.35 kg/m    General - The patient is well nourished and well developed, and appears to have good nutritional status.  Alert and oriented to person and place, interactive.  Head and Face - Normocephalic and atraumatic, with no gross asymmetry noted of " the contour of the facial features.  The facial nerve is intact, with strong symmetric movements.  Neck-no palpable lymphadenopathy or thyroid mass.  Trachea is midline.  Healed thyroid incision, no palpable nodes or fixed thyroid nodules.  Post left lobectomy  Eyes - Extraocular movements intact.   Ears- examined under microscopy bilaterally  External auditory canals are patent, tympanic membranes are intact   Right TM with patent tube, no otorrhea or retractions  Left TM intact, no effusion or retraction  without effusion or worrisome retractions   Nose - Nasal mucosa is pink and moist with no abnormal mucus.  The septum was grossly midline and non-obstructive, turbinates of normal size and position.  No polyps, masses, or purulence noted on examination.  Mouth - Examination of the oral cavity shows pink, healthy, moist mucosa.  No lesions or ulceration noted.  The dentition are in good repair.  The tongue is mobile and midline.  Throat - The walls of the oropharynx were smooth, pink, moist, symmetric, and had no lesions or ulcerations.  The tonsillar pillars and soft palate were symmetric.  The uvula was midline on elevation.      To evaluate the nose and sinuses in the post operative state, I performed rigid nasal endoscopy. The nose was anesthetized with home afrin or topical lidocaine and neosynephrine in the office.    I began with the LEFT side using a 0 degree rigid nasal endoscope, and then similarly examined the RIGHT side    Findings:  Inferior turbinates:  2+  Septum midline  Middle turbinate and middle meatus:  No purulence, no polyposis, no synechiae  Antrostomy with viscous secretions bilaterally debrided with an 8 Turner  Ethmoid cavity clear  Mucosa is healthy throughout without polyps nor polypoid degeneration  Superior meatus is clear  Frontal recess clear  Sphenoethmoidal clear  Nasopharynx is clear    The patient tolerated procedure well      Impression/Plan  Shilpi Moore is a 34 year old  female    ICD-10-CM    1. Acute otitis media with effusion of left ear, resolved  H65.192 DISCONTINUED: phenol 89 % swab 1 Swab     DISCONTINUED: ciprofloxacin-dexAMETHasone (CIPRODEX) 0.3-0.1 % otic suspension 4 drop      2. S/P myringotomy with insertion of tube  Z96.22       3. History of left thyroidectomy  E89.0     pT1a left papillary thyroid microcarcinoma  -margins clear  -BRAF V600E positive   Left thyroid lobectomy on 10/25/2023      4. h/o FESS (functional endoscopic sinus surgery)  Z98.890               Left AOM resolved, right tube patent  Chronic maxillary sinusitis, suction debrided        Ear drops 4 drops twice a day for 3 days Right ear  Continue Budesonide twice a day    Follow-up prn  Follow-up with endocrine as scheduled for surveillance      Daniella Schmitt D.O.  Otolaryngology/Head and Neck Surgery  Allergy

## 2024-10-25 NOTE — LETTER
"10/25/2024      Shilpi Moore  [address] [facility] [address]      Dear Colleague,    Thank you for referring your patient, Shilpi Moore, to the [facility]. Please see a copy of my visit note below.    Otolaryngology Progress Note      Shilpi Moore is a 34 year old female  For evaluation of her ears.  History of right tube insertion on 5/2/2024.  She saw Kaila on 10/11/2024 for left acute otitis media associated with an upper respiratory tract infection.  The right tube is intact.  She was started on oral Omnicef.      Today she notes a sensation of some drainage from the right ear but no heavy or chronic otorrhea no otalgia.  The left ear is improved.  She denies hearing loss or otalgia.  The left ear feels slightly plugged.  She feels like she has thick mucus draining down the back of her throat for over 3 months.  No preceding URI.  She is using budesonide irrigations    Audiogram 7/23/2024 shows normal thresholds with large volume right B tympanogram type a left    pT1a left papillary thyroid microcarcinoma  -margins clear  -BRAF V600E positive   No thyroid concerns, follows with endocrine      Procedure 10/25/23:   Left thyroid lobectomy   1.5 hours nerve monitoring of the recurrent laryngeal nerve     Surgeon:  Daniella Schmitt D.O.  Assistant:  Christine Aguiar NP   Anesthesia:  General endotracheal  EBL:  50 ml  Findings: 6.0 cm left thyroid nodule comprising most of the left lobe      Procedures 1/3/24:    1.  Bilateral maxillary antrostomy with tissue removal  2.  Endoscopic septoplasty  3.  Bilateral submucous reduction inferior turbinates  4.  Partial excision bilateral middle turbinates with polypectomy      Physical Exam  BP (!) 146/83 (BP Location: Right arm, Patient Position: Sitting, Cuff Size: Adult Large)   Pulse 71   Temp 98  F (36.7  C) (Tympanic)   Resp 16   Ht 1.676 m (5' 5.98\")   Wt 102.1 kg (225 lb 1.4 oz)   SpO2 100%   BMI 36.35 kg/m  "   General - The patient is well nourished and well developed, and appears to have good nutritional status.  Alert and oriented to person and place, interactive.  Head and Face - Normocephalic and atraumatic, with no gross asymmetry noted of the contour of the facial features.  The facial nerve is intact, with strong symmetric movements.  Neck-no palpable lymphadenopathy or thyroid mass.  Trachea is midline.  Healed thyroid incision, no palpable nodes or fixed thyroid nodules.  Post left lobectomy  Eyes - Extraocular movements intact.   Ears- examined under microscopy bilaterally  External auditory canals are patent, tympanic membranes are intact   Right TM with patent tube, no otorrhea or retractions  Left TM intact, no effusion or retraction  without effusion or worrisome retractions   Nose - Nasal mucosa is pink and moist with no abnormal mucus.  The septum was grossly midline and non-obstructive, turbinates of normal size and position.  No polyps, masses, or purulence noted on examination.  Mouth - Examination of the oral cavity shows pink, healthy, moist mucosa.  No lesions or ulceration noted.  The dentition are in good repair.  The tongue is mobile and midline.  Throat - The walls of the oropharynx were smooth, pink, moist, symmetric, and had no lesions or ulcerations.  The tonsillar pillars and soft palate were symmetric.  The uvula was midline on elevation.      To evaluate the nose and sinuses in the post operative state, I performed rigid nasal endoscopy. The nose was anesthetized with home afrin or topical lidocaine and neosynephrine in the office.    I began with the LEFT side using a 0 degree rigid nasal endoscope, and then similarly examined the RIGHT side    Findings:  Inferior turbinates:  2+  Septum midline  Middle turbinate and middle meatus:  No purulence, no polyposis, no synechiae  Antrostomy with viscous secretions bilaterally debrided with an 8 Turner  Ethmoid cavity clear  Mucosa is healthy  throughout without polyps nor polypoid degeneration  Superior meatus is clear  Frontal recess clear  Sphenoethmoidal clear  Nasopharynx is clear    The patient tolerated procedure well      Impression/Plan  Shilpi Moore is a 34 year old female    ICD-10-CM    1. Acute otitis media with effusion of left ear, resolved  H65.192 DISCONTINUED: phenol 89 % swab 1 Swab     DISCONTINUED: ciprofloxacin-dexAMETHasone (CIPRODEX) 0.3-0.1 % otic suspension 4 drop      2. S/P myringotomy with insertion of tube  Z96.22       3. History of left thyroidectomy  E89.0     pT1a left papillary thyroid microcarcinoma  -margins clear  -BRAF V600E positive   Left thyroid lobectomy on 10/25/2023      4. h/o FESS (functional endoscopic sinus surgery)  Z98.890               Left AOM resolved, right tube patent  Chronic maxillary sinusitis, suction debrided        Ear drops 4 drops twice a day for 3 days Right ear  Continue Budesonide twice a day    Follow-up prn  Follow-up with endocrine as scheduled for surveillance      LAKESHA FernandezO.  Otolaryngology/Head and Neck Surgery  Allergy        Again, thank you for allowing me to participate in the care of your patient.        Sincerely,        Daniella Schmitt MD

## 2024-11-27 ENCOUNTER — MYC MEDICAL ADVICE (OUTPATIENT)
Dept: FAMILY MEDICINE | Facility: OTHER | Age: 34
End: 2024-11-27

## 2024-12-04 ENCOUNTER — PREP FOR PROCEDURE (OUTPATIENT)
Dept: SURGERY | Facility: OTHER | Age: 34
End: 2024-12-04

## 2024-12-04 ENCOUNTER — OFFICE VISIT (OUTPATIENT)
Dept: SURGERY | Facility: OTHER | Age: 34
End: 2024-12-04
Attending: SURGERY
Payer: COMMERCIAL

## 2024-12-04 ENCOUNTER — HOSPITAL ENCOUNTER (OUTPATIENT)
Facility: HOSPITAL | Age: 34
End: 2024-12-04
Attending: SURGERY | Admitting: SURGERY
Payer: COMMERCIAL

## 2024-12-04 VITALS
BODY MASS INDEX: 36.16 KG/M2 | DIASTOLIC BLOOD PRESSURE: 78 MMHG | HEIGHT: 66 IN | HEART RATE: 70 BPM | SYSTOLIC BLOOD PRESSURE: 132 MMHG | RESPIRATION RATE: 16 BRPM | WEIGHT: 225 LBS | OXYGEN SATURATION: 97 %

## 2024-12-04 DIAGNOSIS — K21.00 GASTROESOPHAGEAL REFLUX DISEASE WITH ESOPHAGITIS, UNSPECIFIED WHETHER HEMORRHAGE: Primary | ICD-10-CM

## 2024-12-04 DIAGNOSIS — K21.00 GASTROESOPHAGEAL REFLUX DISEASE WITH ESOPHAGITIS WITHOUT HEMORRHAGE: ICD-10-CM

## 2024-12-04 ASSESSMENT — PAIN SCALES - GENERAL: PAINLEVEL_OUTOF10: NO PAIN (0)

## 2024-12-04 NOTE — PATIENT INSTRUCTIONS
Thank you for allowing Dr Ravin Rosenberg and our surgical team to participate in your care. Please call our health unit coordinator at [phone] with scheduling questions or the nurse at [phone] with any other questions or concerns.      You have been scheduled for: UPPER ENDOSCOPY with Dr. Ravin Rosenberg on Monday December 30th.     You will be notified the weekday before the procedure for your check in time, you can also call admitting at  after 5:00PM    You will need to have and adult  to bring you home.    SURGERY EDUCATION NURSE TO CALL ONE WEEK PRIOR TO PROCEDURE, IF YOU DO NOT HEAR FROM THEM YOU CAN CALL  [phone]     Dec 23rd 8:30    Please see handout for additional instruction.    You WILL NOT need a pre-operative appointment with your primary care provider.    You may call [phone] or [phone] with any questions.

## 2024-12-04 NOTE — PROGRESS NOTES
CLINIC NOTE - CONSULT  12/4/2024    Patient : Shilpi Moore  Referring Physician : Tiana Tucker    Reason for Referral : Upper endoscopy    This is a 34 year old female with a need for an upper endoscopy.  Upper endoscopy is needed for GERD.      Last EGD : never  History of GERD : YES  History of PUD : NO  On PPI's : YES   Drug and Dose : Nexium 40 mg a day   If on H2 blockers or PPI's, have they helped: They help but she has had to add Pepcid once a day recently  History of dysphagia : NO   Dysphagia to solids greater than liquids : Not Applicable  Hematemesis : NO  Melena : NO    She has been having an increase in her asthma symptoms as her reflux has gotten worse.  She does feel that she has reflux into her posterior oropharynx.    Past Medical History:  Past Medical History:   Diagnosis Date    Allergic rhinitis 6/15/2011    Anxiety 9/17/2019    Benign essential hypertension 4/27/2017    Gastroesophageal reflux disease without esophagitis 9/21/2018    Major depressive disorder, recurrent episode, unspecified 6/15/2011    Obesity 4/27/2017    Reactive airway disease that is not asthma 2/12/2018    Seasonal allergic rhinitis due to pollen 4/27/2017    Unspecified vitamin D deficiency 4/11/2014       Past Surgical History:  Past Surgical History:   Procedure Laterality Date    CHOLECYSTECTOMY, LAPOROSCOPIC N/A [facility]27/2020    [facility]    ENDOSCOPIC SINUS SURGERY N/A 1/3/2024    Procedure: BILATERAL ENDOSCOPIC SINUS SURGERY;  Surgeon: Daniella Schmitt MD;  Location: [facility] OR    ENT SURGERY      tonsillectomy    OTHER SURGICAL HISTORY      I&D rt abscess axilla    SEPTOPLASTY, TURBINOPLASTY, COMBINED Bilateral 1/3/2024    Procedure: Septoplasty, Turbinate Reduction;  Surgeon: Daniella Schmitt MD;  Location: [facility] OR    SOFT TISSUE SURGERY      IND cyst right axilla    THYROIDECTOMY Left 10/25/2023    Procedure: Left Thyroid Lobectomy with 1.5 hours nerve monitoring;  Surgeon: Daniella Schmitt MD;   Location: [facility] OR       Family History History:  Family History   Problem Relation Age of Onset    Asthma Mother     Hypertension Father     Asthma Brother     Asthma Sister     Cancer Maternal Grandfather 62        Mesothelioma, cause of death    Cancer Other 28        Cousin; Melanoma, cause of death    Thyroid Disease No family hx of        History of Tobacco Use:  History   Smoking Status    Never   Smokeless Tobacco    Never       Current Medications:  Current Outpatient Medications   Medication Sig Dispense Refill    acetaminophen (TYLENOL) 500 MG tablet Take 500-1,000 mg by mouth every 6 hours as needed for mild pain      ALBUTEROL IN       azelastine (ASTELIN) 0.1 % nasal spray Spray 2 sprays into both nostrils 2 times daily 30 mL 12    budesonide (PULMICORT) 0.5 MG/2ML neb solution Squirt entire vial into maciel med saline solution, mix, and irrigate each nostril until entire bottle empty.  Do this twice daily. 200 mL 11    EPINEPHrine (ANY BX GENERIC EQUIV) 0.3 MG/0.3ML injection 2-pack Inject 0.3 mLs (0.3 mg) into the muscle once as needed 2 each 11    fexofenadine (ALLEGRA) 60 MG tablet Take 60 mg by mouth 2 times daily      fluticasone (FLONASE) 50 MCG/ACT nasal spray Spray 2 sprays into both nostrils daily 16 g 11    losartan (COZAAR) 50 MG tablet Take 1 tablet (50 mg) by mouth daily 90 tablet 1    metFORMIN (GLUCOPHAGE XR) 500 MG 24 hr tablet Take 2 tablets (1,000 mg) by mouth daily (with dinner) 180 tablet 1    Multiple Vitamins-Minerals (QC WOMENS DAILY MULTIVITAMIN PO) Take 1 Dose by mouth daily      omeprazole (PRILOSEC) 40 MG DR capsule TAKE ONE CAPSULE BY MOUTH EVERY DAY 30 TO 60 MINUTES BEFORE A MEAL 90 capsule 3    sertraline (ZOLOFT) 100 MG tablet Take 0.5 tablets (50 mg) by mouth 2 times daily 90 tablet 1    SUBLINGUAL IMMUNOTHERAPY, SLIT, Place 1 drop under the tongue 3 times daily Start allergy drops (SLIT). 1 drop under the tongue daily x 7 days, then 1 drop under the tongue twice daily x 7  "days, then 1 drop under the tongue three times daily ongoing. 1 vial PRN    VITAMIN D PO          Allergies:  Allergies   Allergen Reactions    Ibuprofen Anaphylaxis    Nsaids Swelling       ROS:  Pertinent items are noted in HPI.  All other systems are negative.    PHYSICAL EXAM:     Vital signs: /78 (BP Location: Right arm, Cuff Size: Adult Regular)   Pulse 70   Resp 16   Ht 1.676 m (5' 6\")   Wt 102.1 kg (225 lb)   SpO2 97%   BMI 36.32 kg/m     Weight: [unfilled]   BMI: Body mass index is 36.32 kg/m .   General: Normal, healthy, cooperative, in no acute distress, obese   Skin: no jaundice   HEENT: PERRLA and EOMI   Neck: supple   Lungs: Non labored     Assessment:   34 year old female with need for upper endoscopy for gerd.  The patient does have symptoms of true reflux and not just heartburn.    Plan:   Will schedule an esophagogastroduodenoscopy.  The procedures with their risks, benefits and alternatives were explained.  Risks include but are not limited to bleeding, perforation, missing lesions, need for additional procedures, reaction to anesthesia.  All the patients questions were answered.  The patient consents to proceed.  The procedure will be scheduled.    After the procedure we may need to discuss surgical options as she is relatively poorly controlled on adequate dose to medications.      "

## 2024-12-16 DIAGNOSIS — K21.9 GASTROESOPHAGEAL REFLUX DISEASE WITHOUT ESOPHAGITIS: ICD-10-CM

## 2024-12-16 RX ORDER — OMEPRAZOLE 40 MG/1
CAPSULE, DELAYED RELEASE ORAL
Qty: 90 CAPSULE | Refills: 3 | Status: SHIPPED | OUTPATIENT
Start: 2024-12-16

## 2024-12-16 NOTE — TELEPHONE ENCOUNTER
Omeprazole      Last Written Prescription Date:  11/16/23  Last Fill Quantity: 90,   # refills: 3  Last Office Visit: 12/06/24  Future Office visit:    Next 5 appointments (look out 90 days)      Feb 10, 2025 3:00 PM  (Arrive by 2:45 PM)  Provider Visit with Tiana Tucker CNP  [facility] ([facility] ) [address]  [facility] [address]  [phone]

## 2024-12-18 ENCOUNTER — ANESTHESIA EVENT (OUTPATIENT)
Dept: SURGERY | Facility: HOSPITAL | Age: 34
End: 2024-12-18
Payer: COMMERCIAL

## 2024-12-18 RX ORDER — NALOXONE HYDROCHLORIDE 0.4 MG/ML
0.1 INJECTION, SOLUTION INTRAMUSCULAR; INTRAVENOUS; SUBCUTANEOUS
Status: CANCELLED | OUTPATIENT
Start: 2024-12-18

## 2024-12-18 RX ORDER — ONDANSETRON 2 MG/ML
4 INJECTION INTRAMUSCULAR; INTRAVENOUS EVERY 30 MIN PRN
Status: CANCELLED | OUTPATIENT
Start: 2024-12-18

## 2024-12-18 RX ORDER — DEXAMETHASONE SODIUM PHOSPHATE 10 MG/ML
4 INJECTION, SOLUTION INTRAMUSCULAR; INTRAVENOUS
Status: CANCELLED | OUTPATIENT
Start: 2024-12-18

## 2024-12-18 RX ORDER — LIDOCAINE 40 MG/G
CREAM TOPICAL
Status: CANCELLED | OUTPATIENT
Start: 2024-12-18

## 2024-12-18 RX ORDER — ONDANSETRON 4 MG/1
4 TABLET, ORALLY DISINTEGRATING ORAL EVERY 30 MIN PRN
Status: CANCELLED | OUTPATIENT
Start: 2024-12-18

## 2024-12-18 RX ORDER — SODIUM CHLORIDE, SODIUM LACTATE, POTASSIUM CHLORIDE, CALCIUM CHLORIDE 600; 310; 30; 20 MG/100ML; MG/100ML; MG/100ML; MG/100ML
INJECTION, SOLUTION INTRAVENOUS CONTINUOUS
Status: CANCELLED | OUTPATIENT
Start: 2024-12-18

## 2024-12-18 NOTE — ANESTHESIA PREPROCEDURE EVALUATION
Anesthesia Pre-Procedure Evaluation    Patient: Shilpi Moore   MRN: [id number] : 1990        Procedure : Procedure(s):  ESOPHAGOGASTRODUODENOSCOPY          Past Medical History:   Diagnosis Date     Allergic rhinitis 6/15/2011     Anxiety 2019     Benign essential hypertension 2017     Gastroesophageal reflux disease without esophagitis 2018     Major depressive disorder, recurrent episode, unspecified 6/15/2011     Obesity 2017     Reactive airway disease that is not asthma 2018     Seasonal allergic rhinitis due to pollen 2017     Unspecified vitamin D deficiency 2014      Past Surgical History:   Procedure Laterality Date     CHOLECYSTECTOMY, LAPOROSCOPIC N/A 2020    [facility]     ENDOSCOPIC SINUS SURGERY N/A 1/3/2024    Procedure: BILATERAL ENDOSCOPIC SINUS SURGERY;  Surgeon: Daniella Schmitt MD;  Location: [facility]     ENT SURGERY      tonsillectomy     OTHER SURGICAL HISTORY      I&D rt abscess axilla     SEPTOPLASTY, TURBINOPLASTY, COMBINED Bilateral 1/3/2024    Procedure: Septoplasty, Turbinate Reduction;  Surgeon: Daniella Schmitt MD;  Location: [facility]     SOFT TISSUE SURGERY      IND cyst right axilla     THYROIDECTOMY Left 10/25/2023    Procedure: Left Thyroid Lobectomy with 1.5 hours nerve monitoring;  Surgeon: Daniella Schmitt MD;  Location: [facility]      Allergies   Allergen Reactions     Ibuprofen Anaphylaxis     Nsaids Swelling      Social History     Tobacco Use     Smoking status: Never     Smokeless tobacco: Never     Tobacco comments:     Passive exposure   Substance Use Topics     Alcohol use: Yes     Comment: Beer; Occasionally      Wt Readings from Last 1 Encounters:   24 107.4 kg (236 lb 11.2 oz)        Anesthesia Evaluation            ROS/MED HX  ENT/Pulmonary: Comment: RAD    (+)           allergic rhinitis,                             Neurologic:       Cardiovascular:       METS/Exercise Tolerance:     Hematologic:     "   Musculoskeletal:       GI/Hepatic:     (+) GERD,                   Renal/Genitourinary:       Endo:     (+)               Obesity,       Psychiatric/Substance Use:     (+) psychiatric history anxiety and depression       Infectious Disease:       Malignancy:       Other:               OUTSIDE LABS:  CBC:   Lab Results   Component Value Date    WBC 7.5 12/29/2023    WBC 7.4 10/18/2023    HGB 12.6 12/29/2023    HGB 13.1 10/18/2023    HCT 39.0 12/29/2023    HCT 40.5 10/18/2023     12/29/2023     10/18/2023     BMP:   Lab Results   Component Value Date     08/02/2024     12/29/2023    POTASSIUM 4.0 08/02/2024    POTASSIUM 4.1 12/29/2023    CHLORIDE 104 08/02/2024    CHLORIDE 103 12/29/2023    CO2 22 08/02/2024    CO2 25 12/29/2023    BUN 12.9 08/02/2024    BUN 8.7 12/29/2023    CR 0.75 08/02/2024    CR 0.70 12/29/2023    GLC 91 08/02/2024    GLC 87 12/29/2023     COAGS: No results found for: \"PTT\", \"INR\", \"FIBR\"  POC:   Lab Results   Component Value Date    HCG Negative 12/29/2023     HEPATIC:   Lab Results   Component Value Date    ALBUMIN 4.3 08/02/2024    PROTTOTAL 7.2 08/02/2024    ALT 13 08/02/2024    AST 16 08/02/2024    ALKPHOS 43 08/02/2024    BILITOTAL 0.2 08/02/2024     OTHER:   Lab Results   Component Value Date    A1C 5.2 12/11/2018    SYED 9.2 08/02/2024    TSH 3.39 08/02/2024    T4 0.96 09/07/2023       Anesthesia Plan                        Consents            Postoperative Care            Comments:    Other Comments: 12/4/24 Saw [facility]         PAULIE Burkett CRNA    I have reviewed the pertinent notes and labs in the chart from the past 30 days and (re)examined the patient.  Any updates or changes from those notes are reflected in this note.               # Hypertension: Noted on problem list           # Obesity: Estimated body mass index is 38.2 kg/m  as calculated from the following:    Height as of 12/4/24: 1.676 m (5' 6\").    Weight as of " 12/6/24: 107.4 kg (236 lb 11.2 oz).

## 2024-12-20 DIAGNOSIS — J33.9 NASAL POLYP: ICD-10-CM

## 2024-12-20 DIAGNOSIS — Z98.890 S/P FESS (FUNCTIONAL ENDOSCOPIC SINUS SURGERY): ICD-10-CM

## 2024-12-20 NOTE — TELEPHONE ENCOUNTER
Flonase      Last Written Prescription Date:  2/13/24  Last Fill Quantity: 16g,   # refills: 11  Last Office Visit: 12/6/24  Future Office visit:    Next 5 appointments (look out 90 days)      Feb 10, 2025 3:00 PM  (Arrive by 2:45 PM)  Provider Visit with Tiana Tucker CNP  [facility] ([facility] ) [address]  [facility] [address]  [phone]             Routing refill request to provider for review/approval because:

## 2024-12-23 RX ORDER — FLUTICASONE PROPIONATE 50 MCG
2 SPRAY, SUSPENSION (ML) NASAL DAILY
Qty: 16 G | Refills: 11 | Status: SHIPPED | OUTPATIENT
Start: 2024-12-23

## 2024-12-27 DIAGNOSIS — I10 BENIGN ESSENTIAL HYPERTENSION: ICD-10-CM

## 2024-12-27 DIAGNOSIS — E66.01 MORBID OBESITY (H): ICD-10-CM

## 2024-12-27 NOTE — TELEPHONE ENCOUNTER
losartan  Last Written Prescription Date: 8/2/24  Last Fill Quantity: 90 # of Refills: 1  Last Office Visit: 12/6/24    metformin  Last Written Prescription Date: 8/2/24  Last Fill Quantity: 180 # of Refills: 1  Last Office Visit: 12/6/24

## 2024-12-29 ENCOUNTER — MYC MEDICAL ADVICE (OUTPATIENT)
Dept: SURGERY | Facility: OTHER | Age: 34
End: 2024-12-29

## 2024-12-30 ENCOUNTER — ANESTHESIA (OUTPATIENT)
Dept: SURGERY | Facility: HOSPITAL | Age: 34
End: 2024-12-30
Payer: COMMERCIAL

## 2024-12-30 RX ORDER — METFORMIN HYDROCHLORIDE 500 MG/1
1000 TABLET, EXTENDED RELEASE ORAL
Qty: 180 TABLET | Refills: 0 | Status: SHIPPED | OUTPATIENT
Start: 2024-12-30

## 2024-12-30 RX ORDER — LOSARTAN POTASSIUM 50 MG/1
50 TABLET ORAL DAILY
Qty: 90 TABLET | Refills: 2 | Status: SHIPPED | OUTPATIENT
Start: 2024-12-30

## 2025-01-06 ENCOUNTER — PREP FOR PROCEDURE (OUTPATIENT)
Dept: SURGERY | Facility: OTHER | Age: 35
End: 2025-01-06

## 2025-01-06 DIAGNOSIS — K21.00 GASTROESOPHAGEAL REFLUX DISEASE WITH ESOPHAGITIS: Primary | ICD-10-CM

## 2025-01-15 NOTE — PROGRESS NOTES
Otolaryngology Progress Note      Shilpi Moore is a 34 year old female with chronic recurrent sinusitis with nasal polyps, aspirin exacerbated respiratory and asthma (sampters), s/p fess,  history of T1 a left papillary thyroid carcinoma post thyroid lobectomy presents for follow-up of her sinuses and ears  Evaluation of her ears.    History of right tube insertion on 5/2/2024.  She saw Kaila on 10/11/2024 after a left acute otitis media following a URI.  The right tube was intact at that time she was started on oral Omnicef.  I saw her on 10/25/2024 the left tympanic membrane was normal there was a patent right tube without otorrhea.  Endoscopy was performed secondary to prior history of sinus surgery and was clear the eustachian tubes were patent.    Audiogram 7/23/2024 shows normal thresholds with large volume right B tympanogram type a left     Currently, she notes over 1 months of facial pressure congestion copious postnasal discharge and ear pressure with left hearing loss.  No sudden hearing loss vertigo otorrhea or otalgia.  Her right ear has been stable aside from scant drainage for which she uses Floxin otic rarely.    No weight loss fever chills or night sweats no dysphagia or dysphonia no concerns with her thyroid.      ENT surgical hx:    pT1a left papillary thyroid microcarcinoma  -margins clear  -BRAF V600E positive   Left thyroid lobectomy on 10/25/2023    Thyroid ultrasound with Dr. Silveira 8/23/2024 is negative for concerning lymphadenopathy.  No right mass or isthmus mass.  Remaining isthmus is 2.7 mm.  Status post left lobectomy with a isoechoic 1.2 cm area within the left thyroid bed questionable remnant thyroid tissue.  This is adjacent from previous ultrasound.      Fess 1/3/2024    Procedures 1/3/24:    1.  Bilateral maxillary antrostomy with tissue removal  2.  Endoscopic septoplasty  3.  Bilateral submucous reduction inferior turbinates  4.  Partial excision bilateral middle turbinates  "with polypectomy      Physical Exam  BP (!) 142/84 (BP Location: Left arm, Patient Position: Sitting, Cuff Size: Adult Large)   Pulse 70   Temp 98  F (36.7  C) (Tympanic)   Resp 16   Ht 1.676 m (5' 5.98\")   Wt 107.4 kg (236 lb 12.4 oz)   SpO2 99%   BMI 38.23 kg/m    General - The patient is well nourished and well developed, and appears to have good nutritional status.  Alert and oriented to person and place, interactive.  Head and Face - Normocephalic and atraumatic, with no gross asymmetry noted of the contour of the facial features.  The facial nerve is intact, with strong symmetric movements.  Neck-no palpable lymphadenopathy or thyroid mass.  Healed thyroid incision trachea is midline.  Eyes - Extraocular movements intact.   Ears- examined under microscopy bilaterally  Right tube is clear with scant mucoid otorrhea removed with a 3 Turner     Left tympanic membrane dull with full mucoid effusion     Nose - Nasal mucosa is pink and moist with viscous mucus.       LEFT Myringotomy with Tube Placement    Procedure - I discussed the risks and complications of  Left tympanostomy tube insertion  Including topical anesthesia, bleeding, infection, change in hearing or hearing loss, tympanic membrane perforation, need for additional surgery, chronic ear drainage, tube occlusion or need for tube reinsertion, cholesteatoma.   All questions were answered and the patient/and or guardian wishes are to proceed with surgical intervention.   After discussion of the risks and benefits of myringotomy, informed consent was signed and placed in the chart.    I proceeded to position the patient in a supine position in the examination chair.  Using the binocular surgical microscope, I then proceeded to clean the  LEFT canal of cerumen and squamous debris.  I was able to see the tympanic membrane.  Using a small cotton tipped applicator, I applied a tiny coating of phenol onto the tympanic membrane.  After visualizing a good " mariana, I then proceeded to use a myringotomy knife to make a radially oriented incision in the tympanic membrane.  Filling mucoid effusion was noted and removed with a #5 and #3 suction.  Next, I proceeded to place a 1.14 mm tube through the incision.  She noted improved hearing. After confirming good positioning and a clearly visible open tube, otic drops were applied and a cotton ball was inserted into the canal.      To evaluate the nose and sinuses in the post operative state, I performed rigid nasal endoscopy. The nose was anesthetized with home afrin or topical lidocaine and neosynephrine in the office.    I began with the LEFT side using a 0 degree rigid nasal endoscope, and then similarly examined the RIGHT side    Findings:  Inferior turbinates: Lateralized   septum midline  Viscous secretions removed with a 10 and 8 Turner from the middle meatus and maxillary enterostomy's.  Right ethmoid with pockets of purulence removed with suction.  The mucosa has moderate to severe polypoid degeneration throughout.  Purulent drainage in the sphenoethmoidal recess and nasopharynx cleared with suction eustachian tubes are patent and edematous no mass   Frontal recess is edematous  The patient tolerated procedure well      Impression/Plan  Shilpi Moore is a 34 year old female    ICD-10-CM    1. S/P FESS (functional endoscopic sinus surgery)  Z98.890 ofloxacin (FLOXIN) 0.3 % otic solution 5 drop     phenol 89 % swab 1 Swab     lidocaine 2%-oxymetazoline 0.025% nasal solution 2 spray     amoxicillin-clavulanate (AUGMENTIN) 875-125 MG tablet     predniSONE (DELTASONE) 10 MG tablet      2. COME (chronic otitis media with effusion), right  H65.491       3. S/P myringotomy with insertion of tube  Z96.22 DISCONTINUED: ciprofloxacin-dexAMETHasone (CIPRODEX) 0.3-0.1 % otic suspension      4. Chronic pansinusitis  J32.4 amoxicillin-clavulanate (AUGMENTIN) 875-125 MG tablet     predniSONE (DELTASONE) 10 MG tablet      5.  Aspirin-exacerbated respiratory disease (AERD)  J45.909     J33.9     Z88.6       6. Hx of papillary thyroid carcinoma, s/p left lobectomy 10/25/23  Z85.850             Follow-up with me in one month  Consider repeat CT, revision FESS and Dupixient post op     Complete Augmentin complete prednisone taper take with/after breakfast  Continue budesonide irrigations twice a day  Complete ciprodex ear drops    Thyroid stable, no new nodule or mass, follow-up with Dr. Silveira       Total exam time spent on the day of the visit including review of the chart, reviewing pertinent prior imaging and notes, obtaining a detailed history and physical exam, education, discussion with the patient and documenting in epic was over 45 minutes .  This did not include procedure time.    Daniella Schmitt D.O.  Otolaryngology/Head and Neck Surgery  Allergy

## 2025-01-16 ENCOUNTER — OFFICE VISIT (OUTPATIENT)
Dept: OTOLARYNGOLOGY | Facility: OTHER | Age: 35
End: 2025-01-16
Attending: OTOLARYNGOLOGY
Payer: COMMERCIAL

## 2025-01-16 VITALS
TEMPERATURE: 98 F | OXYGEN SATURATION: 99 % | BODY MASS INDEX: 38.05 KG/M2 | SYSTOLIC BLOOD PRESSURE: 142 MMHG | RESPIRATION RATE: 16 BRPM | HEART RATE: 70 BPM | DIASTOLIC BLOOD PRESSURE: 84 MMHG | HEIGHT: 66 IN | WEIGHT: 236.77 LBS

## 2025-01-16 DIAGNOSIS — Z96.22 S/P MYRINGOTOMY WITH INSERTION OF TUBE: ICD-10-CM

## 2025-01-16 DIAGNOSIS — Z98.890 S/P FESS (FUNCTIONAL ENDOSCOPIC SINUS SURGERY): Primary | ICD-10-CM

## 2025-01-16 DIAGNOSIS — J33.9 ASPIRIN-EXACERBATED RESPIRATORY DISEASE (AERD): ICD-10-CM

## 2025-01-16 DIAGNOSIS — Z85.850 HX OF PAPILLARY THYROID CARCINOMA: ICD-10-CM

## 2025-01-16 DIAGNOSIS — J45.909 ASPIRIN-EXACERBATED RESPIRATORY DISEASE (AERD): ICD-10-CM

## 2025-01-16 DIAGNOSIS — H65.491 COME (CHRONIC OTITIS MEDIA WITH EFFUSION), RIGHT: ICD-10-CM

## 2025-01-16 DIAGNOSIS — Z88.6 ASPIRIN-EXACERBATED RESPIRATORY DISEASE (AERD): ICD-10-CM

## 2025-01-16 DIAGNOSIS — J32.4 CHRONIC PANSINUSITIS: ICD-10-CM

## 2025-01-16 PROCEDURE — 250N000009 HC RX 250: Performed by: OTOLARYNGOLOGY

## 2025-01-16 RX ORDER — CIPROFLOXACIN AND DEXAMETHASONE 3; 1 MG/ML; MG/ML
4 SUSPENSION/ DROPS AURICULAR (OTIC) 2 TIMES DAILY
Qty: 7.5 ML | Refills: 2 | Status: SHIPPED | OUTPATIENT
Start: 2025-01-16 | End: 2025-01-16

## 2025-01-16 RX ORDER — PREDNISONE 10 MG/1
TABLET ORAL
Qty: 19 TABLET | Refills: 0 | Status: SHIPPED | OUTPATIENT
Start: 2025-01-16 | End: 2025-01-26

## 2025-01-16 RX ORDER — OFLOXACIN 3 MG/ML
5 SOLUTION AURICULAR (OTIC) ONCE
Status: COMPLETED | OUTPATIENT
Start: 2025-01-16 | End: 2025-01-16

## 2025-01-16 RX ADMIN — OFLOXACIN 5 DROP: 3 SOLUTION AURICULAR (OTIC) at 09:28

## 2025-01-16 ASSESSMENT — PAIN SCALES - GENERAL: PAINLEVEL_OUTOF10: NO PAIN (0)

## 2025-01-16 NOTE — LETTER
1/16/2025      Shilpi Moore  [address] [facility] [address]      Dear Colleague,    Thank you for referring your patient, Shilpi Moore, to the [facility]. Please see a copy of my visit note below.    Otolaryngology Progress Note      Shilpi Moore is a 34 year old female with chronic recurrent sinusitis with nasal polyps, aspirin exacerbated respiratory and asthma (sampters), s/p fess,  history of T1 a left papillary thyroid carcinoma post thyroid lobectomy presents for follow-up of her sinuses and ears  Evaluation of her ears.    History of right tube insertion on 5/2/2024.  She saw Kaila on 10/11/2024 after a left acute otitis media following a URI.  The right tube was intact at that time she was started on oral Omnicef.  I saw her on 10/25/2024 the left tympanic membrane was normal there was a patent right tube without otorrhea.  Endoscopy was performed secondary to prior history of sinus surgery and was clear the eustachian tubes were patent.    Audiogram 7/23/2024 shows normal thresholds with large volume right B tympanogram type a left     Currently, she notes over 1 months of facial pressure congestion copious postnasal discharge and ear pressure with left hearing loss.  No sudden hearing loss vertigo otorrhea or otalgia.  Her right ear has been stable aside from scant drainage for which she uses Floxin otic rarely.    No weight loss fever chills or night sweats no dysphagia or dysphonia no concerns with her thyroid.      ENT surgical hx:    pT1a left papillary thyroid microcarcinoma  -margins clear  -BRAF V600E positive   Left thyroid lobectomy on 10/25/2023    Thyroid ultrasound with Dr. Silveira 8/23/2024 is negative for concerning lymphadenopathy.  No right mass or isthmus mass.  Remaining isthmus is 2.7 mm.  Status post left lobectomy with a isoechoic 1.2 cm area within the left thyroid bed questionable remnant thyroid tissue.  This is adjacent from previous  "ultrasound.      Fess 1/3/2024    Procedures 1/3/24:    1.  Bilateral maxillary antrostomy with tissue removal  2.  Endoscopic septoplasty  3.  Bilateral submucous reduction inferior turbinates  4.  Partial excision bilateral middle turbinates with polypectomy      Physical Exam  BP (!) 142/84 (BP Location: Left arm, Patient Position: Sitting, Cuff Size: Adult Large)   Pulse 70   Temp 98  F (36.7  C) (Tympanic)   Resp 16   Ht 1.676 m (5' 5.98\")   Wt 107.4 kg (236 lb 12.4 oz)   SpO2 99%   BMI 38.23 kg/m    General - The patient is well nourished and well developed, and appears to have good nutritional status.  Alert and oriented to person and place, interactive.  Head and Face - Normocephalic and atraumatic, with no gross asymmetry noted of the contour of the facial features.  The facial nerve is intact, with strong symmetric movements.  Neck-no palpable lymphadenopathy or thyroid mass.  Healed thyroid incision trachea is midline.  Eyes - Extraocular movements intact.   Ears- examined under microscopy bilaterally  Right tube is clear with scant mucoid otorrhea removed with a 3 Turner     Left tympanic membrane dull with full mucoid effusion     Nose - Nasal mucosa is pink and moist with viscous mucus.       LEFT Myringotomy with Tube Placement    Procedure - I discussed the risks and complications of  Left tympanostomy tube insertion  Including topical anesthesia, bleeding, infection, change in hearing or hearing loss, tympanic membrane perforation, need for additional surgery, chronic ear drainage, tube occlusion or need for tube reinsertion, cholesteatoma.   All questions were answered and the patient/and or guardian wishes are to proceed with surgical intervention.   After discussion of the risks and benefits of myringotomy, informed consent was signed and placed in the chart.    I proceeded to position the patient in a supine position in the examination chair.  Using the binocular surgical microscope, I " then proceeded to clean the  LEFT canal of cerumen and squamous debris.  I was able to see the tympanic membrane.  Using a small cotton tipped applicator, I applied a tiny coating of phenol onto the tympanic membrane.  After visualizing a good mariana, I then proceeded to use a myringotomy knife to make a radially oriented incision in the tympanic membrane.  Filling mucoid effusion was noted and removed with a #5 and #3 suction.  Next, I proceeded to place a 1.14 mm tube through the incision.  She noted improved hearing. After confirming good positioning and a clearly visible open tube, otic drops were applied and a cotton ball was inserted into the canal.      To evaluate the nose and sinuses in the post operative state, I performed rigid nasal endoscopy. The nose was anesthetized with home afrin or topical lidocaine and neosynephrine in the office.    I began with the LEFT side using a 0 degree rigid nasal endoscope, and then similarly examined the RIGHT side    Findings:  Inferior turbinates: Lateralized   septum midline  Viscous secretions removed with a 10 and 8 Turner from the middle meatus and maxillary enterostomy's.  Right ethmoid with pockets of purulence removed with suction.  The mucosa has moderate to severe polypoid degeneration throughout.  Purulent drainage in the sphenoethmoidal recess and nasopharynx cleared with suction eustachian tubes are patent and edematous no mass   Frontal recess is edematous  The patient tolerated procedure well      Impression/Plan  Shilpi Moore is a 34 year old female    ICD-10-CM    1. S/P FESS (functional endoscopic sinus surgery)  Z98.890 ofloxacin (FLOXIN) 0.3 % otic solution 5 drop     phenol 89 % swab 1 Swab     lidocaine 2%-oxymetazoline 0.025% nasal solution 2 spray     amoxicillin-clavulanate (AUGMENTIN) 875-125 MG tablet     predniSONE (DELTASONE) 10 MG tablet      2. COME (chronic otitis media with effusion), right  H65.491       3. S/P myringotomy with  insertion of tube  Z96.22 DISCONTINUED: ciprofloxacin-dexAMETHasone (CIPRODEX) 0.3-0.1 % otic suspension      4. Chronic pansinusitis  J32.4 amoxicillin-clavulanate (AUGMENTIN) 875-125 MG tablet     predniSONE (DELTASONE) 10 MG tablet      5. Aspirin-exacerbated respiratory disease (AERD)  J45.909     J33.9     Z88.6       6. Hx of papillary thyroid carcinoma, s/p left lobectomy 10/25/23  Z85.850             Follow-up with me in one month  Consider repeat CT, revision FESS and Dupixient post op     Complete Augmentin complete prednisone taper take with/after breakfast  Continue budesonide irrigations twice a day  Complete ciprodex ear drops    Thyroid stable, no new nodule or mass, follow-up with Dr. Silveira       Total exam time spent on the day of the visit including review of the chart, reviewing pertinent prior imaging and notes, obtaining a detailed history and physical exam, education, discussion with the patient and documenting in epic was over 45 minutes .  This did not include procedure time.    Daniella Schmitt D.O.  Otolaryngology/Head and Neck Surgery  Allergy            Again, thank you for allowing me to participate in the care of your patient.        Sincerely,        Daniella Schmitt MD    Electronically signed

## 2025-01-16 NOTE — PATIENT INSTRUCTIONS
Follow-up with Dr. Schmitt in one month    Complete Augmentin complete prednisone taper take with/after breakfast  Continue budesonide irrigations twice a day  Complete ciprodex ear drops    Instructions for Myringotomy Tubes ( Ear Tubes)      Take one dose of liquid or chewable TYLENOL based on weight the morning of surgery.   If you can swallow pills you may take tylenol tablets with a small sip of water.  If you have any dosing questions ask your pharmacist.         Recovery - The placement of ear tubes is a brief operation, and therefore the recovery from the anesthetic is usually less than a day.  However, in young children the sleep patterns, feeding, and behavior can be altered for several days.  Try to return to the daily routine as soon as possible and this issue will resolve without problems.  There are no restrictions to diet or activity after ear tube placement.    Medications - Children and adults can return to all preoperative medications after this procedure, including blood thinners.  You were sent home with ear drops, please use them as directed to assist in the rapid healing of the ear drum around the tube.  Pain medication may have been sent home with you, but a vast majority of the time, over the counter Tylenol or ibuprofen (advil) I sufficient. Finish prescription ear drops (5 drops twice a day).     Complications - A low grade fever (up to 100 degrees ) is not unusual in the day after tubes are placed.  Treat this with cool wash cloths to the forehead and Tylenol.  If the fever is higher, or does not respond to medication, call the Doctor s office or call service after hours.  A small amount of bloody drainage can occur for a day or two after ear tubes, and is perfectly normal, continue the ear drops as directed and it will clear up.    Water Precautions - Recent clinical research has shown that absolute water precautions are not always necessary.  Ear plugs or water head bands are not  necessary unless the ear is actively draining, or if your child does not like the sensation of water in the ear.    Follow up - Approximately 4-6 weeks after the tubes are placed I like to examine the ears to make sure there are no signs of complications, which are extremely rare.  You should already have an appointment in 4-6 weeks with ENT PA or NP and audiology.  If not, call our office at [phone].  In some unusual cases the ears  reject  the tubes.  Depending on the situation, a hearing test may or may not be performed at that time.  Afterwards, follow up is done every 6 months, but of course earlier if there are any issues or problems.    Advantages of Tubes - After ear tube placement, there are certain benefits from having a direct communication of the middle ear space with the ear canal.  In the event of drainage from the ears with ear tubes in place ( which is common with colds and flus ) use the ear drops you were discharged home with using the same dosage and instructions.  This will clear up the ears without the need for oral antibiotics a majority of the time.  Another advantage is that with tubes in place, the ears automatically adjust to changes in atmospheric pressure ( such as in airplanes or elevation ).  In other words, if the tubes are open the ears will not hurt or pop!      Thank you for allowing Dr. Schmitt and our ENT team to participate in your care.  If your medications are too expensive, please give the nurse a call.  We can possibly change this medication.  If you have a scheduling or an appointment question please contact our Health Unit Coordinator at their direct line [phone].   ALL nursing questions or concerns can be directed to your ENT nurse, Crow, at: [phone]

## 2025-01-22 ENCOUNTER — TELEPHONE (OUTPATIENT)
Dept: ALLERGY | Facility: OTHER | Age: 35
End: 2025-01-22

## 2025-01-22 NOTE — TELEPHONE ENCOUNTER
Called and left patient a message to inform them of their allergy drop refill. A call back number was left.

## 2025-02-05 DIAGNOSIS — J32.4 CHRONIC PANSINUSITIS: ICD-10-CM

## 2025-02-06 ENCOUNTER — ANESTHESIA EVENT (OUTPATIENT)
Dept: SURGERY | Facility: HOSPITAL | Age: 35
End: 2025-02-06
Payer: COMMERCIAL

## 2025-02-06 NOTE — ANESTHESIA PREPROCEDURE EVALUATION
Anesthesia Pre-Procedure Evaluation    Patient: Shilpi Moore   MRN: [id number] : 1990        Procedure : Procedure(s):  Upper endoscopy          Past Medical History:   Diagnosis Date     Allergic rhinitis 6/15/2011     Anxiety 2019     Benign essential hypertension 2017     Gastroesophageal reflux disease without esophagitis 2018     Major depressive disorder, recurrent episode, unspecified 6/15/2011     Obesity 2017     Reactive airway disease that is not asthma 2018     Seasonal allergic rhinitis due to pollen 2017     Unspecified vitamin D deficiency 2014      Past Surgical History:   Procedure Laterality Date     [email or web address]     ENDOSCOPIC SINUS SURGERY N/A 1/3/2024    Procedure: BILATERAL ENDOSCOPIC SINUS SURGERY;  Surgeon: Daniella Schmitt MD;  Location: [facility]     ENT SURGERY      tonsillectomy     OTHER SURGICAL HISTORY      I&D rt abscess axilla     SEPTOPLASTY, TURBINOPLASTY, COMBINED Bilateral 1/3/2024    Procedure: Septoplasty, Turbinate Reduction;  Surgeon: Daniella Schmitt MD;  Location: [facility]     SOFT TISSUE SURGERY      IND cyst right axilla     THYROIDECTOMY Left 10/25/2023    Procedure: Left Thyroid Lobectomy with 1.5 hours nerve monitoring;  Surgeon: Daniella Schmitt MD;  Location: [facility]      Allergies   Allergen Reactions     Ibuprofen Anaphylaxis     Nsaids Swelling      Social History     Tobacco Use     Smoking status: Never     Smokeless tobacco: Never     Tobacco comments:     Passive exposure   Substance Use Topics     Alcohol use: Yes     Comment: Beer; Occasionally      Wt Readings from Last 1 Encounters:   25 107.4 kg (236 lb 12.4 oz)        Anesthesia Evaluation   Pt has had prior anesthetic. Type: General.    No history of anesthetic complications       ROS/MED HX  ENT/Pulmonary: Comment: RAD  Chronic recurrent sinusitis  COME  Aspirin-exacerbated respiratory disease  (AERD)       1/16/25 ENT - augmentin and prednisone taper for sinuses    (+)     KATHY risk factors,  hypertension, obese,   allergic rhinitis,                             Neurologic:  - neg neurologic ROS     Cardiovascular:     (+)  hypertension-range: losartan/ -   -  - -                                 Previous cardiac testing   Echo: Date: Results:    Stress Test:  Date: Results:    ECG Reviewed:  Date: 2017 Results:  HR 69, NSR  Cath:  Date: Results:      METS/Exercise Tolerance:     Hematologic:  - neg hematologic  ROS     Musculoskeletal:  - neg musculoskeletal ROS     GI/Hepatic:     (+) GERD (omeprazole), Asymptomatic on medication,                  Renal/Genitourinary:       Endo: Comment: 8/26/24 Endo visit (upcoming visit scheduled 2/12/25)    (+)          thyroid problem, hypothyroidism lobectomy - TSH 3.39 o 8/2/24,    Obesity,       Psychiatric/Substance Use:     (+) psychiatric history anxiety and depression       Infectious Disease:  - neg infectious disease ROS     Malignancy:   (+) Malignancy, History of Other.Other CA 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    Other:  - neg other ROS          Physical Exam    Airway  airway exam normal      Mallampati: II   TM distance: > 3 FB   Neck ROM: full   Mouth opening: > 3 cm    Respiratory Devices and Support         Dental       (+) Minor Abnormalities - some fillings, tiny chips      Cardiovascular   cardiovascular exam normal       Rhythm and rate: regular and normal     Pulmonary   pulmonary exam normal        breath sounds clear to auscultation       OUTSIDE LABS:  CBC:   Lab Results   Component Value Date    WBC 7.5 12/29/2023    WBC 7.4 10/18/2023    HGB 12.6 12/29/2023    HGB 13.1 10/18/2023    HCT 39.0 12/29/2023    HCT 40.5 10/18/2023     12/29/2023     10/18/2023     BMP:   Lab Results   Component Value Date     08/02/2024     12/29/2023    POTASSIUM 4.0 08/02/2024    POTASSIUM 4.1 12/29/2023     "CHLORIDE 104 08/02/2024    CHLORIDE 103 12/29/2023    CO2 22 08/02/2024    CO2 25 12/29/2023    BUN 12.9 08/02/2024    BUN 8.7 12/29/2023    CR 0.75 08/02/2024    CR 0.70 12/29/2023    GLC 91 08/02/2024    GLC 87 12/29/2023     COAGS: No results found for: \"PTT\", \"INR\", \"FIBR\"  POC:   Lab Results   Component Value Date    HCG Negative 12/29/2023     HEPATIC:   Lab Results   Component Value Date    ALBUMIN 4.3 08/02/2024    PROTTOTAL 7.2 08/02/2024    ALT 13 08/02/2024    AST 16 08/02/2024    ALKPHOS 43 08/02/2024    BILITOTAL 0.2 08/02/2024     OTHER:   Lab Results   Component Value Date    A1C 5.2 12/11/2018    SYED 9.2 08/02/2024    TSH 3.39 08/02/2024    T4 0.96 09/07/2023       Anesthesia Plan    ASA Status:  2    NPO Status:  NPO Appropriate    Anesthesia Type: MAC.     - Reason for MAC: straight local not clinically adequate              Consents    Anesthesia Plan(s) and associated risks, benefits, and realistic alternatives discussed. Questions answered and patient/representative(s) expressed understanding.     - Discussed: Risks, Benefits and Alternatives for BOTH SEDATION and the PROCEDURE were discussed     - Discussed with:  Patient      - Extended Intubation/Ventilatory Support Discussed: No.      - Patient is DNR/DNI Status: No     Use of blood products discussed: No .     Postoperative Care            Comments:    Other Comments:     HCG ordered           PAULIE Noyola CNP    I have reviewed the pertinent notes and labs in the chart from the past 30 days.  Any updates or changes from those notes are reflected in this note.    Clinically Significant Risk Factors Present on Admission                   # Hypertension: Noted on problem list           # Obesity: Estimated body mass index is 38.23 kg/m  as calculated from the following:    Height as of 1/16/25: 1.676 m (5' 5.98\").    Weight as of 1/16/25: 107.4 kg (236 lb 12.4 oz).                "

## 2025-02-11 NOTE — TELEPHONE ENCOUNTER
Budesonide      Last Written Prescription Date:  12.30.24  Last Fill Quantity: #120mL,   # refills: 0  Last Office Visit: 1.16.25  Future Office visit:    Next 5 appointments (look out 90 days)      Feb 18, 2025 2:30 PM  (Arrive by 2:15 PM)  Provider Visit with Tiana Tucker CNP  [facility] ([facility] ) [address] [address]  [phone]     Feb 20, 2025 1:15 PM  (Arrive by 1:00 PM)  Return Visit with Daniella Schmitt MD  [facility] [address] [place] [address]  [phone]     May 05, 2025 3:30 PM  (Arrive by 3:15 PM)  Return Visit with Rajani Aguiar NP  [facility] ([facility] ) [address] [place] [address]  [phone]             Routing refill request to provider for review/approval because:

## 2025-02-12 ENCOUNTER — TRANSFERRED RECORDS (OUTPATIENT)
Dept: HEALTH INFORMATION MANAGEMENT | Facility: CLINIC | Age: 35
End: 2025-02-12
Payer: COMMERCIAL

## 2025-02-12 RX ORDER — BUDESONIDE 0.5 MG/2ML
INHALANT ORAL
Qty: 200 ML | Refills: 0 | Status: SHIPPED | OUTPATIENT
Start: 2025-02-12

## 2025-02-13 ENCOUNTER — ANESTHESIA (OUTPATIENT)
Dept: SURGERY | Facility: HOSPITAL | Age: 35
End: 2025-02-13
Payer: COMMERCIAL

## 2025-02-13 ENCOUNTER — HOSPITAL ENCOUNTER (OUTPATIENT)
Facility: HOSPITAL | Age: 35
Discharge: HOME | End: 2025-02-13
Attending: SURGERY | Admitting: SURGERY
Payer: COMMERCIAL

## 2025-02-13 ENCOUNTER — APPOINTMENT (OUTPATIENT)
Dept: LAB | Facility: HOSPITAL | Age: 35
End: 2025-02-13
Attending: SURGERY
Payer: COMMERCIAL

## 2025-02-13 VITALS
WEIGHT: 240 LBS | SYSTOLIC BLOOD PRESSURE: 111 MMHG | BODY MASS INDEX: 37.67 KG/M2 | TEMPERATURE: 98.9 F | HEART RATE: 61 BPM | RESPIRATION RATE: 16 BRPM | HEIGHT: 67 IN | OXYGEN SATURATION: 93 % | DIASTOLIC BLOOD PRESSURE: 69 MMHG

## 2025-02-13 DIAGNOSIS — J32.4 CHRONIC PANSINUSITIS: ICD-10-CM

## 2025-02-13 LAB — HCG UR QL: NEGATIVE

## 2025-02-13 PROCEDURE — 999N000141 HC STATISTIC PRE-PROCEDURE NURSING ASSESSMENT: Performed by: SURGERY

## 2025-02-13 PROCEDURE — 258N000003 HC RX IP 258 OP 636: Performed by: NURSE PRACTITIONER

## 2025-02-13 PROCEDURE — 272N000001 HC OR GENERAL SUPPLY STERILE: Performed by: SURGERY

## 2025-02-13 PROCEDURE — 43239 EGD BIOPSY SINGLE/MULTIPLE: CPT | Performed by: SURGERY

## 2025-02-13 PROCEDURE — 81025 URINE PREGNANCY TEST: CPT | Performed by: NURSE PRACTITIONER

## 2025-02-13 PROCEDURE — 370N000017 HC ANESTHESIA TECHNICAL FEE, PER MIN: Performed by: SURGERY

## 2025-02-13 PROCEDURE — 710N000012 HC RECOVERY PHASE 2, PER MINUTE: Performed by: SURGERY

## 2025-02-13 PROCEDURE — 360N000075 HC SURGERY LEVEL 2, PER MIN: Performed by: SURGERY

## 2025-02-13 PROCEDURE — 250N000011 HC RX IP 250 OP 636: Performed by: NURSE ANESTHETIST, CERTIFIED REGISTERED

## 2025-02-13 PROCEDURE — 250N000009 HC RX 250: Performed by: NURSE ANESTHETIST, CERTIFIED REGISTERED

## 2025-02-13 PROCEDURE — 88305 TISSUE EXAM BY PATHOLOGIST: CPT | Mod: TC | Performed by: SURGERY

## 2025-02-13 PROCEDURE — 88305 TISSUE EXAM BY PATHOLOGIST: CPT | Mod: 26 | Performed by: PATHOLOGY

## 2025-02-13 RX ORDER — HYDROMORPHONE HYDROCHLORIDE 1 MG/ML
0.2 INJECTION, SOLUTION INTRAMUSCULAR; INTRAVENOUS; SUBCUTANEOUS EVERY 5 MIN PRN
Status: DISCONTINUED | OUTPATIENT
Start: 2025-02-13 | End: 2025-02-13 | Stop reason: HOSPADM

## 2025-02-13 RX ORDER — HYDROMORPHONE HYDROCHLORIDE 1 MG/ML
0.4 INJECTION, SOLUTION INTRAMUSCULAR; INTRAVENOUS; SUBCUTANEOUS EVERY 5 MIN PRN
Status: DISCONTINUED | OUTPATIENT
Start: 2025-02-13 | End: 2025-02-13 | Stop reason: HOSPADM

## 2025-02-13 RX ORDER — OXYCODONE HYDROCHLORIDE 5 MG/1
5 TABLET ORAL
Status: DISCONTINUED | OUTPATIENT
Start: 2025-02-13 | End: 2025-02-13 | Stop reason: HOSPADM

## 2025-02-13 RX ORDER — LIDOCAINE 40 MG/G
CREAM TOPICAL
Status: DISCONTINUED | OUTPATIENT
Start: 2025-02-13 | End: 2025-02-13 | Stop reason: HOSPADM

## 2025-02-13 RX ORDER — FENTANYL CITRATE 50 UG/ML
50 INJECTION, SOLUTION INTRAMUSCULAR; INTRAVENOUS EVERY 5 MIN PRN
Status: DISCONTINUED | OUTPATIENT
Start: 2025-02-13 | End: 2025-02-13 | Stop reason: HOSPADM

## 2025-02-13 RX ORDER — DEXAMETHASONE SODIUM PHOSPHATE 10 MG/ML
4 INJECTION, SOLUTION INTRAMUSCULAR; INTRAVENOUS
Status: DISCONTINUED | OUTPATIENT
Start: 2025-02-13 | End: 2025-02-13 | Stop reason: HOSPADM

## 2025-02-13 RX ORDER — SODIUM CHLORIDE, SODIUM LACTATE, POTASSIUM CHLORIDE, CALCIUM CHLORIDE 600; 310; 30; 20 MG/100ML; MG/100ML; MG/100ML; MG/100ML
INJECTION, SOLUTION INTRAVENOUS CONTINUOUS
Status: DISCONTINUED | OUTPATIENT
Start: 2025-02-13 | End: 2025-02-13 | Stop reason: HOSPADM

## 2025-02-13 RX ORDER — PROPOFOL 10 MG/ML
INJECTION, EMULSION INTRAVENOUS PRN
Status: DISCONTINUED | OUTPATIENT
Start: 2025-02-13 | End: 2025-02-13

## 2025-02-13 RX ORDER — LIDOCAINE HYDROCHLORIDE 20 MG/ML
INJECTION, SOLUTION INFILTRATION; PERINEURAL PRN
Status: DISCONTINUED | OUTPATIENT
Start: 2025-02-13 | End: 2025-02-13

## 2025-02-13 RX ORDER — FENTANYL CITRATE 50 UG/ML
25 INJECTION, SOLUTION INTRAMUSCULAR; INTRAVENOUS EVERY 5 MIN PRN
Status: DISCONTINUED | OUTPATIENT
Start: 2025-02-13 | End: 2025-02-13 | Stop reason: HOSPADM

## 2025-02-13 RX ORDER — NALOXONE HYDROCHLORIDE 0.4 MG/ML
0.1 INJECTION, SOLUTION INTRAMUSCULAR; INTRAVENOUS; SUBCUTANEOUS
Status: DISCONTINUED | OUTPATIENT
Start: 2025-02-13 | End: 2025-02-13 | Stop reason: HOSPADM

## 2025-02-13 RX ORDER — ONDANSETRON 4 MG/1
4 TABLET, ORALLY DISINTEGRATING ORAL EVERY 30 MIN PRN
Status: DISCONTINUED | OUTPATIENT
Start: 2025-02-13 | End: 2025-02-13 | Stop reason: HOSPADM

## 2025-02-13 RX ORDER — ONDANSETRON 2 MG/ML
4 INJECTION INTRAMUSCULAR; INTRAVENOUS EVERY 30 MIN PRN
Status: DISCONTINUED | OUTPATIENT
Start: 2025-02-13 | End: 2025-02-13 | Stop reason: HOSPADM

## 2025-02-13 RX ADMIN — PROPOFOL 70 MG: 10 INJECTION, EMULSION INTRAVENOUS at 09:51

## 2025-02-13 RX ADMIN — SODIUM CHLORIDE, POTASSIUM CHLORIDE, SODIUM LACTATE AND CALCIUM CHLORIDE: 600; 310; 30; 20 INJECTION, SOLUTION INTRAVENOUS at 09:40

## 2025-02-13 RX ADMIN — PROPOFOL 70 MG: 10 INJECTION, EMULSION INTRAVENOUS at 09:52

## 2025-02-13 RX ADMIN — LIDOCAINE HYDROCHLORIDE 40 MG: 20 INJECTION, SOLUTION INFILTRATION; PERINEURAL at 09:50

## 2025-02-13 ASSESSMENT — ACTIVITIES OF DAILY LIVING (ADL): ADLS_ACUITY_SCORE: 41

## 2025-02-13 NOTE — H&P
HISTORY AND PHYSICAL - ENDOSCOPY   2/13/2025    Patient : Shilpi Moore    Planned Procedures : Esophagogastroduodenoscopy     This is a 34 year old female with a need for an Esophagogastroduodenoscopy for Gastroesohpageal Reflux.    History of GERD : YES  History of PUD : NO  History of Peralta's : NO  On PPI's : YES      Past Medical History:  Past Medical History:   Diagnosis Date    Allergic rhinitis 6/15/2011    Anxiety 9/17/2019    Benign essential hypertension 4/27/2017    Gastroesophageal reflux disease without esophagitis 9/21/2018    Major depressive disorder, recurrent episode, unspecified 6/15/2011    Obesity 4/27/2017    Reactive airway disease that is not asthma 2/12/2018    Seasonal allergic rhinitis due to pollen 4/27/2017    Unspecified vitamin D deficiency 4/11/2014       Past Surgical History:  Past Surgical History:   Procedure Laterality Date    CHOLECYSTECTOMY, LAPOROSCOPIC N/A 09/27/2020    [facility].    ENDOSCOPIC SINUS SURGERY N/A 1/3/2024    Procedure: BILATERAL ENDOSCOPIC SINUS SURGERY;  Surgeon: Daniella Schmitt MD;  [facility]: [facility]    ENT SURGERY      tonsillectomy    OTHER SURGICAL HISTORY      I&D rt abscess axilla    SEPTOPLASTY, TURBINOPLASTY, COMBINED Bilateral 1/3/2024    Procedure: Septoplasty, Turbinate Reduction;  Surgeon: Daniella Schmitt MD;  [facility]    SOFT TISSUE SURGERY      IND cyst right axilla    THYROIDECTOMY Left 10/25/2023    Procedure: Left Thyroid Lobectomy with 1.5 hours nerve monitoring;  Surgeon: Daniella Schmitt MD;  Location[facility]       Family History History:  Family History   Problem Relation Age of Onset    Asthma Mother     Hypertension Father     Asthma Brother     Asthma Sister     Cancer Maternal Grandfather 62        Mesothelioma, cause of death    Cancer Other 28        Cousin; Melanoma, cause of death    Thyroid Disease No family hx of        History of Tobacco Use:  History   Smoking Status    Never   Smokeless  Tobacco    Never       Current Medications:  No current outpatient medications on file.       Allergies:  Allergies   Allergen Reactions    Ibuprofen Anaphylaxis    Nsaids Swelling       ROS:  Constitutional: negative  Eyes: negative  Ears, nose, mouth, throat, and face: positive for ear, nose, sinus issues--see ENT  Respiratory: positive for asthma  Cardiovascular: negative  Gastrointestinal: positive for GERD  Genitourinary:negative  Integument/breast: negative  Hematologic/lymphatic: positive for history of papillary microcarcinoma  Musculoskeletal: Negative  Neurological: positive for headaches  Behavioral/Psych: negative  Endocrine: positive for history of papillary microcarcinoma  Allergic/Immunologic: negative    PHYSICAL EXAM:     Vital signs: There were no vitals taken for this visit.   BMI: There is no height or weight on file to calculate BMI.   General: Normal, healthy, cooperative, in no acute distress, alert   Skin: no rashes   Lungs: clear to auscultation   CV: Regular rate and rhythm   Abdominal: non-distended, soft, non-tender to palpation   Extremities: No cyanosis, clubbing or edema noted bilaterally in Upper and Lower Extremities   Neurological: without deficit    Assessment:   34 year old female with need of an Esophagogastroduodenoscopy for Gastroesohpageal Reflux.    Plan:   Will proceed with doing an Esophagogastroduodenoscopy     The risks, benefits, and alternatives to the planned procedure were fully discussed with the patient and/or the patient's representative(s). The risks of bleeding, infection, death, missing pathology, the need for additional procedures intra-operatively, the possible need for intra-operative consults, the possible need for transfusion therapy, cardiopulmonary compromise, the possible need for additional surgery for a complication were discussed with the patient and/or the patient's representative(s). The patient's and/or patient's representative(s) questions were  addressed and answered. Informed consent was obtained from the patient and/or the patient's representative(s). The patient and/or the patient's representative(s) consent to proceed.    Specific risks:  Risks include but are not limited to bleeding, perforation, missing lesions, need for additional procedures, reaction to anesthesia.  All the patients questions were answered.  The patient consents to proceed.  The procedures will be scheduled.

## 2025-02-13 NOTE — OP NOTE
REPORT OF OPERATION  DATE OF PROCEDURE: 2/13/2025    PATIENT: Shilpi Moore    SURGERY PERFORMED: Esophagogastroduodenoscopy with biopsies     PREOPERATIVE DIAGNOSIS: Gastroesohpageal Reflux    POSTOPERATIVE DIAGNOSIS:    Normal Esophagogastroduodenoscopy and Bile in stomach   Squamous columnar junction at 35    SURGEON: Ravin Rosenberg MD    ASSISTANTS: None    ANESTHESIA: General Endotracheal Anesthesia    COMPLICATIONS: None apparent    TRANSFUSIONS: None    TISSUE TO PATHOLOGY: Duodenal, Antral, and Distal Esophageal    FINDINGS: Normal Esophagogastroduodenoscopy and bile in stomach    INDICATIONS: This is a 34 year old female in need of an Esophagogastroduodenoscopy for Gastroesohpageal Reflux.  The patient will be taken to the endoscopy suite for that procedure.    DESCRIPTIONS OF PROCEDURE IN DETAIL: After consent was obtained the patient was taken to the operative suite and thiago in the left lateral decubitus position.  The patient was identified and the correct patient was confirmed. Monitored Anesthesia Care was given by anesthesia. A time out was performed verifying the correct patient and the correct procedure.  The entire operative team was in agreement.  All necessary equipment and supplies were in the room.    The endoscope was inserted into the mouth and passed without difficulty to the third portion of the duodenum.  Duodenal biopsies were taken.  The endoscope was then withdrawn through the duodenum, the duodenal bulb and pyloric channel and no abnormalities were noted.  The endoscope was brought back into the stomach and antral biopsies were obtained.  The endoscope was then retroflexed and no lesions of the fundus body or antrum were seen.  The endoscope was straightened back out and brought into the distal esophagus and a well-defined squamocolumnar junction was identified at 35 cm. Biopsies of the distal esophagus were taken.  The endoscope was slowly withdrawn through the remaining  esophagus no other abnormalities are seen,  The endoscope was withdrawn from the patient the patient was then taken to the recovery room in stable condition tolerated the procedure well.

## 2025-02-13 NOTE — TELEPHONE ENCOUNTER
Patient requests a 90 day supply    Budesonide      Last Written Prescription Date:  2/12/25  Last Fill Quantity: 200mL,   # refills: 0  Last Office Visit: 1/16/25  Future Office visit:    Next 5 appointments (look out 90 days)      Feb 18, 2025 2:30 PM  (Arrive by 2:15 PM)  Provider Visit with Tiana Tucker CNP  [facility] ([facility] ) [address]  [facility] [address]  [phone]     Feb 20, 2025 1:15 PM  (Arrive by 1:00 PM)  Return Visit with Daniella Schmitt MD  [facility] ([facility] ) [address] [place] [address]  [phone]     May 05, 2025 3:30 PM  (Arrive by 3:15 PM)  Return Visit with Rajani Aguiar NP  [facility] ([facility] ) [address] [place] [address]  [phone]

## 2025-02-13 NOTE — ANESTHESIA POSTPROCEDURE EVALUATION
Patient: Shilpi Moore    Procedure: Procedure(s):  Upper endoscopy with biopsy       Anesthesia Type:  MAC    Note:  Disposition: Outpatient   Postop Pain Control: Uneventful            Sign Out: Well controlled pain   PONV: No   Neuro/Psych: Uneventful            Sign Out: Acceptable/Baseline neuro status   Airway/Respiratory: Uneventful            Sign Out: Acceptable/Baseline resp. status   CV/Hemodynamics: Uneventful            Sign Out: Acceptable CV status; No obvious hypovolemia; No obvious fluid overload   Other NRE: NONE   DID A NON-ROUTINE EVENT OCCUR? No       Last vitals:  Vitals Value Taken Time   /69 02/13/25 1015   Temp     Pulse 61 02/13/25 1015   Resp 16 02/13/25 1015   SpO2 94 % 02/13/25 1018   Vitals shown include unfiled device data.    Electronically Signed By: PAULIE Burkett CRNA  February 13, 2025  10:38 AM

## 2025-02-13 NOTE — DISCHARGE INSTRUCTIONS
Upper GI Endoscopy: What to Expect at Home  Your Recovery  You had an upper GI endoscopy. Your doctor used a thin, lighted tube that bends to look at the inside of your esophagus, your stomach, and the first part of the small intestine, called the duodenum.  After you have an endoscopy, you will stay at the hospital or clinic for 1 to 2 hours. This will allow the medicine to wear off. You will be able to go home after your doctor or nurse checks to make sure that you're not having any problems.  You may have to stay overnight if you had treatment during the test. You may have a sore throat for a day or two after the test.  This care sheet gives you a general idea about what to expect after the test.  How can you care for yourself at home?  Activity   Rest as much as you need to after you go home.  You should be able to go back to your usual activities the day after the test.  Diet   Follow your doctor's directions for eating after the test.  Drink plenty of fluids (unless your doctor has told you not to).  Medications   If you have a sore throat the day after the test, use an over-the-counter spray to numb your throat.  Follow-up care is a key part of your treatment and safety. Be sure to make and go to all appointments, and call your doctor if you are having problems. It's also a good idea to know your test results and keep a list of the medicines you take.  When should you call for help?   Call 911 anytime you think you may need emergency care. For example, call if:    You passed out (lost consciousness).     You have trouble breathing.     You pass maroon or bloody stools.   Call your doctor now or seek immediate medical care if:    You have pain that does not get better after your take pain medicine.     You have new or worse belly pain.     You have blood in your stools.     You are sick to your stomach and cannot keep fluids down.     You have a fever.     You cannot pass stools or gas.   Watch closely for  "changes in your health, and be sure to contact your doctor if:    Your throat still hurts after a day or two.     You do not get better as expected.   Where can you learn more?  Go to https://www.[employer].net/patiented  Enter J454 in the search box to learn more about \"Upper GI Endoscopy: What to Expect at Home.\"  Current as of: October 19, 2023  Content Version: 14.3    2024 [employer].   Care instructions adapted under license by your healthcare professional. If you have questions about a medical condition or this instruction, always ask your healthcare professional. [employer] disclaims any warranty or liability for your use of this information.      Post-Anesthesia Patient Instructions    IMMEDIATELY FOLLOWING SURGERY:  Do not drive or operate machinery for the first twenty four hours after surgery.  Do not make any important decisions for twenty four hours after surgery or while taking narcotic pain medications or sedatives.  If you develop intractable nausea and vomiting or a severe headache please notify your doctor immediately.    FOLLOW-UP:  Please make an appointment with your surgeon as instructed. You do not need to follow up with anesthesia unless specifically instructed to do so.    WOUND CARE INSTRUCTIONS (if applicable):  Keep a dry clean dressing on the anesthesia/puncture wound site if there is drainage.  Once the wound has quit draining you may leave it open to air.  Generally you should leave the bandage intact for twenty four hours unless there is drainage.  If the epidural site drains for more than 36-48 hours please call the anesthesia department.    QUESTIONS?:  Please feel free to call your physician or the hospital  if you have any questions, and they will be happy to assist you.     "

## 2025-02-13 NOTE — ANESTHESIA CARE TRANSFER NOTE
Patient: Shilpi Moore    Procedure: Procedure(s):  Upper endoscopy with biopsy       Diagnosis: Gastroesophageal reflux disease with esophagitis [K21.00]  Diagnosis Additional Information: No value filed.    Anesthesia Type:   MAC     Note:    Oropharynx: oropharynx clear of all foreign objects  Level of Consciousness: drowsy  Oxygen Supplementation: room air    Independent Airway: airway patency satisfactory and stable  Dentition: dentition unchanged  Vital Signs Stable: post-procedure vital signs reviewed and stable  Report to RN Given: handoff report given  Patient transferred to: Phase II    Handoff Report: Identifed the Patient, Identified the Reponsible Provider, Reviewed the pertinent medical history, Discussed the surgical course, Reviewed Intra-OP anesthesia mangement and issues during anesthesia, Set expectations for post-procedure period and Allowed opportunity for questions and acknowledgement of understanding  Vitals:  Vitals Value Taken Time   BP     Temp     Pulse     Resp     SpO2 95 % 02/13/25 1003   Vitals shown include unfiled device data.    Electronically Signed By: PAULIE Rosraio CRNA  February 13, 2025  10:04 AM

## 2025-02-17 RX ORDER — BUDESONIDE 0.5 MG/2ML
INHALANT ORAL
Qty: 360 ML | Refills: 2 | Status: SHIPPED | OUTPATIENT
Start: 2025-02-17

## 2025-02-19 NOTE — PROGRESS NOTES
With chronic recurrent sinusitis with nasal polyps, AERD, asthma and bilateral COME s/p FESS presents for sinus follow-up. Hx perennial allergic rhinitis on SLIT.     History of right myringotomy with tube insertion tube was clear at 1/16/2025 visit and a new left mucoid effusion was noted.  A left 1.14 tube was placed.    Endoscopy on 1/16/2025 showed viscous secretions throughout with moderate polypoid degeneration and mucopurulent drainage in the sphenoethmoidal recess.  She was started on Augmentin and prednisone after debridement.        Fess 1/3/2024     Procedures 1/3/24:    1.  Bilateral maxillary antrostomy with tissue removal  2.  Endoscopic septoplasty  3.  Bilateral submucous reduction inferior turbinates  4.  Partial excision bilateral middle turbinates with polypectomy    SNOT 30  She notes improvement after completion of Augmentin but still notes occasional thick nasal discharge.  Using budesonide irrigations    Thyroid:    Thyroid ultrasound 2/10/2025.  Shows a left lobe surgically absent.  There is a stable 1.4 cm area of residual thyroid tissue in the left thyroid bed.  No suspicious nodules.  The right lobe has no nodules.  Ultrasound recommended in 1 year    Sino-Nasal Outcome Test (SNOT - 22)    1. Need to Blow Nose: (Patient-Rptd) (P) Moderate  2. Nasal Blockage: (Patient-Rptd) (P) Mild or slight  3. Sneezing: (Patient-Rptd) (P) Mild or slight  4. Runny Nose: (Patient-Rptd) (P) Severe  5. Cough: (Patient-Rptd) (P) Mild or slight  6. Post-nasal discharge: (Patient-Rptd) (P) Bad as it can be  7. Thick nasal discharge: (Patient-Rptd) (P) Bad as it can be  8. Ear fullness: (Patient-Rptd) (P) None  9. Dizziness: (Patient-Rptd) (P) None  10. Ear Pain: (Patient-Rptd) (P) None  11. Facial pain/pressure: (Patient-Rptd) (P) Severe  12. Decreased Sense of Smell/Taste: (Patient-Rptd) (P) Moderate  13. Difficulty falling asleep: (Patient-Rptd) (P) None  14. Wake up at night: (Patient-Rptd) (P) None  15.  "Lack of a good night's sleep: (Patient-Rptd) (P) None  16. Wake up tired: (Patient-Rptd) (P) None  17. Fatigue: (Patient-Rptd) (P) None  18. Reduced Productivity: (Patient-Rptd) (P) None  19. Reduced Concentration: (Patient-Rptd) (P) None  20. Frustrated/restless/irritable: (Patient-Rptd) (P) None  21. Sad: (Patient-Rptd) (P) None  22. Embarrassed: (Patient-Rptd) (P) None    Total Score: (Patient-Rptd) (P) 30    COPYRIGHT 1996. [facility] [place], [place]      Otolaryngology Progress Note      Shilpi Moore is a 34 year old female     Physical Exam  /62 (BP Location: Left arm, Patient Position: Sitting, Cuff Size: Adult Large)   Pulse 64   Temp 97.9  F (36.6  C) (Tympanic)   Ht 1.702 m (5' 7\")   Wt 108.9 kg (240 lb)   LMP 01/13/2025   SpO2 98%   BMI 37.59 kg/m    General - The patient is well nourished and well developed, and appears to have good nutritional status.  Alert and oriented to person and place, interactive.  Head and Face - Normocephalic and atraumatic, with no gross asymmetry noted of the contour of the facial features.  The facial nerve is intact, with strong symmetric movements.  Neck-hypertrophic but healed collar incision.  No palpable thyroid mass or lymphadenopathy  Trachea is midline.  Eyes - Extraocular movements intact.   Ears- External auditory canals are patent, tubes are patent and in good position bilaterally no otorrhea no retractions  Nose - Nasal mucosa is pink and moist with no abnormal mucus.  The septum was grossly midline and non-obstructive, turbinates of normal size and position.  No polyps, masses, or purulence noted on examination.  Mouth - Examination of the oral cavity shows pink, healthy, moist mucosa.  No lesions or ulceration noted.  The dentition are in good repair.  The tongue is mobile and midline.  Throat - The walls of the oropharynx were smooth, pink, moist, symmetric, and had no lesions or ulcerations.  The tonsillar pillars and soft " palate were symmetric.  The uvula was midline on elevation.        To evaluate the nose and sinuses, I performed rigid nasal endoscopy.  I applied topical nasal lidocaine and neosynephrine.    I began with the LEFT side using a 0 degree rigid nasal endoscope, and then similarly examined the RIGHT side    Findings:  Inferior turbinates:  2+  Middle turbinate and middle meatus:  No purulence, no polyposis  Antrostomies clear bilaterally with scant viscous mucus removed with an 8 Turner from the right maxillary sinus  Superior meatus was evaluated  Frontal recess clear  Mucosa is healthy throughout,  no polyps nor polypoid degeneration  Sphenoethmoidal recess without purulence   Nasopharynx clear  The patient tolerated the procedure well    Impression/Plan  Shilpi Moore is a 34 year old female    ICD-10-CM    1. S/P FESS (functional endoscopic sinus surgery)  Z98.890 lidocaine 2%-oxymetazoline 0.025% nasal solution 2 spray     amoxicillin-clavulanate (AUGMENTIN) 875-125 MG tablet      2. Acute sinusitis treated with antibiotics in the past 60 days  J01.90       3. Perennial allergic rhinitis  J30.89       4. Papillary microcarcinoma of thyroid (H)  C73       5. h/o left thyroidectomy  E89.0             -Resolved acute on chronic maxillary sinusitis.  Follow-up prn.  Continue budesonide irrigations  Continue sublingual immunotherapy     Augmentin if needed, watch and wait  Floxin otic:  use ear drops at home 5 drops twice a day x 5-7 days as needed for ear drainage    -Tubes look good    -Neck US stable, annual US and follow-up with Dr. Silveira as scheduled, levothyroxine recently increased  pT1a left papillary thyroid microcarcinoma  -margins clear  -BRAF V600E positive   Left thyroid lobectomy on 10/25/2023        Daniella Schmitt D.O.  Otolaryngology/Head and Neck Surgery  Allergy

## 2025-02-20 ENCOUNTER — OFFICE VISIT (OUTPATIENT)
Dept: OTOLARYNGOLOGY | Facility: OTHER | Age: 35
End: 2025-02-20
Attending: OTOLARYNGOLOGY
Payer: COMMERCIAL

## 2025-02-20 VITALS
WEIGHT: 240 LBS | SYSTOLIC BLOOD PRESSURE: 110 MMHG | OXYGEN SATURATION: 98 % | HEIGHT: 67 IN | DIASTOLIC BLOOD PRESSURE: 62 MMHG | TEMPERATURE: 97.9 F | HEART RATE: 64 BPM | BODY MASS INDEX: 37.67 KG/M2

## 2025-02-20 DIAGNOSIS — Z98.890 S/P FESS (FUNCTIONAL ENDOSCOPIC SINUS SURGERY): Primary | ICD-10-CM

## 2025-02-20 DIAGNOSIS — C73 PAPILLARY MICROCARCINOMA OF THYROID (H): ICD-10-CM

## 2025-02-20 DIAGNOSIS — J01.90 ACUTE SINUSITIS TREATED WITH ANTIBIOTICS IN THE PAST 60 DAYS: ICD-10-CM

## 2025-02-20 DIAGNOSIS — E89.0 S/P PARTIAL THYROIDECTOMY: ICD-10-CM

## 2025-02-20 DIAGNOSIS — J30.89 PERENNIAL ALLERGIC RHINITIS: ICD-10-CM

## 2025-02-20 ASSESSMENT — PAIN SCALES - GENERAL: PAINLEVEL_OUTOF10: NO PAIN (0)

## 2025-02-20 NOTE — PATIENT INSTRUCTIONS
Augmentin if needed, watch and wait  Floxin otic:  use ear drops at home 5 drops twice a day x 5-7 days as needed for ear drainage        Thank you for allowing Dr. Schmitt and our ENT team to participate in your care.  If your medications are too expensive, please give the nurse a call.  We can possibly change this medication.  If you have a scheduling or an appointment question please contact our Health Unit Coordinator at their direct line [phone].   ALL nursing questions or concerns can be directed to your ENT nurse, Crow, at: [phone]

## 2025-02-20 NOTE — LETTER
2/20/2025      Shilpi Moore  [address] [facility] [address]      Dear Colleague,    Thank you for referring your patient, Shilpi Moore, to the [facility]. Please see a copy of my visit note below.    With chronic recurrent sinusitis with nasal polyps, AERD, asthma and bilateral COME s/p FESS presents for sinus follow-up. Hx perennial allergic rhinitis on SLIT.     History of right myringotomy with tube insertion tube was clear at 1/16/2025 visit and a new left mucoid effusion was noted.  A left 1.14 tube was placed.    Endoscopy on 1/16/2025 showed viscous secretions throughout with moderate polypoid degeneration and mucopurulent drainage in the sphenoethmoidal recess.  She was started on Augmentin and prednisone after debridement.        Fess 1/3/2024     Procedures 1/3/24:    1.  Bilateral maxillary antrostomy with tissue removal  2.  Endoscopic septoplasty  3.  Bilateral submucous reduction inferior turbinates  4.  Partial excision bilateral middle turbinates with polypectomy    SNOT 30  She notes improvement after completion of Augmentin but still notes occasional thick nasal discharge.  Using budesonide irrigations    Thyroid:    Thyroid ultrasound 2/10/2025.  Shows a left lobe surgically absent.  There is a stable 1.4 cm area of residual thyroid tissue in the left thyroid bed.  No suspicious nodules.  The right lobe has no nodules.  Ultrasound recommended in 1 year    Sino-Nasal Outcome Test (SNOT - 22)    1. Need to Blow Nose: (Patient-Rptd) (P) Moderate  2. Nasal Blockage: (Patient-Rptd) (P) Mild or slight  3. Sneezing: (Patient-Rptd) (P) Mild or slight  4. Runny Nose: (Patient-Rptd) (P) Severe  5. Cough: (Patient-Rptd) (P) Mild or slight  6. Post-nasal discharge: (Patient-Rptd) (P) Bad as it can be  7. Thick nasal discharge: (Patient-Rptd) (P) Bad as it can be  8. Ear fullness: (Patient-Rptd) (P) None  9. Dizziness: (Patient-Rptd) (P) None  10. Ear Pain: (Patient-Rptd)  "(P) None  11. Facial pain/pressure: (Patient-Rptd) (P) Severe  12. Decreased Sense of Smell/Taste: (Patient-Rptd) (P) Moderate  13. Difficulty falling asleep: (Patient-Rptd) (P) None  14. Wake up at night: (Patient-Rptd) (P) None  15. Lack of a good night's sleep: (Patient-Rptd) (P) None  16. Wake up tired: (Patient-Rptd) (P) None  17. Fatigue: (Patient-Rptd) (P) None  18. Reduced Productivity: (Patient-Rptd) (P) None  19. Reduced Concentration: (Patient-Rptd) (P) None  20. Frustrated/restless/irritable: (Patient-Rptd) (P) None  21. Sad: (Patient-Rptd) (P) None  22. Embarrassed: (Patient-Rptd) (P) None    Total Score: (Patient-Rptd) (P) 30    COPYRIGHT 1996. [facility] [place], [place]      Otolaryngology Progress Note      Shilpi Moore is a 34 year old female     Physical Exam  /62 (BP Location: Left arm, Patient Position: Sitting, Cuff Size: Adult Large)   Pulse 64   Temp 97.9  F (36.6  C) (Tympanic)   Ht 1.702 m (5' 7\")   Wt 108.9 kg (240 lb)   LMP 01/13/2025   SpO2 98%   BMI 37.59 kg/m    General - The patient is well nourished and well developed, and appears to have good nutritional status.  Alert and oriented to person and place, interactive.  Head and Face - Normocephalic and atraumatic, with no gross asymmetry noted of the contour of the facial features.  The facial nerve is intact, with strong symmetric movements.  Neck-hypertrophic but healed collar incision.  No palpable thyroid mass or lymphadenopathy  Trachea is midline.  Eyes - Extraocular movements intact.   Ears- External auditory canals are patent, tubes are patent and in good position bilaterally no otorrhea no retractions  Nose - Nasal mucosa is pink and moist with no abnormal mucus.  The septum was grossly midline and non-obstructive, turbinates of normal size and position.  No polyps, masses, or purulence noted on examination.  Mouth - Examination of the oral cavity shows pink, healthy, moist mucosa.  No lesions " or ulceration noted.  The dentition are in good repair.  The tongue is mobile and midline.  Throat - The walls of the oropharynx were smooth, pink, moist, symmetric, and had no lesions or ulcerations.  The tonsillar pillars and soft palate were symmetric.  The uvula was midline on elevation.        To evaluate the nose and sinuses, I performed rigid nasal endoscopy.  I applied topical nasal lidocaine and neosynephrine.    I began with the LEFT side using a 0 degree rigid nasal endoscope, and then similarly examined the RIGHT side    Findings:  Inferior turbinates:  2+  Middle turbinate and middle meatus:  No purulence, no polyposis  Antrostomies clear bilaterally with scant viscous mucus removed with an 8 Turner from the right maxillary sinus  Superior meatus was evaluated  Frontal recess clear  Mucosa is healthy throughout,  no polyps nor polypoid degeneration  Sphenoethmoidal recess without purulence   Nasopharynx clear  The patient tolerated the procedure well    Impression/Plan  Shilpi Moore is a 34 year old female    ICD-10-CM    1. S/P FESS (functional endoscopic sinus surgery)  Z98.890 lidocaine 2%-oxymetazoline 0.025% nasal solution 2 spray     amoxicillin-clavulanate (AUGMENTIN) 875-125 MG tablet      2. Acute sinusitis treated with antibiotics in the past 60 days  J01.90       3. Perennial allergic rhinitis  J30.89       4. Papillary microcarcinoma of thyroid (H)  C73       5. h/o left thyroidectomy  E89.0             -Resolved acute on chronic maxillary sinusitis.  Follow-up prn.  Continue budesonide irrigations  Continue sublingual immunotherapy     Augmentin if needed, watch and wait  Floxin otic:  use ear drops at home 5 drops twice a day x 5-7 days as needed for ear drainage    -Tubes look good    -Neck US stable, annual US and follow-up with Dr. Silveira as scheduled, levothyroxine recently increased  pT1a left papillary thyroid microcarcinoma  -margins clear  -BRAF V600E positive   Left thyroid  lobectomy on 10/25/2023        Daniella Schmitt D.O.  Otolaryngology/Head and Neck Surgery  Allergy          Again, thank you for allowing me to participate in the care of your patient.        Sincerely,        Daniella Schmitt MD    Electronically signed

## 2025-02-21 DIAGNOSIS — J32.4 CHRONIC PANSINUSITIS: ICD-10-CM

## 2025-02-26 ENCOUNTER — OFFICE VISIT (OUTPATIENT)
Dept: SURGERY | Facility: OTHER | Age: 35
End: 2025-02-26
Attending: NURSE PRACTITIONER
Payer: COMMERCIAL

## 2025-02-26 VITALS
HEIGHT: 67 IN | HEART RATE: 74 BPM | RESPIRATION RATE: 18 BRPM | SYSTOLIC BLOOD PRESSURE: 122 MMHG | TEMPERATURE: 99.1 F | OXYGEN SATURATION: 99 % | DIASTOLIC BLOOD PRESSURE: 74 MMHG | WEIGHT: 244 LBS | BODY MASS INDEX: 38.3 KG/M2

## 2025-02-26 DIAGNOSIS — K21.9: Primary | ICD-10-CM

## 2025-02-26 RX ORDER — BUDESONIDE 0.5 MG/2ML
INHALANT ORAL
Qty: 360 ML | Refills: 11 | Status: SHIPPED | OUTPATIENT
Start: 2025-02-26

## 2025-02-26 RX ORDER — CHOLESTYRAMINE 4 G/9G
4 POWDER, FOR SUSPENSION ORAL
Qty: 378 G | Refills: 1 | Status: SHIPPED | OUTPATIENT
Start: 2025-02-26

## 2025-02-26 ASSESSMENT — PAIN SCALES - GENERAL: PAINLEVEL_OUTOF10: NO PAIN (0)

## 2025-02-26 NOTE — PROGRESS NOTES
CLINIC NOTE - POST-OP ENDOSCOPY  2/26/2025    Patient:Shilpi Moore    Procedure: Esophagogastroduodenoscopy with biopsy    This is a 34 year old female who is 2 weeks s/p Esophagogastroduodenoscopy with biopsy.  At the time of endoscopy bile was noted in the patient's stomach.  The patient continues to note GERD symptoms that are not adequately addressed with medication.      Current Medications:  Current Outpatient Medications   Medication Sig Dispense Refill    acetaminophen (TYLENOL) 500 MG tablet Take 500-1,000 mg by mouth every 6 hours as needed for mild pain      ALBUTEROL IN       azelastine (ASTELIN) 0.1 % nasal spray Spray 2 sprays into both nostrils 2 times daily 30 mL 12    budesonide (PULMICORT) 0.5 MG/2ML neb solution Squirt entire vial into maciel med saline solution, mix, and irrigate each nostril until entire bottle empty.  Do this twice daily. 200 mL 11    budesonide (PULMICORT) 0.5 MG/2ML neb solution EMPTY 1 VIAL INTO MACIEL MED SALINE SOLUTION, MIX, AND IRRIGATE EACH NOSTRIL UNTIL ENTIRE BOTTLE EMPTY. DO THIS TWICE DAILY. 360 mL 2    cholestyramine (QUESTRAN) 4 GM/DOSE powder Take 4 g by mouth 3 times daily (with meals). 378 g 1    EPINEPHrine (ANY BX GENERIC EQUIV) 0.3 MG/0.3ML injection 2-pack Inject 0.3 mLs (0.3 mg) into the muscle once as needed 2 each 11    fexofenadine (ALLEGRA) 60 MG tablet Take 60 mg by mouth 2 times daily      fluticasone (FLONASE) 50 MCG/ACT nasal spray SHAKE LIQUID AND USE 2 SPRAYS IN EACH NOSTRIL DAILY 16 g 11    losartan (COZAAR) 50 MG tablet TAKE 1 TABLET(50 MG) BY MOUTH DAILY 90 tablet 2    metFORMIN (GLUCOPHAGE XR) 500 MG 24 hr tablet TAKE 2 TABLETS(1000 MG) BY MOUTH DAILY WITH DINNER 180 tablet 0    Multiple Vitamins-Minerals (QC WOMENS DAILY MULTIVITAMIN PO) Take 1 Dose by mouth daily      omeprazole (PRILOSEC) 40 MG DR capsule TAKE ONE CAPSULE BY MOUTH EVERY DAY 30 TO 60 MINUTES BEFORE A MEAL 90 capsule 3    sertraline (ZOLOFT) 100 MG tablet Take 0.5 tablets  "(50 mg) by mouth 2 times daily 90 tablet 1    SUBLINGUAL IMMUNOTHERAPY, SLIT, Place 1 drop under the tongue 3 times daily Start allergy drops (SLIT). 1 drop under the tongue daily x 7 days, then 1 drop under the tongue twice daily x 7 days, then 1 drop under the tongue three times daily ongoing. 1 vial PRN    VITAMIN D PO       amoxicillin-clavulanate (AUGMENTIN) 875-125 MG tablet Take 1 tablet by mouth 2 times daily for 10 days. As needed for sinusitis (Patient not taking: Reported on 2/26/2025) 20 tablet 0       Allergies:  Allergies   Allergen Reactions    Ibuprofen Anaphylaxis    Nsaids Swelling       PHYSICAL EXAM:   Vital signs: /74 (BP Location: Right arm, Cuff Size: Adult Regular)   Pulse 74   Temp 99.1  F (37.3  C)   Resp 18   Ht 1.702 m (5' 7\")   Wt 110.7 kg (244 lb)   LMP 01/13/2025   SpO2 99%   BMI 38.22 kg/m     BMI: Body mass index is 38.22 kg/m .   General: Normal, healthy, cooperative, in no acute distress, alert   Lungs: respirations are non-labored   Abdominal: non-distended     PATHOLOGY:  Case Report   Date Value Ref Range Status   02/13/2025   Final    Surgical Pathology Report                         Case: [id number]                                  Authorizing Provider:  Ravin Rosenberg MD  Collected:           02/13/2025 09:56 AM          Ordering Location:     [facility] Main Operating Room     Received:            02/13/2025 12:38 PM          Pathologist:           Reuben Wynn DO                                                         Specimens:   A) - Small Intestine, Duodenum                                                                      B) - Stomach, Antrum                                                                                C) - Esophagus, Distal                                                                      Final Diagnosis   Date Value Ref Range Status   02/13/2025   Final    A.  Duodenum, biopsy:  -Duodenal mucosa with no diagnostic " abnormality.    B.  Stomach, antrum, biopsy:  -Antral mucosa with no diagnostic abnormality.    C.  Esophagus, distal, biopsy:  -Squamous mucosa with no diagnostic abnormality.         ASSESSMENT:    34 year old female who is 2 weeks s/p Esophagogastroduodenoscopy with biopsy.  GERD, Bile in stomach    PLAN:   Will try the patient on questran for her symptoms as bile was noted in her stomach. Will have the patient follow up in 1 month for a recheck of her symptoms.  Sooner with problems/concerns.

## 2025-03-09 ENCOUNTER — HEALTH MAINTENANCE LETTER (OUTPATIENT)
Age: 35
End: 2025-03-09

## 2025-03-10 NOTE — PROGRESS NOTES
Assessment & Plan         Benign essential hypertension  - Comprehensive metabolic panel  - Lipid Profile (Chol, Trig, HDL, LDL calc)  - TSH with free T4 reflex      Gastroesophageal reflux disease without esophagitis  - Continue plan of care and Follow-up with general surgery      Anxiety  - hydrOXYzine HCl (ATARAX) 25 MG tablet; Take 1 tablet (25 mg) by mouth 3 times daily as needed for anxiety.      Vitamin D deficiency  - Vitamin D level      Morbid obesity (H)  - tirzepatide-Weight Management (ZEPBOUND) 5 MG/0.5ML prefilled pen; Inject 0.5 mLs (5 mg) subcutaneously every 7 days.  - Discontinue Metformin            Return in about 3 months (around 6/11/2025).        All chronic conditions are stable at this time  Please continue to take all medication as directed  Please notify your pharmacy or our refill line of future refills needed.  Please return sooner than your next scheduled appointment with any concerns.      When your lab results return, we will call you with abnormal findings  You can also view this information in your MyChart, if you have an account.  Please call or [employer] message us with any questions you may have.          Tiana Tucker [facility]  [phone]           Shilpi is a 34 year old, presenting for the following health issues:  Gastrophageal Reflux, Anxiety, and Hypertension      Hypertension Follow-up  Do you check your blood pressure regularly outside of the clinic? No   Are you following a low salt diet? Yes  Are your blood pressures ever more than 140 on the top number (systolic) OR more   than 90 on the bottom number (diastolic), for example 140/90? No does not check outside of clinic        Anxiety   How are you doing with your anxiety since your last visit? Worsened at night  Are you having other symptoms that might be associated with anxiety? Yes:  anxious  Have you had a significant life event? No   Are you feeling depressed? No  Do you have any concerns with your use of alcohol or  other drugs? No        Social History     Tobacco Use    Smoking status: Never    Smokeless tobacco: Never    Tobacco comments:     Passive exposure   Vaping Use    Vaping status: Never Used   Substance Use Topics    Alcohol use: Yes     Comment: Beer; Occasionally    Drug use: No             3/5/2024     2:21 PM 8/2/2024    10:26 AM 2/28/2025     7:19 AM   KING-7 SCORE   Total Score  6 (mild anxiety) 15 (severe anxiety)   Total Score 0 6 15        Patient-reported             9/7/2023    11:29 AM 8/2/2024    10:25 AM 2/28/2025     7:18 AM   PHQ   PHQ-9 Total Score 1 0 0    Q9: Thoughts of better off dead/self-harm past 2 weeks Not at all Not at all Not at all       Patient-reported             2/28/2025     7:18 AM   Last PHQ-9   1.  Little interest or pleasure in doing things 0   2.  Feeling down, depressed, or hopeless 0   3.  Trouble falling or staying asleep, or sleeping too much 0   4.  Feeling tired or having little energy 0   5.  Poor appetite or overeating 0   6.  Feeling bad about yourself 0   7.  Trouble concentrating 0   8.  Moving slowly or restless 0   Q9: Thoughts of better off dead/self-harm past 2 weeks 0   PHQ-9 Total Score 0        Patient-reported             2/28/2025     7:19 AM   KING-7    1. Feeling nervous, anxious, or on edge 3   2. Not being able to stop or control worrying 2   3. Worrying too much about different things 2   4. Trouble relaxing 2   5. Being so restless that it is hard to sit still 1   6. Becoming easily annoyed or irritable 2   7. Feeling afraid, as if something awful might happen 3   KING-7 Total Score 15    If you checked any problems, how difficult have they made it for you to do your work, take care of things at home, or get along with other people? Very difficult       Patient-reported         GERD/Heartburn  Onset/Duration: 5 plus years  Accompanying Signs & Symptoms:  Does it feel like food gets stuck or trouble swallowing: No  Nausea: YES  Vomiting (bloody?):  No  Abdominal Pain: No  Black-Tarry stools: No  Bloody stools: No  History:  Previous ulcers: No  Therapies tried and outcome:             Medications: Omeprazole (Prilosec) helpful            Patient Active Problem List   Diagnosis    Vitamin D deficiency    ACP (advance care planning)    Seasonal allergic rhinitis due to pollen    Benign essential hypertension    Morbid obesity (H)    Reactive airway disease that is not asthma    Family history of melanoma    Gastroesophageal reflux disease without esophagitis    Anxiety    Lactation problem     Past Surgical History:   Procedure Laterality Date    BIOPSY      Thyroid    CHOLECYSTECTOMY, LAPOROSCOPIC N/A 09/27/2020    [facility].    ENDOSCOPIC SINUS SURGERY N/A 01/03/2024    Procedure: BILATERAL ENDOSCOPIC SINUS SURGERY;  Surgeon: Daniella Schmitt MD;  Location: [facility]    ENT SURGERY      tonsillectomy    ESOPHAGOSCOPY, GASTROSCOPY, DUODENOSCOPY (EGD), COMBINED N/A 02/13/2025    Procedure: Upper endoscopy with biopsy;  Surgeon: Ravin Rosenberg MD;  Location: [facility] OR    OTHER SURGICAL HISTORY      I&D rt abscess axilla    SEPTOPLASTY, TURBINOPLASTY, COMBINED Bilateral 01/03/2024    Procedure: Septoplasty, Turbinate Reduction;  Surgeon: Daniella Schmitt MD;  Location: [facility] OR    SOFT TISSUE SURGERY      IND cyst right axilla    THYROIDECTOMY Left 10/25/2023    Procedure: Left Thyroid Lobectomy with 1.5 hours nerve monitoring;  Surgeon: Daniella Schmitt MD;  Location: [facility] OR       Social History     Tobacco Use    Smoking status: Never    Smokeless tobacco: Never    Tobacco comments:     Passive exposure   Substance Use Topics    Alcohol use: Yes     Comment: Beer; Occasionally     Family History   Problem Relation Age of Onset    Asthma Mother     Hypertension Father     Asthma Brother     Asthma Sister     Cancer Maternal Grandfather 62        Mesothelioma, cause of death    Cancer Other 28        Cousin; Melanoma, cause of death    Thyroid  Disease No family hx of              Current Outpatient Medications   Medication Sig Dispense Refill    acetaminophen (TYLENOL) 500 MG tablet Take 500-1,000 mg by mouth every 6 hours as needed for mild pain      ALBUTEROL IN       azelastine (ASTELIN) 0.1 % nasal spray Spray 2 sprays into both nostrils 2 times daily 30 mL 12    budesonide (PULMICORT) 0.5 MG/2ML neb solution SQUIRT ENTIRE VIAL INTO ISABELLA MD SALINE SOLUTION. MIX AND IRRIGATE EACH NOSTRIL UNTIL ENTIRE BOTTLE EMPTY TWICE DAILY 360 mL 11    cholestyramine (QUESTRAN) 4 GM/DOSE powder Take 4 g by mouth 3 times daily (with meals). 378 g 1    EPINEPHrine (ANY BX GENERIC EQUIV) 0.3 MG/0.3ML injection 2-pack Inject 0.3 mLs (0.3 mg) into the muscle once as needed 2 each 11    fexofenadine (ALLEGRA) 60 MG tablet Take 60 mg by mouth 2 times daily      fluticasone (FLONASE) 50 MCG/ACT nasal spray SHAKE LIQUID AND USE 2 SPRAYS IN EACH NOSTRIL DAILY 16 g 11    hydrOXYzine HCl (ATARAX) 25 MG tablet Take 1 tablet (25 mg) by mouth 3 times daily as needed for anxiety. 60 tablet 1    losartan (COZAAR) 50 MG tablet TAKE 1 TABLET(50 MG) BY MOUTH DAILY 90 tablet 2    metFORMIN (GLUCOPHAGE XR) 500 MG 24 hr tablet TAKE 2 TABLETS(1000 MG) BY MOUTH DAILY WITH DINNER 180 tablet 0    Multiple Vitamins-Minerals (QC WOMENS DAILY MULTIVITAMIN PO) Take 1 Dose by mouth daily      omeprazole (PRILOSEC) 40 MG DR capsule TAKE ONE CAPSULE BY MOUTH EVERY DAY 30 TO 60 MINUTES BEFORE A MEAL 90 capsule 3    sertraline (ZOLOFT) 100 MG tablet Take 0.5 tablets (50 mg) by mouth 2 times daily 90 tablet 1    SUBLINGUAL IMMUNOTHERAPY, SLIT, Place 1 drop under the tongue 3 times daily Start allergy drops (SLIT). 1 drop under the tongue daily x 7 days, then 1 drop under the tongue twice daily x 7 days, then 1 drop under the tongue three times daily ongoing. 1 vial PRN    tirzepatide-Weight Management (ZEPBOUND) 5 MG/0.5ML prefilled pen Inject 0.5 mLs (5 mg) subcutaneously every 7 days. 0.5 mL 1     "VITAMIN D PO            Allergies   Allergen Reactions    Ibuprofen Anaphylaxis    Nsaids Swelling         Recent Labs   Lab Test 08/02/24  1427 12/29/23  1537 10/18/23  1523 09/07/23  1213 08/27/20  0000 09/17/19  1429 12/11/18  1700 09/21/18  1357 04/04/17  0919 04/04/17  0919   A1C  --   --   --   --   --   --  5.2  --   --   --    LDL 95  --   --   --   --   --   --   --   --  101*   HDL 65  --   --   --   --   --   --   --   --  59   TRIG 107  --   --   --   --   --   --   --   --  134   ALT 13 10 14  --    < > 31  --  33   < >  --    CR 0.75 0.70 0.84  --    < > 0.79  --  0.78  --  0.82   GFRESTIMATED >90 >90 >90  --    < > >90  --  88  --  84   GFRESTBLACK  --   --   --   --   --  >90  --  >90  --  >90  African American GFR Calc     POTASSIUM 4.0 4.1 4.2  --    < > 3.7  --  4.0  --  3.8   TSH 3.39  --   --  1.88   < > 0.70  --  2.40  --  2.19    < > = values in this interval not displayed.          BP Readings from Last 3 Encounters:   03/11/25 130/82   02/26/25 122/74   02/20/25 110/62    Wt Readings from Last 3 Encounters:   03/11/25 108 kg (238 lb)   02/26/25 110.7 kg (244 lb)   02/20/25 108.9 kg (240 lb)                    Review of Systems  Constitutional, HEENT, cardiovascular, pulmonary, GI, , musculoskeletal, neuro, skin, endocrine and psych systems are negative, except as otherwise noted.            Objective    /82 (BP Location: Left arm, Patient Position: Sitting, Cuff Size: Adult Large)   Pulse 73   Temp 98.7  F (37.1  C) (Tympanic)   Resp 18   Ht 1.702 m (5' 7\")   Wt 108 kg (238 lb)   LMP 03/08/2025 (Approximate)   SpO2 94%   Breastfeeding No   BMI 37.28 kg/m    Body mass index is 37.28 kg/m .          Physical Exam   GENERAL: alert and no distress  EYES: Eyes grossly normal to inspection, PERRL and conjunctivae and sclerae normal  HENT: ear canals and TM's normal, nose and mouth without ulcers or lesions  NECK: no adenopathy, no asymmetry, masses, or scars  RESP: lungs clear to " auscultation - no rales, rhonchi or wheezes  CV: regular rate and rhythm, normal S1 S2, no S3 or S4, no murmur, click or rub, no peripheral edema  SKIN: no suspicious lesions or rashes  PSYCH: mentation appears normal, affect normal/bright            The longitudinal plan of care for the diagnosis(es)/condition(s) as documented were addressed during this visit. Due to the added complexity in care, I will continue to support Shilpi in the subsequent management and with ongoing continuity of care.             Signed Electronically by: Tiana Tucker CNP

## 2025-03-11 ENCOUNTER — OFFICE VISIT (OUTPATIENT)
Dept: FAMILY MEDICINE | Facility: OTHER | Age: 35
End: 2025-03-11
Attending: NURSE PRACTITIONER
Payer: COMMERCIAL

## 2025-03-11 VITALS
DIASTOLIC BLOOD PRESSURE: 82 MMHG | SYSTOLIC BLOOD PRESSURE: 130 MMHG | HEART RATE: 73 BPM | HEIGHT: 67 IN | WEIGHT: 238 LBS | RESPIRATION RATE: 18 BRPM | TEMPERATURE: 98.7 F | OXYGEN SATURATION: 94 % | BODY MASS INDEX: 37.35 KG/M2

## 2025-03-11 DIAGNOSIS — E66.01 MORBID OBESITY (H): ICD-10-CM

## 2025-03-11 DIAGNOSIS — K21.9 GASTROESOPHAGEAL REFLUX DISEASE WITHOUT ESOPHAGITIS: ICD-10-CM

## 2025-03-11 DIAGNOSIS — I10 BENIGN ESSENTIAL HYPERTENSION: Primary | ICD-10-CM

## 2025-03-11 DIAGNOSIS — F41.9 ANXIETY: ICD-10-CM

## 2025-03-11 DIAGNOSIS — E55.9 VITAMIN D DEFICIENCY: ICD-10-CM

## 2025-03-11 LAB
ALBUMIN SERPL BCG-MCNC: 4.3 G/DL (ref 3.5–5.2)
ALP SERPL-CCNC: 51 U/L (ref 40–150)
ALT SERPL W P-5'-P-CCNC: 13 U/L (ref 0–50)
ANION GAP SERPL CALCULATED.3IONS-SCNC: 10 MMOL/L (ref 7–15)
AST SERPL W P-5'-P-CCNC: 17 U/L (ref 0–45)
BILIRUB SERPL-MCNC: <0.2 MG/DL
BUN SERPL-MCNC: 9.2 MG/DL (ref 6–20)
CALCIUM SERPL-MCNC: 8.7 MG/DL (ref 8.8–10.4)
CHLORIDE SERPL-SCNC: 101 MMOL/L (ref 98–107)
CHOLEST SERPL-MCNC: 158 MG/DL
CREAT SERPL-MCNC: 0.7 MG/DL (ref 0.51–0.95)
EGFRCR SERPLBLD CKD-EPI 2021: >90 ML/MIN/1.73M2
FASTING STATUS PATIENT QL REPORTED: ABNORMAL
FASTING STATUS PATIENT QL REPORTED: ABNORMAL
GLUCOSE SERPL-MCNC: 94 MG/DL (ref 70–99)
HCO3 SERPL-SCNC: 24 MMOL/L (ref 22–29)
HDLC SERPL-MCNC: 55 MG/DL
HOLD SPECIMEN: NORMAL
LDLC SERPL CALC-MCNC: 67 MG/DL
NONHDLC SERPL-MCNC: 103 MG/DL
POTASSIUM SERPL-SCNC: 3.9 MMOL/L (ref 3.4–5.3)
PROT SERPL-MCNC: 7.1 G/DL (ref 6.4–8.3)
SODIUM SERPL-SCNC: 135 MMOL/L (ref 135–145)
TRIGL SERPL-MCNC: 180 MG/DL
TSH SERPL DL<=0.005 MIU/L-ACNC: 2.25 UIU/ML (ref 0.3–4.2)

## 2025-03-11 RX ORDER — HYDROXYZINE HYDROCHLORIDE 25 MG/1
25 TABLET, FILM COATED ORAL 3 TIMES DAILY PRN
Qty: 60 TABLET | Refills: 1 | Status: SHIPPED | OUTPATIENT
Start: 2025-03-11

## 2025-03-11 ASSESSMENT — PAIN SCALES - GENERAL: PAINLEVEL_OUTOF10: NO PAIN (0)

## 2025-03-11 NOTE — PATIENT INSTRUCTIONS
Assessment & Plan           Benign essential hypertension  - Comprehensive metabolic panel  - Lipid Profile (Chol, Trig, HDL, LDL calc)  - TSH with free T4 reflex      Gastroesophageal reflux disease without esophagitis  - Continue plan of care and Follow-up with general surgery      Anxiety  - hydrOXYzine HCl (ATARAX) 25 MG tablet; Take 1 tablet (25 mg) by mouth 3 times daily as needed for anxiety.      Vitamin D deficiency  - Vitamin D level      Morbid obesity (H)  - tirzepatide-Weight Management (ZEPBOUND) 5 MG/0.5ML prefilled pen; Inject 0.5 mLs (5 mg) subcutaneously every 7 days.  - Discontinue Metformin            Return in about 3 months (around 6/11/2025).        All chronic conditions are stable at this time  Please continue to take all medication as directed  Please notify your pharmacy or our refill line of future refills needed.  Please return sooner than your next scheduled appointment with any concerns.        When your lab results return, we will call you with abnormal findings  You can also view this information in your MyChart, if you have an account.  Please call or [employer] message us with any questions you may have.          Tiana KEMP  [phone]

## 2025-03-12 DIAGNOSIS — J32.4 CHRONIC PANSINUSITIS: ICD-10-CM

## 2025-03-12 DIAGNOSIS — E66.01 MORBID OBESITY (H): ICD-10-CM

## 2025-03-12 LAB — VIT D+METAB SERPL-MCNC: 47 NG/ML (ref 20–50)

## 2025-03-12 NOTE — TELEPHONE ENCOUNTER
Budesonide      Last Written Prescription Date:  2/26/25  Last Fill Quantity: 360mL,   # refills: 11  Last Office Visit: 3/11/25  Future Office visit:    Next 5 appointments (look out 90 days)      Mar 26, 2025 3:10 PM  (Arrive by 2:55 PM)  Return Visit with Erin Danielle NP  [facility] ([facility] ) [address]  [facility] [address]  [phone]     May 05, 2025 3:30 PM  (Arrive by 3:15 PM)  Return Visit with Rajani Aguiar NP  [facility] ([facility] ) [address] [place] [address]  [phone]             Routing refill request to provider for review/approval because:

## 2025-03-12 NOTE — TELEPHONE ENCOUNTER
Fax from [facility] Zepbound can only be dispensed in a full box. Please send an rx with the quantity of 2ml so they can bill insurance.

## 2025-03-13 RX ORDER — BUDESONIDE 0.5 MG/2ML
INHALANT ORAL
Qty: 360 ML | Refills: 3 | Status: SHIPPED | OUTPATIENT
Start: 2025-03-13

## 2025-03-17 ENCOUNTER — TELEPHONE (OUTPATIENT)
Dept: FAMILY MEDICINE | Facility: OTHER | Age: 35
End: 2025-03-17

## 2025-03-17 DIAGNOSIS — E66.01 MORBID OBESITY (H): ICD-10-CM

## 2025-03-17 DIAGNOSIS — F41.9 ANXIETY: ICD-10-CM

## 2025-03-17 RX ORDER — SERTRALINE HYDROCHLORIDE 100 MG/1
TABLET, FILM COATED ORAL
Qty: 90 TABLET | Refills: 1 | Status: SHIPPED | OUTPATIENT
Start: 2025-03-17

## 2025-03-17 NOTE — TELEPHONE ENCOUNTER
Sertraline HCl 100 MG Oral Tablet (Zoloft)       Last Written Prescription Date:  7/9/24  Last Fill Quantity: 90,   # refills: 1  Last Office Visit: 3/11/25  Future Office visit:    Next 5 appointments (look out 90 days)      Mar 26, 2025 3:10 PM  (Arrive by 2:55 PM)  Return Visit with Erin Danielle NP  [facility] ([facility] ) [address] [facility] [address]  [phone]     Apr 18, 2025 11:00 AM  (Arrive by 10:45 AM)  Return Visit with Rajani Aguiar NP  [facility] [place] ([facility] [place] ) [address]  [place] [address]  [phone]     May 05, 2025 3:30 PM  (Arrive by 3:15 PM)  Return Visit with Rajani Aguiar NP  [facility] [place] ([facility] [place] ) [address]  [place] [address]  [phone]             Routing refill request to provider for review/approval because:  SSRIs Protocol [id number]/17/2025 09:05 AM   Protocol Details   Medication is active on med list and the sig matches. RN to manually verify dose and sig if red X/fail.    KING-7 score of less than 5 in past 6 months.         3/5/2024     2:21 PM 8/2/2024    10:26 AM 2/28/2025     7:19 AM   KING-7 SCORE   Total Score  6 (mild anxiety) 15 (severe anxiety)   Total Score 0 6 15        Patient-reported           tirzepatide-Weight Management (ZEPBOUND) 5 MG/0.5ML prefilled pen       Last Written Prescription Date:  3/14/25  Last Fill Quantity: 0.5ml,   # refills: 1  Last Office Visit: 3/11/25  Routing refill request to provider for review/approval because:  See note regarding last quantity filled.

## 2025-03-17 NOTE — TELEPHONE ENCOUNTER
Received a PA request from [email or web address] for tirzepatide-Weight Management (ZEPBOUND) 5 MG/0.5ML prefilled pen. Submitted on CMM. Waiting for a response.

## 2025-03-25 DIAGNOSIS — K21.9: ICD-10-CM

## 2025-03-25 RX ORDER — CHOLESTYRAMINE 4 G/9G
4 POWDER, FOR SUSPENSION ORAL
Qty: 1134 G | Refills: 1 | Status: SHIPPED | OUTPATIENT
Start: 2025-03-25

## 2025-03-25 NOTE — TELEPHONE ENCOUNTER
cholestyramine (QUESTRAN) 4 GM/DOSE powder     Last Written Prescription Date:  2-26-25  Last Fill Quantity: 378,   # refills: 1  Last Office Visit: 2-2-25  Future Office visit:    Next 5 appointments (look out 90 days)      Apr 02, 2025 3:10 PM  (Arrive by 2:55 PM)  Return Visit with Erin Danielle NP  [facility] ([facility] ) [address]  [place] [address]  [phone]     Apr 18, 2025 11:00 AM  (Arrive by 10:45 AM)  Return Visit with Rajani Aguiar NP  [facility] [place] ([facility] [place] ) [address]  [place] [address]  [phone]     May 05, 2025 3:30 PM  (Arrive by 3:15 PM)  Return Visit with Rajani Aguiar NP  [facility] [place] ([facility] [place] ) [address]  [place] [address]  [phone]     Jun 17, 2025 3:00 PM  (Arrive by 2:45 PM)  Adult Preventative Visit with Tiana Tucker CNP  [facility] ([facility] ) [address]  [place] [address]  [phone]             Routing refill request to provider for review/approval because:  THREE MONTH SUPPLY

## 2025-04-18 ENCOUNTER — OFFICE VISIT (OUTPATIENT)
Dept: OTOLARYNGOLOGY | Facility: OTHER | Age: 35
End: 2025-04-18
Attending: NURSE PRACTITIONER
Payer: COMMERCIAL

## 2025-04-18 VITALS
TEMPERATURE: 97.1 F | DIASTOLIC BLOOD PRESSURE: 74 MMHG | BODY MASS INDEX: 34.37 KG/M2 | OXYGEN SATURATION: 98 % | SYSTOLIC BLOOD PRESSURE: 122 MMHG | WEIGHT: 219 LBS | RESPIRATION RATE: 18 BRPM | HEART RATE: 72 BPM | HEIGHT: 67 IN

## 2025-04-18 DIAGNOSIS — Z98.890 S/P FESS (FUNCTIONAL ENDOSCOPIC SINUS SURGERY): Primary | ICD-10-CM

## 2025-04-18 ASSESSMENT — PAIN SCALES - GENERAL: PAINLEVEL_OUTOF10: NO PAIN (0)

## 2025-04-18 NOTE — LETTER
4/18/2025      Shilpi Moore  [address] [facility] [address]      Dear Colleague,    Thank you for referring your patient, Shilpi Moore, to the [facility]. Please see a copy of my visit note below.    Otolaryngology Note         Chief Complaint:     Patient presents with:  Allergies           History of Present Illness:     Shilpi Moore is a 34 year old female seen today for ***         Medications:     Current Outpatient Rx   Medication Sig Dispense Refill     acetaminophen (TYLENOL) 500 MG tablet Take 500-1,000 mg by mouth every 6 hours as needed for mild pain       ALBUTEROL IN        budesonide (PULMICORT) 0.5 MG/2ML neb solution SQUIRT ENTIRE VIAL INTO ISABELLA MD SALINE SOLUTION. MIX AND IRRIGATE EACH NOSTRIL UNTIL ENTIRE BOTTLE EMPTY TWICE DAILY 360 mL 3     cholestyramine (QUESTRAN) 4 GM/DOSE powder Take 4 g by mouth 3 times daily (with meals). 1134 g 1     EPINEPHrine (ANY BX GENERIC EQUIV) 0.3 MG/0.3ML injection 2-pack Inject 0.3 mLs (0.3 mg) into the muscle once as needed 2 each 11     fexofenadine (ALLEGRA) 60 MG tablet Take 60 mg by mouth 2 times daily       fluticasone (FLONASE) 50 MCG/ACT nasal spray SHAKE LIQUID AND USE 2 SPRAYS IN EACH NOSTRIL DAILY 16 g 11     hydrOXYzine HCl (ATARAX) 25 MG tablet Take 1 tablet (25 mg) by mouth 3 times daily as needed for anxiety. 60 tablet 1     losartan (COZAAR) 50 MG tablet TAKE 1 TABLET(50 MG) BY MOUTH DAILY 90 tablet 2     Multiple Vitamins-Minerals (QC WOMENS DAILY MULTIVITAMIN PO) Take 1 Dose by mouth daily       omeprazole (PRILOSEC) 40 MG DR capsule TAKE ONE CAPSULE BY MOUTH EVERY DAY 30 TO 60 MINUTES BEFORE A MEAL 90 capsule 3     ondansetron (ZOFRAN ODT) 4 MG ODT tab Take 1 tablet (4 mg) by mouth every 8 hours as needed for nausea. 30 tablet 5     sertraline (ZOLOFT) 100 MG tablet Take 0.5 Tablets by mouth two times a day. 90 tablet 1     VITAMIN D PO        ZEPBOUND 5 MG/0.5ML prefilled pen ADMINISTER 5 MG UNDER THE  SKIN EVERY 7 DAYS 2 mL 1     azelastine (ASTELIN) 0.1 % nasal spray Spray 2 sprays into both nostrils 2 times daily 30 mL 12            Allergies:     Allergies: Ibuprofen and Nsaids          Past Medical History:     Past Medical History:   Diagnosis Date     Allergic rhinitis 06/15/2011     Anxiety 09/17/2019     Benign essential hypertension 04/27/2017     Gastroesophageal reflux disease without esophagitis 09/21/2018     Major depressive disorder, recurrent episode, unspecified 06/15/2011     Obesity 04/27/2017     Reactive airway disease that is not asthma 02/12/2018     Seasonal allergic rhinitis due to pollen 04/27/2017     Uncomplicated asthma      Unspecified vitamin D deficiency 04/11/2014            Past Surgical History:     Past Surgical History:   Procedure Laterality Date     BIOPSY      Thyroid     CHOLECYSTECTOMY, LAPOROSCOPIC N/A 09/27/2020    [facility].     ENDOSCOPIC SINUS SURGERY N/A 01/03/2024    Procedure: BILATERAL ENDOSCOPIC SINUS SURGERY;  Surgeon: Daniella Schmitt MD;  Location: [facility]     ENT SURGERY      tonsillectomy     ESOPHAGOSCOPY, GASTROSCOPY, DUODENOSCOPY (EGD), COMBINED N/A 02/13/2025    Procedure: Upper endoscopy with biopsy;  Surgeon: Ravin Rosenberg MD;  Location: [facility]     OTHER SURGICAL HISTORY      I&D rt abscess axilla     SEPTOPLASTY, TURBINOPLASTY, COMBINED Bilateral 01/03/2024    Procedure: Septoplasty, Turbinate Reduction;  Surgeon: Daniella Schmitt MD;  Location: [facility]     SOFT TISSUE SURGERY      IND cyst right axilla     THYROIDECTOMY Left 10/25/2023    Procedure: Left Thyroid Lobectomy with 1.5 hours nerve monitoring;  Surgeon: Daniella Schmitt MD;  Location: [facility]       ENT family history reviewed         Social History:     Social History     Tobacco Use     Smoking status: Never     Smokeless tobacco: Never     Tobacco comments:     Passive exposure   Vaping Use     Vaping status: Never Used   Substance Use Topics     Alcohol use: Yes  "    Comment: Beer; Occasionally     Drug use: No            Review of Systems:     ROS: See HPI         Physical Exam:     Pulse 72   Temp 97.1  F (36.2  C) (Tympanic)   Resp 18   Ht 1.702 m (5' 7\")   Wt 99.3 kg (219 lb)   LMP 03/08/2025 (Approximate)   SpO2 98%   BMI 34.30 kg/m    General - The patient is well nourished and well developed, and appears to have good nutritional status.  Alert and oriented to person and place, answers questions and cooperates with examination appropriately.   Head and Face - Normocephalic and atraumatic, with no gross asymmetry noted.  The facial nerve is intact, with strong symmetric movements.  Voice and Breathing - The patient was breathing comfortably without the use of accessory muscles. There was no wheezing, stridor. The patients voice was clear and strong, and had appropriate pitch and quality.  Ears - External ear normal. Canals are patent. Right tympanic membrane is intact without effusion, retraction or mass. Left tympanic membrane is intact without effusion, retraction or mass.  Eyes - Extraocular movements intact, sclera were not icteric or injected.  Mouth - Examination of the oral cavity showed pink, healthy oral mucosa. Dentition in good condition. No lesions or ulcerations noted. The tongue was mobile and midline.   Throat - The walls of the oropharynx were smooth, pink, moist, symmetric, and had no lesions or ulcerations.  The tonsillar pillars and soft palate were symmetric. The uvula was midline on elevation.    Neck - Normal midline excursion of the laryngotracheal complex during swallowing.  Full range of motion on passive movement.  Palpation of the occipital, submental, submandibular, internal jugular chain, and supraclavicular nodes did not demonstrate any abnormal lymph nodes or masses.  Palpation of the thyroid was soft and smooth, with no nodules or goiter appreciated.  The trachea was mobile and midline.  Nose - External contour is symmetric, no " gross deflection or scars.  Nasal mucosa is pink and moist with no abnormal mucus.  The septum and turbinates were evaluated: ***.  No polyps, masses, or purulence noted on examination.         Assessment and Plan:       ICD-10-CM    1. S/P FESS (functional endoscopic sinus surgery)  Z98.890 lidocaine 2%-oxymetazoline 0.025% nasal solution 2 spray          Again, thank you for allowing me to participate in the care of your patient.        Sincerely,        Rajani Aguiar NP    Electronically signed

## 2025-04-18 NOTE — PROGRESS NOTES
Otolaryngology Note         Chief Complaint:     Patient presents with:  Allergies           History of Present Illness:     Shilpi Moore is a 34 year old female seen today for     Overall she has been doing well.  She was treated with a course of abx by Dr neal with improvement of malodorous smell in her nose.      Saw Dr Silveira, levothyroxine increased  No otorrhea or otalgia  She is currently rinsing with budesonide twice daily  Flonase and SLIT  Allegra daily     Follow up for allergy slit  Epi pen is not .           Medications:     Current Outpatient Rx   Medication Sig Dispense Refill    acetaminophen (TYLENOL) 500 MG tablet Take 500-1,000 mg by mouth every 6 hours as needed for mild pain      ALBUTEROL IN       budesonide (PULMICORT) 0.5 MG/2ML neb solution SQUIRT ENTIRE VIAL INTO ISABELLA MD SALINE SOLUTION. MIX AND IRRIGATE EACH NOSTRIL UNTIL ENTIRE BOTTLE EMPTY TWICE DAILY 360 mL 3    cholestyramine (QUESTRAN) 4 GM/DOSE powder Take 4 g by mouth 3 times daily (with meals). 1134 g 1    EPINEPHrine (ANY BX GENERIC EQUIV) 0.3 MG/0.3ML injection 2-pack Inject 0.3 mLs (0.3 mg) into the muscle once as needed 2 each 11    fexofenadine (ALLEGRA) 60 MG tablet Take 60 mg by mouth 2 times daily      fluticasone (FLONASE) 50 MCG/ACT nasal spray SHAKE LIQUID AND USE 2 SPRAYS IN EACH NOSTRIL DAILY 16 g 11    hydrOXYzine HCl (ATARAX) 25 MG tablet Take 1 tablet (25 mg) by mouth 3 times daily as needed for anxiety. 60 tablet 1    losartan (COZAAR) 50 MG tablet TAKE 1 TABLET(50 MG) BY MOUTH DAILY 90 tablet 2    Multiple Vitamins-Minerals (QC WOMENS DAILY MULTIVITAMIN PO) Take 1 Dose by mouth daily      omeprazole (PRILOSEC) 40 MG DR capsule TAKE ONE CAPSULE BY MOUTH EVERY DAY 30 TO 60 MINUTES BEFORE A MEAL 90 capsule 3    ondansetron (ZOFRAN ODT) 4 MG ODT tab Take 1 tablet (4 mg) by mouth every 8 hours as needed for nausea. 30 tablet 5    sertraline (ZOLOFT) 100 MG tablet Take 0.5 Tablets by mouth two times a  day. 90 tablet 1    VITAMIN D PO       ZEPBOUND 5 MG/0.5ML prefilled pen ADMINISTER 5 MG UNDER THE SKIN EVERY 7 DAYS 2 mL 1    azelastine (ASTELIN) 0.1 % nasal spray Spray 2 sprays into both nostrils 2 times daily 30 mL 12            Allergies:     Allergies: Ibuprofen and Nsaids          Past Medical History:     Past Medical History:   Diagnosis Date    Allergic rhinitis 06/15/2011    Anxiety 09/17/2019    Benign essential hypertension 04/27/2017    Gastroesophageal reflux disease without esophagitis 09/21/2018    Major depressive disorder, recurrent episode, unspecified 06/15/2011    Obesity 04/27/2017    Reactive airway disease that is not asthma 02/12/2018    Seasonal allergic rhinitis due to pollen 04/27/2017    Uncomplicated asthma     Unspecified vitamin D deficiency 04/11/2014            Past Surgical History:     Past Surgical History:   Procedure Laterality Date    BIOPSY      Thyroid    CHOLECYSTECTOMY, LAPOROSCOPIC N/A 09/27/2020    [facility].    ENDOSCOPIC SINUS SURGERY N/A 01/03/2024    Procedure: BILATERAL ENDOSCOPIC SINUS SURGERY;  Surgeon: Daniella Schmitt MD;  Location: [facility]    ENT SURGERY      tonsillectomy    ESOPHAGOSCOPY, GASTROSCOPY, DUODENOSCOPY (EGD), COMBINED N/A 02/13/2025    Procedure: Upper endoscopy with biopsy;  Surgeon: Ravin Rosenberg MD;  Location: [facility]    OTHER SURGICAL HISTORY      I&D rt abscess axilla    SEPTOPLASTY, TURBINOPLASTY, COMBINED Bilateral 01/03/2024    Procedure: Septoplasty, Turbinate Reduction;  Surgeon: Daniella Schmitt MD;  Location: [facility]    SOFT TISSUE SURGERY      IND cyst right axilla    THYROIDECTOMY Left 10/25/2023    Procedure: Left Thyroid Lobectomy with 1.5 hours nerve monitoring;  Surgeon: Daniella Schmitt MD;  Location: [facility]       ENT family history reviewed         Social History:     Social History     Tobacco Use    Smoking status: Never    Smokeless tobacco: Never    Tobacco comments:     Passive exposure   Vaping  "Use    Vaping status: Never Used   Substance Use Topics    Alcohol use: Yes     Comment: Beer; Occasionally    Drug use: No            Review of Systems:     ROS: See HPI         Physical Exam:     /74 (BP Location: Right arm, Patient Position: Sitting, Cuff Size: Adult Large)   Pulse 72   Temp 97.1  F (36.2  C) (Tympanic)   Resp 18   Ht 1.702 m (5' 7\")   Wt 99.3 kg (219 lb)   LMP 03/08/2025 (Approximate)   SpO2 98%   BMI 34.30 kg/m    General - The patient is well nourished and well developed, and appears to have good nutritional status.  Alert and oriented to person and place, answers questions and cooperates with examination appropriately.   Head and Face - Normocephalic and atraumatic, with no gross asymmetry noted.  The facial nerve is intact, with strong symmetric movements.  Voice and Breathing - The patient was breathing comfortably without the use of accessory muscles. There was no wheezing, stridor. The patients voice was clear and strong, and had appropriate pitch and quality.  Ears - External ear normal. Canals are patent. Right tympanic membrane is intact without effusion, retraction or mass. Left tympanic membrane is intact without effusion, retraction or mass.  Eyes - Extraocular movements intact, sclera were not icteric or injected.  Mouth - Examination of the oral cavity showed pink, healthy oral mucosa. Dentition in good condition. No lesions or ulcerations noted. The tongue was mobile and midline.   Throat - The walls of the oropharynx were smooth, pink, moist, symmetric, and had no lesions or ulcerations.  The tonsillar pillars and soft palate were symmetric. The uvula was midline on elevation.    Neck - Normal midline excursion of the laryngotracheal complex during swallowing.  Full range of motion on passive movement.  Palpation of the occipital, submental, submandibular, internal jugular chain, and supraclavicular nodes did not demonstrate any abnormal lymph nodes or masses.  " Palpation of the thyroid was soft and smooth, with no nodules or goiter appreciated.  The trachea was mobile and midline.  Nose - External contour is symmetric, no gross deflection or scars.  Nasal mucosa is pink and moist with no abnormal mucus.  The septum and turbinates were evaluated: ***.  No polyps, masses, or purulence noted on examination.         Assessment and Plan:       ICD-10-CM    1. S/P FESS (functional endoscopic sinus surgery)  Z98.890 lidocaine 2%-oxymetazoline 0.025% nasal solution 2 spray

## 2025-04-18 NOTE — PATIENT INSTRUCTIONS
Continue rinsing twice daily with budesonide  Continue flonase but may consider stopping if nosebleeds continue  Start aquaphor ointment to both nostrils 2-3 times daily   Follow up in 1 year, sooner as needed   Continue allergy immunotherapy      Thank you for allowing Rajani Aguiar and our ENT team to participate in your care.  If your medications are too expensive, please give the nurse a call.  We can possibly change this medication.  If you have a scheduling or an appointment question please contact our Health Unit Coordinator at their direct line [phone]  ALL nursing questions or concerns can be directed to your ENT nurse at: [phone] - [place]

## 2025-05-01 ENCOUNTER — TELEPHONE (OUTPATIENT)
Dept: ALLERGY | Facility: OTHER | Age: 35
End: 2025-05-01

## 2025-05-01 DIAGNOSIS — J30.1 SEASONAL ALLERGIC RHINITIS DUE TO POLLEN: Primary | ICD-10-CM

## 2025-06-09 ENCOUNTER — MYC MEDICAL ADVICE (OUTPATIENT)
Dept: FAMILY MEDICINE | Facility: OTHER | Age: 35
End: 2025-06-09

## 2025-06-09 DIAGNOSIS — E66.01 MORBID OBESITY (H): Primary | ICD-10-CM

## 2025-06-14 SDOH — HEALTH STABILITY: PHYSICAL HEALTH: ON AVERAGE, HOW MANY MINUTES DO YOU ENGAGE IN EXERCISE AT THIS LEVEL?: 20 MIN

## 2025-06-14 SDOH — HEALTH STABILITY: PHYSICAL HEALTH: ON AVERAGE, HOW MANY DAYS PER WEEK DO YOU ENGAGE IN MODERATE TO STRENUOUS EXERCISE (LIKE A BRISK WALK)?: 3 DAYS

## 2025-06-14 ASSESSMENT — SOCIAL DETERMINANTS OF HEALTH (SDOH): HOW OFTEN DO YOU GET TOGETHER WITH FRIENDS OR RELATIVES?: ONCE A WEEK

## 2025-06-17 ENCOUNTER — OFFICE VISIT (OUTPATIENT)
Dept: FAMILY MEDICINE | Facility: OTHER | Age: 35
End: 2025-06-17
Attending: NURSE PRACTITIONER
Payer: COMMERCIAL

## 2025-06-17 VITALS
HEIGHT: 66 IN | SYSTOLIC BLOOD PRESSURE: 120 MMHG | DIASTOLIC BLOOD PRESSURE: 78 MMHG | WEIGHT: 201.7 LBS | BODY MASS INDEX: 32.42 KG/M2 | RESPIRATION RATE: 20 BRPM | HEART RATE: 68 BPM | OXYGEN SATURATION: 100 % | TEMPERATURE: 98.2 F

## 2025-06-17 DIAGNOSIS — K21.9 GASTROESOPHAGEAL REFLUX DISEASE WITHOUT ESOPHAGITIS: ICD-10-CM

## 2025-06-17 DIAGNOSIS — E66.01 MORBID OBESITY (H): ICD-10-CM

## 2025-06-17 DIAGNOSIS — I10 BENIGN ESSENTIAL HYPERTENSION: ICD-10-CM

## 2025-06-17 DIAGNOSIS — F41.9 ANXIETY: ICD-10-CM

## 2025-06-17 DIAGNOSIS — Z00.00 ROUTINE GENERAL MEDICAL EXAMINATION AT A HEALTH CARE FACILITY: Primary | ICD-10-CM

## 2025-06-17 LAB
ALBUMIN SERPL BCG-MCNC: 4.2 G/DL (ref 3.5–5.2)
ALP SERPL-CCNC: 75 U/L (ref 40–150)
ALT SERPL W P-5'-P-CCNC: 97 U/L (ref 0–50)
ANION GAP SERPL CALCULATED.3IONS-SCNC: 10 MMOL/L (ref 7–15)
AST SERPL W P-5'-P-CCNC: 32 U/L (ref 0–45)
BASOPHILS # BLD AUTO: 0.1 10E3/UL (ref 0–0.2)
BASOPHILS NFR BLD AUTO: 1 %
BILIRUB SERPL-MCNC: 0.3 MG/DL
BUN SERPL-MCNC: 8.9 MG/DL (ref 6–20)
CALCIUM SERPL-MCNC: 9.5 MG/DL (ref 8.8–10.4)
CHLORIDE SERPL-SCNC: 106 MMOL/L (ref 98–107)
CREAT SERPL-MCNC: 0.71 MG/DL (ref 0.51–0.95)
EGFRCR SERPLBLD CKD-EPI 2021: >90 ML/MIN/1.73M2
EOSINOPHIL # BLD AUTO: 0.3 10E3/UL (ref 0–0.7)
EOSINOPHIL NFR BLD AUTO: 5 %
ERYTHROCYTE [DISTWIDTH] IN BLOOD BY AUTOMATED COUNT: 14 % (ref 10–15)
GLUCOSE SERPL-MCNC: 90 MG/DL (ref 70–99)
HCO3 SERPL-SCNC: 23 MMOL/L (ref 22–29)
HCT VFR BLD AUTO: 38.6 % (ref 35–47)
HGB BLD-MCNC: 12.8 G/DL (ref 11.7–15.7)
IMM GRANULOCYTES # BLD: 0 10E3/UL
IMM GRANULOCYTES NFR BLD: 0 %
LYMPHOCYTES # BLD AUTO: 2.6 10E3/UL (ref 0.8–5.3)
LYMPHOCYTES NFR BLD AUTO: 43 %
MCH RBC QN AUTO: 28.2 PG (ref 26.5–33)
MCHC RBC AUTO-ENTMCNC: 33.2 G/DL (ref 31.5–36.5)
MCV RBC AUTO: 85 FL (ref 78–100)
MONOCYTES # BLD AUTO: 0.5 10E3/UL (ref 0–1.3)
MONOCYTES NFR BLD AUTO: 9 %
NEUTROPHILS # BLD AUTO: 2.5 10E3/UL (ref 1.6–8.3)
NEUTROPHILS NFR BLD AUTO: 42 %
PLATELET # BLD AUTO: 265 10E3/UL (ref 150–450)
POTASSIUM SERPL-SCNC: 4.1 MMOL/L (ref 3.4–5.3)
PROT SERPL-MCNC: 7.1 G/DL (ref 6.4–8.3)
RBC # BLD AUTO: 4.54 10E6/UL (ref 3.8–5.2)
SODIUM SERPL-SCNC: 139 MMOL/L (ref 135–145)
TSH SERPL DL<=0.005 MIU/L-ACNC: 1.82 UIU/ML (ref 0.3–4.2)
WBC # BLD AUTO: 6 10E3/UL (ref 4–11)

## 2025-06-17 ASSESSMENT — ANXIETY QUESTIONNAIRES
GAD7 TOTAL SCORE: 0
6. BECOMING EASILY ANNOYED OR IRRITABLE: NOT AT ALL
2. NOT BEING ABLE TO STOP OR CONTROL WORRYING: NOT AT ALL
GAD7 TOTAL SCORE: 0
IF YOU CHECKED OFF ANY PROBLEMS ON THIS QUESTIONNAIRE, HOW DIFFICULT HAVE THESE PROBLEMS MADE IT FOR YOU TO DO YOUR WORK, TAKE CARE OF THINGS AT HOME, OR GET ALONG WITH OTHER PEOPLE: NOT DIFFICULT AT ALL
1. FEELING NERVOUS, ANXIOUS, OR ON EDGE: NOT AT ALL
5. BEING SO RESTLESS THAT IT IS HARD TO SIT STILL: NOT AT ALL
7. FEELING AFRAID AS IF SOMETHING AWFUL MIGHT HAPPEN: NOT AT ALL
3. WORRYING TOO MUCH ABOUT DIFFERENT THINGS: NOT AT ALL

## 2025-06-17 ASSESSMENT — PAIN SCALES - GENERAL: PAINLEVEL_OUTOF10: NO PAIN (0)

## 2025-06-17 ASSESSMENT — PATIENT HEALTH QUESTIONNAIRE - PHQ9
SUM OF ALL RESPONSES TO PHQ QUESTIONS 1-9: 0
5. POOR APPETITE OR OVEREATING: NOT AT ALL

## 2025-06-17 NOTE — PROGRESS NOTES
Preventive Care Visit  [facility]  Tiana Tucker, CNP, Family Medicine        Jun 17, 2025          Assessment & Plan         Routine general medical examination at a health care facility  - CBC with platelets and differential      Benign essential hypertension - stable  - Will resolve this problem as BP is normal  - TSH with free T4 reflex  - Comprehensive metabolic panel      Gastroesophageal reflux disease without esophagitis - stable  - Continue Prilosec as directed      Anxiety - stable  - Continue Zoloft as directed      Morbid obesity (H)  - improved  - Continue Zepbound  - TSH with free T4 reflex  - Comprehensive metabolic panel          Patient has been advised of split billing requirements and indicates understanding: Yes          Follow-up 6 months        Please continue to take all medication as directed  Please notify your pharmacy or our refill line of future refills needed.  Please return sooner than your next scheduled appointment with any concerns.        When your lab results return, we will call you with abnormal findings  You can also view this information in your MyChart, if you have an account.  Please call or [employer] message us with any questions you may have.          Tiana Tucker [facility]  [phone]           Shilpi is a 35 year old, presenting for the following:  Physical              6/17/2025     2:45 PM   Patient Reported Additional Medications   Patient reports taking the following new medications none            Hypertension Follow-up  - Off meds, weight loss has helped - will resolve this problem  Do you check your blood pressure regularly outside of the clinic? No   Are you following a low salt diet? Yes  Are your blood pressures ever more than 140 on the top number (systolic) OR more   than 90 on the bottom number (diastolic), for example 140/90? No          BP Readings from Last 2 Encounters:   06/17/25 120/78   04/18/25 122/74           Anxiety   How are you doing with your anxiety  since your last visit? Improved   Are you having other symptoms that might be associated with anxiety? No  Have you had a significant life event? No   Are you feeling depressed? No  Do you have any concerns with your use of alcohol or other drugs? No        Social History     Tobacco Use    Smoking status: Never    Smokeless tobacco: Never    Tobacco comments:     Passive exposure   Vaping Use    Vaping status: Never Used   Substance Use Topics    Alcohol use: Yes     Comment: Beer; Occasionally    Drug use: No             8/2/2024    10:26 AM 2/28/2025     7:19 AM 6/17/2025     3:08 PM   KING-7 SCORE   Total Score 6 (mild anxiety) 15 (severe anxiety)    Total Score 6 15  0       Patient-reported               8/2/2024    10:25 AM 2/28/2025     7:18 AM 6/17/2025     3:08 PM   PHQ   PHQ-9 Total Score 0 0  0   Q9: Thoughts of better off dead/self-harm past 2 weeks Not at all Not at all Not at all       Patient-reported           GERD  Stable  No acute concerns          Advance Care Planning  Discussed advance care planning with patient; however, patient declined at this time.              6/14/2025   General Health   How would you rate your overall physical health? (!) FAIR   Feel stress (tense, anxious, or unable to sleep) Only a little   (!) STRESS CONCERN              6/14/2025   Nutrition   Three or more servings of calcium each day? Yes   Diet: Carbohydrate counting   How many servings of fruit and vegetables per day? (!) 2-3   How many sweetened beverages each day? 0-1             6/14/2025   Exercise   Days per week of moderate/strenous exercise 3 days   Average minutes spent exercising at this level 20 min               6/14/2025   Social Factors   Frequency of gathering with friends or relatives Once a week   Worry food won't last until get money to buy more No   Food not last or not have enough money for food? No   Do you have housing? (Housing is defined as stable permanent housing and does not include  staying outside in a car, in a tent, in an abandoned building, in an overnight shelter, or couch-surfing.) Yes   Are you worried about losing your housing? No   Lack of transportation? No   Unable to get utilities (heat,electricity)? No             6/14/2025   Dental   Dentist two times every year? Yes           Today's PHQ-2 Score:       2/26/2025    10:00 AM   PHQ-2 ( 1999 [employer])   Q1: Little interest or pleasure in doing things 0   Q2: Feeling down, depressed or hopeless 0   PHQ-2 Score 0               6/14/2025   Substance Use   Alcohol more than 3/day or more than 7/wk No   Do you use any other substances recreationally? (!) CANNABIS PRODUCTS           Social History     Tobacco Use    Smoking status: Never    Smokeless tobacco: Never    Tobacco comments:     Passive exposure   Vaping Use    Vaping status: Never Used   Substance Use Topics    Alcohol use: Yes     Comment: Beer; Occasionally    Drug use: No           Mammogram Screening - Patient under 40 years of age: Routine Mammogram Screening not recommended.               6/14/2025   STI Screening   New sexual partner(s) since last STI/HIV test? No           History of abnormal Pap smear: No - age 30-64 HPV with reflex Pap every 5 years recommended            8/3/2021    10:09 AM 2/20/2018     4:25 PM 8/29/2014    12:00 AM   PAP / HPV   PAP (Historical)  NIL     PAP-ABSTRACT See Scanned Document   See Scanned Document                6/14/2025   Contraception/Family Planning   Questions about contraception or family planning No                         Past Medical History:   Diagnosis Date    Allergic rhinitis 06/15/2011    Anxiety 09/17/2019    Benign essential hypertension 04/27/2017    Gastroesophageal reflux disease without esophagitis 09/21/2018    Major depressive disorder, recurrent episode, unspecified 06/15/2011    Obesity 04/27/2017    Reactive airway disease that is not asthma 02/12/2018    Seasonal allergic rhinitis due to pollen 04/27/2017     Uncomplicated asthma     Unspecified vitamin D deficiency 2014         Past Surgical History:   Procedure Laterality Date    BIOPSY      Thyroid    CHOLECYSTECTOMY, LAPOROSCOPIC N/A 2020    [facility].    ENDOSCOPIC SINUS SURGERY N/A 2024    Procedure: BILATERAL ENDOSCOPIC SINUS SURGERY;  Surgeon: Daniella Schmitt MD;  Location: [facility]    ENT SURGERY      tonsillectomy    ESOPHAGOSCOPY, GASTROSCOPY, DUODENOSCOPY (EGD), COMBINED N/A 2025    Procedure: Upper endoscopy with biopsy;  Surgeon: Ravin Rosenberg MD;  Location: [facility]    OTHER SURGICAL HISTORY      I&D rt abscess axilla    SEPTOPLASTY, TURBINOPLASTY, COMBINED Bilateral 2024    Procedure: Septoplasty, Turbinate Reduction;  Surgeon: Daniella Schmitt MD;  Location: [facility]    SOFT TISSUE SURGERY      IND cyst right axilla    THYROIDECTOMY Left 10/25/2023    Procedure: Left Thyroid Lobectomy with 1.5 hours nerve monitoring;  Surgeon: Daniella Schmitt MD;  Location: [facility]         OB History    Para Term  AB Living   2 1 1 0 0 0   SAB IAB Ectopic Multiple Live Births   0 0 0 0 0      # Outcome Date GA Lbr Ap/2nd Weight Sex Type Anes PTL Lv   2             1 Term 19 39w3d   F             Lab work is in process  Labs reviewed in EPIC  BP Readings from Last 3 Encounters:   25 120/78   25 122/74   25 130/82    Wt Readings from Last 3 Encounters:   25 91.5 kg (201 lb 11.2 oz)   25 99.3 kg (219 lb)   25 108 kg (238 lb)                  Patient Active Problem List   Diagnosis    Vitamin D deficiency    ACP (advance care planning)    Seasonal allergic rhinitis due to pollen    Morbid obesity (H)    Reactive airway disease that is not asthma    Family history of melanoma    Gastroesophageal reflux disease without esophagitis    Anxiety    Lactation problem     Past Surgical History:   Procedure Laterality Date    BIOPSY      Thyroid    CHOLECYSTECTOMY,  LAPOROSCOPIC N/A 09/27/2020    [facility].    ENDOSCOPIC SINUS SURGERY N/A 01/03/2024    Procedure: BILATERAL ENDOSCOPIC SINUS SURGERY;  Surgeon: Daniella Schmitt MD;  [facility]: [facility]    ENT SURGERY      tonsillectomy    ESOPHAGOSCOPY, GASTROSCOPY, DUODENOSCOPY (EGD), COMBINED N/A 02/13/2025    Procedure: Upper endoscopy with biopsy;  Surgeon: Ravin Rosenberg MD;  Location: [facility]    OTHER SURGICAL HISTORY      I&D rt abscess axilla    SEPTOPLASTY, TURBINOPLASTY, COMBINED Bilateral 01/03/2024    Procedure: Septoplasty, Turbinate Reduction;  Surgeon: Daniella Schmitt MD;  Location: [facility]    SOFT TISSUE SURGERY      IND cyst right axilla    THYROIDECTOMY Left 10/25/2023    Procedure: Left Thyroid Lobectomy with 1.5 hours nerve monitoring;  Surgeon: Daniella Schmitt MD;  Location: [facility]       Social History     Tobacco Use    Smoking status: Never    Smokeless tobacco: Never    Tobacco comments:     Passive exposure   Substance Use Topics    Alcohol use: Yes     Comment: Beer; Occasionally     Family History   Problem Relation Age of Onset    Asthma Mother     Hypertension Father     Asthma Brother     Asthma Sister     Cancer Maternal Grandfather 62        Mesothelioma, cause of death    Cancer Other 28        Cousin; Melanoma, cause of death    Thyroid Disease No family hx of              Current Outpatient Medications   Medication Sig Dispense Refill    acetaminophen (TYLENOL) 500 MG tablet Take 500-1,000 mg by mouth every 6 hours as needed for mild pain      ALBUTEROL IN       budesonide (PULMICORT) 0.5 MG/2ML neb solution SQUIRT ENTIRE VIAL INTO ISABELLA MD SALINE SOLUTION. MIX AND IRRIGATE EACH NOSTRIL UNTIL ENTIRE BOTTLE EMPTY TWICE DAILY 360 mL 3    cholestyramine (QUESTRAN) 4 GM/DOSE powder Take 4 g by mouth 3 times daily (with meals). 1134 g 1    EPINEPHrine (ANY BX GENERIC EQUIV) 0.3 MG/0.3ML injection 2-pack Inject 0.3 mLs (0.3 mg) into the muscle once as needed 2 each 11     fexofenadine (ALLEGRA) 60 MG tablet Take 60 mg by mouth 2 times daily      fluticasone (FLONASE) 50 MCG/ACT nasal spray SHAKE LIQUID AND USE 2 SPRAYS IN EACH NOSTRIL DAILY 16 g 11    Multiple Vitamins-Minerals (QC WOMENS DAILY MULTIVITAMIN PO) Take 1 Dose by mouth daily      omeprazole (PRILOSEC) 40 MG DR capsule TAKE ONE CAPSULE BY MOUTH EVERY DAY 30 TO 60 MINUTES BEFORE A MEAL 90 capsule 3    ondansetron (ZOFRAN ODT) 4 MG ODT tab Take 1 tablet (4 mg) by mouth every 8 hours as needed for nausea. 30 tablet 5    sertraline (ZOLOFT) 100 MG tablet Take 0.5 Tablets by mouth two times a day. 90 tablet 1    SUBLINGUAL IMMUNOTHERAPY, SLIT, Place 1 drop under the tongue 3 times daily. 1 vial     tirzepatide-Weight Management (ZEPBOUND) 10 MG/0.5ML prefilled pen Inject 0.5 mLs (10 mg) subcutaneously every 7 days. 2 mL 2    VITAMIN D PO       hydrOXYzine HCl (ATARAX) 25 MG tablet Take 1 tablet (25 mg) by mouth 3 times daily as needed for anxiety. (Patient not taking: Reported on 6/17/2025) 60 tablet 1         Allergies   Allergen Reactions    Ibuprofen Anaphylaxis    Nsaids Swelling         Recent Labs   Lab Test 03/11/25  1511 08/02/24  1427 12/29/23  1537 08/27/20  0000 09/17/19  1429 12/11/18  1700 09/21/18  1357 04/04/17  0919 04/04/17  0919   A1C  --   --   --   --   --  5.2  --   --   --    LDL 67 95  --   --   --   --   --   --  101*   HDL 55 65  --   --   --   --   --   --  59   TRIG 180* 107  --   --   --   --   --   --  134   ALT 13 13 10   < > 31  --  33   < >  --    CR 0.70 0.75 0.70   < > 0.79  --  0.78  --  0.82   GFRESTIMATED >90 >90 >90   < > >90  --  88  --  84   GFRESTBLACK  --   --   --   --  >90  --  >90  --  >90  African American GFR Calc     POTASSIUM 3.9 4.0 4.1   < > 3.7  --  4.0  --  3.8   TSH 2.25 3.39  --    < > 0.70  --  2.40  --  2.19    < > = values in this interval not displayed.             Review of Systems  Constitutional, HEENT, cardiovascular, pulmonary, GI, , musculoskeletal, neuro,  "skin, endocrine and psych systems are negative, except as otherwise noted.           Objective    Exam  /78 (BP Location: Left arm, Patient Position: Sitting, Cuff Size: Adult Large)   Pulse 68   Temp 98.2  F (36.8  C) (Tympanic)   Resp 20   Ht 1.676 m (5' 6\")   Wt 91.5 kg (201 lb 11.2 oz)   SpO2 100%   BMI 32.56 kg/m     Estimated body mass index is 32.56 kg/m  as calculated from the following:    Height as of this encounter: 1.676 m (5' 6\").    Weight as of this encounter: 91.5 kg (201 lb 11.2 oz).          Physical Exam  GENERAL: alert and no distress  EYES: Eyes grossly normal to inspection, PERRL and conjunctivae and sclerae normal  HENT: ear canals and TM's normal, nose and mouth without ulcers or lesions  NECK: no adenopathy, no asymmetry, masses, or scars  RESP: lungs clear to auscultation - no rales, rhonchi or wheezes  CV: regular rate and rhythm, normal S1 S2, no S3 or S4, no murmur, click or rub, no peripheral edema  ABDOMEN: soft, nontender, no hepatosplenomegaly, no masses and bowel sounds normal  MS: no gross musculoskeletal defects noted, no edema          The longitudinal plan of care for the diagnosis(es)/condition(s) as documented were addressed during this visit. Due to the added complexity in care, I will continue to support Shilpi in the subsequent management and with ongoing continuity of care.         Signed Electronically by: Tiana Tucker CNP    "

## 2025-06-17 NOTE — PATIENT INSTRUCTIONS
Assessment & Plan         Routine general medical examination at a health care facility  - CBC with platelets and differential      Benign essential hypertension - stable  - Will resolve this problem as BP is normal  - TSH with free T4 reflex  - Comprehensive metabolic panel      Gastroesophageal reflux disease without esophagitis - stable  - Continue Prilosec as directed      Anxiety - stable  - Continue Zoloft as directed      Morbid obesity (H)  - improved  - Continue Zepbound  - TSH with free T4 reflex  - Comprehensive metabolic panel        Follow-up 6 months        Please continue to take all medication as directed  Please notify your pharmacy or our refill line of future refills needed.  Please return sooner than your next scheduled appointment with any concerns.        When your lab results return, we will call you with abnormal findings  You can also view this information in your MyChart, if you have an account.  Please call or [employer] message us with any questions you may have.          Tiana KEMP  [phone]       Preventive Care Advice   This is general advice given by our system to help you stay healthy. However, your care team may have specific advice just for you. Please talk to your care team about your preventive care needs.  Nutrition  Eat 5 or more servings of fruits and vegetables each day.  Try wheat bread, brown rice and whole grain pasta (instead of white bread, rice, and pasta).  Get enough calcium and vitamin D. Check the label on foods and aim for 100% of the RDA (recommended daily allowance).  Lifestyle  Exercise at least 150 minutes each week  (30 minutes a day, 5 days a week).  Do muscle strengthening activities 2 days a week. These help control your weight and prevent disease.  No smoking.  Wear sunscreen to prevent skin cancer.  Have a dental exam and cleaning every 6 months.  Yearly exams  See your health care team every year to talk about:  Any changes in your  KIDNEY STONE PREVENTION    Urinary tract stones form, when urine becomes to concentrated with certain chemicals and substances that can turn into solids.    To make this clearer think of salty water, which is full of sodium chloride, the chemical that makes up salt.  When enough water is present, the salt remains dissolved in the water.  When the water evaporates, the residual salt solidifies at the bottom, leaving the salt as a solid.    In the urinary tract, sodium chloride, is not a problem, but other chemicals are.  The most common chemical elements, in stones are calcium, oxalate, phosphates, and uric acid.  Less common are magnesium, ammonia, and cystine.  Once a small stone is started, the urine tends to coat the stone with more of the same chemical, or sometimes other chemicals join in to make a complex stone.  This is similar to a piece of sand in oyster.  The sand is an irritant and the oyster coats the sand particle - and a hermelinda is formed!    CALCIUM STONES  By far the most common stones are made of a combination of calcium and oxalate.  When, calcium and oxalate join (or precipitate), they bind so tightly that most acids are not able to dissolve them. Many foods, particularly dairy products, have calcium.  Other foods, such as spinach and green, leafy vegetables are rich in oxalate. The body also manufactures its own oxalate, and the body stores immense amounts of calcium in the bones.  Even if your diet has no calcium or oxalate, the urine will have some of these chemicals from the body stores.  When too much calcium or oxalate join in the urine, a calcium oxalate stone can form.  Other chemicals can join with calcium such as phosphate carbonate to also form stones.    In the past, advice to patients, who have suffered from calcium-containing kidney stones was to dramatic decrease the calcium in the diet, particularly milk and dairy products.  We are not so sure about that thinking today.  A recently  health.  Any medicines your care team has prescribed.  Preventive care, family planning, and ways to prevent chronic diseases.  Shots (vaccines)   HPV shots (up to age 26), if you've never had them before.  Hepatitis B shots (up to age 59), if you've never had them before.  COVID-19 shot: Get this shot when it's due.  Flu shot: Get a flu shot every year.  Tetanus shot: Get a tetanus shot every 10 years.  Pneumococcal, hepatitis A, and RSV shots: Ask your care team if you need these based on your risk.  Shingles shot (for age 50 and up)  General health tests  Diabetes screening:  Starting at age 35, Get screened for diabetes at least every 3 years.  If you are younger than age 35, ask your care team if you should be screened for diabetes.  Cholesterol test: At age 39, start having a cholesterol test every 5 years, or more often if advised.  Bone density scan (DEXA): At age 50, ask your care team if you should have this scan for osteoporosis (brittle bones).  Hepatitis C: Get tested at least once in your life.  STIs (sexually transmitted infections)  Before age 24: Ask your care team if you should be screened for STIs.  After age 24: Get screened for STIs if you're at risk. You are at risk for STIs (including HIV) if:  You are sexually active with more than one person.  You don't use condoms every time.  You or a partner was diagnosed with a sexually transmitted infection.  If you are at risk for HIV, ask about PrEP medicine to prevent HIV.  Get tested for HIV at least once in your life, whether you are at risk for HIV or not.  Cancer screening tests  Cervical cancer screening: If you have a cervix, begin getting regular cervical cancer screening tests starting at age 21.  Breast cancer scan (mammogram): If you've ever had breasts, begin having regular mammograms starting at age 40. This is a scan to check for breast cancer.  Colon cancer screening: It is important to start screening for colon cancer at age 45.  Have  published medical study showed that those patients, who took the highest amounts of calcium with ther food are less likely to develop a stone over the next 4 years than those who consume the smallest amount of the mineral!  Just the opposite of what we have been telling people.  The researcher's speculate that calcium in the diet help prevent the formation of kidney stone because of decreased the absorption of oxalate.  Unattached (or free) oxalate, which is found in foods such as chocolate, spinach, benson, tea, and peanuts are normally absorbed into the bloodstream from the gut.  Therefore, if the oxalate can be bound to the calcium in the gut, it will not be absorbed.  By maintaining an adequate amount of calcium in your diet, you might decrease the oxalate absorption from the gastrointestinal tract and thus, decreased urinary excretion of oxalate.    There are also medications that can prevent calcium absorption from the gastrointestinal tract.  Diuretics such as hydrochlorothiazide, promote absorption of calcium from the kidneys and decrease the concentration of urine.          URIC ACID STONES  After calcium-containing stones, uric acid is the most common cause of stones comprising about 10% of the total number seen in the [place].  Uric acid stones are very different from calcium stones.  Uric acid is created within the body, it is not absorbed from the gut like calcium or oxalate.  Uric acid comes from the breakdown of certain food products, particularly red meat.  Uric acid does not need to join with another substance, it will form stones by itself.  Uric acid forms in acid.  The more acid the urine, the more precipitation of uric acid.  Stones made of uric acid can be treated by 2 different methods, but often combined together, reducing, urine acidity, and lowering the production of uric acid in the body.  Lowering the acidity of the urine is accomplished by giving medications or chemicals which  a colonoscopy test every 10 years (or more often if you're at risk) Or, ask your provider about stool tests like a FIT test every year or Cologuard test every 3 years.  To learn more about your testing options, visit:   .  For help making a decision, visit:   [email or web address].[email or web address]/[email or web address].  Prostate cancer screening test: If you have a prostate, ask your care team if a prostate cancer screening test (PSA) at age 55 is right for you.  Lung cancer screening: If you are a current or former smoker ages 50 to 80, ask your care team if ongoing lung cancer screenings are right for you.  For informational purposes only. Not to replace the advice of your health care provider. Copyright   2023 [facility] [employer]. All rights reserved. Clinically reviewed by the  [employer]. [employer] [id number] - REV 01/24.  Substance Use Disorder: Care Instructions  Overview     You can improve your life and health by stopping your use of alcohol or drugs. When you don't drink or use drugs, you may feel and sleep better. You may get along better with your family, friends, and coworkers. There are medicines and programs that can help with substance use disorder.  How can you care for yourself at home?  Here are some ways to help you stay sober and prevent relapse.  If you have been given medicine to help keep you sober or reduce your cravings, be sure to take it exactly as prescribed.  Talk to your doctor about programs that can help you stop using drugs or drinking alcohol.  Do not keep alcohol or drugs in your home.  Plan ahead. Think about what you'll say if other people ask you to drink or use drugs. Try not to spend time with people who drink or use drugs.  Use the time and money spent on drinking or drugs to do something that's important to you.  Preventing a relapse  Have a plan to deal with relapse. Learn to recognize changes in your thinking that lead you to drink or use drugs. Get help before you start to drink  counteract acid (antI-acids) such as sodium bicarbonate or potassium citrate.  A drug called allopurinol or Zyloprim decreases the production of uric acid in the body by blocking, certain enzymes.  Some patients with calcium oxalate stones will be found to have too much uric acid in the urine.  We feel that many calcium stones form on smaller uric acid centers (similar to a piece of sand causing a hermelinda in and oyster).  By lowering uric acid levels in the urine, these patients form fewer stones.    Uric acid is also responsible for gout and gouty arthritis, when too much uric acid is found in the joints.  Not all patient's with gout get uric acid stones and vice versa.    WATER INTAKE  Whatever the cause of stones, drinking plenty of water prevents stone formation.  This occurs for 2 reasons.  First, water dilutes the urine making all chemicals less concentrated or less apt to precipitate.  Second, the increased flow may encourage the small stones to pass spontaneously before they reach a size that will not passed easily.  An adult should drink at least 8 glasses of water a day, particularly before bedtime when we tend to drink less in general.  This means that one might have to get up once or twice a night to urinate and drink another glass of water.  However, if stones are prevented, the time spent is worthwhile.  One should produce about 2 quarts of urine a day to prevent stones forming.  In those who exercise or do heavy physical labor, even more water intake is necessary.    If you have any questions regarding kidney stones, or what dietary modification, you should make, ask your physician.                                  LOW OXALATE DIET    · These foods contain little or no oxalate.  They may be eaten as desired.  FRUITS  Apples, apricots, avocados, bananas, cherries, coconut, grapefruit, grapes (seedless), eddie (except rind), mangos, melons, nectarines, peaches, pears, pineapple, plums (Green Edgar, Ross  Edgar)    VEGETABLES  Broccoli, brussel sprouts, cabbage, cauliflower, chives, lettuce, lima beans, mushrooms, onions, peas, potatoes (white), radishes, turnip, watercress    BEVERAGES  Apple juice, apricot juice, cider, grapefruit juice, lemonade, limeade, orange juice, pineapple juice, carbonated beverages, milk, beer, wine, nicho, kyle    MISCELLANEOUS  Butter, margarine, cheese, milk, egg noodles, macaroni, rice, spaghetti, white bread, cornflakes, oatmeal, jam, jelly, plain candies    · These foods contain a minimum amount of oxalate (might per serving).   Do not eat them in large amounts.  FRUITS  Currants, black (3/4 cup), Orange (1 small), Prunes, Italian (4)    VEGETABLES  Asparagus (2/3 cup), corn (1/2 cup), endive (3 large or 6 small leaves), mustard greens (1/2 cup), tomato (1 small)    BEVERAGES  Coffee (6 ounces), cranberry juice (4 ounces), grape juice (4 ounces), lose tea (4 ounces), tomato juice (4 ounces)    MISCELLANEOUS  Black pepper (2 tsp), cocoa powder (1 Its), fruitcake (1 slice), soy crackers (2)    · These foods contain a moderate amount of oxalate.  They may be eaten occasionally.  FRUITS  Blackberries (1/2 cup), blueberries (3/4 cup), dewberries (3/4 cup), currants, red/white (1/2 cup), plums, Damson (2), raspberries, red (1/2 cup), strawberries (2/3 cup), whortleberries (1/2 cup)    VEGETABLES  Beans, green/wax (1/2 cup), carrots (1/2 cup), celery (3/4 cup), eggplant (1/3 cup), green pepper (1 lg. shell), kale (3/4 cup cooked), parsley (1 Tbsp), rutabagas (1/2 cup), turnip greens (1/2 cup)      BEVERAGES  Ovaltine (1 Tbsp dry powder)    MISCELLANEOUS  Almonds (2 Tbsp), cashews (2 Tbsp), milk chocolate (1 ounce), pecans (2 Tbsp), walnuts (6 halves)    · These foods contain a high amount of oxalate (20-49 mg per serving).  Do NOT eat them.  FRUITS  Grapes, [place]    VEGETABLES  Baked beans in tomato sauce, dandelion greens, summer squash    MISCELLANEOUS  Chocolate (dark, plain, or  or use drugs again.  Try to stay away from situations, friends, or places that may lead you to drink or use drugs.  If you feel the need to drink alcohol or use drugs again, seek help right away. Call a trusted friend or family member. Some people get support from organizations such as Narcotics Anonymous or [employer] or from treatment facilities.  If you relapse, get help as soon as you can. Some people make a plan with another person that outlines what they want that person to do for them if they relapse. The plan usually includes how to handle the relapse and who to notify in case of relapse.  Don't give up. Remember that a relapse doesn't mean that you have failed. Use the experience to learn the triggers that lead you to drink or use drugs. Then quit again. Recovery is a lifelong process. Many people have several relapses before they are able to quit for good.  Follow-up care is a key part of your treatment and safety. Be sure to make and go to all appointments, and call your doctor if you are having problems. It's also a good idea to know your test results and keep a list of the medicines you take.  When should you call for help?   Call 251  anytime you think you may need emergency care. For example, call if you or someone else:    Has overdosed or has withdrawal signs. Be sure to tell the emergency workers that you are or someone else is using or trying to quit using drugs. Overdose or withdrawal signs may include:  Losing consciousness.  Seizure.  Seeing or hearing things that aren't there (hallucinations).     Is thinking or talking about suicide or harming others.   Where to get help 24 hours a day, 7 days a week   If you or someone you know talks about suicide, self-harm, a mental health crisis, a substance use crisis, or any other kind of emotional distress, get help right away. You can:    Call the Suicide and Crisis Lifeline at 382.     Call 4-509-404-TALK (1-543.754.3124).     Text HOME to 224946  "to access the Crisis Text Line.   Consider saving these numbers in your phone.  Go to [employer] for more information or to chat online.  Call your doctor now or seek immediate medical care if:    You are having withdrawal symptoms. These may include nausea or vomiting, sweating, shakiness, and anxiety.   Watch closely for changes in your health, and be sure to contact your doctor if:    You have a relapse.     You need more help or support to stop.   Where can you learn more?  Go to https://www.[employer].net/patiented  Enter H573 in the search box to learn more about \"Substance Use Disorder: Care Instructions.\"  Current as of: August 20, 2024  Content Version: 14.5 2024-2025 [employer].   Care instructions adapted under license by your healthcare professional. If you have questions about a medical condition or this instruction, always ask your healthcare professional. [employer] disclaims any warranty or liability for your use of this information.       " semi-sweet), grits (white corn), peanut butter, wheatgerm      · These foods contain over 50mg of oxalate per serving.  These foods should NEVER be eaten.  FRUITS  Gooseberries, lemon peel, lime peel, orange peel, raspberries (black), rhubarb    VEGETABLES  Beet greens (tops), beets, collards, leeks, okra, pokeweed, sorrel, spinach, sweet potato, Swiss chard    BEVERAGES  Tea    MISCELLANEOUS  Gelatin, soy beans, tofu                                  LEMONADE and KIDNEY STONES    Diet plays a strong role in the recurring formation of kidney stones.  Not drinking enough fluids leads to low urine output and over concentration of stone forming elements in the urine.  Naturally occurring compounds, such as citrate, inhibit, stones from forming.      Urologist frequently recommend drinking more than 2 quarts (64 ounces) of fluids daily.  In patients with low urinary citrate levels, citrate supplements are recommended.  However, some people find them expensive and distasteful.    Drinking lemonade is a tasty way to increase fluid and citrate intake.  Lemon juice was found to have 5 times the citrate levels of orange or grapefruit juice.  2 quarts of a daily lemonade mixture has about 6 grams of citric acid which is equivalent to taking potassium citrate medication.  It is also significantly cheaper.    In addition to lemonade, we also recommend increased fluids, balanced meals, reduce protein and salt intake to help reduce the chance of stone recurrence.      * Lemonade Recipe *     1/2 cup concentrated lemon juice     7-1/2 cups of water     Sugar or sugar substitute to taste                                   (makes 2 quarts)

## 2025-06-18 ENCOUNTER — RESULTS FOLLOW-UP (OUTPATIENT)
Dept: FAMILY MEDICINE | Facility: OTHER | Age: 35
End: 2025-06-18

## 2025-07-07 ENCOUNTER — MYC REFILL (OUTPATIENT)
Dept: FAMILY MEDICINE | Facility: OTHER | Age: 35
End: 2025-07-07

## 2025-07-07 DIAGNOSIS — E66.01 MORBID OBESITY (H): ICD-10-CM

## 2025-07-07 NOTE — TELEPHONE ENCOUNTER
Tirzepatide-Weight Management (Zepbound) 10 MG/0.5ML prefilled pen    Last Written Prescription Date:  06/09/2025  Last Fill Quantity: 2 ml,   # refills: 2  Last Office Visit: 06/17/2025  Future Office visit:       Routing refill request to provider for review/approval because:

## 2025-07-08 NOTE — TELEPHONE ENCOUNTER
Routing refill request to provider for review/approval because:  Drug not on the [facility] [facility] or [facility] refill protocol or controlled substance

## 2025-08-13 ENCOUNTER — TELEPHONE (OUTPATIENT)
Dept: ALLERGY | Facility: OTHER | Age: 35
End: 2025-08-13

## 2025-08-25 ASSESSMENT — ASTHMA QUESTIONNAIRES
QUESTION_2 LAST FOUR WEEKS HOW OFTEN HAVE YOU HAD SHORTNESS OF BREATH: NOT AT ALL
QUESTION_1 LAST FOUR WEEKS HOW MUCH OF THE TIME DID YOUR ASTHMA KEEP YOU FROM GETTING AS MUCH DONE AT WORK, SCHOOL OR AT HOME: NONE OF THE TIME
QUESTION_3 LAST FOUR WEEKS HOW OFTEN DID YOUR ASTHMA SYMPTOMS (WHEEZING, COUGHING, SHORTNESS OF BREATH, CHEST TIGHTNESS OR PAIN) WAKE YOU UP AT NIGHT OR EARLIER THAN USUAL IN THE MORNING: NOT AT ALL
QUESTION_5 LAST FOUR WEEKS HOW WOULD YOU RATE YOUR ASTHMA CONTROL: COMPLETELY CONTROLLED
ACT_TOTALSCORE: 25
QUESTION_4 LAST FOUR WEEKS HOW OFTEN HAVE YOU USED YOUR RESCUE INHALER OR NEBULIZER MEDICATION (SUCH AS ALBUTEROL): NOT AT ALL

## 2025-09-01 DIAGNOSIS — E66.01 MORBID OBESITY (H): ICD-10-CM

## 2025-09-02 ASSESSMENT — PAIN SCALES - PAIN ENJOYMENT GENERAL ACTIVITY SCALE (PEG)
PEG_TOTALSCORE: 6
INTERFERED_ENJOYMENT_LIFE: 7
AVG_PAIN_PASTWEEK: 4
INTERFERED_GENERAL_ACTIVITY: 7

## 2025-09-03 ENCOUNTER — OFFICE VISIT (OUTPATIENT)
Dept: FAMILY MEDICINE | Facility: OTHER | Age: 35
End: 2025-09-03
Attending: NURSE PRACTITIONER
Payer: COMMERCIAL

## 2025-09-03 VITALS
HEIGHT: 66 IN | SYSTOLIC BLOOD PRESSURE: 135 MMHG | BODY MASS INDEX: 29.09 KG/M2 | WEIGHT: 181 LBS | TEMPERATURE: 98.6 F | DIASTOLIC BLOOD PRESSURE: 88 MMHG | HEART RATE: 67 BPM | OXYGEN SATURATION: 98 % | RESPIRATION RATE: 18 BRPM

## 2025-09-03 DIAGNOSIS — M54.42 CHRONIC BILATERAL LOW BACK PAIN WITH BILATERAL SCIATICA: ICD-10-CM

## 2025-09-03 DIAGNOSIS — G89.29 CHRONIC BILATERAL LOW BACK PAIN WITH BILATERAL SCIATICA: ICD-10-CM

## 2025-09-03 DIAGNOSIS — M54.41 CHRONIC BILATERAL LOW BACK PAIN WITH BILATERAL SCIATICA: ICD-10-CM

## 2025-09-03 DIAGNOSIS — Q76.414: Primary | ICD-10-CM

## 2025-09-03 RX ORDER — TIRZEPATIDE 10 MG/.5ML
INJECTION, SOLUTION SUBCUTANEOUS
Qty: 2 ML | Refills: 1 | Status: SHIPPED | OUTPATIENT
Start: 2025-09-03

## 2025-09-03 ASSESSMENT — PAIN SCALES - GENERAL: PAINLEVEL_OUTOF10: MILD PAIN (3)

## (undated) DEVICE — CONNECTOR ERBEFLO 2 PORT 20325-215

## (undated) DEVICE — SU SILK 2-0 TIE 12X30" A305H

## (undated) DEVICE — LABEL STERILE PREPRINTED FOR OR FRRH01-2M

## (undated) DEVICE — DRAPE POUCH INSTRUMENT 1018

## (undated) DEVICE — SPONGE-NEURO PATTY 1/2" X 1" X-RAY DETECTABLE

## (undated) DEVICE — COVER LT HANDLE 2/PK 5160-2FG

## (undated) DEVICE — DRSG KERLIX SUPER SPONGE 6X6.75" 2585

## (undated) DEVICE — JELLY LUBRICATING SURGILUBE 2OZ TUBE

## (undated) DEVICE — GLOVE 6.5 PROTEXIS PI CLSC PF BD CUF STRL LF 12IN 2D72PL65X

## (undated) DEVICE — SOL WATER IRRIG 1000ML BOTTLE 2F7114

## (undated) DEVICE — GLOVE BIOGEL 6.5 LATEX

## (undated) DEVICE — BLANKET BAIR HUGGER LOWER BODY 42568

## (undated) DEVICE — SU SILK 2-0 SH 30" K833H

## (undated) DEVICE — TUBING SUCTION 20FT N620A

## (undated) DEVICE — PACK ENT CUSTOM SEN32ENMBI

## (undated) DEVICE — CANISTER SUCTION MEDI-VAC GUARDIAN 2000ML 90D 65651-220

## (undated) DEVICE — DRSG NASOPORE FIRM 4CM 5400-020-004

## (undated) DEVICE — ENDO SHEATH STORZ SHARPSITE ENDOSCRUB 30DEG 4MM 1912010

## (undated) DEVICE — SINUS BALL INFLATION DEVICE BID30

## (undated) DEVICE — SU DERMABOND ADVANCED .7ML DNX12

## (undated) DEVICE — ESU LIGASURE DISSECTOR EXACT LF2019

## (undated) DEVICE — DRSG STERI STRIP 1/2X4" R1547

## (undated) DEVICE — PACK BASIN SET UP SUTCNBSBBA

## (undated) DEVICE — GOWN SURG XL LVL 3 REINFORCED 9541

## (undated) DEVICE — BIN-ENT BIN BN07

## (undated) DEVICE — FORCEP COLON BIOPSY STD W/NEEDLE 160CM M00513390

## (undated) DEVICE — NIM PROBE PRASS STIMULATOR PROTECTED TIP 8225101

## (undated) DEVICE — ENDO BITE BLOCK ADULT OLYMPUS LATEX FREE MAJ-1632

## (undated) DEVICE — ESU GROUND PAD ADULT W/CORD E7507

## (undated) DEVICE — BLADE 15 RB BK SS STRL LF DISPLF DISP 371215

## (undated) DEVICE — TUBE ENDOTRACHEAL NIM TRIVANTAGE 7.0MM 8229707

## (undated) DEVICE — SOL NACL 0.9% IRRIG 1000ML BOTTLE 2F7124

## (undated) DEVICE — SYR 30ML SLIP TIP W/O NDL 302833

## (undated) DEVICE — MARKER SKIN 6 LABEL RULER CLIP CAP CONVERTORS GREEN 250FPRL

## (undated) DEVICE — NDL 27GA 1 1/2" 1188827112

## (undated) DEVICE — SU VICRYL 3-0 SH 27" UND J416H

## (undated) DEVICE — SLEEVE SCD EXPRESS KNEE LENGTH MED 9529

## (undated) DEVICE — ESU HOLSTER PLASTIC DISP E2400

## (undated) DEVICE — SUTURE SILK 0 TIE A306H A306H

## (undated) DEVICE — CLIP APPLIER 09 3/8" SM LIGACLIP MCS20

## (undated) DEVICE — SU VICRYL 4-0 P-3 18" UND J494G

## (undated) DEVICE — SPONGE COTTONOID 1/2X1" 80-1402

## (undated) DEVICE — PACK LAPAROTOMY CUSTOM SBA32LPMBG

## (undated) DEVICE — SYR 10ML FINGER CONTROL W/O NDL 309695

## (undated) DEVICE — APPLICATOR BNZN TNCT RYN SWBSTK NWVN SNGL APLS1106

## (undated) DEVICE — TRAY SKIN PREP POVIDONE/IODINE DYND70372

## (undated) DEVICE — DRSG NON ADHERING 3 X 8 TELFA 1238

## (undated) DEVICE — SU VICRYL 4-0 SH-1 27" J315H

## (undated) DEVICE — SU SILK 0 SH 30" K834H

## (undated) DEVICE — SPONGE PEANUT 3/8IN  7103

## (undated) DEVICE — SOL NACL 0.9% INJ 1000ML BAG 2B1324X

## (undated) DEVICE — ESU ELEC BLADE 2.75" COATED/INSULATED E1455

## (undated) DEVICE — BLADE QUADCUT 4.3MM X 13CM NON NAVIGATED 1884380HRE

## (undated) DEVICE — ELECTRODE-SUBDERMAL PAIRED

## (undated) DEVICE — SUCTION MANIFOLD NEPTUNE 2 SYS 1 PORT 702-025-000

## (undated) DEVICE — NASAL DILATION SYSTEM-RELIEVA TRACT 16X40MM

## (undated) DEVICE — TAPE MEDIPORE 4"X2YD 2864

## (undated) DEVICE — SPECIMEN BAG MEDIVAC SUCTION WHITE SOCK 65652-122

## (undated) DEVICE — ENDO SHEATH STORZ SHARPSITE ENDOSCRUB 0DEG 4MM 1912000

## (undated) RX ORDER — DEXAMETHASONE SODIUM PHOSPHATE 10 MG/ML
INJECTION, SOLUTION INTRAMUSCULAR; INTRAVENOUS
Status: DISPENSED
Start: 2023-10-25

## (undated) RX ORDER — ONDANSETRON 2 MG/ML
INJECTION INTRAMUSCULAR; INTRAVENOUS
Status: DISPENSED
Start: 2023-10-25

## (undated) RX ORDER — ONDANSETRON 2 MG/ML
INJECTION INTRAMUSCULAR; INTRAVENOUS
Status: DISPENSED
Start: 2024-01-03

## (undated) RX ORDER — DEXAMETHASONE SODIUM PHOSPHATE 10 MG/ML
INJECTION, SOLUTION INTRAMUSCULAR; INTRAVENOUS
Status: DISPENSED
Start: 2024-01-03

## (undated) RX ORDER — PROPOFOL 10 MG/ML
INJECTION, EMULSION INTRAVENOUS
Status: DISPENSED
Start: 2023-10-25

## (undated) RX ORDER — PROPOFOL 10 MG/ML
INJECTION, EMULSION INTRAVENOUS
Status: DISPENSED
Start: 2024-01-03

## (undated) RX ORDER — FENTANYL CITRATE 50 UG/ML
INJECTION, SOLUTION INTRAMUSCULAR; INTRAVENOUS
Status: DISPENSED
Start: 2023-10-25

## (undated) RX ORDER — FENTANYL CITRATE 50 UG/ML
INJECTION, SOLUTION INTRAMUSCULAR; INTRAVENOUS
Status: DISPENSED
Start: 2024-01-03

## (undated) RX ORDER — EPHEDRINE SULFATE 50 MG/ML
INJECTION, SOLUTION INTRAMUSCULAR; INTRAVENOUS; SUBCUTANEOUS
Status: DISPENSED
Start: 2023-10-25